# Patient Record
Sex: FEMALE | Race: WHITE | NOT HISPANIC OR LATINO | Employment: OTHER | URBAN - NONMETROPOLITAN AREA
[De-identification: names, ages, dates, MRNs, and addresses within clinical notes are randomized per-mention and may not be internally consistent; named-entity substitution may affect disease eponyms.]

---

## 2017-03-13 ENCOUNTER — TELEPHONE (OUTPATIENT)
Dept: GASTROENTEROLOGY | Facility: CLINIC | Age: 62
End: 2017-03-13

## 2017-03-13 NOTE — TELEPHONE ENCOUNTER
Patient called and said she is set up for colonoscopy with Dr. Cordero April 7 wants to know why she has to go to him to have colonoscopy's instead why can't you do them?

## 2017-03-14 NOTE — TELEPHONE ENCOUNTER
Called patient and told her to do this colonoscopy with Dr. Cordero since last time her polyp was so big if everything is ok this time Dr. Sparks will be glad to do her follow up colon scopes here .

## 2017-05-03 ENCOUNTER — LAB (OUTPATIENT)
Dept: LAB | Facility: HOSPITAL | Age: 62
End: 2017-05-03

## 2017-05-03 ENCOUNTER — OFFICE VISIT (OUTPATIENT)
Dept: GASTROENTEROLOGY | Facility: CLINIC | Age: 62
End: 2017-05-03

## 2017-05-03 VITALS
OXYGEN SATURATION: 98 % | DIASTOLIC BLOOD PRESSURE: 82 MMHG | SYSTOLIC BLOOD PRESSURE: 128 MMHG | HEIGHT: 64 IN | WEIGHT: 170 LBS | BODY MASS INDEX: 29.02 KG/M2 | HEART RATE: 120 BPM

## 2017-05-03 DIAGNOSIS — E66.9 OBESITY, UNSPECIFIED OBESITY SEVERITY, UNSPECIFIED OBESITY TYPE: ICD-10-CM

## 2017-05-03 DIAGNOSIS — R79.89 ABNORMAL LIVER FUNCTION TESTS: ICD-10-CM

## 2017-05-03 DIAGNOSIS — R79.89 ABNORMAL LIVER FUNCTION TESTS: Primary | ICD-10-CM

## 2017-05-03 DIAGNOSIS — F17.200 NEEDS SMOKING CESSATION EDUCATION: ICD-10-CM

## 2017-05-03 LAB
FERRITIN SERPL-MCNC: 47.3 NG/ML (ref 11.1–264)
HAV IGM SERPL QL IA: NEGATIVE
HBV SURFACE AB SER QL: <5
HBV SURFACE AB SER RIA-ACNC: ABNORMAL
HBV SURFACE AG SERPL QL IA: NEGATIVE
HCV AB SER DONR QL: NEGATIVE
HCV S/C RATIO: 0.14 (ref 0–0.99)

## 2017-05-03 PROCEDURE — 86803 HEPATITIS C AB TEST: CPT | Performed by: CLINICAL NURSE SPECIALIST

## 2017-05-03 PROCEDURE — 83516 IMMUNOASSAY NONANTIBODY: CPT | Performed by: CLINICAL NURSE SPECIALIST

## 2017-05-03 PROCEDURE — 82390 ASSAY OF CERULOPLASMIN: CPT | Performed by: CLINICAL NURSE SPECIALIST

## 2017-05-03 PROCEDURE — 86225 DNA ANTIBODY NATIVE: CPT | Performed by: CLINICAL NURSE SPECIALIST

## 2017-05-03 PROCEDURE — 87340 HEPATITIS B SURFACE AG IA: CPT | Performed by: CLINICAL NURSE SPECIALIST

## 2017-05-03 PROCEDURE — 82104 ALPHA-1-ANTITRYPSIN PHENO: CPT | Performed by: CLINICAL NURSE SPECIALIST

## 2017-05-03 PROCEDURE — 86038 ANTINUCLEAR ANTIBODIES: CPT | Performed by: CLINICAL NURSE SPECIALIST

## 2017-05-03 PROCEDURE — 82103 ALPHA-1-ANTITRYPSIN TOTAL: CPT | Performed by: CLINICAL NURSE SPECIALIST

## 2017-05-03 PROCEDURE — 86706 HEP B SURFACE ANTIBODY: CPT | Performed by: CLINICAL NURSE SPECIALIST

## 2017-05-03 PROCEDURE — 99213 OFFICE O/P EST LOW 20 MIN: CPT | Performed by: CLINICAL NURSE SPECIALIST

## 2017-05-03 PROCEDURE — 86709 HEPATITIS A IGM ANTIBODY: CPT | Performed by: CLINICAL NURSE SPECIALIST

## 2017-05-03 PROCEDURE — 36415 COLL VENOUS BLD VENIPUNCTURE: CPT | Performed by: CLINICAL NURSE SPECIALIST

## 2017-05-03 PROCEDURE — 86376 MICROSOMAL ANTIBODY EACH: CPT | Performed by: CLINICAL NURSE SPECIALIST

## 2017-05-03 PROCEDURE — 82728 ASSAY OF FERRITIN: CPT | Performed by: CLINICAL NURSE SPECIALIST

## 2017-05-04 LAB
ACTIN IGG SERPL-ACNC: 62 UNITS (ref 0–19)
ALP LIVER CFR SERPL: <1 UNITS (ref 0–20)
ANA SER QL: POSITIVE
CERULOPLASMIN SERPL-MCNC: 42.8 MG/DL (ref 19–39)
DEPRECATED MITOCHONDRIA M2 IGG SER-ACNC: <20 UNITS (ref 0–20)
DSDNA AB SER-ACNC: 13 IU/ML (ref 0–9)
Lab: ABNORMAL

## 2017-05-05 LAB
A1AT SERPL-MCNC: 233 MG/DL (ref 90–200)
PHENOTYPE: ABNORMAL

## 2017-05-10 ENCOUNTER — HOSPITAL ENCOUNTER (OUTPATIENT)
Dept: ULTRASOUND IMAGING | Facility: HOSPITAL | Age: 62
Discharge: HOME | End: 2017-05-10
Admitting: CLINICAL NURSE SPECIALIST

## 2017-05-10 PROCEDURE — 76705 ECHO EXAM OF ABDOMEN: CPT

## 2017-05-12 ENCOUNTER — TELEPHONE (OUTPATIENT)
Dept: GASTROENTEROLOGY | Facility: CLINIC | Age: 62
End: 2017-05-12

## 2017-05-23 ENCOUNTER — OFFICE VISIT (OUTPATIENT)
Dept: GASTROENTEROLOGY | Facility: CLINIC | Age: 62
End: 2017-05-23

## 2017-05-23 ENCOUNTER — APPOINTMENT (OUTPATIENT)
Dept: LAB | Facility: HOSPITAL | Age: 62
End: 2017-05-23

## 2017-05-23 VITALS
SYSTOLIC BLOOD PRESSURE: 132 MMHG | DIASTOLIC BLOOD PRESSURE: 80 MMHG | OXYGEN SATURATION: 98 % | WEIGHT: 177 LBS | HEART RATE: 98 BPM | BODY MASS INDEX: 30.22 KG/M2 | HEIGHT: 64 IN

## 2017-05-23 DIAGNOSIS — K76.0 FATTY LIVER: Primary | ICD-10-CM

## 2017-05-23 DIAGNOSIS — F17.200 TOBACCO USE DISORDER: ICD-10-CM

## 2017-05-23 DIAGNOSIS — R79.89 ABNORMAL LIVER FUNCTION TESTS: Primary | ICD-10-CM

## 2017-05-23 LAB
ALBUMIN SERPL-MCNC: 3.8 G/DL (ref 3.5–5)
ALBUMIN/GLOB SERPL: 0.8 G/DL (ref 1.1–2.5)
ALP SERPL-CCNC: 170 U/L (ref 24–120)
ALT SERPL W P-5'-P-CCNC: 43 U/L (ref 0–54)
ANION GAP SERPL CALCULATED.3IONS-SCNC: 11 MMOL/L (ref 4–13)
AST SERPL-CCNC: 51 U/L (ref 7–45)
BILIRUB SERPL-MCNC: 0.6 MG/DL (ref 0.1–1)
BUN BLD-MCNC: 18 MG/DL (ref 5–21)
BUN/CREAT SERPL: 26.1 (ref 7–25)
CALCIUM SPEC-SCNC: 9.1 MG/DL (ref 8.4–10.4)
CHLORIDE SERPL-SCNC: 107 MMOL/L (ref 98–110)
CO2 SERPL-SCNC: 24 MMOL/L (ref 24–31)
CREAT BLD-MCNC: 0.69 MG/DL (ref 0.5–1.4)
GFR SERPL CREATININE-BSD FRML MDRD: 87 ML/MIN/1.73
GLOBULIN UR ELPH-MCNC: 4.7 GM/DL
GLUCOSE BLD-MCNC: 141 MG/DL (ref 70–100)
POTASSIUM BLD-SCNC: 5.2 MMOL/L (ref 3.5–5.3)
PROT SERPL-MCNC: 8.5 G/DL (ref 6.3–8.7)
SODIUM BLD-SCNC: 142 MMOL/L (ref 135–145)

## 2017-05-23 PROCEDURE — 99213 OFFICE O/P EST LOW 20 MIN: CPT | Performed by: CLINICAL NURSE SPECIALIST

## 2017-05-23 PROCEDURE — 80053 COMPREHEN METABOLIC PANEL: CPT | Performed by: CLINICAL NURSE SPECIALIST

## 2017-05-23 PROCEDURE — 36415 COLL VENOUS BLD VENIPUNCTURE: CPT | Performed by: CLINICAL NURSE SPECIALIST

## 2017-05-30 DIAGNOSIS — R19.7 DIARRHEA, UNSPECIFIED TYPE: Primary | ICD-10-CM

## 2017-05-30 RX ORDER — MONTELUKAST SODIUM 4 MG/1
1 TABLET, CHEWABLE ORAL 2 TIMES DAILY
Qty: 180 TABLET | Refills: 3 | Status: SHIPPED | OUTPATIENT
Start: 2017-05-30 | End: 2018-08-08 | Stop reason: ALTCHOICE

## 2017-09-15 ENCOUNTER — LAB (OUTPATIENT)
Dept: LAB | Facility: HOSPITAL | Age: 62
End: 2017-09-15

## 2017-09-15 DIAGNOSIS — K76.0 FATTY LIVER: ICD-10-CM

## 2017-09-15 LAB
ALBUMIN SERPL-MCNC: 4.3 G/DL (ref 3.5–5)
ALBUMIN/GLOB SERPL: 1 G/DL (ref 1.1–2.5)
ALP SERPL-CCNC: 114 U/L (ref 24–120)
ALT SERPL W P-5'-P-CCNC: 49 U/L (ref 0–54)
ANION GAP SERPL CALCULATED.3IONS-SCNC: 11 MMOL/L (ref 4–13)
AST SERPL-CCNC: 38 U/L (ref 7–45)
BILIRUB SERPL-MCNC: 0.5 MG/DL (ref 0.1–1)
BUN BLD-MCNC: 14 MG/DL (ref 5–21)
BUN/CREAT SERPL: 20.9 (ref 7–25)
CALCIUM SPEC-SCNC: 9.4 MG/DL (ref 8.4–10.4)
CHLORIDE SERPL-SCNC: 109 MMOL/L (ref 98–110)
CO2 SERPL-SCNC: 22 MMOL/L (ref 24–31)
CREAT BLD-MCNC: 0.67 MG/DL (ref 0.5–1.4)
GFR SERPL CREATININE-BSD FRML MDRD: 89 ML/MIN/1.73
GLOBULIN UR ELPH-MCNC: 4.5 GM/DL
GLUCOSE BLD-MCNC: 100 MG/DL (ref 70–100)
POTASSIUM BLD-SCNC: 4.6 MMOL/L (ref 3.5–5.3)
PROT SERPL-MCNC: 8.8 G/DL (ref 6.3–8.7)
SODIUM BLD-SCNC: 142 MMOL/L (ref 135–145)

## 2017-09-15 PROCEDURE — 80053 COMPREHEN METABOLIC PANEL: CPT | Performed by: CLINICAL NURSE SPECIALIST

## 2017-09-15 PROCEDURE — 36415 COLL VENOUS BLD VENIPUNCTURE: CPT

## 2017-09-21 DIAGNOSIS — R79.89 ELEVATED LIVER FUNCTION TESTS: Primary | ICD-10-CM

## 2018-03-18 ENCOUNTER — APPOINTMENT (OUTPATIENT)
Dept: ULTRASOUND IMAGING | Facility: HOSPITAL | Age: 63
End: 2018-03-18

## 2018-03-18 ENCOUNTER — APPOINTMENT (OUTPATIENT)
Dept: GENERAL RADIOLOGY | Facility: HOSPITAL | Age: 63
End: 2018-03-18

## 2018-03-18 ENCOUNTER — APPOINTMENT (OUTPATIENT)
Dept: CT IMAGING | Facility: HOSPITAL | Age: 63
End: 2018-03-18

## 2018-03-18 ENCOUNTER — HOSPITAL ENCOUNTER (EMERGENCY)
Facility: HOSPITAL | Age: 63
Discharge: HOME | End: 2018-03-18
Attending: EMERGENCY MEDICINE | Admitting: EMERGENCY MEDICINE

## 2018-03-18 VITALS
RESPIRATION RATE: 16 BRPM | HEIGHT: 64 IN | OXYGEN SATURATION: 95 % | DIASTOLIC BLOOD PRESSURE: 89 MMHG | WEIGHT: 200 LBS | BODY MASS INDEX: 34.15 KG/M2 | SYSTOLIC BLOOD PRESSURE: 155 MMHG | HEART RATE: 98 BPM | TEMPERATURE: 97.9 F

## 2018-03-18 DIAGNOSIS — R07.9 CHEST PAIN, UNSPECIFIED TYPE: ICD-10-CM

## 2018-03-18 DIAGNOSIS — J44.1 COPD EXACERBATION (HCC): Primary | ICD-10-CM

## 2018-03-18 LAB
ANION GAP SERPL CALCULATED.3IONS-SCNC: 12 MMOL/L (ref 4–13)
BASOPHILS # BLD AUTO: 0.05 10*3/MM3 (ref 0–0.2)
BASOPHILS NFR BLD AUTO: 0.5 % (ref 0–2)
BUN BLD-MCNC: 15 MG/DL (ref 5–21)
BUN/CREAT SERPL: 21.1 (ref 7–25)
CALCIUM SPEC-SCNC: 9 MG/DL (ref 8.4–10.4)
CHLORIDE SERPL-SCNC: 111 MMOL/L (ref 98–110)
CO2 SERPL-SCNC: 19 MMOL/L (ref 24–31)
CREAT BLD-MCNC: 0.71 MG/DL (ref 0.5–1.4)
D DIMER PPP FEU-MCNC: 2.69 MG/L (FEU) (ref 0–0.5)
DEPRECATED RDW RBC AUTO: 50.2 FL (ref 40–54)
EOSINOPHIL # BLD AUTO: 0.04 10*3/MM3 (ref 0–0.7)
EOSINOPHIL NFR BLD AUTO: 0.4 % (ref 0–4)
ERYTHROCYTE [DISTWIDTH] IN BLOOD BY AUTOMATED COUNT: 17.7 % (ref 12–15)
GFR SERPL CREATININE-BSD FRML MDRD: 84 ML/MIN/1.73
GLUCOSE BLD-MCNC: 104 MG/DL (ref 70–100)
HCT VFR BLD AUTO: 37.5 % (ref 37–47)
HGB BLD-MCNC: 11.7 G/DL (ref 12–16)
IMM GRANULOCYTES # BLD: 0.13 10*3/MM3 (ref 0–0.03)
IMM GRANULOCYTES NFR BLD: 1.3 % (ref 0–5)
LYMPHOCYTES # BLD AUTO: 0.83 10*3/MM3 (ref 0.72–4.86)
LYMPHOCYTES NFR BLD AUTO: 8.6 % (ref 15–45)
MCH RBC QN AUTO: 24.5 PG (ref 28–32)
MCHC RBC AUTO-ENTMCNC: 31.2 G/DL (ref 33–36)
MCV RBC AUTO: 78.5 FL (ref 82–98)
MONOCYTES # BLD AUTO: 0.84 10*3/MM3 (ref 0.19–1.3)
MONOCYTES NFR BLD AUTO: 8.7 % (ref 4–12)
NEUTROPHILS # BLD AUTO: 7.77 10*3/MM3 (ref 1.87–8.4)
NEUTROPHILS NFR BLD AUTO: 80.5 % (ref 39–78)
NRBC BLD MANUAL-RTO: 0 /100 WBC (ref 0–0)
NT-PROBNP SERPL-MCNC: 171 PG/ML (ref 0–900)
PLATELET # BLD AUTO: 259 10*3/MM3 (ref 130–400)
PMV BLD AUTO: 9.4 FL (ref 6–12)
POTASSIUM BLD-SCNC: 4.3 MMOL/L (ref 3.5–5.3)
RBC # BLD AUTO: 4.78 10*6/MM3 (ref 4.2–5.4)
SODIUM BLD-SCNC: 142 MMOL/L (ref 135–145)
TROPONIN I SERPL-MCNC: <0.012 NG/ML (ref 0–0.03)
TROPONIN I SERPL-MCNC: <0.012 NG/ML (ref 0–0.03)
WBC NRBC COR # BLD: 9.66 10*3/MM3 (ref 4.8–10.8)

## 2018-03-18 PROCEDURE — 96374 THER/PROPH/DIAG INJ IV PUSH: CPT

## 2018-03-18 PROCEDURE — 71046 X-RAY EXAM CHEST 2 VIEWS: CPT

## 2018-03-18 PROCEDURE — 71275 CT ANGIOGRAPHY CHEST: CPT

## 2018-03-18 PROCEDURE — 99284 EMERGENCY DEPT VISIT MOD MDM: CPT

## 2018-03-18 PROCEDURE — 0 IOPAMIDOL PER 1 ML: Performed by: EMERGENCY MEDICINE

## 2018-03-18 PROCEDURE — 94799 UNLISTED PULMONARY SVC/PX: CPT

## 2018-03-18 PROCEDURE — 93010 ELECTROCARDIOGRAM REPORT: CPT | Performed by: INTERNAL MEDICINE

## 2018-03-18 PROCEDURE — 25010000002 DEXAMETHASONE PER 1 MG: Performed by: EMERGENCY MEDICINE

## 2018-03-18 PROCEDURE — 80048 BASIC METABOLIC PNL TOTAL CA: CPT | Performed by: EMERGENCY MEDICINE

## 2018-03-18 PROCEDURE — 93971 EXTREMITY STUDY: CPT

## 2018-03-18 PROCEDURE — 93971 EXTREMITY STUDY: CPT | Performed by: SURGERY

## 2018-03-18 PROCEDURE — 83880 ASSAY OF NATRIURETIC PEPTIDE: CPT | Performed by: EMERGENCY MEDICINE

## 2018-03-18 PROCEDURE — 84484 ASSAY OF TROPONIN QUANT: CPT | Performed by: EMERGENCY MEDICINE

## 2018-03-18 PROCEDURE — 93005 ELECTROCARDIOGRAM TRACING: CPT

## 2018-03-18 PROCEDURE — 85379 FIBRIN DEGRADATION QUANT: CPT | Performed by: EMERGENCY MEDICINE

## 2018-03-18 PROCEDURE — 94640 AIRWAY INHALATION TREATMENT: CPT

## 2018-03-18 PROCEDURE — 85025 COMPLETE CBC W/AUTO DIFF WBC: CPT | Performed by: EMERGENCY MEDICINE

## 2018-03-18 PROCEDURE — 93005 ELECTROCARDIOGRAM TRACING: CPT | Performed by: EMERGENCY MEDICINE

## 2018-03-18 RX ORDER — IPRATROPIUM BROMIDE AND ALBUTEROL SULFATE 2.5; .5 MG/3ML; MG/3ML
3 SOLUTION RESPIRATORY (INHALATION) ONCE
Status: COMPLETED | OUTPATIENT
Start: 2018-03-18 | End: 2018-03-18

## 2018-03-18 RX ORDER — DEXAMETHASONE SODIUM PHOSPHATE 10 MG/ML
10 INJECTION INTRAMUSCULAR; INTRAVENOUS ONCE
Status: COMPLETED | OUTPATIENT
Start: 2018-03-18 | End: 2018-03-18

## 2018-03-18 RX ORDER — PREDNISONE 20 MG/1
20 TABLET ORAL 2 TIMES DAILY
Qty: 10 TABLET | Refills: 0 | Status: SHIPPED | OUTPATIENT
Start: 2018-03-18 | End: 2018-08-08

## 2018-03-18 RX ORDER — DEXAMETHASONE 4 MG/1
10 TABLET ORAL ONCE
Status: DISCONTINUED | OUTPATIENT
Start: 2018-03-18 | End: 2018-03-18

## 2018-03-18 RX ORDER — OMEPRAZOLE 20 MG/1
20 CAPSULE, DELAYED RELEASE ORAL DAILY
COMMUNITY
End: 2019-05-21

## 2018-03-18 RX ORDER — METOPROLOL TARTRATE 50 MG/1
100 TABLET, FILM COATED ORAL NIGHTLY
COMMUNITY
End: 2019-08-10 | Stop reason: HOSPADM

## 2018-03-18 RX ORDER — AZITHROMYCIN 250 MG/1
250 TABLET, FILM COATED ORAL DAILY
Qty: 6 TABLET | Refills: 0 | Status: SHIPPED | OUTPATIENT
Start: 2018-03-18 | End: 2018-08-08

## 2018-03-18 RX ADMIN — IOPAMIDOL 120 ML: 755 INJECTION, SOLUTION INTRAVENOUS at 14:32

## 2018-03-18 RX ADMIN — IPRATROPIUM BROMIDE AND ALBUTEROL SULFATE 3 ML: 2.5; .5 SOLUTION RESPIRATORY (INHALATION) at 13:21

## 2018-03-18 RX ADMIN — DEXAMETHASONE SODIUM PHOSPHATE 10 MG: 10 INJECTION, SOLUTION INTRAMUSCULAR; INTRAVENOUS at 13:22

## 2018-03-18 NOTE — DISCHARGE INSTRUCTIONS
You should take ibuprofen every 6 hours for the next 3-5 days.  If your chest pain continues you should follow up with your primary care physician.  If it worsens in any way, you should return to the emergency department for further care.

## 2018-03-18 NOTE — ED PROVIDER NOTES
Subjective   Patient is a 62-year-old female who presents with chest pain.  History of COPD, fibromyalgia, hypercholesterolemia, hypertension.  Patient notes intermittent chest pain for the past 2 weeks.  Recent URI treated with antibiotics 1 month ago and improved.  Last evening, patient noted chest pain returned and has been constant since then.  Location is left upper chest with radiation to her left shoulder.  Denies any previous cardiac history.  Describes her pain as pressure.  Constant since last evening.  Denies any obvious exacerbating or relieving factors.  Denies any associated trauma.  Denies associated diaphoresis, nausea, vomiting.  Chest pain started patient was at rest last evening.  Nonexertional.  She does have chronic shortness of breath secondary to smoking and COPD and does use inhalers.  She notes some worsening shortness of breath and unclear if chronic or not.  She denies any fevers.  Denies any productive cough.  Denies any hemoptysis.        History provided by:  Patient      Review of Systems   Constitutional: Negative for fever.   HENT: Negative for sore throat.    Eyes: Negative for visual disturbance.   Respiratory: Positive for cough, shortness of breath and wheezing. Negative for chest tightness and stridor.    Cardiovascular: Positive for chest pain. Negative for palpitations and leg swelling.   Gastrointestinal: Negative for abdominal pain, constipation, diarrhea, nausea and vomiting.   Genitourinary: Negative for hematuria.   Musculoskeletal: Negative for back pain.   Skin: Negative for rash.   Neurological: Negative for dizziness, syncope, light-headedness and headaches.       Past Medical History:   Diagnosis Date   • Anemia    • Anxiety    • Arthritis    • Cancer of skin     BCC OF NOSE   • COPD (chronic obstructive pulmonary disease)    • Depression    • Fibromyalgia    • GERD (gastroesophageal reflux disease)    • Hx of colonic polyps    • Hypercholesteremia    • Hypertension         Allergies   Allergen Reactions   • Codeine    • Penicillins        Past Surgical History:   Procedure Laterality Date   • BREAST SURGERY     • CHOLECYSTECTOMY     • COLONOSCOPY  04/12/2017    Hemorrhoids and a few diverticula repeat exam in 3 years Dr. Cordero   • COLONOSCOPY W/ POLYPECTOMY  07/28/2016    Tubular adenoma proximal to the anus, 30 MM polyp in the cecum not resected, Diverticulosis ref to Dr. Cordero   • ENDOSCOPY  06/15/2016    Small hh, Negative for celiac disease   • RADICAL HYSTERECTOMY         Family History   Problem Relation Age of Onset   • Diabetes Sister    • Cancer Brother    • Colon cancer Neg Hx    • Colon polyps Neg Hx        Social History     Social History   • Marital status:      Social History Main Topics   • Smoking status: Current Every Day Smoker     Packs/day: 2.00   • Smokeless tobacco: Never Used   • Alcohol use No   • Drug use: No     Other Topics Concern   • Not on file       Lab Results (last 24 hours)     Procedure Component Value Units Date/Time    CBC & Differential [[id number]] Collected:  03/18/18 1320    Specimen:  Blood Updated:  03/18/18 1333    Narrative:       The following orders were created for panel order CBC & Differential.  Procedure                               Abnormality         Status                     ---------                               -----------         ------                     CBC Auto Differential[[id number]]        Abnormal            Final result                 Please view results for these tests on the individual orders.    D-dimer, Quantitative [[id number]]  (Abnormal) Collected:  03/18/18 1320    Specimen:  Blood from Arm, Left Updated:  03/18/18 1343     D-Dimer, Quantitative 2.69 (H) mg/L (FEU)     Narrative:       Reference Range is 0-0.50 mg/L FEU. However, results <0.50 mg/L FEU tends to rule out DVT or PE. Results >0.50 mg/L FEU are not useful in predicting absence or presence of DVT or PE.    CBC Auto Differential  [[id number]]  (Abnormal) Collected:  03/18/18 1320    Specimen:  Blood from Arm, Left Updated:  03/18/18 1333     WBC 9.66 10*3/mm3      RBC 4.78 10*6/mm3      Hemoglobin 11.7 (L) g/dL      Hematocrit 37.5 %      MCV 78.5 (L) fL      MCH 24.5 (L) pg      MCHC 31.2 (L) g/dL      RDW 17.7 (H) %      RDW-SD 50.2 fl      MPV 9.4 fL      Platelets 259 10*3/mm3      Neutrophil % 80.5 (H) %      Lymphocyte % 8.6 (L) %      Monocyte % 8.7 %      Eosinophil % 0.4 %      Basophil % 0.5 %      Immature Grans % 1.3 %      Neutrophils, Absolute 7.77 10*3/mm3      Lymphocytes, Absolute 0.83 10*3/mm3      Monocytes, Absolute 0.84 10*3/mm3      Eosinophils, Absolute 0.04 10*3/mm3      Basophils, Absolute 0.05 10*3/mm3      Immature Grans, Absolute 0.13 (H) 10*3/mm3      nRBC 0.0 /100 WBC     Basic Metabolic Panel [[id number]]  (Abnormal) Collected:  03/18/18 1344    Specimen:  Blood from Arm, Left Updated:  03/18/18 1404     Glucose 104 (H) mg/dL      BUN 15 mg/dL      Comment: Specimen hemolyzed. Results may be affected.        Creatinine 0.71 mg/dL      Sodium 142 mmol/L      Potassium 4.3 mmol/L      Comment: Specimen hemolyzed.  Results may be affected.        Chloride 111 (H) mmol/L      CO2 19.0 (L) mmol/L      Calcium 9.0 mg/dL      eGFR Non African Amer 84 mL/min/1.73      BUN/Creatinine Ratio 21.1     Anion Gap 12.0 mmol/L     Narrative:       GFR Normal >60  Chronic Kidney Disease <60  Kidney Failure <15    Troponin [[id number]]  (Normal) Collected:  03/18/18 1344    Specimen:  Blood from Arm, Left Updated:  03/18/18 1412     Troponin I <0.012 ng/mL     BNP [[id number]]  (Normal) Collected:  03/18/18 1344    Specimen:  Blood from Arm, Left Updated:  03/18/18 1412     proBNP 171.0 pg/mL     Troponin [[id number]]  (Normal) Collected:  03/18/18 1647    Specimen:  Blood from Arm, Right Updated:  03/18/18 7418     Troponin I <0.012 ng/mL           Objective   Physical Exam   Constitutional: She is oriented to person, place, and  time. She appears well-nourished. She is cooperative.  Non-toxic appearance. She does not have a sickly appearance. She does not appear ill. No distress.   HENT:   Head: Atraumatic.   Mouth/Throat: Oropharynx is clear and moist.   Eyes: EOM are normal. Pupils are equal, round, and reactive to light.   Neck: Normal range of motion. Neck supple. No JVD present.   Cardiovascular: Normal rate, regular rhythm and normal heart sounds.  Exam reveals no gallop and no friction rub.    No murmur heard.  Pulmonary/Chest: Effort normal. No stridor. No tachypnea. No respiratory distress. She has wheezes.   Abdominal: Soft. There is no tenderness.   Musculoskeletal: Normal range of motion. She exhibits no edema, tenderness or deformity.        Right knee: Normal.        Left knee: Normal.        Right ankle: Normal.        Left ankle: Normal.        Right lower leg: Normal.        Left lower leg: Normal.   Neurological: She is alert and oriented to person, place, and time. No cranial nerve deficit.   Skin: Skin is warm and dry. She is not diaphoretic.   Psychiatric: She has a normal mood and affect.   Nursing note and vitals reviewed.      ECG 12 Lead    Date/Time: 3/18/2018 1:01 PM  Performed by: MAYA SILVA  Authorized by: MAYA SILVA   Interpreted by physician  Comparison: not compared with previous ECG   Rhythm: sinus rhythm  Rate: normal  BPM: 98  QRS axis: normal  Conduction: conduction normal  ST Segments: ST segments normal  T Waves: T waves normal  Other: no other findings  Clinical impression: normal ECG               CT Angiogram Chest With Contrast   Final Result   1. No CT angiographic evidence of pulmonary embolus or acute aortic   pathology.    2. Pulmonary emphysema and chronic lung changes.   This report was finalized on 03/18/2018 14:45 by Dr. Carl Peguero MD.      XR Chest 2 View   Final Result   1. No acute cardiopulmonary process.           This report was finalized on 03/18/2018 14:18 by   "Carl Peguero MD.      US Venous Doppler Lower Extremity Left (duplex)    (Results Pending)       /82 (BP Location: Left arm, Patient Position: Lying)   Pulse 93   Temp 97.9 °F (36.6 °C) (Temporal Artery )   Resp 16   Ht 162.6 cm (64\")   Wt 90.7 kg (200 lb)   SpO2 94%   BMI 34.33 kg/m²     ED Course    ED Course   Value Comment By Time   D-dimer, Quant: (!) 2.69 (Reviewed) Milan Caballero MD 03/18 1350    This is a 62-year-old female who presents in setting of chest pain with recent URI.  Patient is well-appearing on exam.  Initially hypertensive and tachycardic but resolved.  EKG normal sinus.  Troponin negative.  Patient was complaining of left leg swelling but exam was unremarkable.  Her d-dimer was elevated.  Chest x-ray negative.  CTA negative for PE.  Venous Doppler ultrasound negative for DVT.  BNP normal.  We are pending repeat troponin. Milan Caballero MD 03/18 2131    Repeat troponin negative.  Patient remains well-appearing on reevaluation.  We will discharge home with prednisone and azithromycin.  Follow up with PCP if chest pain continues. Milan Caballero MD 03/18 6658       Medications   ipratropium-albuterol (DUO-NEB) nebulizer solution 3 mL (3 mL Nebulization Given 3/18/18 1321)   dexamethasone (DECADRON) injection 10 mg (10 mg Intravenous Given 3/18/18 1322)   iopamidol (ISOVUE-370) 76 % injection 200 mL (120 mL Intravenous Given 3/18/18 1432)           HEART Score (for prediction of 6-week risk of major adverse cardiac event) reviewed and/or performed as part of the patient evaluation and treatment planning process.  The result associated with this review/performance is: 2    PERC Rule (for pulmonary embolism) reviewed and/or performed as part of the patient evaluation and treatment planning process.  The result associated with this review/performance is: 1      MDM  Number of Diagnoses or Management Options  Chest pain, unspecified type: new and requires workup  COPD " exacerbation: new and requires workup     Amount and/or Complexity of Data Reviewed  Clinical lab tests: reviewed and ordered  Tests in the radiology section of CPT®: ordered and reviewed  Decide to obtain previous medical records or to obtain history from someone other than the patient: yes    Risk of Complications, Morbidity, and/or Mortality  Presenting problems: moderate  Diagnostic procedures: moderate  Management options: low    Patient Progress  Patient progress: improved      Final diagnoses:   COPD exacerbation   Chest pain, unspecified type          Milan Caballero MD  03/18/18 9547

## 2018-03-18 NOTE — ED NOTES
Pt reports off and on again chest pain for a couple of weeks. Pt reports being seen for this pain and was told she was ok. Pt reports having a URI that has been treated. Pt reports coughing due to COPD. Pt states her chest hurts, left shoulder and across upper shoulders posterior.     Jolly Blum  03/18/18 1797

## 2018-04-20 ENCOUNTER — APPOINTMENT (OUTPATIENT)
Dept: GENERAL RADIOLOGY | Facility: HOSPITAL | Age: 63
End: 2018-04-20

## 2018-04-20 ENCOUNTER — APPOINTMENT (OUTPATIENT)
Dept: ULTRASOUND IMAGING | Facility: HOSPITAL | Age: 63
End: 2018-04-20

## 2018-04-20 ENCOUNTER — HOSPITAL ENCOUNTER (EMERGENCY)
Facility: HOSPITAL | Age: 63
Discharge: HOME | End: 2018-04-20
Admitting: EMERGENCY MEDICINE

## 2018-04-20 VITALS
TEMPERATURE: 98.9 F | HEART RATE: 96 BPM | DIASTOLIC BLOOD PRESSURE: 98 MMHG | WEIGHT: 198 LBS | SYSTOLIC BLOOD PRESSURE: 137 MMHG | RESPIRATION RATE: 20 BRPM | HEIGHT: 64 IN | OXYGEN SATURATION: 97 % | BODY MASS INDEX: 33.8 KG/M2

## 2018-04-20 DIAGNOSIS — S82.831A CLOSED FRACTURE OF PROXIMAL END OF RIGHT FIBULA, UNSPECIFIED FRACTURE MORPHOLOGY, INITIAL ENCOUNTER: Primary | ICD-10-CM

## 2018-04-20 PROCEDURE — 93971 EXTREMITY STUDY: CPT | Performed by: SURGERY

## 2018-04-20 PROCEDURE — 73590 X-RAY EXAM OF LOWER LEG: CPT

## 2018-04-20 PROCEDURE — 93971 EXTREMITY STUDY: CPT

## 2018-04-20 PROCEDURE — 99284 EMERGENCY DEPT VISIT MOD MDM: CPT

## 2018-04-20 RX ORDER — OXYCODONE AND ACETAMINOPHEN 7.5; 325 MG/1; MG/1
1 TABLET ORAL ONCE
Status: COMPLETED | OUTPATIENT
Start: 2018-04-20 | End: 2018-04-20

## 2018-04-20 RX ORDER — OXYCODONE AND ACETAMINOPHEN 7.5; 325 MG/1; MG/1
1 TABLET ORAL EVERY 4 HOURS PRN
Qty: 12 TABLET | Refills: 0 | Status: SHIPPED | OUTPATIENT
Start: 2018-04-20 | End: 2018-08-08

## 2018-04-20 RX ADMIN — OXYCODONE AND ACETAMINOPHEN 1 TABLET: 7.5; 325 TABLET ORAL at 11:32

## 2018-04-20 NOTE — DISCHARGE INSTRUCTIONS
Cast or Splint Care, Adult  Casts and splints are supports that are worn to protect broken bones and other injuries. A cast or splint may hold a bone still and in the correct position while it heals. Casts and splints may also help ease pain, swelling, and muscle spasms.  A cast is a hardened support that is usually made of fiberglass or plaster. It is custom-fit to the body and it offers more protection than a splint. It cannot be taken off and put back on. A splint is a type of soft support that is usually made from cloth and elastic. It can be adjusted or taken off as needed.  You may need a cast or a splint if you:  · Have a broken bone.  · Have a soft-tissue injury.  · Need to keep an injured body part from moving (keep it immobile) after surgery.  How is this treated?  If you have a cast:   · Do not stick anything inside the cast to scratch your skin. Sticking something in the cast increases your risk of infection.  · Check the skin around the cast every day. Tell your health care provider about any concerns.  · You may put lotion on dry skin around the edges of the cast. Do not put lotion on the skin underneath the cast.  · Keep the cast clean.  · If the cast is not waterproof:  ¨ Do not let it get wet.  ¨ Cover it with a watertight covering when you take a bath or a shower.  If you have a splint:   · Wear it as told by your health care provider. Remove it only as told by your health care provider.  · Loosen the splint if your fingers or toes tingle, become numb, or turn cold and blue.  · Keep the splint clean.  · If the splint is not waterproof:  ¨ Do not let it get wet.  ¨ Cover it with a watertight covering when you take a bath or a shower.  Bathing   · Do not take baths or swim until your health care provider approves. Ask your health care provider if you can take showers. You may only be allowed to take sponge baths for bathing.  · If your cast or splint is not waterproof, cover it with a watertight  covering when you take a bath or shower.  Managing pain, stiffness, and swelling   · Move your fingers or toes often to avoid stiffness and to lessen swelling.  · Raise (elevate) the injured area above the level of your heart while sitting or lying down.  Safety   · Do not use the injured limb to support your body weight until your health care provider says that it is okay.  · Use crutches or other assistive devices as told by your health care provider.  General instructions   · Do not put pressure on any part of the cast or splint until it is fully hardened. This may take several hours.  · Return to your normal activities as told by your health care provider. Ask your health care provider what activities are safe for you.  · Take over-the-counter and prescription medicines only as told by your health care provider.  · Keep all follow-up visits as told by your health care provider. This is important.  Contact a health care provider if:  · Your cast or splint gets damaged.  · The skin around the cast gets red or raw.  · The skin under the cast is extremely itchy or painful.  · Your cast or splint feels very uncomfortable.  · Your cast or splint is too tight or too loose.  · Your cast becomes wet or it develops a soft spot or area.  · You get an object stuck under your cast.  Get help right away if:  · Your pain is getting worse.  · The injured area tingles, becomes numb, or turns cold and blue.  · The part of your body above or below the cast is swollen and discolored.  · You cannot feel or move your fingers or toes.  · There is fluid leaking through the cast.  · You have severe pain or pressure under the cast.  · You have trouble breathing.  · You have shortness of breath.  · You have chest pain.  This information is not intended to replace advice given to you by your health care provider. Make sure you discuss any questions you have with your health care provider.  Document Released: 12/15/2001 Document Revised:  07/08/2017 Document Reviewed: 06/10/2017  [employer] Interactive Patient Education © 2017 [employer]  Fibular Fracture Rehab  Ask your health care provider which exercises are safe for you. Do exercises exactly as told by your health care provider and adjust them as directed. It is normal to feel mild stretching, pulling, tightness, or discomfort as you do these exercises, but you should stop right away if you feel sudden pain or your pain gets worse. Do not begin these exercises until told by your health care provider.  Stretching and range of motion exercises  These exercises warm up your muscles and joints and improve the movement and flexibility of your lower leg. These exercises also help to relieve pain, numbness, and tingling.  Exercise A: Calf stretch, seated     1. Sit with your left / right leg extended.  2. Hold onto both ends of a belt or towel and loop it around the ball of your left / right foot. The ball of your foot is on the walking surface, right under your toes.  3. Keep your left / right ankle and foot relaxed and keep your knee straight while you use the belt or towel to pull your foot and ankle toward you. You should feel a gentle stretch behind your calf or knee.  4. Hold this position for __________ seconds.  Repeat __________ times. Complete this exercise __________ times a day.  Exercise B: Ankle alphabet     1. Sit with your left / right leg supported at the lower leg.  ¨ Do not rest your foot on anything.  ¨ Make sure your foot has room to move freely.  2. Think of your left / right foot as a paintbrush, and move your foot to trace each letter of the alphabet in the air. Keep your hip and knee still while you trace. Make the letters as large as you can without feeling discomfort.  3. Trace every letter from A to Z.  Repeat __________ times. Complete this exercise __________ times a day.  Exercise C: Passive ankle dorsiflexion     1. Remove your shoes and sit on a chair that is placed on  a non-carpeted surface.  2. Place your left / right foot on the floor, directly under your knee. Extend your other leg for support.  3. Keeping your heel down, slide your left / right foot back toward the chair until you feel a stretch at your ankle or calf. If you do not feel a stretch, slide your buttocks forward to the edge of the chair while still keeping your heel down.  4. Hold this stretch for __________ seconds.  Repeat __________ times. Complete this exercise __________ times a day.  Strengthening exercises  These exercises build strength and endurance in your lower leg. Endurance is the ability to use your muscles for a long time, even after they get tired.  Exercise D: Dorsiflexion     1. Secure a rubber exercise band or tubing to a fixed object, such as a table or pole.  2. Secure the other end of the band around your left / right foot.  3. Sit on the floor, facing the fixed object. The band should be slightly tense when your foot is relaxed.  4. Slowly use your ankle muscles to pull your foot toward you.  5. Hold this position for __________ seconds.  6. Slowly release the tension in the band and return your foot to the starting position.  Repeat __________ times. Complete this exercise __________ times a day.  Exercise E: Plantar flexion     1. Sit with your left / right leg extended.  2. Loop a rubber exercise band or tubing around the ball of your left / right foot. The ball of your foot is on the walking surface, right under your toes.  3. Hold onto both ends of the band or tubing.  4. Slowly push your foot and toes away from you, pointing them downward.  5. Hold this position for __________ seconds.  6. Control the tension in the band as you slowly return to the starting position.  Repeat __________ times. Complete this exercise __________ times a day.  Exercise F: Ankle eversion with band   1. Secure one end of a rubber exercise band or tubing to a fixed object, such as a table leg or a pole, that  will stay still when the band is pulled.  2. Loop the other end of the band around the middle of your left / right foot.  3. Sit on the floor, facing the fixed object. The band should be slightly tense when your foot is relaxed.  4. Make fists with your hands and put them between your knees. This will focus your strengthening at your ankle.  5. Leading with your little toe, slowly push your banded foot outward, away from your body. Make sure the band is positioned to resist the entire motion. Do not move your heel.  6. Hold this position for __________ seconds.  7. Control the tension in the band as you slowly return to the starting position.  Repeat __________ times. Complete this exercise __________ times a day.  Exercise G: Ankle inversion with band   1. Secure one end of a rubber exercise band or tubing to a fixed object, such as a table leg or a pole, that will stay still when the band is pulled.  2. Loop the other end of the band around your left / right foot, near your toes.  3. Sit on the floor, facing the fixed object. The band should be slightly tense when your foot is relaxed.  4. Make fists with your hands and put them between your knees. This will focus your strengthening at your ankle.  5. Leading with your big toe, slowly pull your banded foot inward, toward your other leg. Make sure the band is positioned to resist the entire motion. Do not move your heel.  6. Hold this position for __________ seconds.  7. Control the tension in the band as you slowly return to the starting position.  Repeat __________ times. Complete this exercise __________ times a day.  This information is not intended to replace advice given to you by your health care provider. Make sure you discuss any questions you have with your health care provider.  Document Released: 12/18/2006 Document Revised: 08/24/2017 Document Reviewed: 10/27/2016  [employer] Interactive Patient Education © 2017 [employer]

## 2018-04-20 NOTE — ED PROVIDER NOTES
Subjective   Pt is a  62-year-old white female presents with right lower leg pain for the past 2.5 weeks. She states that she had slipped and twisted her right lower leg at that time. She states that she was seen [facility] in [place], [place] and was advised that she had a sprain. She states that she did not have an xray at that time. She states that she has continued to have pain with ambulation and now has noticed bruising. She denies any injury since this incident.         History provided by:  Patient   used: No        Review of Systems   Constitutional: Negative.    HENT: Negative.    Eyes: Negative.    Respiratory: Negative.    Cardiovascular: Negative.    Gastrointestinal: Negative.    Endocrine: Negative.    Genitourinary: Negative.    Musculoskeletal: Negative.         Pt is a  62-year-old white female presents with right lower leg pain for the past 2.5 weeks. She states that she had slipped and twisted her right lower leg at that time. She states that she was [facility] in [place] and was advised that she had a sprain. She states that she did not have an xray at that time. She states that she has continued to have pain with ambulation and now has noticed bruising. She denies any injury since this incident.      Skin: Negative.    Allergic/Immunologic: Negative.    Neurological: Negative.    Hematological: Negative.    Psychiatric/Behavioral: Negative.    All other systems reviewed and are negative.      Past Medical History:   Diagnosis Date   • Anemia    • Anxiety    • Arthritis    • Cancer of skin     BCC OF NOSE   • COPD (chronic obstructive pulmonary disease)    • Depression    • Fibromyalgia    • GERD (gastroesophageal reflux disease)    • Hx of colonic polyps    • Hypercholesteremia    • Hypertension        Allergies   Allergen Reactions   • Codeine    • Penicillins        Past Surgical History:   Procedure Laterality Date   • BREAST SURGERY     • CHOLECYSTECTOMY     •  COLONOSCOPY  04/12/2017    Hemorrhoids and a few diverticula repeat exam in 3 years Dr. Cordero   • COLONOSCOPY W/ POLYPECTOMY  07/28/2016    Tubular adenoma proximal to the anus, 30 MM polyp in the cecum not resected, Diverticulosis ref to Dr. Cordero   • ENDOSCOPY  06/15/2016    Small hh, Negative for celiac disease   • RADICAL HYSTERECTOMY         Family History   Problem Relation Age of Onset   • Diabetes Sister    • Cancer Brother    • Colon cancer Neg Hx    • Colon polyps Neg Hx        Social History     Social History   • Marital status:      Social History Main Topics   • Smoking status: Current Every Day Smoker     Packs/day: 2.00   • Smokeless tobacco: Never Used   • Alcohol use No   • Drug use: No   • Sexual activity: Defer     Other Topics Concern   • Not on file       Prior to Admission medications    Medication Sig Start Date End Date Taking? Authorizing Provider   ARIPiprazole (ABILIFY) 10 MG tablet  9/19/16   Historical Provider, MD   azithromycin (ZITHROMAX) 250 MG tablet Take 1 tablet by mouth Daily. Take 2 tablets the first day, then 1 tablet daily for 4 days. 3/18/18   Milan Caballero MD   colestipol (COLESTID) 1 G tablet  9/24/16   Historical Provider, MD   colestipol (COLESTID) 1 G tablet Take 1 tablet by mouth 2 (Two) Times a Day. 5/30/17   YOLY Hannah   DULoxetine (CYMBALTA) 60 MG capsule  9/19/16   Historical Provider, MD   metoprolol tartrate (LOPRESSOR) 50 MG tablet Take 50 mg by mouth 2 (Two) Times a Day.    Historical Provider, MD   omeprazole (priLOSEC) 20 MG capsule Take 20 mg by mouth Daily.    Historical Provider, MD   predniSONE (DELTASONE) 20 MG tablet Take 1 tablet by mouth 2 (Two) Times a Day. 3/18/18   Milan Caballero MD   PROAIR  (90 BASE) MCG/ACT inhaler USE TWO PUFFS BY MOUTH EVERY 4 TO 6 HOURS AS NEEDED FOR WHEEZING 9/19/16   Historical Provider, MD   SYMBICORT 160-4.5 MCG/ACT inhaler  8/1/16   Historical Provider, MD       /98 Comment:  "'will take my b/p this evening'  Pulse 96   Temp 98.9 °F (37.2 °C)   Resp 20   Ht 162.6 cm (64\")   Wt 89.8 kg (198 lb)   SpO2 97%   BMI 33.99 kg/m²     Objective   Physical Exam   Constitutional: She is oriented to person, place, and time. She appears well-developed and well-nourished.   HENT:   Head: Normocephalic and atraumatic.   Eyes: Conjunctivae and EOM are normal. Pupils are equal, round, and reactive to light.   Neck: Normal range of motion. Neck supple. No tracheal deviation present. No thyromegaly present.   Cardiovascular: Normal rate, regular rhythm, normal heart sounds and intact distal pulses.    Pulmonary/Chest: Effort normal and breath sounds normal. No respiratory distress. She has no wheezes. She has no rales. She exhibits no tenderness.   Abdominal: Soft. Bowel sounds are normal.   Musculoskeletal:   RLE: moderate tenderness on palpation to anterior and posterior aspect of right tib/fib. She does have calf tenderness. Mild ecchymosis noted to anterior tib/fib area. Pedal pulses palp.    Neurological: She is alert and oriented to person, place, and time. She has normal reflexes. No cranial nerve deficit.   Skin: Skin is warm and dry.   Psychiatric: She has a normal mood and affect. Her behavior is normal. Judgment and thought content normal.   Nursing note and vitals reviewed.      Procedures         Lab Results (last 24 hours)     ** No results found for the last 24 hours. **          US Venous Doppler Lower Extremity Right (duplex)   Final Result   Impression: There is no evidence of deep venous thrombosis or   superficial thrombophlebitis of the right lower extremity.       Comments: Right lower extremity venous duplex exam was performed using   color Doppler flow, Doppler wave form analysis, and grayscale imaging,   with and without compression. There is no evidence of deep venous   thrombosis of the common femoral, superficial femoral, popliteal,   posterior tibial, and peroneal veins. " There is no thrombus identified in   the saphenofemoral junction or the greater saphenous vein. There is no   evidence of clot in the left common femoral vein.       This report was finalized on 04/20/2018 15:50 by Dr. Tim Carrasquillo MD.      XR Tibia Fibula 2 View Right   ED Interpretation   1. Minimally displaced proximal fibular fracture.   This report was finalized on 04/20/2018 11:55 by Dr. Carl Peguero MD.      Final Result   1. Minimally displaced proximal fibular fracture.   This report was finalized on 04/20/2018 11:55 by Dr. Carl Peguero MD.          ED Course  ED Course   Comment By Time   Reviewed pt and pt care plan with dr wu- also in agreement with pt care plan- pt will be placed in knee immobilizer have follow up with ortho. Ivan report completed #[id number]. No signs of suspicious activity noted. Will prescribe small amt of pain medication for acute pain. Reviewed side effects and potential for abuse YOLY Barry 04/20 1243          MDM    Final diagnoses:   Closed fracture of proximal end of right fibula, unspecified fracture morphology, initial encounter     \     YOLY Barry  04/20/18 0768

## 2018-05-14 DIAGNOSIS — K58.0 IRRITABLE BOWEL SYNDROME WITH DIARRHEA: Primary | ICD-10-CM

## 2018-05-14 RX ORDER — MONTELUKAST SODIUM 4 MG/1
TABLET, CHEWABLE ORAL
Qty: 180 TABLET | Refills: 3 | Status: SHIPPED | OUTPATIENT
Start: 2018-05-14 | End: 2018-11-26

## 2018-08-08 ENCOUNTER — OFFICE VISIT (OUTPATIENT)
Dept: GASTROENTEROLOGY | Facility: CLINIC | Age: 63
End: 2018-08-08

## 2018-08-08 ENCOUNTER — APPOINTMENT (OUTPATIENT)
Dept: LAB | Facility: HOSPITAL | Age: 63
End: 2018-08-08

## 2018-08-08 VITALS
SYSTOLIC BLOOD PRESSURE: 120 MMHG | HEART RATE: 90 BPM | OXYGEN SATURATION: 98 % | DIASTOLIC BLOOD PRESSURE: 80 MMHG | HEIGHT: 64 IN | BODY MASS INDEX: 32.95 KG/M2 | WEIGHT: 193 LBS

## 2018-08-08 DIAGNOSIS — R19.7 DIARRHEA IN ADULT PATIENT: Primary | ICD-10-CM

## 2018-08-08 LAB
ADV 40+41 DNA STL QL NAA+NON-PROBE: NOT DETECTED
ASTRO TYP 1-8 RNA STL QL NAA+NON-PROBE: NOT DETECTED
C CAYETANENSIS DNA STL QL NAA+NON-PROBE: NOT DETECTED
C DIFF TOX GENS STL QL NAA+PROBE: NOT DETECTED
CAMPY SP DNA.DIARRHEA STL QL NAA+PROBE: NOT DETECTED
CRYPTOSP STL CULT: NOT DETECTED
E COLI DNA SPEC QL NAA+PROBE: NOT DETECTED
E HISTOLYT AG STL-ACNC: NOT DETECTED
EAEC PAA PLAS AGGR+AATA ST NAA+NON-PRB: NOT DETECTED
EC STX1 + STX2 GENES STL NAA+PROBE: NOT DETECTED
EPEC EAE GENE STL QL NAA+NON-PROBE: NOT DETECTED
ETEC LTA+ST1A+ST1B TOX ST NAA+NON-PROBE: NOT DETECTED
G LAMBLIA DNA SPEC QL NAA+PROBE: NOT DETECTED
NOROVIRUS GI+II RNA STL QL NAA+NON-PROBE: NOT DETECTED
P SHIGELLOIDES DNA STL QL NAA+NON-PROBE: NOT DETECTED
RV RNA STL NAA+PROBE: NOT DETECTED
SALMONELLA DNA SPEC QL NAA+PROBE: NOT DETECTED
SAPO I+II+IV+V RNA STL QL NAA+NON-PROBE: NOT DETECTED
SHIGELLA SP+EIEC IPAH STL QL NAA+PROBE: NOT DETECTED
V CHOLERAE DNA SPEC QL NAA+PROBE: NOT DETECTED
VIBRIO DNA SPEC NAA+PROBE: NOT DETECTED
YERSINIA STL CULT: NOT DETECTED

## 2018-08-08 PROCEDURE — 87507 IADNA-DNA/RNA PROBE TQ 12-25: CPT | Performed by: CLINICAL NURSE SPECIALIST

## 2018-08-08 PROCEDURE — 87205 SMEAR GRAM STAIN: CPT | Performed by: CLINICAL NURSE SPECIALIST

## 2018-08-08 PROCEDURE — 99213 OFFICE O/P EST LOW 20 MIN: CPT | Performed by: CLINICAL NURSE SPECIALIST

## 2018-08-08 RX ORDER — METRONIDAZOLE 500 MG/1
500 TABLET ORAL 3 TIMES DAILY
Qty: 30 TABLET | Refills: 0 | Status: SHIPPED | OUTPATIENT
Start: 2018-08-08 | End: 2018-08-22

## 2018-08-08 NOTE — PROGRESS NOTES
Heather Russo  1955 8/8/2018  Chief Complaint   Patient presents with   • GI Problem     Chronic diarrhea     Subjective   HPI  Heather Russo is a 62 y.o. female who presents with a complaint of diarrhea up to 5-6 times per day that began 10 weeks ago moderate to severe at times. No specific triggers. No alleviating factors. She has associated fatigue and weakness. No fever . No wt loss. She says that she was on antibiotics prior for dental work prior to this starting. She denies any hx of Cdiff in the past. She has has small BRBPR on the tissue when she wipes. She says that she has known hemorrhoids as well that could be the source. She takes Colestid for chronic diarrhea that usually manages well and this is different she says. She has a hx of collagenous colitis years ago.    Past Medical History:   Diagnosis Date   • Anemia    • Anxiety    • Arthritis    • Cancer of skin     BCC OF NOSE   • COPD (chronic obstructive pulmonary disease) (CMS/HCC)    • Depression    • Fibromyalgia    • GERD (gastroesophageal reflux disease)    • Hx of colonic polyps    • Hypercholesteremia    • Hypertension      Past Surgical History:   Procedure Laterality Date   • BREAST SURGERY     • CHOLECYSTECTOMY     • COLONOSCOPY  04/12/2017    Hemorrhoids and a few diverticula repeat exam in 3 years Dr. Cordero   • COLONOSCOPY W/ POLYPECTOMY  07/28/2016    Tubular adenoma proximal to the anus, 30 MM polyp in the cecum not resected, Diverticulosis ref to Dr. Cordero   • ENDOSCOPY  06/15/2016    Small hh, Negative for celiac disease   • RADICAL HYSTERECTOMY       Outpatient Prescriptions Marked as Taking for the 8/8/18 encounter (Office Visit) with Becca Blackwood APRN   Medication Sig Dispense Refill   • ARIPiprazole (ABILIFY) 10 MG tablet      • colestipol (COLESTID) 1 g tablet TAKE 1 TABLET TWICE A  tablet 3   • DULoxetine (CYMBALTA) 60 MG capsule      • metoprolol tartrate (LOPRESSOR) 50 MG tablet Take 50 mg by mouth 2 (Two)  Times a Day.     • omeprazole (priLOSEC) 20 MG capsule Take 20 mg by mouth Daily.     • PROAIR  (90 BASE) MCG/ACT inhaler USE TWO PUFFS BY MOUTH EVERY 4 TO 6 HOURS AS NEEDED FOR WHEEZING  2   • SYMBICORT 160-4.5 MCG/ACT inhaler        Allergies   Allergen Reactions   • Codeine    • Penicillins      Social History     Social History   • Marital status:      Spouse name: N/A   • Number of children: N/A   • Years of education: N/A     Occupational History   • Not on file.     Social History Main Topics   • Smoking status: Current Every Day Smoker     Packs/day: 1.50   • Smokeless tobacco: Never Used   • Alcohol use No   • Drug use: No   • Sexual activity: Defer     Other Topics Concern   • Not on file     Social History Narrative   • No narrative on file     Family History   Problem Relation Age of Onset   • Diabetes Sister    • Cancer Brother    • Colon cancer Neg Hx    • Colon polyps Neg Hx      Health Maintenance   Topic Date Due   • ANNUAL PHYSICAL  08/11/1959   • PNEUMOCOCCAL VACCINE (19-64 MEDIUM RISK) (1 of 1 - PPSV23) 08/11/1975   • TDAP/TD VACCINES (1 - Tdap) 08/11/1975   • ZOSTER VACCINE (1 of 2) 08/11/2006   • MAMMOGRAM  05/02/2017   • PAP SMEAR  05/02/2017   • LIPID PANEL  05/03/2017   • INFLUENZA VACCINE  08/01/2018   • COLONOSCOPY  04/17/2027   • HEPATITIS C SCREENING  Completed     Review of Systems   Constitutional: Positive for fatigue. Negative for activity change, appetite change, chills, diaphoresis, fever and unexpected weight change.   HENT: Negative for ear pain, hearing loss, mouth sores, sore throat, trouble swallowing and voice change.    Eyes: Negative.    Respiratory: Negative for cough, choking, shortness of breath and wheezing.    Cardiovascular: Negative for chest pain and palpitations.   Gastrointestinal: Positive for blood in stool and diarrhea. Negative for abdominal pain, constipation, nausea and vomiting.   Endocrine: Negative for cold intolerance and heat intolerance.  "  Genitourinary: Negative for decreased urine volume, dysuria, frequency, hematuria and urgency.   Musculoskeletal: Negative for back pain, gait problem and myalgias.   Skin: Negative for color change, pallor and rash.   Allergic/Immunologic: Negative for food allergies and immunocompromised state.   Neurological: Positive for weakness. Negative for dizziness, tremors, seizures, syncope, light-headedness, numbness and headaches.   Hematological: Negative for adenopathy. Does not bruise/bleed easily.   Psychiatric/Behavioral: Negative for agitation and confusion. The patient is not nervous/anxious.    All other systems reviewed and are negative.    Objective   Vitals:    08/08/18 0855   BP: 120/80   Pulse: 90   SpO2: 98%   Weight: 87.5 kg (193 lb)   Height: 162.6 cm (64\")     Body mass index is 33.13 kg/m².  Physical Exam   Constitutional: She is oriented to person, place, and time. She appears well-developed and well-nourished.   HENT:   Head: Normocephalic and atraumatic.   Eyes: Pupils are equal, round, and reactive to light.   Neck: Normal range of motion. Neck supple. No tracheal deviation present.   Cardiovascular: Normal rate, regular rhythm and normal heart sounds.  Exam reveals no gallop and no friction rub.    No murmur heard.  Pulmonary/Chest: Effort normal and breath sounds normal. No respiratory distress. She has no wheezes. She has no rales. She exhibits no tenderness.   Abdominal: Soft. Bowel sounds are normal. She exhibits no distension. There is no hepatosplenomegaly. There is no tenderness. There is no rigidity, no rebound and no guarding.   Musculoskeletal: Normal range of motion. She exhibits no edema, tenderness or deformity.   Neurological: She is alert and oriented to person, place, and time. She has normal reflexes.   Skin: Skin is warm and dry. No rash noted. No pallor.   Psychiatric: She has a normal mood and affect. Her behavior is normal. Judgment and thought content normal. "     Assessment/Plan   Heather was seen today for gi problem.    Diagnoses and all orders for this visit:    Diarrhea in adult patient  -     Gastrointestinal Panel, PCR - Stool, Per Rectum  -     Fecal Leukocytes - Stool, Per Rectum  -     metroNIDAZOLE (FLAGYL) 500 MG tablet; Take 1 tablet by mouth 3 (Three) Times a Day.    I want her to start the Flagyl after she turn in her stool cultures.     EMR Dragon/transcription disclaimer: Much of this encounter note is electronic transcription/translation of spoken language to printed text. The electronic translation of spoken language may be erroneous, or at times, nonsensical words or phrases may be inadvertently transcribed. Although I have reviewed the note for such errors, some may still exist.  Body mass index is 33.13 kg/m².  No Follow-up on file.    Patient's Body mass index is 33.13 kg/m². BMI is above normal parameters. Recommendations include: nutrition counseling.      All risks, benefits, alternatives, and indications of colonoscopy and/or Endoscopy procedure have been discussed with the patient. Risks to include perforation of the colon requiring possible surgery or colostomy, risk of bleeding from biopsies or removal of colon tissue, possibility of missing a colon polyp or cancer, or adverse drug reaction.  Benefits to include the diagnosis and management of disease of the colon and rectum. Alternatives to include barium enema, radiographic evaluation, lab testing or no intervention. Pt verbalizes understanding and agrees.     Becca Blackwood, APRN  8/8/2018  9:12 AM      Obesity, Adult  Obesity is the condition of having too much total body fat. Being overweight or obese means that your weight is greater than what is considered healthy for your body size. Obesity is determined by a measurement called BMI. BMI is an estimate of body fat and is calculated from height and weight. For adults, a BMI of 30 or higher is considered obese.  Obesity can eventually  lead to other health concerns and major illnesses, including:  · Stroke.  · Coronary artery disease (CAD).  · Type 2 diabetes.  · Some types of cancer, including cancers of the colon, breast, uterus, and gallbladder.  · Osteoarthritis.  · High blood pressure (hypertension).  · High cholesterol.  · Sleep apnea.  · Gallbladder stones.  · Infertility problems.  What are the causes?  The main cause of obesity is taking in (consuming) more calories than your body uses for energy. Other factors that contribute to this condition may include:  · Being born with genes that make you more likely to become obese.  · Having a medical condition that causes obesity. These conditions include:  ¨ Hypothyroidism.  ¨ Polycystic ovarian syndrome (PCOS).  ¨ Binge-eating disorder.  ¨ Cushing syndrome.  · Taking certain medicines, such as steroids, antidepressants, and seizure medicines.  · Not being physically active (sedentary lifestyle).  · Living where there are limited places to exercise safely or buy healthy foods.  · Not getting enough sleep.  What increases the risk?  The following factors may increase your risk of this condition:  · Having a family history of obesity.  · Being a woman of -American descent.  · Being a man of  descent.  What are the signs or symptoms?  Having excessive body fat is the main symptom of this condition.  How is this diagnosed?  This condition may be diagnosed based on:  · Your symptoms.  · Your medical history.  · A physical exam. Your health care provider may measure:  ¨ Your BMI. If you are an adult with a BMI between 25 and less than 30, you are considered overweight. If you are an adult with a BMI of 30 or higher, you are considered obese.  ¨ The distances around your hips and your waist (circumferences). These may be compared to each other to help diagnose your condition.  ¨ Your skinfold thickness. Your health care provider may gently pinch a fold of your skin and measure it.  How  is this treated?  Treatment for this condition often includes changing your lifestyle. Treatment may include some or all of the following:  · Dietary changes. Work with your health care provider and a dietitian to set a weight-loss goal that is healthy and reasonable for you. Dietary changes may include eating:  ¨ Smaller portions. A portion size is the amount of a particular food that is healthy for you to eat at one time. This varies from person to person.  ¨ Low-calorie or low-fat options.  ¨ More whole grains, fruits, and vegetables.  · Regular physical activity. This may include aerobic activity (cardio) and strength training.  · Medicine to help you lose weight. Your health care provider may prescribe medicine if you are unable to lose 1 pound a week after 6 weeks of eating more healthily and doing more physical activity.  · Surgery. Surgical options may include gastric banding and gastric bypass. Surgery may be done if:  ¨ Other treatments have not helped to improve your condition.  ¨ You have a BMI of 40 or higher.  ¨ You have life-threatening health problems related to obesity.  Follow these instructions at home:     Eating and drinking     · Follow recommendations from your health care provider about what you eat and drink. Your health care provider may advise you to:  ¨ Limit fast foods, sweets, and processed snack foods.  ¨ Choose low-fat options, such as low-fat milk instead of whole milk.  ¨ Eat 5 or more servings of fruits or vegetables every day.  ¨ Eat at home more often. This gives you more control over what you eat.  ¨ Choose healthy foods when you eat out.  ¨ Learn what a healthy portion size is.  ¨ Keep low-fat snacks on hand.  ¨ Avoid sugary drinks, such as soda, fruit juice, iced tea sweetened with sugar, and flavored milk.  ¨ Eat a healthy breakfast.  · Drink enough water to keep your urine clear or pale yellow.  · Do not go without eating for long periods of time (do not fast) or follow a  fad diet. Fasting and fad diets can be unhealthy and even dangerous.  Physical Activity   · Exercise regularly, as told by your health care provider. Ask your health care provider what types of exercise are safe for you and how often you should exercise.  · Warm up and stretch before being active.  · Cool down and stretch after being active.  · Rest between periods of activity.  Lifestyle   · Limit the time that you spend in front of your TV, computer, or video game system.  · Find ways to reward yourself that do not involve food.  · Limit alcohol intake to no more than 1 drink a day for nonpregnant women and 2 drinks a day for men. One drink equals 12 oz of beer, 5 oz of wine, or 1½ oz of hard liquor.  General instructions   · Keep a weight loss journal to keep track of the food you eat and how much you exercise you get.  · Take over-the-counter and prescription medicines only as told by your health care provider.  · Take vitamins and supplements only as told by your health care provider.  · Consider joining a support group. Your health care provider may be able to recommend a support group.  · Keep all follow-up visits as told by your health care provider. This is important.  Contact a health care provider if:  · You are unable to meet your weight loss goal after 6 weeks of dietary and lifestyle changes.  This information is not intended to replace advice given to you by your health care provider. Make sure you discuss any questions you have with your health care provider.  Document Released: 01/25/2006 Document Revised: 05/22/2017 Document Reviewed: 10/05/2016  [employer] Interactive Patient Education © 2017 [employer]      If you smoke or use tobacco, 4 minutes reading provided  Steps to Quit Smoking  Smoking tobacco can be harmful to your health and can affect almost every organ in your body. Smoking puts you, and those around you, at risk for developing many serious chronic diseases. Quitting smoking is  difficult, but it is one of the best things that you can do for your health. It is never too late to quit.  What are the benefits of quitting smoking?  When you quit smoking, you lower your risk of developing serious diseases and conditions, such as:  · Lung cancer or lung disease, such as COPD.  · Heart disease.  · Stroke.  · Heart attack.  · Infertility.  · Osteoporosis and bone fractures.  Additionally, symptoms such as coughing, wheezing, and shortness of breath may get better when you quit. You may also find that you get sick less often because your body is stronger at fighting off colds and infections. If you are pregnant, quitting smoking can help to reduce your chances of having a baby of low birth weight.  How do I get ready to quit?  When you decide to quit smoking, create a plan to make sure that you are successful. Before you quit:  · Pick a date to quit. Set a date within the next two weeks to give you time to prepare.  · Write down the reasons why you are quitting. Keep this list in places where you will see it often, such as on your bathroom mirror or in your car or wallet.  · Identify the people, places, things, and activities that make you want to smoke (triggers) and avoid them. Make sure to take these actions:  ¨ Throw away all cigarettes at home, at work, and in your car.  ¨ Throw away smoking accessories, such as ashtrays and lighters.  ¨ Clean your car and make sure to empty the ashtray.  ¨ Clean your home, including curtains and carpets.  · Tell your family, friends, and coworkers that you are quitting. Support from your loved ones can make quitting easier.  · Talk with your health care provider about your options for quitting smoking.  · Find out what treatment options are covered by your health insurance.  What strategies can I use to quit smoking?  Talk with your healthcare provider about different strategies to quit smoking. Some strategies include:  · Quitting smoking altogether instead  of gradually lessening how much you smoke over a period of time. Research shows that quitting “cold turkey” is more successful than gradually quitting.  · Attending in-person counseling to help you build problem-solving skills. You are more likely to have success in quitting if you attend several counseling sessions. Even short sessions of 10 minutes can be effective.  · Finding resources and support systems that can help you to quit smoking and remain smoke-free after you quit. These resources are most helpful when you use them often. They can include:  ¨ Online chats with a counselor.  ¨ Telephone quitlines.  ¨ Printed self-help materials.  ¨ Support groups or group counseling.  ¨ Text messaging programs.  ¨ Mobile phone applications.  · Taking medicines to help you quit smoking. (If you are pregnant or breastfeeding, talk with your health care provider first.) Some medicines contain nicotine and some do not. Both types of medicines help with cravings, but the medicines that include nicotine help to relieve withdrawal symptoms. Your health care provider may recommend:  ¨ Nicotine patches, gum, or lozenges.  ¨ Nicotine inhalers or sprays.  ¨ Non-nicotine medicine that is taken by mouth.  Talk with your health care provider about combining strategies, such as taking medicines while you are also receiving in-person counseling. Using these two strategies together makes you more likely to succeed in quitting than if you used either strategy on its own.  If you are pregnant or breastfeeding, talk with your health care provider about finding counseling or other support strategies to quit smoking. Do not take medicine to help you quit smoking unless told to do so by your health care provider.  What things can I do to make it easier to quit?  Quitting smoking might feel overwhelming at first, but there is a lot that you can do to make it easier. Take these important actions:  · Reach out to your family and friends and ask  that they support and encourage you during this time. Call telephone quitlines, reach out to support groups, or work with a counselor for support.  · Ask people who smoke to avoid smoking around you.  · Avoid places that trigger you to smoke, such as bars, parties, or smoke-break areas at work.  · Spend time around people who do not smoke.  · Lessen stress in your life, because stress can be a smoking trigger for some people. To lessen stress, try:  ¨ Exercising regularly.  ¨ Deep-breathing exercises.  ¨ Yoga.  ¨ Meditating.  ¨ Performing a body scan. This involves closing your eyes, scanning your body from head to toe, and noticing which parts of your body are particularly tense. Purposefully relax the muscles in those areas.  · Download or purchase mobile phone or tablet apps (applications) that can help you stick to your quit plan by providing reminders, tips, and encouragement. There are many free apps, such as QuitGuide from the CDC (Centers for Disease Control and Prevention). You can find other support for quitting smoking (smoking cessation) through smokefree.gov and other websites.  How will I feel when I quit smoking?  Within the first 24 hours of quitting smoking, you may start to feel some withdrawal symptoms. These symptoms are usually most noticeable 2-3 days after quitting, but they usually do not last beyond 2-3 weeks. Changes or symptoms that you might experience include:  · Mood swings.  · Restlessness, anxiety, or irritation.  · Difficulty concentrating.  · Dizziness.  · Strong cravings for sugary foods in addition to nicotine.  · Mild weight gain.  · Constipation.  · Nausea.  · Coughing or a sore throat.  · Changes in how your medicines work in your body.  · A depressed mood.  · Difficulty sleeping (insomnia).  After the first 2-3 weeks of quitting, you may start to notice more positive results, such as:  · Improved sense of smell and taste.  · Decreased coughing and sore throat.  · Slower heart  rate.  · Lower blood pressure.  · Clearer skin.  · The ability to breathe more easily.  · Fewer sick days.  Quitting smoking is very challenging for most people. Do not get discouraged if you are not successful the first time. Some people need to make many attempts to quit before they achieve long-term success. Do your best to stick to your quit plan, and talk with your health care provider if you have any questions or concerns.  This information is not intended to replace advice given to you by your health care provider. Make sure you discuss any questions you have with your health care provider.  Document Released: 12/12/2002 Document Revised: 08/15/2017 Document Reviewed: 05/03/2016  [employer] Interactive Patient Education © 2017 [employer]

## 2018-08-09 LAB — WBC STL QL MICRO: ABNORMAL

## 2018-08-22 ENCOUNTER — APPOINTMENT (OUTPATIENT)
Dept: LAB | Facility: HOSPITAL | Age: 63
End: 2018-08-22

## 2018-08-22 ENCOUNTER — TELEPHONE (OUTPATIENT)
Dept: GASTROENTEROLOGY | Facility: CLINIC | Age: 63
End: 2018-08-22

## 2018-08-22 ENCOUNTER — OFFICE VISIT (OUTPATIENT)
Dept: GASTROENTEROLOGY | Facility: CLINIC | Age: 63
End: 2018-08-22

## 2018-08-22 VITALS
SYSTOLIC BLOOD PRESSURE: 130 MMHG | BODY MASS INDEX: 31.24 KG/M2 | DIASTOLIC BLOOD PRESSURE: 84 MMHG | OXYGEN SATURATION: 97 % | HEIGHT: 64 IN | WEIGHT: 183 LBS | HEART RATE: 102 BPM

## 2018-08-22 DIAGNOSIS — R11.2 NAUSEA AND VOMITING, INTRACTABILITY OF VOMITING NOT SPECIFIED, UNSPECIFIED VOMITING TYPE: ICD-10-CM

## 2018-08-22 DIAGNOSIS — R10.84 GENERALIZED ABDOMINAL PAIN: Primary | ICD-10-CM

## 2018-08-22 DIAGNOSIS — E66.9 OBESITY, UNSPECIFIED OBESITY SEVERITY, UNSPECIFIED OBESITY TYPE: ICD-10-CM

## 2018-08-22 DIAGNOSIS — Z71.6 TOBACCO ABUSE COUNSELING: ICD-10-CM

## 2018-08-22 LAB
ALBUMIN SERPL-MCNC: 4.8 G/DL (ref 3.5–5)
ALBUMIN/GLOB SERPL: 1.2 G/DL (ref 1.1–2.5)
ALP SERPL-CCNC: 96 U/L (ref 24–120)
ALT SERPL W P-5'-P-CCNC: 15 U/L (ref 0–54)
AMYLASE SERPL-CCNC: 135 U/L (ref 30–110)
ANION GAP SERPL CALCULATED.3IONS-SCNC: 18 MMOL/L (ref 4–13)
ANISOCYTOSIS BLD QL: ABNORMAL
AST SERPL-CCNC: 20 U/L (ref 7–45)
BILIRUB SERPL-MCNC: 0.3 MG/DL (ref 0.1–1)
BUN BLD-MCNC: 11 MG/DL (ref 5–21)
BUN/CREAT SERPL: 12.8 (ref 7–25)
CALCIUM SPEC-SCNC: 9.7 MG/DL (ref 8.4–10.4)
CHLORIDE SERPL-SCNC: 104 MMOL/L (ref 98–110)
CO2 SERPL-SCNC: 19 MMOL/L (ref 24–31)
CREAT BLD-MCNC: 0.86 MG/DL (ref 0.5–1.4)
DEPRECATED RDW RBC AUTO: 46.4 FL (ref 40–54)
ERYTHROCYTE [DISTWIDTH] IN BLOOD BY AUTOMATED COUNT: 18.5 % (ref 12–15)
GFR SERPL CREATININE-BSD FRML MDRD: 67 ML/MIN/1.73
GLOBULIN UR ELPH-MCNC: 4 GM/DL
GLUCOSE BLD-MCNC: 154 MG/DL (ref 70–100)
HCT VFR BLD AUTO: 37.8 % (ref 37–47)
HGB BLD-MCNC: 11.4 G/DL (ref 12–16)
HYPOCHROMIA BLD QL: ABNORMAL
LIPASE SERPL-CCNC: 182 U/L (ref 23–203)
LYMPHOCYTES # BLD MANUAL: 1.98 10*3/MM3 (ref 0.72–4.86)
LYMPHOCYTES NFR BLD MANUAL: 13 % (ref 15–45)
LYMPHOCYTES NFR BLD MANUAL: 14 % (ref 4–12)
MCH RBC QN AUTO: 22.2 PG (ref 28–32)
MCHC RBC AUTO-ENTMCNC: 30.2 G/DL (ref 33–36)
MCV RBC AUTO: 73.5 FL (ref 82–98)
MICROCYTES BLD QL: ABNORMAL
MONOCYTES # BLD AUTO: 2.14 10*3/MM3 (ref 0.19–1.3)
NEUTROPHILS # BLD AUTO: 10.83 10*3/MM3 (ref 1.87–8.4)
NEUTROPHILS NFR BLD MANUAL: 71 % (ref 39–78)
PLAT MORPH BLD: NORMAL
PLATELET # BLD AUTO: 357 10*3/MM3 (ref 130–400)
PMV BLD AUTO: 9.7 FL (ref 6–12)
POLYCHROMASIA BLD QL SMEAR: ABNORMAL
POTASSIUM BLD-SCNC: 2.9 MMOL/L (ref 3.5–5.3)
PROT SERPL-MCNC: 8.8 G/DL (ref 6.3–8.7)
RBC # BLD AUTO: 5.14 10*6/MM3 (ref 4.2–5.4)
SODIUM BLD-SCNC: 141 MMOL/L (ref 135–145)
STOMATOCYTES BLD QL SMEAR: ABNORMAL
VARIANT LYMPHS NFR BLD MANUAL: 2 % (ref 0–5)
WBC MORPH BLD: NORMAL
WBC NRBC COR # BLD: 15.25 10*3/MM3 (ref 4.8–10.8)

## 2018-08-22 PROCEDURE — 83690 ASSAY OF LIPASE: CPT | Performed by: CLINICAL NURSE SPECIALIST

## 2018-08-22 PROCEDURE — 82150 ASSAY OF AMYLASE: CPT | Performed by: CLINICAL NURSE SPECIALIST

## 2018-08-22 PROCEDURE — 80053 COMPREHEN METABOLIC PANEL: CPT | Performed by: CLINICAL NURSE SPECIALIST

## 2018-08-22 PROCEDURE — 85007 BL SMEAR W/DIFF WBC COUNT: CPT | Performed by: CLINICAL NURSE SPECIALIST

## 2018-08-22 PROCEDURE — 99214 OFFICE O/P EST MOD 30 MIN: CPT | Performed by: CLINICAL NURSE SPECIALIST

## 2018-08-22 PROCEDURE — 36415 COLL VENOUS BLD VENIPUNCTURE: CPT | Performed by: CLINICAL NURSE SPECIALIST

## 2018-08-22 PROCEDURE — 85025 COMPLETE CBC W/AUTO DIFF WBC: CPT | Performed by: CLINICAL NURSE SPECIALIST

## 2018-08-22 RX ORDER — POTASSIUM CHLORIDE 20 MEQ/1
40 TABLET, EXTENDED RELEASE ORAL 2 TIMES DAILY
Qty: 8 TABLET | Refills: 0 | Status: SHIPPED | OUTPATIENT
Start: 2018-08-22 | End: 2018-08-24

## 2018-08-22 NOTE — PROGRESS NOTES
Heather Russo  1955 8/22/2018  Chief Complaint   Patient presents with   • GI Problem     Diarrhea and vomiting     Subjective   HPI  Heather Russo is a 63 y.o. female who presents with a complaint of progressive ongoing diarrhea with up to 6 ot 7 stools per day persistent for approx 3 months at this time. I saw her on 8/8 and we started with stool cultures and she had already tried Pepto for her hx of collagenous colitis in the past. This did not help. Cultures were negative other than some WBCs. I treated her with Flagyl she finished this the weekend and now has indigestion and reflux with abdominal pain. The pain is generalized throughout she rates it a 6 out of 10. It radiates throughout her abdomen. It is sharp stabbing and a cramp at times. No alleviating factors. No aggravating factors. No fever. No wt loss. She has associated fatigue and overall not feeling well. She has had an episode of nausea with vomiting on Monday. She has persistent nausea. No BRBPR. No melena.    Past Medical History:   Diagnosis Date   • Anemia    • Anxiety    • Arthritis    • Cancer of skin     BCC OF NOSE   • COPD (chronic obstructive pulmonary disease) (CMS/HCC)    • Depression    • Fibromyalgia    • GERD (gastroesophageal reflux disease)    • Hx of colonic polyps    • Hypercholesteremia    • Hypertension      Past Surgical History:   Procedure Laterality Date   • BREAST SURGERY     • CHOLECYSTECTOMY     • COLONOSCOPY  04/12/2017    Hemorrhoids and a few diverticula repeat exam in 3 years Dr. Cordero   • COLONOSCOPY W/ POLYPECTOMY  07/28/2016    Tubular adenoma proximal to the anus, 30 MM polyp in the cecum not resected, Diverticulosis ref to Dr. Cordero   • ENDOSCOPY  06/15/2016    Small hh, Negative for celiac disease   • RADICAL HYSTERECTOMY       Outpatient Prescriptions Marked as Taking for the 8/22/18 encounter (Office Visit) with Becca Blackwood APRN   Medication Sig Dispense Refill   • ARIPiprazole (ABILIFY) 10 MG  tablet      • colestipol (COLESTID) 1 g tablet TAKE 1 TABLET TWICE A  tablet 3   • DULoxetine (CYMBALTA) 60 MG capsule      • metoprolol tartrate (LOPRESSOR) 50 MG tablet Take 50 mg by mouth 2 (Two) Times a Day.     • omeprazole (priLOSEC) 20 MG capsule Take 20 mg by mouth Daily.     • PROAIR  (90 BASE) MCG/ACT inhaler USE TWO PUFFS BY MOUTH EVERY 4 TO 6 HOURS AS NEEDED FOR WHEEZING  2   • SYMBICORT 160-4.5 MCG/ACT inhaler        Allergies   Allergen Reactions   • Codeine    • Penicillins      Social History     Social History   • Marital status:      Spouse name: N/A   • Number of children: N/A   • Years of education: N/A     Occupational History   • Not on file.     Social History Main Topics   • Smoking status: Current Every Day Smoker     Packs/day: 1.50   • Smokeless tobacco: Never Used   • Alcohol use No   • Drug use: No   • Sexual activity: Defer     Other Topics Concern   • Not on file     Social History Narrative   • No narrative on file     Family History   Problem Relation Age of Onset   • Diabetes Sister    • Cancer Brother    • Colon cancer Neg Hx    • Colon polyps Neg Hx      Health Maintenance   Topic Date Due   • ANNUAL PHYSICAL  08/11/1958   • PNEUMOCOCCAL VACCINE (19-64 MEDIUM RISK) (1 of 1 - PPSV23) 08/11/1975   • TDAP/TD VACCINES (1 - Tdap) 08/11/1975   • ZOSTER VACCINE (1 of 2) 08/11/2006   • MAMMOGRAM  05/02/2017   • PAP SMEAR  05/02/2017   • LIPID PANEL  05/03/2017   • INFLUENZA VACCINE  08/01/2018   • COLONOSCOPY  04/17/2027   • HEPATITIS C SCREENING  Completed     Review of Systems   Constitutional: Positive for fatigue. Negative for activity change, appetite change, chills, diaphoresis, fever and unexpected weight change.   HENT: Negative for ear pain, hearing loss, mouth sores, sore throat, trouble swallowing and voice change.    Eyes: Negative.    Respiratory: Negative for cough, choking, shortness of breath and wheezing.    Cardiovascular: Negative for chest pain and  "palpitations.   Gastrointestinal: Positive for abdominal pain, diarrhea, nausea and vomiting. Negative for blood in stool and constipation.   Endocrine: Negative for cold intolerance and heat intolerance.   Genitourinary: Negative for decreased urine volume, dysuria, frequency, hematuria and urgency.   Musculoskeletal: Negative for back pain, gait problem and myalgias.   Skin: Negative for color change, pallor and rash.   Allergic/Immunologic: Negative for food allergies and immunocompromised state.   Neurological: Positive for weakness. Negative for dizziness, tremors, seizures, syncope, light-headedness, numbness and headaches.   Hematological: Negative for adenopathy. Does not bruise/bleed easily.   Psychiatric/Behavioral: Negative for agitation and confusion. The patient is not nervous/anxious.    All other systems reviewed and are negative.    Objective   Vitals:    08/22/18 1430   BP: 130/84   Pulse: 102   SpO2: 97%   Weight: 83 kg (183 lb)   Height: 162.6 cm (64\")     Body mass index is 31.41 kg/m².  Physical Exam   Constitutional: She is oriented to person, place, and time. She appears well-developed and well-nourished.   HENT:   Head: Normocephalic and atraumatic.   Eyes: Pupils are equal, round, and reactive to light.   Neck: Normal range of motion. Neck supple. No tracheal deviation present.   Cardiovascular: Normal rate, regular rhythm and normal heart sounds.  Exam reveals no gallop and no friction rub.    No murmur heard.  Pulmonary/Chest: Effort normal and breath sounds normal. No respiratory distress. She has no wheezes. She has no rales. She exhibits no tenderness.   Abdominal: Soft. Bowel sounds are normal. She exhibits no distension. There is no hepatosplenomegaly. There is no tenderness. There is no rigidity, no rebound and no guarding.   Musculoskeletal: Normal range of motion. She exhibits no edema, tenderness or deformity.   Neurological: She is alert and oriented to person, place, and time. " She has normal reflexes.   Skin: Skin is warm and dry. No rash noted. No pallor.   Psychiatric: She has a normal mood and affect. Her behavior is normal. Judgment and thought content normal.     Assessment/Plan   Heather was seen today for gi problem.    Diagnoses and all orders for this visit:    Generalized abdominal pain  -     CT Abdomen Pelvis With Contrast  -     CBC & Differential  -     Comprehensive Metabolic Panel  -     Amylase  -     Lipase    Nausea and vomiting, intractability of vomiting not specified, unspecified vomiting type    Obesity, unspecified obesity severity, unspecified obesity type    Tobacco abuse counseling      I have explained that if her symptoms worsen or she develops fever intractable nausea with vomiting she needs to go to the ER.   I have advised her to take prilosec BID and add Gaviscon for her reflux.   I discussed non pharmaceutical treatment of gerd.  This includes gradually losing weight to achieve ideal body wt., elevation of the head of bed by 4-6 inches, nothing to eat or drink 3 hours prior to lying down, avoiding tight clothing, stress reduction, tobacco cessation, reduction of alcohol intake, and dietary restrictions (avoiding caffeine, coffee, fatty foods, mints, chocolate, spicy foods and tomato based sauces as much as possible).        EMR Dragon/transcription disclaimer: Much of this encounter note is electronic transcription/translation of spoken language to printed text. The electronic translation of spoken language may be erroneous, or at times, nonsensical words or phrases may be inadvertently transcribed. Although I have reviewed the note for such errors, some may still exist.  Body mass index is 31.41 kg/m².  No Follow-up on file.    Patient's Body mass index is 31.41 kg/m². BMI is above normal parameters. Recommendations include: nutrition counseling.      All risks, benefits, alternatives, and indications of colonoscopy and/or Endoscopy procedure have been  discussed with the patient. Risks to include perforation of the colon requiring possible surgery or colostomy, risk of bleeding from biopsies or removal of colon tissue, possibility of missing a colon polyp or cancer, or adverse drug reaction.  Benefits to include the diagnosis and management of disease of the colon and rectum. Alternatives to include barium enema, radiographic evaluation, lab testing or no intervention. Pt verbalizes understanding and agrees.     Becca Blackwood, APRN  8/22/2018  3:12 PM      Obesity, Adult  Obesity is the condition of having too much total body fat. Being overweight or obese means that your weight is greater than what is considered healthy for your body size. Obesity is determined by a measurement called BMI. BMI is an estimate of body fat and is calculated from height and weight. For adults, a BMI of 30 or higher is considered obese.  Obesity can eventually lead to other health concerns and major illnesses, including:  · Stroke.  · Coronary artery disease (CAD).  · Type 2 diabetes.  · Some types of cancer, including cancers of the colon, breast, uterus, and gallbladder.  · Osteoarthritis.  · High blood pressure (hypertension).  · High cholesterol.  · Sleep apnea.  · Gallbladder stones.  · Infertility problems.  What are the causes?  The main cause of obesity is taking in (consuming) more calories than your body uses for energy. Other factors that contribute to this condition may include:  · Being born with genes that make you more likely to become obese.  · Having a medical condition that causes obesity. These conditions include:  ¨ Hypothyroidism.  ¨ Polycystic ovarian syndrome (PCOS).  ¨ Binge-eating disorder.  ¨ Cushing syndrome.  · Taking certain medicines, such as steroids, antidepressants, and seizure medicines.  · Not being physically active (sedentary lifestyle).  · Living where there are limited places to exercise safely or buy healthy foods.  · Not getting enough  sleep.  What increases the risk?  The following factors may increase your risk of this condition:  · Having a family history of obesity.  · Being a woman of -American descent.  · Being a man of  descent.  What are the signs or symptoms?  Having excessive body fat is the main symptom of this condition.  How is this diagnosed?  This condition may be diagnosed based on:  · Your symptoms.  · Your medical history.  · A physical exam. Your health care provider may measure:  ¨ Your BMI. If you are an adult with a BMI between 25 and less than 30, you are considered overweight. If you are an adult with a BMI of 30 or higher, you are considered obese.  ¨ The distances around your hips and your waist (circumferences). These may be compared to each other to help diagnose your condition.  ¨ Your skinfold thickness. Your health care provider may gently pinch a fold of your skin and measure it.  How is this treated?  Treatment for this condition often includes changing your lifestyle. Treatment may include some or all of the following:  · Dietary changes. Work with your health care provider and a dietitian to set a weight-loss goal that is healthy and reasonable for you. Dietary changes may include eating:  ¨ Smaller portions. A portion size is the amount of a particular food that is healthy for you to eat at one time. This varies from person to person.  ¨ Low-calorie or low-fat options.  ¨ More whole grains, fruits, and vegetables.  · Regular physical activity. This may include aerobic activity (cardio) and strength training.  · Medicine to help you lose weight. Your health care provider may prescribe medicine if you are unable to lose 1 pound a week after 6 weeks of eating more healthily and doing more physical activity.  · Surgery. Surgical options may include gastric banding and gastric bypass. Surgery may be done if:  ¨ Other treatments have not helped to improve your condition.  ¨ You have a BMI of 40 or  higher.  ¨ You have life-threatening health problems related to obesity.  Follow these instructions at home:     Eating and drinking     · Follow recommendations from your health care provider about what you eat and drink. Your health care provider may advise you to:  ¨ Limit fast foods, sweets, and processed snack foods.  ¨ Choose low-fat options, such as low-fat milk instead of whole milk.  ¨ Eat 5 or more servings of fruits or vegetables every day.  ¨ Eat at home more often. This gives you more control over what you eat.  ¨ Choose healthy foods when you eat out.  ¨ Learn what a healthy portion size is.  ¨ Keep low-fat snacks on hand.  ¨ Avoid sugary drinks, such as soda, fruit juice, iced tea sweetened with sugar, and flavored milk.  ¨ Eat a healthy breakfast.  · Drink enough water to keep your urine clear or pale yellow.  · Do not go without eating for long periods of time (do not fast) or follow a fad diet. Fasting and fad diets can be unhealthy and even dangerous.  Physical Activity   · Exercise regularly, as told by your health care provider. Ask your health care provider what types of exercise are safe for you and how often you should exercise.  · Warm up and stretch before being active.  · Cool down and stretch after being active.  · Rest between periods of activity.  Lifestyle   · Limit the time that you spend in front of your TV, computer, or video game system.  · Find ways to reward yourself that do not involve food.  · Limit alcohol intake to no more than 1 drink a day for nonpregnant women and 2 drinks a day for men. One drink equals 12 oz of beer, 5 oz of wine, or 1½ oz of hard liquor.  General instructions   · Keep a weight loss journal to keep track of the food you eat and how much you exercise you get.  · Take over-the-counter and prescription medicines only as told by your health care provider.  · Take vitamins and supplements only as told by your health care provider.  · Consider joining a  support group. Your health care provider may be able to recommend a support group.  · Keep all follow-up visits as told by your health care provider. This is important.  Contact a health care provider if:  · You are unable to meet your weight loss goal after 6 weeks of dietary and lifestyle changes.  This information is not intended to replace advice given to you by your health care provider. Make sure you discuss any questions you have with your health care provider.  Document Released: 01/25/2006 Document Revised: 05/22/2017 Document Reviewed: 10/05/2016  [employer] Interactive Patient Education © 2017 [employer]      If you smoke or use tobacco, 4 minutes reading provided  Steps to Quit Smoking  Smoking tobacco can be harmful to your health and can affect almost every organ in your body. Smoking puts you, and those around you, at risk for developing many serious chronic diseases. Quitting smoking is difficult, but it is one of the best things that you can do for your health. It is never too late to quit.  What are the benefits of quitting smoking?  When you quit smoking, you lower your risk of developing serious diseases and conditions, such as:  · Lung cancer or lung disease, such as COPD.  · Heart disease.  · Stroke.  · Heart attack.  · Infertility.  · Osteoporosis and bone fractures.  Additionally, symptoms such as coughing, wheezing, and shortness of breath may get better when you quit. You may also find that you get sick less often because your body is stronger at fighting off colds and infections. If you are pregnant, quitting smoking can help to reduce your chances of having a baby of low birth weight.  How do I get ready to quit?  When you decide to quit smoking, create a plan to make sure that you are successful. Before you quit:  · Pick a date to quit. Set a date within the next two weeks to give you time to prepare.  · Write down the reasons why you are quitting. Keep this list in places where you  will see it often, such as on your bathroom mirror or in your car or wallet.  · Identify the people, places, things, and activities that make you want to smoke (triggers) and avoid them. Make sure to take these actions:  ¨ Throw away all cigarettes at home, at work, and in your car.  ¨ Throw away smoking accessories, such as ashtrays and lighters.  ¨ Clean your car and make sure to empty the ashtray.  ¨ Clean your home, including curtains and carpets.  · Tell your family, friends, and coworkers that you are quitting. Support from your loved ones can make quitting easier.  · Talk with your health care provider about your options for quitting smoking.  · Find out what treatment options are covered by your health insurance.  What strategies can I use to quit smoking?  Talk with your healthcare provider about different strategies to quit smoking. Some strategies include:  · Quitting smoking altogether instead of gradually lessening how much you smoke over a period of time. Research shows that quitting “cold turkey” is more successful than gradually quitting.  · Attending in-person counseling to help you build problem-solving skills. You are more likely to have success in quitting if you attend several counseling sessions. Even short sessions of 10 minutes can be effective.  · Finding resources and support systems that can help you to quit smoking and remain smoke-free after you quit. These resources are most helpful when you use them often. They can include:  ¨ Online chats with a counselor.  ¨ Telephone quitlines.  ¨ Printed self-help materials.  ¨ Support groups or group counseling.  ¨ Text messaging programs.  ¨ Mobile phone applications.  · Taking medicines to help you quit smoking. (If you are pregnant or breastfeeding, talk with your health care provider first.) Some medicines contain nicotine and some do not. Both types of medicines help with cravings, but the medicines that include nicotine help to relieve  withdrawal symptoms. Your health care provider may recommend:  ¨ Nicotine patches, gum, or lozenges.  ¨ Nicotine inhalers or sprays.  ¨ Non-nicotine medicine that is taken by mouth.  Talk with your health care provider about combining strategies, such as taking medicines while you are also receiving in-person counseling. Using these two strategies together makes you more likely to succeed in quitting than if you used either strategy on its own.  If you are pregnant or breastfeeding, talk with your health care provider about finding counseling or other support strategies to quit smoking. Do not take medicine to help you quit smoking unless told to do so by your health care provider.  What things can I do to make it easier to quit?  Quitting smoking might feel overwhelming at first, but there is a lot that you can do to make it easier. Take these important actions:  · Reach out to your family and friends and ask that they support and encourage you during this time. Call telephone quitlines, reach out to support groups, or work with a counselor for support.  · Ask people who smoke to avoid smoking around you.  · Avoid places that trigger you to smoke, such as bars, parties, or smoke-break areas at work.  · Spend time around people who do not smoke.  · Lessen stress in your life, because stress can be a smoking trigger for some people. To lessen stress, try:  ¨ Exercising regularly.  ¨ Deep-breathing exercises.  ¨ Yoga.  ¨ Meditating.  ¨ Performing a body scan. This involves closing your eyes, scanning your body from head to toe, and noticing which parts of your body are particularly tense. Purposefully relax the muscles in those areas.  · Download or purchase mobile phone or tablet apps (applications) that can help you stick to your quit plan by providing reminders, tips, and encouragement. There are many free apps, such as QuitGuide from the CDC (Centers for Disease Control and Prevention). You can find other support  for quitting smoking (smoking cessation) through smokefree.gov and other websites.  How will I feel when I quit smoking?  Within the first 24 hours of quitting smoking, you may start to feel some withdrawal symptoms. These symptoms are usually most noticeable 2-3 days after quitting, but they usually do not last beyond 2-3 weeks. Changes or symptoms that you might experience include:  · Mood swings.  · Restlessness, anxiety, or irritation.  · Difficulty concentrating.  · Dizziness.  · Strong cravings for sugary foods in addition to nicotine.  · Mild weight gain.  · Constipation.  · Nausea.  · Coughing or a sore throat.  · Changes in how your medicines work in your body.  · A depressed mood.  · Difficulty sleeping (insomnia).  After the first 2-3 weeks of quitting, you may start to notice more positive results, such as:  · Improved sense of smell and taste.  · Decreased coughing and sore throat.  · Slower heart rate.  · Lower blood pressure.  · Clearer skin.  · The ability to breathe more easily.  · Fewer sick days.  Quitting smoking is very challenging for most people. Do not get discouraged if you are not successful the first time. Some people need to make many attempts to quit before they achieve long-term success. Do your best to stick to your quit plan, and talk with your health care provider if you have any questions or concerns.  This information is not intended to replace advice given to you by your health care provider. Make sure you discuss any questions you have with your health care provider.  Document Released: 12/12/2002 Document Revised: 08/15/2017 Document Reviewed: 05/03/2016  [employer] Interactive Patient Education © 2017 [employer]

## 2018-08-22 NOTE — TELEPHONE ENCOUNTER
I was called by the lab for a critical potassium of 2.9.  She just saw Becca Blackwood today. I do not see that she is on any potassium supplements.  I am going to send a prescription to her pharmacy.  It will be for 2 days.  She will need to follow-up with her primary care provider for ongoing management of her potassium.    Salud De La Rosa MD

## 2018-08-22 NOTE — TELEPHONE ENCOUNTER
I called the patient personally to  her presciption and advised to follow up with PCP.    Salud De La Rosa MD

## 2018-08-24 NOTE — TELEPHONE ENCOUNTER
Spoke to patient, she hasn't called them yet.  I explained to her this is a critical value and to call them asap.  I called Dr. Grzegorz Michel's office and spoke to Mona.  I told her what Dr De La Rosa called in and she said she will call the patient and get her to come in the office.  Faxed labs.

## 2018-08-28 NOTE — PROGRESS NOTES
Please call and check on this. Her Potassium was 2.9 Dr De La Rosa called in potassium but she needs follow up with her PCP. Thanks Becca VIZCAINO

## 2018-08-30 ENCOUNTER — HOSPITAL ENCOUNTER (OUTPATIENT)
Dept: CT IMAGING | Facility: HOSPITAL | Age: 63
Discharge: HOME | End: 2018-08-30
Admitting: CLINICAL NURSE SPECIALIST

## 2018-08-30 PROCEDURE — 82565 ASSAY OF CREATININE: CPT

## 2018-08-30 PROCEDURE — 25010000002 IOPAMIDOL 61 % SOLUTION: Performed by: CLINICAL NURSE SPECIALIST

## 2018-08-30 PROCEDURE — 74177 CT ABD & PELVIS W/CONTRAST: CPT

## 2018-08-30 RX ADMIN — IOPAMIDOL 100 ML: 612 INJECTION, SOLUTION INTRAVENOUS at 09:22

## 2018-08-31 ENCOUNTER — TELEPHONE (OUTPATIENT)
Dept: GASTROENTEROLOGY | Facility: CLINIC | Age: 63
End: 2018-08-31

## 2018-08-31 DIAGNOSIS — R93.5 ABNORMAL CT OF THE ABDOMEN: Primary | ICD-10-CM

## 2018-09-04 NOTE — PROGRESS NOTES
Subjective    Ms. Russo is 63 y.o. female    Chief Complaint: Pyelonephritis     History of Present Illness     Abnormal radiographic finding   This patient presents with a possible abnormal finding of the right kidneyon CT that included abdominal cuts. This is a  new finding. This test was done 1 week(s) ago. Onset is sudden. This was done in context of evaluation for an unrelated illness. Symptoms include No evidence of hematuria, dysuria, or pain.     The following portions of the patient's history were reviewed and updated as appropriate: allergies, current medications, past family history, past medical history, past social history, past surgical history and problem list.    Review of Systems   Constitutional: Negative for appetite change, diaphoresis and fever.   HENT: Negative for facial swelling and sore throat.    Eyes: Negative for discharge and visual disturbance.   Respiratory: Negative for cough and shortness of breath.    Cardiovascular: Negative for chest pain and leg swelling.   Gastrointestinal: Negative for anal bleeding and vomiting.   Endocrine: Negative for cold intolerance and heat intolerance.   Genitourinary: Positive for frequency and urgency. Negative for decreased urine volume, difficulty urinating, dyspareunia, dysuria, enuresis, flank pain, genital sores, menstrual problem, pelvic pain, vaginal bleeding, vaginal discharge and vaginal pain.   Musculoskeletal: Negative for back pain and gait problem.   Skin: Negative for pallor and rash.   Allergic/Immunologic: Negative for food allergies.   Neurological: Negative for seizures and headaches.   Hematological: Negative for adenopathy. Does not bruise/bleed easily.   Psychiatric/Behavioral: Negative for dysphoric mood, self-injury and suicidal ideas.         Current Outpatient Prescriptions:   •  ARIPiprazole (ABILIFY) 10 MG tablet, , Disp: , Rfl:   •  colestipol (COLESTID) 1 g tablet, TAKE 1 TABLET TWICE A DAY, Disp: 180 tablet, Rfl: 3  •   "DULoxetine (CYMBALTA) 60 MG capsule, , Disp: , Rfl:   •  metoprolol tartrate (LOPRESSOR) 50 MG tablet, Take 50 mg by mouth 2 (Two) Times a Day., Disp: , Rfl:   •  omeprazole (priLOSEC) 20 MG capsule, Take 20 mg by mouth Daily., Disp: , Rfl:   •  PROAIR  (90 BASE) MCG/ACT inhaler, USE TWO PUFFS BY MOUTH EVERY 4 TO 6 HOURS AS NEEDED FOR WHEEZING, Disp: , Rfl: 2  •  SYMBICORT 160-4.5 MCG/ACT inhaler, , Disp: , Rfl:     Past Medical History:   Diagnosis Date   • Anemia    • Anxiety    • Arthritis    • Cancer of skin     BCC OF NOSE   • COPD (chronic obstructive pulmonary disease) (CMS/HCC)    • Depression    • Fibromyalgia    • GERD (gastroesophageal reflux disease)    • Hx of colonic polyps    • Hypercholesteremia    • Hypertension        Past Surgical History:   Procedure Laterality Date   • BREAST SURGERY     • CHOLECYSTECTOMY     • COLONOSCOPY  04/12/2017    Hemorrhoids and a few diverticula repeat exam in 3 years Dr. Cordero   • COLONOSCOPY W/ POLYPECTOMY  07/28/2016    Tubular adenoma proximal to the anus, 30 MM polyp in the cecum not resected, Diverticulosis ref to Dr. Cordero   • ENDOSCOPY  06/15/2016    Small hh, Negative for celiac disease   • RADICAL HYSTERECTOMY         Social History     Social History   • Marital status:      Social History Main Topics   • Smoking status: Current Every Day Smoker     Packs/day: 1.50   • Smokeless tobacco: Never Used   • Alcohol use No   • Drug use: No   • Sexual activity: Defer     Other Topics Concern   • Not on file       Family History   Problem Relation Age of Onset   • Diabetes Sister    • Cancer Brother    • Colon cancer Neg Hx    • Colon polyps Neg Hx        Objective    /90   Temp 97.9 °F (36.6 °C)   Ht 162.6 cm (64\")   Wt 81.6 kg (180 lb)   BMI 30.90 kg/m²     Physical Exam   Constitutional: She is oriented to person, place, and time. She appears well-developed and well-nourished. No distress.   HENT:   Head: Normocephalic and atraumatic.   Right " Ear: External ear and ear canal normal.   Left Ear: External ear and ear canal normal.   Nose: No nasal deformity. No epistaxis.   Mouth/Throat: Oropharynx is clear and moist. Mucous membranes are not pale, not dry and not cyanotic. Normal dentition. No oropharyngeal exudate.   Neck: Trachea normal. No tracheal tenderness present. No tracheal deviation present. No thyroid mass and no thyromegaly present.   Pulmonary/Chest: Effort normal. No accessory muscle usage. No respiratory distress. Chest wall is not dull to percussion (No flatness or hyperresonance). She exhibits no mass and no tenderness.   On palpation, no tactile fremitus. All movements are symmetric. No intercostal retraction noted.    Abdominal: Soft. Normal appearance. She exhibits no distension and no mass. There is no hepatosplenomegaly. There is no tenderness. No hernia.   Rectal examination or stool specimen is not indicated.    Musculoskeletal:   Normal gait and station. The spine, ribs, and pelvis are examined. No obvious misalignment or asymmetry. ROM is reasonable for age. No instability. No obvious atrophy, flaccidity or spasticity.    Lymphadenopathy:     She has no cervical adenopathy.        Right: No inguinal adenopathy present.        Left: No inguinal adenopathy present.   Neurological: She is alert and oriented to person, place, and time.   Skin: Skin is warm, dry and intact. No lesion and no rash noted. She is not diaphoretic. No cyanosis. No pallor. Nails show no clubbing.   On palpation, there were no induration, subcutaneous nodules, or tightening   Psychiatric: Her speech is normal and behavior is normal. Judgment and thought content normal. Her mood appears not anxious. Her affect is not labile. She does not exhibit a depressed mood.   Vitals reviewed.    CT independent reivew    The CT scan of the abdomen/pelvis done with and without contrast is available for me to review.  Treatment recommendations require an independent review.   First I scanned the liver, spleen, and bowel pattern.  The retroperitoneum including the major vessels and lymphatic packages are briefly reviewed.  This film as been reviewed by the radiologist to determine any non urologic abnormalities that are present.  The kidneys are closely inspected for size, symmetry, contour, parenchymal thickness, perinephric reaction, presence of calcifications, and intrarenal dilation of the collecting system.  The ureters are inspected for their course, caliber, and any calcifications.  The bladder is inspected for its thickness, size, and presence of any calcifications.  This scan shows:    The right kidney appears abnormal with hazziness in right upper collecting system and right non obstructing stone    The left kidney appears normal on this contrasted CT scan.  The renal parenchymal is normal in thickness.  There are no solid masses or cysts.  There is no hydronephrosis.  There are no stones.      The bladder appears normal on this non-contrasted CT scan.  The bladder appears normal in thickness.  There no masses or stones seen on this exam.         Results for orders placed or performed in visit on 09/05/18   POC Urinalysis Dipstick, Multipro   Result Value Ref Range    Color Yellow Yellow, Straw, Dark Yellow, Charisse    Clarity, UA Clear Clear    Glucose, UA Negative Negative, 1000 mg/dL (3+) mg/dL    Bilirubin Negative Negative    Ketones, UA Negative Negative    Specific Gravity  1.030 1.005 - 1.030    Blood, UA Negative Negative    pH, Urine 5.5 5.0 - 8.0    Protein, POC Negative Negative mg/dL    Urobilinogen, UA Normal Normal    Nitrite, UA Negative Negative    Leukocytes Negative Negative     Assessment and Plan    Diagnoses and all orders for this visit:    Renal mass, right  -     POC Urinalysis Dipstick, Multipro  -     Case Request; Standing  -     sodium chloride 0.9 % infusion; Infuse 100 mL/hr into a venous catheter Continuous.  -     levoFLOXacin (LEVAQUIN) 500 mg in  sodium chloride 0.9 % 150 mL IVPB; Infuse 500 mg into a venous catheter 1 (One) Time.    Urge incontinence of urine    Stress incontinence in female    Encounter for smoking cessation counseling    Other orders  -     Follow Anesthesia Guidelines / Standing Orders; Future  -     Provide instructions to patient on NPO status  -     Obtain informed consent  -     Follow Anesthesia Guidelines / Standing Orders; Standing  -     Verify NPO Status; Standing  -     SCD (sequential compression device)- to be placed on patient in Pre-op; Standing              I reviewed her imaging.  She has some haziness in the right upper collecting system.  She is a right nonobstructing stone in her right lower pole.  She is a history of flank pain or gross hematuria do not think this is consistent with pyelonephritis.  He has a long-standing history of smoking and I am concerned about the possibility of upper tract urothelial cancer.  We will plan for cystoscopy retrograde pyelogram right ureteroscopy and other indicated procedures in the near future.

## 2018-09-05 ENCOUNTER — OFFICE VISIT (OUTPATIENT)
Dept: UROLOGY | Facility: CLINIC | Age: 63
End: 2018-09-05

## 2018-09-05 VITALS
HEIGHT: 64 IN | DIASTOLIC BLOOD PRESSURE: 90 MMHG | WEIGHT: 180 LBS | SYSTOLIC BLOOD PRESSURE: 120 MMHG | BODY MASS INDEX: 30.73 KG/M2 | TEMPERATURE: 97.9 F

## 2018-09-05 DIAGNOSIS — N39.41 URGE INCONTINENCE OF URINE: ICD-10-CM

## 2018-09-05 DIAGNOSIS — Z71.6 ENCOUNTER FOR SMOKING CESSATION COUNSELING: ICD-10-CM

## 2018-09-05 DIAGNOSIS — N39.3 STRESS INCONTINENCE IN FEMALE: ICD-10-CM

## 2018-09-05 DIAGNOSIS — N28.89 RENAL MASS, RIGHT: Primary | ICD-10-CM

## 2018-09-05 LAB
BILIRUB BLD-MCNC: NEGATIVE MG/DL
CLARITY, POC: CLEAR
COLOR UR: YELLOW
GLUCOSE UR STRIP-MCNC: NEGATIVE MG/DL
KETONES UR QL: NEGATIVE
LEUKOCYTE EST, POC: NEGATIVE
NITRITE UR-MCNC: NEGATIVE MG/ML
PH UR: 5.5 [PH] (ref 5–8)
PROT UR STRIP-MCNC: NEGATIVE MG/DL
RBC # UR STRIP: NEGATIVE /UL
SP GR UR: 1.03 (ref 1–1.03)
UROBILINOGEN UR QL: NORMAL

## 2018-09-05 PROCEDURE — 81001 URINALYSIS AUTO W/SCOPE: CPT | Performed by: UROLOGY

## 2018-09-05 PROCEDURE — 99204 OFFICE O/P NEW MOD 45 MIN: CPT | Performed by: UROLOGY

## 2018-09-05 RX ORDER — SODIUM CHLORIDE 9 MG/ML
100 INJECTION, SOLUTION INTRAVENOUS CONTINUOUS
Status: CANCELLED | OUTPATIENT
Start: 2018-09-05

## 2018-09-07 ENCOUNTER — APPOINTMENT (OUTPATIENT)
Dept: PREADMISSION TESTING | Facility: HOSPITAL | Age: 63
End: 2018-09-07

## 2018-09-07 VITALS
SYSTOLIC BLOOD PRESSURE: 140 MMHG | WEIGHT: 182.1 LBS | HEIGHT: 63 IN | BODY MASS INDEX: 32.27 KG/M2 | OXYGEN SATURATION: 98 % | HEART RATE: 125 BPM | RESPIRATION RATE: 20 BRPM | DIASTOLIC BLOOD PRESSURE: 79 MMHG

## 2018-09-07 LAB
ANION GAP SERPL CALCULATED.3IONS-SCNC: 15 MMOL/L (ref 4–13)
BUN BLD-MCNC: 19 MG/DL (ref 5–21)
BUN/CREAT SERPL: 24.4 (ref 7–25)
CALCIUM SPEC-SCNC: 10.3 MG/DL (ref 8.4–10.4)
CHLORIDE SERPL-SCNC: 109 MMOL/L (ref 98–110)
CO2 SERPL-SCNC: 19 MMOL/L (ref 24–31)
CREAT BLD-MCNC: 0.78 MG/DL (ref 0.5–1.4)
DEPRECATED RDW RBC AUTO: 49.2 FL (ref 40–54)
ERYTHROCYTE [DISTWIDTH] IN BLOOD BY AUTOMATED COUNT: 18.7 % (ref 12–15)
GFR SERPL CREATININE-BSD FRML MDRD: 75 ML/MIN/1.73
GLUCOSE BLD-MCNC: 106 MG/DL (ref 70–100)
HCT VFR BLD AUTO: 36.5 % (ref 37–47)
HGB BLD-MCNC: 10.9 G/DL (ref 12–16)
MCH RBC QN AUTO: 22.4 PG (ref 28–32)
MCHC RBC AUTO-ENTMCNC: 29.9 G/DL (ref 33–36)
MCV RBC AUTO: 75.1 FL (ref 82–98)
PLATELET # BLD AUTO: 359 10*3/MM3 (ref 130–400)
PMV BLD AUTO: 9.9 FL (ref 6–12)
POTASSIUM BLD-SCNC: 3.9 MMOL/L (ref 3.5–5.3)
RBC # BLD AUTO: 4.86 10*6/MM3 (ref 4.2–5.4)
SODIUM BLD-SCNC: 143 MMOL/L (ref 135–145)
WBC NRBC COR # BLD: 11.33 10*3/MM3 (ref 4.8–10.8)

## 2018-09-07 PROCEDURE — 93005 ELECTROCARDIOGRAM TRACING: CPT

## 2018-09-07 PROCEDURE — 85027 COMPLETE CBC AUTOMATED: CPT | Performed by: UROLOGY

## 2018-09-07 PROCEDURE — 36415 COLL VENOUS BLD VENIPUNCTURE: CPT

## 2018-09-07 PROCEDURE — 80048 BASIC METABOLIC PNL TOTAL CA: CPT | Performed by: UROLOGY

## 2018-09-07 PROCEDURE — 93010 ELECTROCARDIOGRAM REPORT: CPT | Performed by: INTERNAL MEDICINE

## 2018-09-07 RX ORDER — BISMUTH SUBSALICYLATE 262 MG/1
524 TABLET, CHEWABLE ORAL
COMMUNITY
End: 2019-02-15

## 2018-09-07 RX ORDER — CETIRIZINE HYDROCHLORIDE 10 MG/1
10 TABLET ORAL DAILY
COMMUNITY

## 2018-09-07 NOTE — DISCHARGE INSTRUCTIONS
DAY OF SURGERY INSTRUCTIONS        YOUR SURGEON: DR PENNY MEZA    PROCEDURE: RIGHT FLEXIBLE URETEROSCOPY WITH POSSIBLE BIOPSY OF RIGHT RENAL PELVIS AND RIGHT URETERAL STENT    DATE OF SURGERY: SEPTEMBER 13, 2018    ARRIVAL TIME: AS DIRECTED BY OFFICE    DAY OF SURGERY TAKE ONLY THESE MEDICATIONS UNLESS OTHERWISE INSTRUCTED BY YOUR PHYSICIAN: NONE          MANAGING PAIN AFTER SURGERY    We know you are probably wondering what your pain will be like after surgery.  Following surgery it is unrealistic to expect you will not have pain.   Pain is how our bodies let us know that something is wrong or cautions us to be careful.  That said, our goal is to make your pain tolerable.    Methods we may use to treat your pain include (oral or IV medications, PCAs, epidurals, nerve blocks, etc.)   While some procedures require IV pain medications for a short time after surgery, transitioning to pain medications by mouth allows for better management of pain.   Your nurse will encourage you to take oral pain medications whenever possible.  IV medications work almost immediately, but only last a short while.  Taking medications by mouth allows for a more constant level of medication in your blood stream for a longer period of time.      Once your pain is out of control it is harder to get back under control.  It is important you are aware when your next dose of pain medication is due.  If you are admitted, your nurse may write the time of your next dose on the white board in your room to help you remember.      We are interested in your pain and encourage you to inform us about aggravating factors during your visit.   Many times a simple repositioning every few hours can make a big difference.    If your physician says it is okay, do not let your pain prevent you from getting out of bed. Be sure to call your nurse for assistance prior to getting up so you do not fall.      Before surgery, please decide your tolerable pain  goal.  These faces help describe the pain ratings we use on a 0-10 scale.   Be prepared to tell us your goal and whether or not you take pain or anxiety medications at home.            BEFORE YOU COME TO THE HOSPITAL  (Pre-op instructions)  • Do not eat, drink, smoke or chew gum after midnight the night before surgery.  This also includes no mints.  • Morning of surgery take only the medicines you have been instructed with a sip of water unless otherwise instructed  by your physician.  • Do not shave, wear makeup or dark nail polish.  • Remove all jewelry including rings.  • Leave anything you consider valuable at home.  • Leave your suitcase in the car until after your surgery.  • Bring the following with you if applicable:  o Picture ID and insurance, Medicare or Medicaid cards  o Co-pay/deductible required by insurance (cash, check, credit card)  o Copy of advance directive, living will or power-of- documents if not brought to PAT  o CPAP or BIPAP mask and tubing  o Relaxation aids (MP3 player, book, magazine)  • On the day of surgery check in at registration located at the main entrance of the hospital.       Outpatient Surgery Guidelines, Adult  Outpatient procedures are those for which the person having the procedure is allowed to go home the same day as the procedure. Various procedures are done on an outpatient basis. You should follow some general guidelines if you will be having an outpatient procedure.  LET YOUR HEALTH CARE PROVIDER KNOW ABOUT:  · Any allergies you have.  · All medicines you are taking, including vitamins, herbs, eye drops, creams, and over-the-counter medicines.  · Previous problems you or members of your family have had with the use of anesthetics.  · Any blood disorders you have.  · Previous surgeries you have had.  · Medical conditions you have.  RISKS AND COMPLICATIONS  Your health care provider will discuss possible risks and complications with you before surgery. Common  risks and complications include:    · Problems due to the use of anesthetics.  · Blood loss and replacement (does not apply to minor surgical procedures).  · Temporary increase in pain due to surgery.  · Uncorrected pain or problems that the surgery was meant to correct.  · Infection.  · New damage.  BEFORE THE PROCEDURE  · Ask your health care provider about changing or stopping your regular medicines. You may need to stop taking certain medicines in the days or weeks before the procedure.  · Stop smoking at least 2 weeks before surgery. This lowers your risk for complications during and after surgery. Ask your health care provider for help with this if needed.  · Eat your usual meals and a light supper the day before surgery. Continue fluid intake. Do not drink alcohol.  · Do not eat or drink after midnight the night before your surgery.   · Arrange for someone to take you home and to stay with you for 24 hours after the procedure. Medicine given for your procedure may affect your ability to drive or to care for yourself.  · Call your health care provider's office if you develop an illness or problem that may prevent you from safely having your procedure.  AFTER THE PROCEDURE  After surgery, you will be taken to a recovery area, where your progress will be monitored. If there are no complications, you will be allowed to go home when you are awake, stable, and taking fluids well. You may have numbness around the surgical site. Healing will take some time. You will have tenderness at the surgical site and may have some swelling and bruising. You may also have some nausea.  HOME CARE INSTRUCTIONS  · Do not drive for 24 hours, or as directed by your health care provider. Do not drive while taking prescription pain medicines.  · Do not drink alcohol for 24 hours.  · Do not make important decisions or sign legal documents for 24 hours.  · You may resume a normal diet and activities as directed.  · Do not lift anything  heavier than 10 pounds (4.5 kg) or play contact sports until your health care provider says it is okay.  · Change your bandages (dressings) as directed.  · Only take over-the-counter or prescription medicines as directed by your health care provider.  · Follow up with your health care provider as directed.  SEEK MEDICAL CARE IF:  · You have increased bleeding (more than a small spot) from the surgical site.  · You have redness, swelling, or increasing pain in the wound.  · You see pus coming from the wound.  · You have a fever.  · You notice a bad smell coming from the wound or dressing.  · You feel lightheaded or faint.  · You develop a rash.  · You have trouble breathing.  · You develop allergies.  MAKE SURE YOU:  · Understand these instructions.  · Will watch your condition.  · Will get help right away if you are not doing well or get worse.     This information is not intended to replace advice given to you by your health care provider. Make sure you discuss any questions you have with your health care provider.     Document Released: 09/12/2002 Document Revised: 05/03/2016 Document Reviewed: 05/22/2014  [employer] Interactive Patient Education ©2016 [employer]       Fall Prevention in Hospitals, Adult  As a hospital patient, your condition and the treatments you receive can increase your risk for falls. Some additional risk factors for falls in a hospital include:  · Being in an unfamiliar environment.  · Being on bed rest.  · Your surgery.  · Taking certain medicines.  · Your tubing requirements, such as intravenous (IV) therapy or catheters.  It is important that you learn how to decrease fall risks while at the hospital. Below are important tips that can help prevent falls.  SAFETY TIPS FOR PREVENTING FALLS  Talk about your risk of falling.  · Ask your health care provider why you are at risk for falling. Is it your medicine, illness, tubing placement, or something else?  · Make a plan with your health care  provider to keep you safe from falls.  · Ask your health care provider or pharmacist about side effects of your medicines. Some medicines can make you dizzy or affect your coordination.  Ask for help.  · Ask for help before getting out of bed. You may need to press your call button.  · Ask for assistance in getting safely to the toilet.  · Ask for a walker or cane to be put at your bedside. Ask that most of the side rails on your bed be placed up before your health care provider leaves the room.  · Ask family or friends to sit with you.  · Ask for things that are out of your reach, such as your glasses, hearing aids, telephone, bedside table, or call button.  Follow these tips to avoid falling:  · Stay lying or seated, rather than standing, while waiting for help.  · Wear rubber-soled slippers or shoes whenever you walk in the hospital.  · Avoid quick, sudden movements.  ¨ Change positions slowly.  ¨ Sit on the side of your bed before standing.  ¨ Stand up slowly and wait before you start to walk.  · Let your health care provider know if there is a spill on the floor.  · Pay careful attention to the medical equipment, electrical cords, and tubes around you.  · When you need help, use your call button by your bed or in the bathroom. Wait for one of your health care providers to help you.  · If you feel dizzy or unsure of your footing, return to bed and wait for assistance.  · Avoid being distracted by the TV, telephone, or another person in your room.  · Do not lean or support yourself on rolling objects, such as IV poles or bedside tables.     This information is not intended to replace advice given to you by your health care provider. Make sure you discuss any questions you have with your health care provider.     Document Released: 12/15/2001 Document Revised: 01/08/2016 Document Reviewed: 08/25/2013  [employer] Interactive Patient Education ©2016 [employer]       Surgical Site Infections FAQs  What is a Surgical  Site Infection (SSI)?  A surgical site infection is an infection that occurs after surgery in the part of the body where the surgery took place. Most patients who have surgery do not develop an infection. However, infections develop in about 1 to 3 out of every 100 patients who have surgery.  Some of the common symptoms of a surgical site infection are:  · Redness and pain around the area where you had surgery  · Drainage of cloudy fluid from your surgical wound  · Fever  Can SSIs be treated?  Yes. Most surgical site infections can be treated with antibiotics. The antibiotic given to you depends on the bacteria (germs) causing the infection. Sometimes patients with SSIs also need another surgery to treat the infection.  What are some of the things that hospitals are doing to prevent SSIs?  To prevent SSIs, doctors, nurses, and other healthcare providers:  · Clean their hands and arms up to their elbows with an antiseptic agent just before the surgery.  · Clean their hands with soap and water or an alcohol-based hand rub before and after caring for each patient.  · May remove some of your hair immediately before your surgery using electric clippers if the hair is in the same area where the procedure will occur. They should not shave you with a razor.  · Wear special hair covers, masks, gowns, and gloves during surgery to keep the surgery area clean.  · Give you antibiotics before your surgery starts. In most cases, you should get antibiotics within 60 minutes before the surgery starts and the antibiotics should be stopped within 24 hours after surgery.  · Clean the skin at the site of your surgery with a special soap that kills germs.  What can I do to help prevent SSIs?  Before your surgery:  · Tell your doctor about other medical problems you may have. Health problems such as allergies, diabetes, and obesity could affect your surgery and your treatment.  · Quit smoking. Patients who smoke get more infections. Talk  to your doctor about how you can quit before your surgery.  · Do not shave near where you will have surgery. Shaving with a razor can irritate your skin and make it easier to develop an infection.  At the time of your surgery:  · Speak up if someone tries to shave you with a razor before surgery. Ask why you need to be shaved and talk with your surgeon if you have any concerns.  · Ask if you will get antibiotics before surgery.  After your surgery:  · Make sure that your healthcare providers clean their hands before examining you, either with soap and water or an alcohol-based hand rub.  · If you do not see your providers clean their hands, please ask them to do so.  · Family and friends who visit you should not touch the surgical wound or dressings.  · Family and friends should clean their hands with soap and water or an alcohol-based hand rub before and after visiting you. If you do not see them clean their hands, ask them to clean their hands.  What do I need to do when I go home from the hospital?  · Before you go home, your doctor or nurse should explain everything you need to know about taking care of your wound. Make sure you understand how to care for your wound before you leave the hospital.  · Always clean your hands before and after caring for your wound.  · Before you go home, make sure you know who to contact if you have questions or problems after you get home.  · If you have any symptoms of an infection, such as redness and pain at the surgery site, drainage, or fever, call your doctor immediately.  If you have additional questions, please ask your doctor or nurse.  Developed and co-sponsored by The Society for Healthcare Epidemiology of Hetal (SHEA); Infectious Diseases Society of Hetal (IDSA); American Hospital Association; Association for Professionals in Infection Control and Epidemiology (APIC); Centers for Disease Control and Prevention (CDC); and The Joint Commission.     This information  is not intended to replace advice given to you by your health care provider. Make sure you discuss any questions you have with your health care provider.     Document Released: 12/23/2014 Document Revised: 01/08/2016 Document Reviewed: 03/02/2016  [employer] Interactive Patient Education ©2016 [employer]     PATIENT/FAMILY/RESPONSIBLE PARTY VERBALIZES UNDERSTANDING OF ABOVE EDUCATION.  COPY OF PAIN SCALE GIVEN AND REVIEWED WITH VERBALIZED UNDERSTANDING.

## 2018-09-13 ENCOUNTER — HOSPITAL ENCOUNTER (OUTPATIENT)
Facility: HOSPITAL | Age: 63
Setting detail: HOSPITAL OUTPATIENT SURGERY
Discharge: HOME | End: 2018-09-13
Attending: UROLOGY | Admitting: UROLOGY

## 2018-09-13 ENCOUNTER — ANESTHESIA EVENT (OUTPATIENT)
Dept: PERIOP | Facility: HOSPITAL | Age: 63
End: 2018-09-13

## 2018-09-13 ENCOUNTER — ANESTHESIA (OUTPATIENT)
Dept: PERIOP | Facility: HOSPITAL | Age: 63
End: 2018-09-13

## 2018-09-13 ENCOUNTER — APPOINTMENT (OUTPATIENT)
Dept: GENERAL RADIOLOGY | Facility: HOSPITAL | Age: 63
End: 2018-09-13

## 2018-09-13 VITALS
SYSTOLIC BLOOD PRESSURE: 127 MMHG | HEART RATE: 92 BPM | DIASTOLIC BLOOD PRESSURE: 75 MMHG | OXYGEN SATURATION: 97 % | TEMPERATURE: 98.6 F | RESPIRATION RATE: 16 BRPM

## 2018-09-13 DIAGNOSIS — N20.0 RIGHT NEPHROLITHIASIS: Primary | ICD-10-CM

## 2018-09-13 DIAGNOSIS — N28.89 RENAL MASS, RIGHT: ICD-10-CM

## 2018-09-13 PROCEDURE — 25010000002 ONDANSETRON PER 1 MG: Performed by: UROLOGY

## 2018-09-13 PROCEDURE — 25010000002 LEVOFLOXACIN PER 250 MG: Performed by: UROLOGY

## 2018-09-13 PROCEDURE — 25010000002 SUCCINYLCHOLINE PER 20 MG: Performed by: NURSE ANESTHETIST, CERTIFIED REGISTERED

## 2018-09-13 PROCEDURE — 52356 CYSTO/URETERO W/LITHOTRIPSY: CPT | Performed by: UROLOGY

## 2018-09-13 PROCEDURE — 25010000002 DEXAMETHASONE PER 1 MG: Performed by: NURSE ANESTHETIST, CERTIFIED REGISTERED

## 2018-09-13 PROCEDURE — C2617 STENT, NON-COR, TEM W/O DEL: HCPCS | Performed by: UROLOGY

## 2018-09-13 PROCEDURE — 74420 UROGRAPHY RTRGR +-KUB: CPT | Performed by: UROLOGY

## 2018-09-13 PROCEDURE — C1769 GUIDE WIRE: HCPCS | Performed by: UROLOGY

## 2018-09-13 PROCEDURE — 25010000002 PROPOFOL 10 MG/ML EMULSION: Performed by: NURSE ANESTHETIST, CERTIFIED REGISTERED

## 2018-09-13 PROCEDURE — 52355 CYSTOURETERO W/EXCISE TUMOR: CPT | Performed by: UROLOGY

## 2018-09-13 PROCEDURE — 25010000002 FENTANYL CITRATE (PF) 100 MCG/2ML SOLUTION: Performed by: NURSE ANESTHETIST, CERTIFIED REGISTERED

## 2018-09-13 PROCEDURE — 25010000002 ONDANSETRON PER 1 MG: Performed by: NURSE ANESTHETIST, CERTIFIED REGISTERED

## 2018-09-13 PROCEDURE — 74420 UROGRAPHY RTRGR +-KUB: CPT

## 2018-09-13 PROCEDURE — 88307 TISSUE EXAM BY PATHOLOGIST: CPT | Performed by: UROLOGY

## 2018-09-13 PROCEDURE — C1758 CATHETER, URETERAL: HCPCS | Performed by: UROLOGY

## 2018-09-13 PROCEDURE — 25010000002 IOPAMIDOL 61 % SOLUTION: Performed by: UROLOGY

## 2018-09-13 PROCEDURE — 25010000002 MIDAZOLAM PER 1 MG: Performed by: NURSE ANESTHETIST, CERTIFIED REGISTERED

## 2018-09-13 DEVICE — URETERAL STENT
Type: IMPLANTABLE DEVICE | Site: URETER | Status: FUNCTIONAL
Brand: PERCUFLEX™ PLUS

## 2018-09-13 RX ORDER — DEXAMETHASONE SODIUM PHOSPHATE 4 MG/ML
INJECTION, SOLUTION INTRA-ARTICULAR; INTRALESIONAL; INTRAMUSCULAR; INTRAVENOUS; SOFT TISSUE AS NEEDED
Status: DISCONTINUED | OUTPATIENT
Start: 2018-09-13 | End: 2018-09-13 | Stop reason: SURG

## 2018-09-13 RX ORDER — FLUMAZENIL 0.1 MG/ML
0.2 INJECTION INTRAVENOUS AS NEEDED
Status: DISCONTINUED | OUTPATIENT
Start: 2018-09-13 | End: 2018-09-13 | Stop reason: HOSPADM

## 2018-09-13 RX ORDER — SODIUM CHLORIDE, SODIUM LACTATE, POTASSIUM CHLORIDE, CALCIUM CHLORIDE 600; 310; 30; 20 MG/100ML; MG/100ML; MG/100ML; MG/100ML
1000 INJECTION, SOLUTION INTRAVENOUS CONTINUOUS
Status: DISCONTINUED | OUTPATIENT
Start: 2018-09-13 | End: 2018-09-13 | Stop reason: HOSPADM

## 2018-09-13 RX ORDER — LIDOCAINE HYDROCHLORIDE 40 MG/ML
SOLUTION TOPICAL AS NEEDED
Status: DISCONTINUED | OUTPATIENT
Start: 2018-09-13 | End: 2018-09-13 | Stop reason: SURG

## 2018-09-13 RX ORDER — SODIUM CHLORIDE, SODIUM LACTATE, POTASSIUM CHLORIDE, CALCIUM CHLORIDE 600; 310; 30; 20 MG/100ML; MG/100ML; MG/100ML; MG/100ML
100 INJECTION, SOLUTION INTRAVENOUS CONTINUOUS
Status: DISCONTINUED | OUTPATIENT
Start: 2018-09-13 | End: 2018-09-13 | Stop reason: HOSPADM

## 2018-09-13 RX ORDER — LABETALOL HYDROCHLORIDE 5 MG/ML
5 INJECTION, SOLUTION INTRAVENOUS
Status: DISCONTINUED | OUTPATIENT
Start: 2018-09-13 | End: 2018-09-13 | Stop reason: HOSPADM

## 2018-09-13 RX ORDER — ONDANSETRON 2 MG/ML
4 INJECTION INTRAMUSCULAR; INTRAVENOUS ONCE AS NEEDED
Status: COMPLETED | OUTPATIENT
Start: 2018-09-13 | End: 2018-09-13

## 2018-09-13 RX ORDER — SORBITOL 30 G/1000ML
IRRIGANT IRRIGATION AS NEEDED
Status: DISCONTINUED | OUTPATIENT
Start: 2018-09-13 | End: 2018-09-13 | Stop reason: HOSPADM

## 2018-09-13 RX ORDER — HYDRALAZINE HYDROCHLORIDE 20 MG/ML
5 INJECTION INTRAMUSCULAR; INTRAVENOUS
Status: DISCONTINUED | OUTPATIENT
Start: 2018-09-13 | End: 2018-09-13 | Stop reason: HOSPADM

## 2018-09-13 RX ORDER — TAMSULOSIN HYDROCHLORIDE 0.4 MG/1
1 CAPSULE ORAL NIGHTLY
Qty: 14 CAPSULE | Refills: 0 | Status: ON HOLD | OUTPATIENT
Start: 2018-09-13 | End: 2019-05-02

## 2018-09-13 RX ORDER — FENTANYL CITRATE 50 UG/ML
INJECTION, SOLUTION INTRAMUSCULAR; INTRAVENOUS AS NEEDED
Status: DISCONTINUED | OUTPATIENT
Start: 2018-09-13 | End: 2018-09-13 | Stop reason: SURG

## 2018-09-13 RX ORDER — HYDROCODONE BITARTRATE AND ACETAMINOPHEN 7.5; 325 MG/1; MG/1
1 TABLET ORAL ONCE AS NEEDED
Status: DISCONTINUED | OUTPATIENT
Start: 2018-09-13 | End: 2018-09-13 | Stop reason: HOSPADM

## 2018-09-13 RX ORDER — SUCCINYLCHOLINE CHLORIDE 20 MG/ML
INJECTION INTRAMUSCULAR; INTRAVENOUS AS NEEDED
Status: DISCONTINUED | OUTPATIENT
Start: 2018-09-13 | End: 2018-09-13 | Stop reason: SURG

## 2018-09-13 RX ORDER — METOCLOPRAMIDE HYDROCHLORIDE 5 MG/ML
5 INJECTION INTRAMUSCULAR; INTRAVENOUS
Status: DISCONTINUED | OUTPATIENT
Start: 2018-09-13 | End: 2018-09-13 | Stop reason: HOSPADM

## 2018-09-13 RX ORDER — SODIUM CHLORIDE 9 MG/ML
100 INJECTION, SOLUTION INTRAVENOUS CONTINUOUS
Status: DISCONTINUED | OUTPATIENT
Start: 2018-09-13 | End: 2018-09-13 | Stop reason: HOSPADM

## 2018-09-13 RX ORDER — LEVOFLOXACIN 5 MG/ML
500 INJECTION, SOLUTION INTRAVENOUS ONCE
Status: COMPLETED | OUTPATIENT
Start: 2018-09-13 | End: 2018-09-13

## 2018-09-13 RX ORDER — MIDAZOLAM HYDROCHLORIDE 1 MG/ML
2 INJECTION INTRAMUSCULAR; INTRAVENOUS
Status: DISCONTINUED | OUTPATIENT
Start: 2018-09-13 | End: 2018-09-13 | Stop reason: HOSPADM

## 2018-09-13 RX ORDER — ACETAMINOPHEN 500 MG
1000 TABLET ORAL ONCE
Status: COMPLETED | OUTPATIENT
Start: 2018-09-13 | End: 2018-09-13

## 2018-09-13 RX ORDER — MAGNESIUM HYDROXIDE 1200 MG/15ML
LIQUID ORAL AS NEEDED
Status: DISCONTINUED | OUTPATIENT
Start: 2018-09-13 | End: 2018-09-13 | Stop reason: HOSPADM

## 2018-09-13 RX ORDER — MEPERIDINE HYDROCHLORIDE 25 MG/ML
12.5 INJECTION INTRAMUSCULAR; INTRAVENOUS; SUBCUTANEOUS
Status: DISCONTINUED | OUTPATIENT
Start: 2018-09-13 | End: 2018-09-13 | Stop reason: HOSPADM

## 2018-09-13 RX ORDER — ONDANSETRON 2 MG/ML
INJECTION INTRAMUSCULAR; INTRAVENOUS AS NEEDED
Status: DISCONTINUED | OUTPATIENT
Start: 2018-09-13 | End: 2018-09-13 | Stop reason: SURG

## 2018-09-13 RX ORDER — IPRATROPIUM BROMIDE AND ALBUTEROL SULFATE 2.5; .5 MG/3ML; MG/3ML
3 SOLUTION RESPIRATORY (INHALATION) ONCE AS NEEDED
Status: DISCONTINUED | OUTPATIENT
Start: 2018-09-13 | End: 2018-09-13 | Stop reason: HOSPADM

## 2018-09-13 RX ORDER — HYDROCODONE BITARTRATE AND ACETAMINOPHEN 7.5; 325 MG/1; MG/1
1-2 TABLET ORAL EVERY 4 HOURS PRN
Qty: 12 TABLET | Refills: 0 | Status: SHIPPED | OUTPATIENT
Start: 2018-09-13 | End: 2019-08-30

## 2018-09-13 RX ORDER — FENTANYL CITRATE 50 UG/ML
25 INJECTION, SOLUTION INTRAMUSCULAR; INTRAVENOUS AS NEEDED
Status: DISCONTINUED | OUTPATIENT
Start: 2018-09-13 | End: 2018-09-13 | Stop reason: HOSPADM

## 2018-09-13 RX ORDER — PROPOFOL 10 MG/ML
VIAL (ML) INTRAVENOUS AS NEEDED
Status: DISCONTINUED | OUTPATIENT
Start: 2018-09-13 | End: 2018-09-13 | Stop reason: SURG

## 2018-09-13 RX ORDER — ROCURONIUM BROMIDE 10 MG/ML
INJECTION, SOLUTION INTRAVENOUS AS NEEDED
Status: DISCONTINUED | OUTPATIENT
Start: 2018-09-13 | End: 2018-09-13 | Stop reason: SURG

## 2018-09-13 RX ORDER — NALOXONE HCL 0.4 MG/ML
0.04 VIAL (ML) INJECTION AS NEEDED
Status: DISCONTINUED | OUTPATIENT
Start: 2018-09-13 | End: 2018-09-13 | Stop reason: HOSPADM

## 2018-09-13 RX ORDER — SODIUM CHLORIDE 0.9 % (FLUSH) 0.9 %
3 SYRINGE (ML) INJECTION AS NEEDED
Status: DISCONTINUED | OUTPATIENT
Start: 2018-09-13 | End: 2018-09-13 | Stop reason: HOSPADM

## 2018-09-13 RX ORDER — MIDAZOLAM HYDROCHLORIDE 1 MG/ML
1 INJECTION INTRAMUSCULAR; INTRAVENOUS
Status: DISCONTINUED | OUTPATIENT
Start: 2018-09-13 | End: 2018-09-13 | Stop reason: HOSPADM

## 2018-09-13 RX ORDER — ONDANSETRON 2 MG/ML
4 INJECTION INTRAMUSCULAR; INTRAVENOUS AS NEEDED
Status: DISCONTINUED | OUTPATIENT
Start: 2018-09-13 | End: 2018-09-13 | Stop reason: HOSPADM

## 2018-09-13 RX ORDER — LIDOCAINE HYDROCHLORIDE 20 MG/ML
INJECTION, SOLUTION INFILTRATION; PERINEURAL AS NEEDED
Status: DISCONTINUED | OUTPATIENT
Start: 2018-09-13 | End: 2018-09-13 | Stop reason: SURG

## 2018-09-13 RX ORDER — SODIUM CHLORIDE 0.9 % (FLUSH) 0.9 %
1-10 SYRINGE (ML) INJECTION AS NEEDED
Status: DISCONTINUED | OUTPATIENT
Start: 2018-09-13 | End: 2018-09-13 | Stop reason: HOSPADM

## 2018-09-13 RX ADMIN — FENTANYL CITRATE 100 MCG: 50 INJECTION, SOLUTION INTRAMUSCULAR; INTRAVENOUS at 14:03

## 2018-09-13 RX ADMIN — ONDANSETRON HYDROCHLORIDE 4 MG: 2 INJECTION, SOLUTION INTRAMUSCULAR; INTRAVENOUS at 15:20

## 2018-09-13 RX ADMIN — ACETAMINOPHEN 1000 MG: 500 TABLET, FILM COATED ORAL at 13:41

## 2018-09-13 RX ADMIN — LIDOCAINE HYDROCHLORIDE 40 MG: 20 INJECTION, SOLUTION INFILTRATION; PERINEURAL at 14:03

## 2018-09-13 RX ADMIN — LIDOCAINE HYDROCHLORIDE 1 EACH: 40 SOLUTION TOPICAL at 14:03

## 2018-09-13 RX ADMIN — DEXAMETHASONE SODIUM PHOSPHATE 4 MG: 4 INJECTION, SOLUTION INTRAMUSCULAR; INTRAVENOUS at 14:11

## 2018-09-13 RX ADMIN — PROPOFOL 200 MG: 10 INJECTION, EMULSION INTRAVENOUS at 14:03

## 2018-09-13 RX ADMIN — ONDANSETRON HYDROCHLORIDE 4 MG: 2 SOLUTION INTRAMUSCULAR; INTRAVENOUS at 14:11

## 2018-09-13 RX ADMIN — FENTANYL CITRATE 50 MCG: 50 INJECTION, SOLUTION INTRAMUSCULAR; INTRAVENOUS at 14:39

## 2018-09-13 RX ADMIN — SODIUM CHLORIDE, POTASSIUM CHLORIDE, SODIUM LACTATE AND CALCIUM CHLORIDE 1000 ML: 600; 310; 30; 20 INJECTION, SOLUTION INTRAVENOUS at 12:35

## 2018-09-13 RX ADMIN — SUCCINYLCHOLINE CHLORIDE 100 MG: 20 INJECTION, SOLUTION INTRAMUSCULAR; INTRAVENOUS at 14:03

## 2018-09-13 RX ADMIN — MIDAZOLAM HYDROCHLORIDE 2 MG: 1 INJECTION, SOLUTION INTRAMUSCULAR; INTRAVENOUS at 13:41

## 2018-09-13 RX ADMIN — ROCURONIUM BROMIDE 5 MG: 10 INJECTION INTRAVENOUS at 14:03

## 2018-09-13 RX ADMIN — LIDOCAINE HYDROCHLORIDE 0.5 ML: 10 INJECTION, SOLUTION EPIDURAL; INFILTRATION; INTRACAUDAL; PERINEURAL at 12:35

## 2018-09-13 RX ADMIN — LEVOFLOXACIN 500 MG: 5 INJECTION, SOLUTION INTRAVENOUS at 13:42

## 2018-09-13 RX ADMIN — FENTANYL CITRATE 50 MCG: 50 INJECTION, SOLUTION INTRAMUSCULAR; INTRAVENOUS at 14:25

## 2018-09-13 NOTE — DISCHARGE INSTRUCTIONS

## 2018-09-13 NOTE — OP NOTE
FLEXIBLE URETEROSCOPY  Procedure Note    Heather Russo  9/13/2018    Pre-op Diagnosis:   Renal mass, right [N28.89]    Post-op Diagnosis:     Post-Op Diagnosis Codes:     * Renal mass, right [N28.89]  Right Nephrolithiasis  Procedure/CPT® Codes:      Procedure(s):  CYSTOSCOPY RIGHT RETROGRADE PYELOGRAM RIGHT URETEROSCOPY WITH BIOPSY OF RIGHT RENAL PELVIS LASER LITHOTRIPSY RIGHT NEPHROLITHIASIS AND RIGHT URETERAL STENT INSERTION    Surgeon(s):  Yifan Greco MD    Anesthesia: General    Staff:   Circulator: Coby Rea, RN  Scrub Person: Mo Romero Trystyn L  Orientee: Araceli Granado    Estimated Blood Loss: minimal    Specimens:                ID Type Source Tests Collected by Time   A : RIGHT RENAL PELVIS BIOPSY Tissue Kidney, Right TISSUE PATHOLOGY EXAM Yifan Greco MD 9/13/2018 1421         Drains:      Findings: No papillary lesion  Flat erythema of renal pelvis biopsied  Stone disease in lower pole completely treated    Complications: None    Indications: 63-year-old female who had a CT scan which showed thickening in the renal pelvis it could have been pyelonephritis or possible transitional cell carcinoma.  She was brought the operating room to evaluate this completely.    Description of Procedure: After informed consent was obtained, the patient brought the operating room where she underwent general endotracheal anesthesia in the supine position.  She was converted to the dorsal lithotomy position.  She was prepped and draped in the usual sterile fashion.  Timeout was done to ensure the correct patient, procedure and site.  She received preoperative box one area.    22 Brazilian Storz endoscope inserted urethra in retrograde fashion.  The bladder was drained.  Bladder was inspected there is no lesions noted.  The right ureteral orifice was identified and cannulated 0.38 sensor tip wire.  A 10 Brazilian dual-lumen catheter was placed over this.  Another 0.38 sensor tip wire  was then placed.  I placed the flexible ureteroscope over the wire.  I do not see a papillary lesion there was some flat erythema in the renal pelvis when ahead and used biopsy forceps to remove this.  I felt I got an adequate sample with one biopsy.    I then inspected all the calyces and I did see some stone in her lower pole.  I used a 200 µ fiber on 0.3 J and 8 Hz to dust the stone.  I inspected the other calyces and saw no evidence of stone.  I then inspected the ureter its entirety on way out so no evidence of stone or ureteral injury.  I then placed a 10 [place] dual-lumen back over the wire and instilled 10 mL dilute contrast outlining the collecting system with the findings be dictated below.  At that point I backloaded the cystoscope over the wire placed a 6 Tu[place]uan 24 semi-approach flexible good curl seen in the region of the upper pole calyx and a good curl seen distally in the bladder.  The bladder was drained scope was removed string was secured to the patient the patient was wakened anesthesia and transferred current satisfactory condition.    Right retropyelogram read: 10 Tu[place] dual lumen catheter placed over pre-soup a 0.38 sensor tip wire.  10 mL dilute contrast used outline the collecting system.  Ureter was normal in course and caliber is no filling defects noted there is no hydronephrosis was no dilation of the renal pelvis and blunting of the calyces.    Yifan Greco MD     Date: 9/13/2018  Time: 2:49 PM

## 2018-09-13 NOTE — ANESTHESIA PROCEDURE NOTES
Airway  Urgency: elective    Airway not difficult    General Information and Staff    Patient location during procedure: OR  CRNA: PENNY CONNELLY    Indications and Patient Condition  Indications for airway management: airway protection    Preoxygenated: yes  MILS not maintained throughout  Mask difficulty assessment: 1 - vent by mask    Final Airway Details  Final airway type: endotracheal airway      Successful airway: ETT  Cuffed: yes   Successful intubation technique: direct laryngoscopy  Facilitating devices/methods: intubating stylet  Endotracheal tube insertion site: oral  Blade: Arias  Blade size: 2  ETT size: 7.5 mm  Cormack-Lehane Classification: grade I - full view of glottis  Placement verified by: chest auscultation and capnometry   Measured from: lips  ETT to lips (cm): 20  Number of attempts at approach: 1

## 2018-09-13 NOTE — ANESTHESIA PREPROCEDURE EVALUATION
Anesthesia Evaluation     Patient summary reviewed and Nursing notes reviewed   NPO Solid Status: > 8 hours  NPO Liquid Status: > 8 hours           Airway   Mallampati: I  TM distance: >3 FB  No difficulty expected  Dental    (+) implants    Pulmonary - normal exam   (+) a smoker Current Smoked day of surgery, COPD,   Cardiovascular - normal exam  Exercise tolerance: good (4-7 METS)    Patient on routine beta blocker and Beta blocker given within 24 hours of surgery    (+) hypertension well controlled less than 2 medications, hyperlipidemia,       Neuro/Psych  (+) psychiatric history Anxiety and Depression,     GI/Hepatic/Renal/Endo    (+) obesity,  GERD well controlled,      Musculoskeletal     Abdominal  - normal exam   Substance History      OB/GYN negative ob/gyn ROS         Other   (+) arthritis   history of cancer                    Anesthesia Plan    ASA 3     general     intravenous induction   Anesthetic plan, all risks, benefits, and alternatives have been provided, discussed and informed consent has been obtained with: patient.

## 2018-09-13 NOTE — ANESTHESIA POSTPROCEDURE EVALUATION
Patient: Heather Russo    Procedure Summary     Date:  09/13/18 Room / Location:   [facility]  [facility]    Anesthesia Start:  1401 Anesthesia Stop:  1446    Procedure:  CYSTOSCOPY RIGHT RETROGRADE PYELOGRAM RIGHT URETEROSCOPY WITH BIOPSY OF RIGHT RENAL PELVIS LASER LITHOTRIPSY AND RIGHT URETERAL STENT INSERTION (Right Ureter) Diagnosis:       Renal mass, right      (Renal mass, right [N28.89])    Surgeon:  Yifan Greco MD Provider:  Yifan Rosa CRNA    Anesthesia Type:  general ASA Status:  3          Anesthesia Type: general  Last vitals  BP   109/56 (09/13/18 1547)   Temp   98.6 °F (37 °C) (09/13/18 1515)   Pulse   85 (09/13/18 1547)   Resp   14 (09/13/18 1547)     SpO2   95 % (09/13/18 1547)     Post Anesthesia Care and Evaluation    Patient location during evaluation: PACU  Patient participation: complete - patient participated  Level of consciousness: awake and alert  Pain management: adequate  Airway patency: patent  Anesthetic complications: No anesthetic complications    Cardiovascular status: acceptable  Respiratory status: acceptable  Hydration status: acceptable

## 2018-09-14 LAB
CYTO UR: NORMAL
LAB AP CASE REPORT: NORMAL
LAB AP DIAGNOSIS COMMENT: NORMAL
PATH REPORT.FINAL DX SPEC: NORMAL
PATH REPORT.GROSS SPEC: NORMAL

## 2018-09-17 ENCOUNTER — PROCEDURE VISIT (OUTPATIENT)
Dept: UROLOGY | Facility: CLINIC | Age: 63
End: 2018-09-17

## 2018-09-17 DIAGNOSIS — N20.0 RENAL CALCULUS: ICD-10-CM

## 2018-09-17 PROCEDURE — 99024 POSTOP FOLLOW-UP VISIT: CPT | Performed by: UROLOGY

## 2018-09-17 NOTE — PROGRESS NOTES
Patient of Dr. Greco states she is here today to get her stent removed SP Ureteroscopy Laser Litho with stent placement on 09/13/18. Patient denies any fever, chills, N&V or hematuria. The tape was removed and using the string stent was pulled with no complications. The patient was advised to continue any medications prescribed in the hospital and to call if he has any questions or concerns. The patient verbalized understanding. Follow up with Dr. Greco in 6 weeks with Renal US prior. Dr. Greco was in the office for this procedure.     Reviewed and agree with above

## 2018-10-25 NOTE — PROGRESS NOTES
Subjective    Ms. Russo is 63 y.o. female    Chief Complaint: sp Ureteroscopy    History of Present Illness   Abnormal radiographic finding   This patient presents with a possible abnormal finding of the right kidneyon CT that included abdominal cuts. This is a  new finding. This test was done 2 months(s) ago. Onset is sudden. This was done in context of evaluation for an unrelated illness. Symptoms include No evidence of hematuria, dysuria, or pain.        The following portions of the patient's history were reviewed and updated as appropriate: allergies, current medications, past family history, past medical history, past social history, past surgical history and problem list.    Review of Systems   Constitutional: Negative for appetite change, chills, fatigue, fever and unexpected weight change.   HENT: Negative for congestion, dental problem, ear pain, hearing loss, nosebleeds, sinus pressure and trouble swallowing.    Eyes: Negative for pain, discharge, redness and itching.   Respiratory: Negative for apnea, cough, choking and shortness of breath.    Cardiovascular: Negative for chest pain and palpitations.   Gastrointestinal: Negative for abdominal distention, abdominal pain, blood in stool, constipation, diarrhea, nausea and vomiting.   Endocrine: Negative for cold intolerance and heat intolerance.   Genitourinary: Negative for decreased urine volume, difficulty urinating, dyspareunia, dysuria, enuresis, flank pain, frequency, genital sores, hematuria, menstrual problem, pelvic pain, urgency, vaginal bleeding, vaginal discharge and vaginal pain.   Musculoskeletal: Negative for arthralgias, back pain and gait problem.   Skin: Negative for pallor, rash and wound.   Allergic/Immunologic: Negative for immunocompromised state.   Neurological: Negative for dizziness, tremors, seizures, weakness, numbness and headaches.   Hematological: Negative for adenopathy. Does not bruise/bleed easily.   Psychiatric/Behavioral:  Negative for agitation, behavioral problems, hallucinations, self-injury and suicidal ideas.         Current Outpatient Prescriptions:   •  ARIPiprazole (ABILIFY) 10 MG tablet, , Disp: , Rfl:   •  bismuth subsalicylate (PEPTO BISMOL) 262 MG chewable tablet, Chew 524 mg 4 (Four) Times a Day Before Meals & at Bedtime., Disp: , Rfl:   •  cetirizine (zyrTEC) 10 MG tablet, Take 10 mg by mouth Daily., Disp: , Rfl:   •  colestipol (COLESTID) 1 g tablet, TAKE 1 TABLET TWICE A DAY, Disp: 180 tablet, Rfl: 3  •  DULoxetine (CYMBALTA) 60 MG capsule, , Disp: , Rfl:   •  HYDROcodone-acetaminophen (NORCO) 7.5-325 MG per tablet, Take 1-2 tablets by mouth Every 4 (Four) Hours As Needed for Moderate Pain  (Pain)., Disp: 12 tablet, Rfl: 0  •  metoprolol tartrate (LOPRESSOR) 50 MG tablet, Take 100 mg by mouth Every Night., Disp: , Rfl:   •  omeprazole (priLOSEC) 20 MG capsule, Take 20 mg by mouth Daily., Disp: , Rfl:   •  PROAIR  (90 BASE) MCG/ACT inhaler, USE TWO PUFFS BY MOUTH EVERY 4 TO 6 HOURS AS NEEDED FOR WHEEZING, Disp: , Rfl: 2  •  SYMBICORT 160-4.5 MCG/ACT inhaler, , Disp: , Rfl:   •  tamsulosin (FLOMAX) 0.4 MG capsule 24 hr capsule, Take 1 capsule by mouth Every Night., Disp: 14 capsule, Rfl: 0    Past Medical History:   Diagnosis Date   • Anemia    • Anxiety    • Arthritis    • Cancer of skin     BCC OF NOSE   • COPD (chronic obstructive pulmonary disease) (CMS/HCC)    • Depression    • Fibromyalgia    • GERD (gastroesophageal reflux disease)    • Hx of colonic polyps    • Hypercholesteremia    • Hypertension        Past Surgical History:   Procedure Laterality Date   • BREAST SURGERY     • CHOLECYSTECTOMY     • COLONOSCOPY  04/12/2017    Hemorrhoids and a few diverticula repeat exam in 3 years Dr. Cordero   • COLONOSCOPY W/ POLYPECTOMY  07/28/2016    Tubular adenoma proximal to the anus, 30 MM polyp in the cecum not resected, Diverticulosis ref to Dr. Cordero   • ENDOSCOPY  06/15/2016    Small hh, Negative for celiac  "disease   • RADICAL HYSTERECTOMY     • URETEROSCOPY Right 9/13/2018    Procedure: CYSTOSCOPY RIGHT RETROGRADE PYELOGRAM RIGHT URETEROSCOPY WITH BIOPSY OF RIGHT RENAL PELVIS LASER LITHOTRIPSY AND RIGHT URETERAL STENT INSERTION;  Surgeon: Yifan Greco MD;  Location: [facility];  Service: Urology       Social History     Social History   • Marital status:      Social History Main Topics   • Smoking status: Current Every Day Smoker     Packs/day: 1.00   • Smokeless tobacco: Never Used   • Alcohol use No   • Drug use: No   • Sexual activity: Defer     Other Topics Concern   • Not on file       Family History   Problem Relation Age of Onset   • Diabetes Sister    • Cancer Brother    • Colon cancer Neg Hx    • Colon polyps Neg Hx        Objective    Temp 98.1 °F (36.7 °C)   Ht 162.6 cm (64\")   Wt 77.1 kg (170 lb)   BMI 29.18 kg/m²     Physical Exam   Constitutional: She is oriented to person, place, and time. She appears well-developed and well-nourished. No distress.   Pulmonary/Chest: Effort normal.   Abdominal: Soft. She exhibits no distension and no mass. There is no tenderness. There is no rebound and no guarding. No hernia.   Neurological: She is alert and oriented to person, place, and time.   Skin: Skin is warm and dry. She is not diaphoretic.   Psychiatric: She has a normal mood and affect.   Vitals reviewed.          Results for orders placed or performed in visit on 10/31/18   POC Urinalysis Dipstick, Multipro   Result Value Ref Range    Color Yellow Yellow, Straw, Dark Yellow, Charisse    Clarity, UA Clear Clear    Glucose, UA Negative Negative, 1000 mg/dL (3+) mg/dL    Bilirubin Negative Negative    Ketones, UA Negative Negative    Specific Gravity  1.025 1.005 - 1.030    Blood, UA Large (A) Negative    pH, Urine 5.5 5.0 - 8.0    Protein, POC 30 mg/dL (A) Negative mg/dL    Urobilinogen, UA Normal Normal    Nitrite, UA Negative Negative    Leukocytes Negative Negative     Assessment and " Plan    Diagnoses and all orders for this visit:    Nephrolithiasis  -     POC Urinalysis Dipstick, Multipro              Status post ureteroscopy and biopsy as well as treatment of a lower pole stone.  She did not have her follow-up imaging.  She denies any issues or problems.  I am going to have a renal ultrasound done and we will call her with those results.  I did discuss dietary therapy and she will follow-up with me on a when necessary basis.

## 2018-10-31 ENCOUNTER — OFFICE VISIT (OUTPATIENT)
Dept: UROLOGY | Facility: CLINIC | Age: 63
End: 2018-10-31

## 2018-10-31 VITALS — HEIGHT: 64 IN | WEIGHT: 170 LBS | TEMPERATURE: 98.1 F | BODY MASS INDEX: 29.02 KG/M2

## 2018-10-31 DIAGNOSIS — N20.0 NEPHROLITHIASIS: Primary | ICD-10-CM

## 2018-10-31 LAB
BILIRUB BLD-MCNC: NEGATIVE MG/DL
CLARITY, POC: CLEAR
COLOR UR: YELLOW
GLUCOSE UR STRIP-MCNC: NEGATIVE MG/DL
KETONES UR QL: NEGATIVE
LEUKOCYTE EST, POC: NEGATIVE
NITRITE UR-MCNC: NEGATIVE MG/ML
PH UR: 5.5 [PH] (ref 5–8)
PROT UR STRIP-MCNC: ABNORMAL MG/DL
RBC # UR STRIP: ABNORMAL /UL
SP GR UR: 1.02 (ref 1–1.03)
UROBILINOGEN UR QL: NORMAL

## 2018-10-31 PROCEDURE — 81001 URINALYSIS AUTO W/SCOPE: CPT | Performed by: UROLOGY

## 2018-10-31 PROCEDURE — 99213 OFFICE O/P EST LOW 20 MIN: CPT | Performed by: UROLOGY

## 2018-10-31 NOTE — PATIENT INSTRUCTIONS

## 2018-11-12 ENCOUNTER — HOSPITAL ENCOUNTER (OUTPATIENT)
Dept: ULTRASOUND IMAGING | Facility: HOSPITAL | Age: 63
Discharge: HOME | End: 2018-11-12
Attending: UROLOGY | Admitting: UROLOGY

## 2018-11-12 DIAGNOSIS — N20.0 NEPHROLITHIASIS: ICD-10-CM

## 2018-11-12 PROCEDURE — 76775 US EXAM ABDO BACK WALL LIM: CPT

## 2018-11-16 ENCOUNTER — TELEPHONE (OUTPATIENT)
Dept: UROLOGY | Facility: CLINIC | Age: 63
End: 2018-11-16

## 2018-11-16 NOTE — TELEPHONE ENCOUNTER
Patient called wanting to know about her results from her ultrasound she had done on 11/12/18. Patient said that Angus was suppose to call her with the results.

## 2018-11-26 ENCOUNTER — OFFICE VISIT (OUTPATIENT)
Dept: GASTROENTEROLOGY | Facility: CLINIC | Age: 63
End: 2018-11-26

## 2018-11-26 VITALS
OXYGEN SATURATION: 98 % | SYSTOLIC BLOOD PRESSURE: 130 MMHG | WEIGHT: 166 LBS | BODY MASS INDEX: 29.41 KG/M2 | HEART RATE: 109 BPM | DIASTOLIC BLOOD PRESSURE: 88 MMHG | HEIGHT: 63 IN

## 2018-11-26 DIAGNOSIS — Z71.6 TOBACCO ABUSE COUNSELING: ICD-10-CM

## 2018-11-26 DIAGNOSIS — R19.7 DIARRHEA IN ADULT PATIENT: Primary | ICD-10-CM

## 2018-11-26 PROCEDURE — 99213 OFFICE O/P EST LOW 20 MIN: CPT | Performed by: CLINICAL NURSE SPECIALIST

## 2018-11-26 NOTE — PROGRESS NOTES
Heather Russo  1955 11/26/2018  Chief Complaint   Patient presents with   • GI Problem     Diarrhea         HPI    Heather Russo is a  63 y.o. female here for a follow up visit for diarrhea. She says that this is persistent ongoing up to 5 or 6 times per day some days worse than others. She has had this for a year or more. She says it has improved since I saw her in August. Yesterday she had a good day with minimal diarrhea 1 time. I did workup for this regarding her diarrhea and I did cultures that were negative. Ct scan was performed for abdominal pain generalized at that time and she had some incidental right sided striated nephrogram. She has colestid available however she only does this as needed. She did a round of Flagyl in August and this did not make a difference. She does do artificial sweeteners.     Past Medical History:   Diagnosis Date   • Anemia    • Anxiety    • Arthritis    • Cancer of skin     BCC OF NOSE   • COPD (chronic obstructive pulmonary disease) (CMS/HCC)    • Depression    • Fibromyalgia    • GERD (gastroesophageal reflux disease)    • Hx of colonic polyps    • Hypercholesteremia    • Hypertension      Past Surgical History:   Procedure Laterality Date   • BREAST SURGERY     • CHOLECYSTECTOMY     • COLONOSCOPY  04/12/2017    Hemorrhoids and a few diverticula repeat exam in 3 years Dr. Cordero   • COLONOSCOPY W/ POLYPECTOMY  07/28/2016    Tubular adenoma proximal to the anus, 30 MM polyp in the cecum not resected, Diverticulosis ref to Dr. Cordero   • ENDOSCOPY  06/15/2016    Small hh, Negative for celiac disease   • RADICAL HYSTERECTOMY         Outpatient Medications Marked as Taking for the 11/26/18 encounter (Office Visit) with Becca Blackwood APRN   Medication Sig Dispense Refill   • ARIPiprazole (ABILIFY) 10 MG tablet      • bismuth subsalicylate (PEPTO BISMOL) 262 MG chewable tablet Chew 524 mg 4 (Four) Times a Day Before Meals & at Bedtime.     • cetirizine (zyrTEC) 10 MG  tablet Take 10 mg by mouth Daily.     • DULoxetine (CYMBALTA) 60 MG capsule      • HYDROcodone-acetaminophen (NORCO) 7.5-325 MG per tablet Take 1-2 tablets by mouth Every 4 (Four) Hours As Needed for Moderate Pain  (Pain). 12 tablet 0   • metoprolol tartrate (LOPRESSOR) 50 MG tablet Take 100 mg by mouth Every Night.     • omeprazole (priLOSEC) 20 MG capsule Take 20 mg by mouth Daily.     • PROAIR  (90 BASE) MCG/ACT inhaler USE TWO PUFFS BY MOUTH EVERY 4 TO 6 HOURS AS NEEDED FOR WHEEZING  2   • SYMBICORT 160-4.5 MCG/ACT inhaler      • tamsulosin (FLOMAX) 0.4 MG capsule 24 hr capsule Take 1 capsule by mouth Every Night. 14 capsule 0       Allergies   Allergen Reactions   • Lyrica [Pregabalin] Swelling     LIPS AND FACE   • Morphine Itching   • Codeine Itching   • Penicillins Other (See Comments)     CHILDHOOD ALLERGY         Social History     Socioeconomic History   • Marital status:      Spouse name: Not on file   • Number of children: Not on file   • Years of education: Not on file   • Highest education level: Not on file   Social Needs   • Financial resource strain: Not on file   • Food insecurity - worry: Not on file   • Food insecurity - inability: Not on file   • Transportation needs - medical: Not on file   • Transportation needs - non-medical: Not on file   Occupational History   • Not on file   Tobacco Use   • Smoking status: Current Every Day Smoker     Packs/day: 1.00   • Smokeless tobacco: Never Used   Substance and Sexual Activity   • Alcohol use: No   • Drug use: No   • Sexual activity: Defer   Other Topics Concern   • Not on file   Social History Narrative   • Not on file       Family History   Problem Relation Age of Onset   • Diabetes Sister    • Cancer Brother    • Colon cancer Neg Hx    • Colon polyps Neg Hx        Review of Systems   Constitutional: Negative for activity change, appetite change, chills, diaphoresis, fatigue, fever and unexpected weight change.   HENT: Negative for  "ear pain, hearing loss, mouth sores, sore throat, trouble swallowing and voice change.    Eyes: Negative.    Respiratory: Negative for cough, choking, shortness of breath and wheezing.    Cardiovascular: Negative for chest pain and palpitations.   Gastrointestinal: Negative for abdominal pain, blood in stool, constipation, diarrhea, nausea and vomiting.   Endocrine: Negative for cold intolerance and heat intolerance.   Genitourinary: Negative for decreased urine volume, dysuria, frequency, hematuria and urgency.   Musculoskeletal: Negative for back pain, gait problem and myalgias.   Skin: Negative for color change, pallor and rash.   Allergic/Immunologic: Negative for food allergies and immunocompromised state.   Neurological: Negative for dizziness, tremors, seizures, syncope, weakness, light-headedness, numbness and headaches.   Hematological: Negative for adenopathy. Does not bruise/bleed easily.   Psychiatric/Behavioral: Negative for agitation and confusion. The patient is not nervous/anxious.    All other systems reviewed and are negative.      /88   Pulse 109   Ht 160 cm (63\")   Wt 75.3 kg (166 lb)   SpO2 98%   BMI 29.41 kg/m²   Body mass index is 29.41 kg/m².    Physical Exam   Constitutional: She is oriented to person, place, and time. She appears well-developed and well-nourished.   HENT:   Head: Normocephalic and atraumatic.   Eyes: Pupils are equal, round, and reactive to light.   Neck: Normal range of motion. Neck supple. No tracheal deviation present.   Cardiovascular: Normal rate, regular rhythm and normal heart sounds. Exam reveals no gallop and no friction rub.   No murmur heard.  Pulmonary/Chest: Effort normal and breath sounds normal. No respiratory distress. She has no wheezes. She has no rales. She exhibits no tenderness.   Abdominal: Soft. Bowel sounds are normal. She exhibits no distension. There is no hepatosplenomegaly. There is no tenderness. There is no rigidity, no rebound and " no guarding.   Musculoskeletal: Normal range of motion. She exhibits no edema, tenderness or deformity.   Neurological: She is alert and oriented to person, place, and time. She has normal reflexes.   Skin: Skin is warm and dry. No rash noted. No pallor.   Psychiatric: She has a normal mood and affect. Her behavior is normal. Judgment and thought content normal.       ASSESSMENT AND PLAN    Heather was seen today for gi problem.    Diagnoses and all orders for this visit:    Diarrhea in adult patient  Comments:  negative cultures; suspect IBS    Tobacco abuse counseling      No artificial sweeteners if no results with that get off of her Prilosec and take Zantac daily with Gaviscon as needed. Dietary modifications discussed.     There are no Patient Instructions on file for this visit.  Becca Blackwood, APRN  11:05 AM  11/26/2018    Obesity, Adult  Obesity is the condition of having too much total body fat. Being overweight or obese means that your weight is greater than what is considered healthy for your body size. Obesity is determined by a measurement called BMI. BMI is an estimate of body fat and is calculated from height and weight. For adults, a BMI of 30 or higher is considered obese.  Obesity can eventually lead to other health concerns and major illnesses, including:  · Stroke.  · Coronary artery disease (CAD).  · Type 2 diabetes.  · Some types of cancer, including cancers of the colon, breast, uterus, and gallbladder.  · Osteoarthritis.  · High blood pressure (hypertension).  · High cholesterol.  · Sleep apnea.  · Gallbladder stones.  · Infertility problems.  What are the causes?  The main cause of obesity is taking in (consuming) more calories than your body uses for energy. Other factors that contribute to this condition may include:  · Being born with genes that make you more likely to become obese.  · Having a medical condition that causes obesity. These conditions  include:  ¨ Hypothyroidism.  ¨ Polycystic ovarian syndrome (PCOS).  ¨ Binge-eating disorder.  ¨ Cushing syndrome.  · Taking certain medicines, such as steroids, antidepressants, and seizure medicines.  · Not being physically active (sedentary lifestyle).  · Living where there are limited places to exercise safely or buy healthy foods.  · Not getting enough sleep.  What increases the risk?  The following factors may increase your risk of this condition:  · Having a family history of obesity.  · Being a woman of -American descent.  · Being a man of  descent.  What are the signs or symptoms?  Having excessive body fat is the main symptom of this condition.  How is this diagnosed?  This condition may be diagnosed based on:  · Your symptoms.  · Your medical history.  · A physical exam. Your health care provider may measure:  ¨ Your BMI. If you are an adult with a BMI between 25 and less than 30, you are considered overweight. If you are an adult with a BMI of 30 or higher, you are considered obese.  ¨ The distances around your hips and your waist (circumferences). These may be compared to each other to help diagnose your condition.  ¨ Your skinfold thickness. Your health care provider may gently pinch a fold of your skin and measure it.  How is this treated?  Treatment for this condition often includes changing your lifestyle. Treatment may include some or all of the following:  · Dietary changes. Work with your health care provider and a dietitian to set a weight-loss goal that is healthy and reasonable for you. Dietary changes may include eating:  ¨ Smaller portions. A portion size is the amount of a particular food that is healthy for you to eat at one time. This varies from person to person.  ¨ Low-calorie or low-fat options.  ¨ More whole grains, fruits, and vegetables.  · Regular physical activity. This may include aerobic activity (cardio) and strength training.  · Medicine to help you lose weight.  Your health care provider may prescribe medicine if you are unable to lose 1 pound a week after 6 weeks of eating more healthily and doing more physical activity.  · Surgery. Surgical options may include gastric banding and gastric bypass. Surgery may be done if:  ¨ Other treatments have not helped to improve your condition.  ¨ You have a BMI of 40 or higher.  ¨ You have life-threatening health problems related to obesity.  Follow these instructions at home:     Eating and drinking     · Follow recommendations from your health care provider about what you eat and drink. Your health care provider may advise you to:  ¨ Limit fast foods, sweets, and processed snack foods.  ¨ Choose low-fat options, such as low-fat milk instead of whole milk.  ¨ Eat 5 or more servings of fruits or vegetables every day.  ¨ Eat at home more often. This gives you more control over what you eat.  ¨ Choose healthy foods when you eat out.  ¨ Learn what a healthy portion size is.  ¨ Keep low-fat snacks on hand.  ¨ Avoid sugary drinks, such as soda, fruit juice, iced tea sweetened with sugar, and flavored milk.  ¨ Eat a healthy breakfast.  · Drink enough water to keep your urine clear or pale yellow.  · Do not go without eating for long periods of time (do not fast) or follow a fad diet. Fasting and fad diets can be unhealthy and even dangerous.  Physical Activity   · Exercise regularly, as told by your health care provider. Ask your health care provider what types of exercise are safe for you and how often you should exercise.  · Warm up and stretch before being active.  · Cool down and stretch after being active.  · Rest between periods of activity.  Lifestyle   · Limit the time that you spend in front of your TV, computer, or video game system.  · Find ways to reward yourself that do not involve food.  · Limit alcohol intake to no more than 1 drink a day for nonpregnant women and 2 drinks a day for men. One drink equals 12 oz of beer, 5 oz  of wine, or 1½ oz of hard liquor.  General instructions   · Keep a weight loss journal to keep track of the food you eat and how much you exercise you get.  · Take over-the-counter and prescription medicines only as told by your health care provider.  · Take vitamins and supplements only as told by your health care provider.  · Consider joining a support group. Your health care provider may be able to recommend a support group.  · Keep all follow-up visits as told by your health care provider. This is important.  Contact a health care provider if:  · You are unable to meet your weight loss goal after 6 weeks of dietary and lifestyle changes.  This information is not intended to replace advice given to you by your health care provider. Make sure you discuss any questions you have with your health care provider.  Document Released: 01/25/2006 Document Revised: 05/22/2017 Document Reviewed: 10/05/2016  [employer] Patient Education © 2017 [employer]      IF YOU SMOKE OR USE TOBACCO PLEASE READ THE FOLLOWING:    Why is smoking bad for me?  Smoking increases the risk of heart disease, lung disease, vascular disease, stroke, and cancer.     If you smoke, STOP!    If you would like more information on quitting smoking, please visit the [employer] website: www.[employer]/iJigg.comate/healthier-together/smoke   This link will provide additional resources including the QUIT line and the Beat the Pack support groups.     For more information:    Quit Now [place]  1-800-QUIT-NOW  https://[place].quitlogix.org/en-[place]/

## 2019-02-15 ENCOUNTER — OFFICE VISIT (OUTPATIENT)
Dept: GASTROENTEROLOGY | Facility: CLINIC | Age: 64
End: 2019-02-15

## 2019-02-15 ENCOUNTER — APPOINTMENT (OUTPATIENT)
Dept: LAB | Facility: HOSPITAL | Age: 64
End: 2019-02-15

## 2019-02-15 VITALS
WEIGHT: 184 LBS | BODY MASS INDEX: 32.6 KG/M2 | OXYGEN SATURATION: 98 % | DIASTOLIC BLOOD PRESSURE: 80 MMHG | HEIGHT: 63 IN | HEART RATE: 105 BPM | SYSTOLIC BLOOD PRESSURE: 142 MMHG

## 2019-02-15 DIAGNOSIS — K52.9 COLITIS: ICD-10-CM

## 2019-02-15 DIAGNOSIS — R19.7 DIARRHEA IN ADULT PATIENT: Primary | ICD-10-CM

## 2019-02-15 DIAGNOSIS — K21.9 GASTROESOPHAGEAL REFLUX DISEASE, ESOPHAGITIS PRESENCE NOT SPECIFIED: ICD-10-CM

## 2019-02-15 DIAGNOSIS — Z71.6 TOBACCO ABUSE COUNSELING: ICD-10-CM

## 2019-02-15 PROCEDURE — 84436 ASSAY OF TOTAL THYROXINE: CPT | Performed by: CLINICAL NURSE SPECIALIST

## 2019-02-15 PROCEDURE — 99214 OFFICE O/P EST MOD 30 MIN: CPT | Performed by: CLINICAL NURSE SPECIALIST

## 2019-02-15 PROCEDURE — 36415 COLL VENOUS BLD VENIPUNCTURE: CPT | Performed by: CLINICAL NURSE SPECIALIST

## 2019-02-15 PROCEDURE — 84479 ASSAY OF THYROID (T3 OR T4): CPT | Performed by: CLINICAL NURSE SPECIALIST

## 2019-02-15 PROCEDURE — 84443 ASSAY THYROID STIM HORMONE: CPT | Performed by: CLINICAL NURSE SPECIALIST

## 2019-02-15 RX ORDER — MONTELUKAST SODIUM 4 MG/1
1 TABLET, CHEWABLE ORAL 2 TIMES DAILY
Status: ON HOLD | COMMUNITY
End: 2019-05-02

## 2019-02-15 RX ORDER — SODIUM, POTASSIUM,MAG SULFATES 17.5-3.13G
SOLUTION, RECONSTITUTED, ORAL ORAL
Qty: 2 BOTTLE | Refills: 0 | Status: ON HOLD | OUTPATIENT
Start: 2019-02-15 | End: 2019-05-02

## 2019-02-15 NOTE — PROGRESS NOTES
Heather Russo  1955    2/15/2019  Chief Complaint   Patient presents with   • GI Problem     Diarrhea since last June     Subjective   HPI  Heather Russo is a 63 y.o. female who presents with a complaint of persistent ongoing diarrhea that she has had for a year or more I last saw her November 26, 2018 with this complaint was persistent ongoing 5-6 times per day worse some days more than others. She says that her recurrent symptoms were acute onset with nausea vomiting and diarrhea.  She has hx of chronic diarrhea secondary to lymphocytic colitis in the past, she has had workup to include stool cultures which were negative CT scan unremarkable from a GI standpoint.  When I last saw her she had Colestid available however she had not been taking this regularly.  She had been treated with Flagyl in August and this did not make a difference she was advised to discontinue artificial sweeteners. We discussed lymphocytic colitis. She says that she has trailed off of her Prilosec and this did not make a difference and she is back on this now she says for her GERD. She has not been on antibiotics. No thyroid check recently. Her testing has been negative for celiac. In 2012 she had a bx of lymphocytic colitis I have reviewed this today. Her last colonoscopy was in 2017 reviewed as well. No BRBPR. No melena. No NSAIDS. No fever.    Past Medical History:   Diagnosis Date   • Anemia    • Anxiety    • Arthritis    • Cancer of skin     BCC OF NOSE   • COPD (chronic obstructive pulmonary disease) (CMS/HCC)    • Depression    • Fibromyalgia    • GERD (gastroesophageal reflux disease)    • Hx of colonic polyps    • Hypercholesteremia    • Hypertension      Past Surgical History:   Procedure Laterality Date   • BREAST SURGERY     • CHOLECYSTECTOMY     • COLONOSCOPY  04/12/2017    Hemorrhoids and a few diverticula repeat exam in 3 years Dr. Cordero   • COLONOSCOPY W/ POLYPECTOMY  07/28/2016    Tubular adenoma proximal to the anus, 30 MM  polyp in the cecum not resected, Diverticulosis ref to Dr. Cordero   • ENDOSCOPY  06/15/2016    Small hh, Negative for celiac disease   • RADICAL HYSTERECTOMY     • URETEROSCOPY Right 9/13/2018    Procedure: CYSTOSCOPY RIGHT RETROGRADE PYELOGRAM RIGHT URETEROSCOPY WITH BIOPSY OF RIGHT RENAL PELVIS LASER LITHOTRIPSY AND RIGHT URETERAL STENT INSERTION;  Surgeon: Yifan Greco MD;  Location: [facility];  Service: Urology       Outpatient Medications Marked as Taking for the 2/15/19 encounter (Office Visit) with Becca Blackwood APRN   Medication Sig Dispense Refill   • ARIPiprazole (ABILIFY) 10 MG tablet      • cetirizine (zyrTEC) 10 MG tablet Take 10 mg by mouth Daily.     • colestipol (COLESTID) 1 g tablet Take 1 g by mouth 2 (Two) Times a Day.     • DULoxetine (CYMBALTA) 60 MG capsule      • HYDROcodone-acetaminophen (NORCO) 7.5-325 MG per tablet Take 1-2 tablets by mouth Every 4 (Four) Hours As Needed for Moderate Pain  (Pain). 12 tablet 0   • metoprolol tartrate (LOPRESSOR) 50 MG tablet Take 100 mg by mouth Every Night.     • omeprazole (priLOSEC) 20 MG capsule Take 20 mg by mouth Daily.     • PROAIR  (90 BASE) MCG/ACT inhaler USE TWO PUFFS BY MOUTH EVERY 4 TO 6 HOURS AS NEEDED FOR WHEEZING  2   • SYMBICORT 160-4.5 MCG/ACT inhaler      • tamsulosin (FLOMAX) 0.4 MG capsule 24 hr capsule Take 1 capsule by mouth Every Night. 14 capsule 0     Allergies   Allergen Reactions   • Lyrica [Pregabalin] Swelling     LIPS AND FACE   • Morphine Itching   • Codeine Itching   • Penicillins Other (See Comments)     CHILDHOOD ALLERGY       Social History     Socioeconomic History   • Marital status:      Spouse name: Not on file   • Number of children: Not on file   • Years of education: Not on file   • Highest education level: Not on file   Social Needs   • Financial resource strain: Not on file   • Food insecurity - worry: Not on file   • Food insecurity - inability: Not on file   • Transportation needs -  medical: Not on file   • Transportation needs - non-medical: Not on file   Occupational History   • Not on file   Tobacco Use   • Smoking status: Current Every Day Smoker     Packs/day: 1.00     Types: Cigarettes   • Smokeless tobacco: Never Used   Substance and Sexual Activity   • Alcohol use: No   • Drug use: No   • Sexual activity: Defer   Other Topics Concern   • Not on file   Social History Narrative   • Not on file     Family History   Problem Relation Age of Onset   • Diabetes Sister    • Cancer Brother    • Colon cancer Neg Hx    • Colon polyps Neg Hx      Health Maintenance   Topic Date Due   • ANNUAL PHYSICAL  08/[birth year]/1959   • PNEUMOCOCCAL VACCINE (19-64 MEDIUM RISK) (1 of 1 - PPSV23) 08/11/1975   • TDAP/TD VACCINES (1 - Tdap) 08/11/1975   • ZOSTER VACCINE (1 of 2) 08/11/2006   • MAMMOGRAM  05/02/2017   • PAP SMEAR  05/02/2017   • LIPID PANEL  05/03/2017   • INFLUENZA VACCINE  08/01/2018   • COLONOSCOPY  04/17/2027   • HEPATITIS C SCREENING  Completed     Review of Systems   Constitutional: Negative for activity change, appetite change, chills, diaphoresis, fatigue, fever and unexpected weight change.   HENT: Negative for ear pain, hearing loss, mouth sores, sore throat, trouble swallowing and voice change.    Eyes: Negative.    Respiratory: Negative for cough, choking, shortness of breath and wheezing.    Cardiovascular: Negative for chest pain and palpitations.   Gastrointestinal: Positive for diarrhea. Negative for abdominal pain, blood in stool, constipation, nausea and vomiting.   Endocrine: Negative for cold intolerance and heat intolerance.   Genitourinary: Negative for decreased urine volume, dysuria, frequency, hematuria and urgency.   Musculoskeletal: Negative for back pain, gait problem and myalgias.   Skin: Negative for color change, pallor and rash.   Allergic/Immunologic: Negative for food allergies and immunocompromised state.   Neurological: Negative for dizziness, tremors, seizures, syncope,  "weakness, light-headedness, numbness and headaches.   Hematological: Negative for adenopathy. Does not bruise/bleed easily.   Psychiatric/Behavioral: Negative for agitation and confusion. The patient is not nervous/anxious.    All other systems reviewed and are negative.    Objective   Vitals:    02/15/19 1303   BP: 142/80   Pulse: 105   SpO2: 98%   Weight: 83.5 kg (184 lb)   Height: 160 cm (63\")     Body mass index is 32.59 kg/m².  Physical Exam   Constitutional: She is oriented to person, place, and time. She appears well-developed and well-nourished.   HENT:   Head: Normocephalic and atraumatic.   Eyes: Pupils are equal, round, and reactive to light.   Neck: Normal range of motion. Neck supple. No tracheal deviation present.   Cardiovascular: Normal rate, regular rhythm and normal heart sounds. Exam reveals no gallop and no friction rub.   No murmur heard.  Pulmonary/Chest: Effort normal and breath sounds normal. No respiratory distress. She has no wheezes. She has no rales. She exhibits no tenderness.   Abdominal: Soft. Bowel sounds are normal. She exhibits no distension. There is no hepatosplenomegaly. There is no tenderness. There is no rigidity, no rebound and no guarding.   Musculoskeletal: Normal range of motion. She exhibits no edema, tenderness or deformity.   Neurological: She is alert and oriented to person, place, and time. She has normal reflexes.   Skin: Skin is warm and dry. No rash noted. No pallor.   Psychiatric: She has a normal mood and affect. Her behavior is normal. Judgment and thought content normal.     Assessment/Plan   Heather was seen today for gi problem.    Diagnoses and all orders for this visit:    Diarrhea in adult patient  -     Fungus Culture - Stool, Per Rectum  -     Gastrointestinal Panel, PCR - Stool, Per Rectum  -     Fecal Leukocytes - Stool, Per Rectum  -     Case Request; Standing  -     Follow Anesthesia Guidelines / Standing Orders; Future  -     Implement Anesthesia " Orders Day of Procedure; Standing  -     Obtain Informed Consent; Standing  -     Verify bowel prep was successful; Standing  -     Case Request  -     SUPREP BOWEL PREP KIT 17.5-3.13-1.6 GM/177ML solution oral solution; Take as directed by office instructions provided    Colitis  Comments:  Lymphocytic in 2012     Gastroesophageal reflux disease, esophagitis presence not specified  Comments:  Gaviscon to be added as needed.    Tobacco abuse counseling    this could be recurrent lymphocytic colitis we have discussed this today as well as dietary modifications to try easy to digest foods etc.   Will await stool cultures    COLONOSCOPY WITH ANESTHESIA (N/A)  EMR Dragon/transcription disclaimer: Much of this encounter note is electronic transcription/translation of spoken language to printed text. The electronic translation of spoken language may be erroneous, or at times, nonsensical words or phrases may be inadvertently transcribed. Although I have reviewed the note for such errors, some may still exist.  Body mass index is 32.59 kg/m².  No Follow-up on file.    Patient's Body mass index is 32.59 kg/m². BMI is above normal parameters. Recommendations include: nutrition counseling.      All risks, benefits, alternatives, and indications of colonoscopy and/or Endoscopy procedure have been discussed with the patient. Risks to include perforation of the colon requiring possible surgery or colostomy, risk of bleeding from biopsies or removal of colon tissue, possibility of missing a colon polyp or cancer, or adverse drug reaction.  Benefits to include the diagnosis and management of disease of the colon and rectum. Alternatives to include barium enema, radiographic evaluation, lab testing or no intervention. Pt verbalizes understanding and agrees.     Becca Blackwood, APRN  2/15/2019  1:52 PM      Obesity, Adult  Obesity is the condition of having too much total body fat. Being overweight or obese means that your  weight is greater than what is considered healthy for your body size. Obesity is determined by a measurement called BMI. BMI is an estimate of body fat and is calculated from height and weight. For adults, a BMI of 30 or higher is considered obese.  Obesity can eventually lead to other health concerns and major illnesses, including:  · Stroke.  · Coronary artery disease (CAD).  · Type 2 diabetes.  · Some types of cancer, including cancers of the colon, breast, uterus, and gallbladder.  · Osteoarthritis.  · High blood pressure (hypertension).  · High cholesterol.  · Sleep apnea.  · Gallbladder stones.  · Infertility problems.  What are the causes?  The main cause of obesity is taking in (consuming) more calories than your body uses for energy. Other factors that contribute to this condition may include:  · Being born with genes that make you more likely to become obese.  · Having a medical condition that causes obesity. These conditions include:  ¨ Hypothyroidism.  ¨ Polycystic ovarian syndrome (PCOS).  ¨ Binge-eating disorder.  ¨ Cushing syndrome.  · Taking certain medicines, such as steroids, antidepressants, and seizure medicines.  · Not being physically active (sedentary lifestyle).  · Living where there are limited places to exercise safely or buy healthy foods.  · Not getting enough sleep.  What increases the risk?  The following factors may increase your risk of this condition:  · Having a family history of obesity.  · Being a woman of -American descent.  · Being a man of  descent.  What are the signs or symptoms?  Having excessive body fat is the main symptom of this condition.  How is this diagnosed?  This condition may be diagnosed based on:  · Your symptoms.  · Your medical history.  · A physical exam. Your health care provider may measure:  ¨ Your BMI. If you are an adult with a BMI between 25 and less than 30, you are considered overweight. If you are an adult with a BMI of 30 or higher,  you are considered obese.  ¨ The distances around your hips and your waist (circumferences). These may be compared to each other to help diagnose your condition.  ¨ Your skinfold thickness. Your health care provider may gently pinch a fold of your skin and measure it.  How is this treated?  Treatment for this condition often includes changing your lifestyle. Treatment may include some or all of the following:  · Dietary changes. Work with your health care provider and a dietitian to set a weight-loss goal that is healthy and reasonable for you. Dietary changes may include eating:  ¨ Smaller portions. A portion size is the amount of a particular food that is healthy for you to eat at one time. This varies from person to person.  ¨ Low-calorie or low-fat options.  ¨ More whole grains, fruits, and vegetables.  · Regular physical activity. This may include aerobic activity (cardio) and strength training.  · Medicine to help you lose weight. Your health care provider may prescribe medicine if you are unable to lose 1 pound a week after 6 weeks of eating more healthily and doing more physical activity.  · Surgery. Surgical options may include gastric banding and gastric bypass. Surgery may be done if:  ¨ Other treatments have not helped to improve your condition.  ¨ You have a BMI of 40 or higher.  ¨ You have life-threatening health problems related to obesity.  Follow these instructions at home:     Eating and drinking     · Follow recommendations from your health care provider about what you eat and drink. Your health care provider may advise you to:  ¨ Limit fast foods, sweets, and processed snack foods.  ¨ Choose low-fat options, such as low-fat milk instead of whole milk.  ¨ Eat 5 or more servings of fruits or vegetables every day.  ¨ Eat at home more often. This gives you more control over what you eat.  ¨ Choose healthy foods when you eat out.  ¨ Learn what a healthy portion size is.  ¨ Keep low-fat snacks on  hand.  ¨ Avoid sugary drinks, such as soda, fruit juice, iced tea sweetened with sugar, and flavored milk.  ¨ Eat a healthy breakfast.  · Drink enough water to keep your urine clear or pale yellow.  · Do not go without eating for long periods of time (do not fast) or follow a fad diet. Fasting and fad diets can be unhealthy and even dangerous.  Physical Activity   · Exercise regularly, as told by your health care provider. Ask your health care provider what types of exercise are safe for you and how often you should exercise.  · Warm up and stretch before being active.  · Cool down and stretch after being active.  · Rest between periods of activity.  Lifestyle   · Limit the time that you spend in front of your TV, computer, or video game system.  · Find ways to reward yourself that do not involve food.  · Limit alcohol intake to no more than 1 drink a day for nonpregnant women and 2 drinks a day for men. One drink equals 12 oz of beer, 5 oz of wine, or 1½ oz of hard liquor.  General instructions   · Keep a weight loss journal to keep track of the food you eat and how much you exercise you get.  · Take over-the-counter and prescription medicines only as told by your health care provider.  · Take vitamins and supplements only as told by your health care provider.  · Consider joining a support group. Your health care provider may be able to recommend a support group.  · Keep all follow-up visits as told by your health care provider. This is important.  Contact a health care provider if:  · You are unable to meet your weight loss goal after 6 weeks of dietary and lifestyle changes.  This information is not intended to replace advice given to you by your health care provider. Make sure you discuss any questions you have with your health care provider.  Document Released: 01/25/2006 Document Revised: 05/22/2017 Document Reviewed: 10/05/2016  [employer] Interactive Patient Education © 2017 [employer]      If you smoke or  use tobacco, 4 minutes reading provided  Steps to Quit Smoking  Smoking tobacco can be harmful to your health and can affect almost every organ in your body. Smoking puts you, and those around you, at risk for developing many serious chronic diseases. Quitting smoking is difficult, but it is one of the best things that you can do for your health. It is never too late to quit.  What are the benefits of quitting smoking?  When you quit smoking, you lower your risk of developing serious diseases and conditions, such as:  · Lung cancer or lung disease, such as COPD.  · Heart disease.  · Stroke.  · Heart attack.  · Infertility.  · Osteoporosis and bone fractures.  Additionally, symptoms such as coughing, wheezing, and shortness of breath may get better when you quit. You may also find that you get sick less often because your body is stronger at fighting off colds and infections. If you are pregnant, quitting smoking can help to reduce your chances of having a baby of low birth weight.  How do I get ready to quit?  When you decide to quit smoking, create a plan to make sure that you are successful. Before you quit:  · Pick a date to quit. Set a date within the next two weeks to give you time to prepare.  · Write down the reasons why you are quitting. Keep this list in places where you will see it often, such as on your bathroom mirror or in your car or wallet.  · Identify the people, places, things, and activities that make you want to smoke (triggers) and avoid them. Make sure to take these actions:  ¨ Throw away all cigarettes at home, at work, and in your car.  ¨ Throw away smoking accessories, such as ashtrays and lighters.  ¨ Clean your car and make sure to empty the ashtray.  ¨ Clean your home, including curtains and carpets.  · Tell your family, friends, and coworkers that you are quitting. Support from your loved ones can make quitting easier.  · Talk with your health care provider about your options for quitting  smoking.  · Find out what treatment options are covered by your health insurance.  What strategies can I use to quit smoking?  Talk with your healthcare provider about different strategies to quit smoking. Some strategies include:  · Quitting smoking altogether instead of gradually lessening how much you smoke over a period of time. Research shows that quitting “cold turkey” is more successful than gradually quitting.  · Attending in-person counseling to help you build problem-solving skills. You are more likely to have success in quitting if you attend several counseling sessions. Even short sessions of 10 minutes can be effective.  · Finding resources and support systems that can help you to quit smoking and remain smoke-free after you quit. These resources are most helpful when you use them often. They can include:  ¨ Online chats with a counselor.  ¨ Telephone quitlines.  ¨ Printed self-help materials.  ¨ Support groups or group counseling.  ¨ Text messaging programs.  ¨ Mobile phone applications.  · Taking medicines to help you quit smoking. (If you are pregnant or breastfeeding, talk with your health care provider first.) Some medicines contain nicotine and some do not. Both types of medicines help with cravings, but the medicines that include nicotine help to relieve withdrawal symptoms. Your health care provider may recommend:  ¨ Nicotine patches, gum, or lozenges.  ¨ Nicotine inhalers or sprays.  ¨ Non-nicotine medicine that is taken by mouth.  Talk with your health care provider about combining strategies, such as taking medicines while you are also receiving in-person counseling. Using these two strategies together makes you more likely to succeed in quitting than if you used either strategy on its own.  If you are pregnant or breastfeeding, talk with your health care provider about finding counseling or other support strategies to quit smoking. Do not take medicine to help you quit smoking unless told to  do so by your health care provider.  What things can I do to make it easier to quit?  Quitting smoking might feel overwhelming at first, but there is a lot that you can do to make it easier. Take these important actions:  · Reach out to your family and friends and ask that they support and encourage you during this time. Call telephone quitlines, reach out to support groups, or work with a counselor for support.  · Ask people who smoke to avoid smoking around you.  · Avoid places that trigger you to smoke, such as bars, parties, or smoke-break areas at work.  · Spend time around people who do not smoke.  · Lessen stress in your life, because stress can be a smoking trigger for some people. To lessen stress, try:  ¨ Exercising regularly.  ¨ Deep-breathing exercises.  ¨ Yoga.  ¨ Meditating.  ¨ Performing a body scan. This involves closing your eyes, scanning your body from head to toe, and noticing which parts of your body are particularly tense. Purposefully relax the muscles in those areas.  · Download or purchase mobile phone or tablet apps (applications) that can help you stick to your quit plan by providing reminders, tips, and encouragement. There are many free apps, such as QuitGuide from the CDC (Centers for Disease Control and Prevention). You can find other support for quitting smoking (smoking cessation) through smokefree.gov and other websites.  How will I feel when I quit smoking?  Within the first 24 hours of quitting smoking, you may start to feel some withdrawal symptoms. These symptoms are usually most noticeable 2-3 days after quitting, but they usually do not last beyond 2-3 weeks. Changes or symptoms that you might experience include:  · Mood swings.  · Restlessness, anxiety, or irritation.  · Difficulty concentrating.  · Dizziness.  · Strong cravings for sugary foods in addition to nicotine.  · Mild weight gain.  · Constipation.  · Nausea.  · Coughing or a sore throat.  · Changes in how your  medicines work in your body.  · A depressed mood.  · Difficulty sleeping (insomnia).  After the first 2-3 weeks of quitting, you may start to notice more positive results, such as:  · Improved sense of smell and taste.  · Decreased coughing and sore throat.  · Slower heart rate.  · Lower blood pressure.  · Clearer skin.  · The ability to breathe more easily.  · Fewer sick days.  Quitting smoking is very challenging for most people. Do not get discouraged if you are not successful the first time. Some people need to make many attempts to quit before they achieve long-term success. Do your best to stick to your quit plan, and talk with your health care provider if you have any questions or concerns.  This information is not intended to replace advice given to you by your health care provider. Make sure you discuss any questions you have with your health care provider.  Document Released: 12/12/2002 Document Revised: 08/15/2017 Document Reviewed: 05/03/2016  [employer] Interactive Patient Education © 2017 [employer]

## 2019-02-16 LAB
FT4I SERPL CALC-MCNC: 1.7 (ref 1.2–4.9)
T3RU NFR SERPL: 24 % (ref 24–39)
T4 SERPL-MCNC: 7.1 UG/DL (ref 4.5–12)
TSH SERPL-ACNC: 0.88 UIU/ML (ref 0.45–4.5)

## 2019-02-21 ENCOUNTER — APPOINTMENT (OUTPATIENT)
Dept: LAB | Facility: HOSPITAL | Age: 64
End: 2019-02-21

## 2019-02-21 PROCEDURE — 87102 FUNGUS ISOLATION CULTURE: CPT | Performed by: CLINICAL NURSE SPECIALIST

## 2019-02-21 PROCEDURE — 87205 SMEAR GRAM STAIN: CPT | Performed by: CLINICAL NURSE SPECIALIST

## 2019-02-21 PROCEDURE — 87507 IADNA-DNA/RNA PROBE TQ 12-25: CPT | Performed by: CLINICAL NURSE SPECIALIST

## 2019-02-22 LAB — WBC STL QL MICRO: NORMAL

## 2019-02-26 ENCOUNTER — TELEPHONE (OUTPATIENT)
Dept: GASTROENTEROLOGY | Facility: CLINIC | Age: 64
End: 2019-02-26

## 2019-02-26 DIAGNOSIS — R19.5 YEAST IN STOOL: Primary | ICD-10-CM

## 2019-02-26 RX ORDER — FLUCONAZOLE 100 MG/1
100 TABLET ORAL DAILY
Qty: 5 TABLET | Refills: 0 | Status: SHIPPED | OUTPATIENT
Start: 2019-02-26 | End: 2019-03-03

## 2019-02-26 NOTE — TELEPHONE ENCOUNTER
Sent in diflucan she had a little yeast minimal not sure that this is the source but we need to treat and see. Becca VIZCAINO

## 2019-04-02 LAB — FUNGUS WND CULT: ABNORMAL

## 2019-04-10 ENCOUNTER — TELEPHONE (OUTPATIENT)
Dept: GASTROENTEROLOGY | Facility: CLINIC | Age: 64
End: 2019-04-10

## 2019-04-11 NOTE — TELEPHONE ENCOUNTER
Patient notified before we would scheduled any expensive testing for EPI she needs to have her colonoscopy to check her colitis will try to move colonoscopy up.

## 2019-04-11 NOTE — TELEPHONE ENCOUNTER
She has a hx of colitis microscopic and this as well would cause her symptoms. This needs to be ruled out before insurance will likely support testing for rare occurrence. Thanks Ashley

## 2019-04-16 ENCOUNTER — TELEPHONE (OUTPATIENT)
Dept: GASTROENTEROLOGY | Facility: HOSPITAL | Age: 64
End: 2019-04-16

## 2019-04-19 ENCOUNTER — PREP FOR SURGERY (OUTPATIENT)
Dept: OTHER | Facility: HOSPITAL | Age: 64
End: 2019-04-19

## 2019-04-19 DIAGNOSIS — R19.7 DIARRHEA IN ADULT PATIENT: Primary | ICD-10-CM

## 2019-04-29 DIAGNOSIS — K58.0 IRRITABLE BOWEL SYNDROME WITH DIARRHEA: ICD-10-CM

## 2019-04-29 RX ORDER — MONTELUKAST SODIUM 4 MG/1
TABLET, CHEWABLE ORAL
Qty: 180 TABLET | Refills: 3 | Status: SHIPPED | OUTPATIENT
Start: 2019-04-29 | End: 2020-04-13

## 2019-05-02 ENCOUNTER — ANESTHESIA EVENT (OUTPATIENT)
Dept: GASTROENTEROLOGY | Facility: HOSPITAL | Age: 64
End: 2019-05-02

## 2019-05-02 ENCOUNTER — ANESTHESIA (OUTPATIENT)
Dept: GASTROENTEROLOGY | Facility: HOSPITAL | Age: 64
End: 2019-05-02

## 2019-05-02 ENCOUNTER — HOSPITAL ENCOUNTER (OUTPATIENT)
Facility: HOSPITAL | Age: 64
Setting detail: HOSPITAL OUTPATIENT SURGERY
Discharge: HOME | End: 2019-05-02
Attending: INTERNAL MEDICINE | Admitting: INTERNAL MEDICINE

## 2019-05-02 VITALS
HEART RATE: 82 BPM | OXYGEN SATURATION: 97 % | RESPIRATION RATE: 21 BRPM | BODY MASS INDEX: 25.27 KG/M2 | HEIGHT: 64 IN | TEMPERATURE: 97.4 F | SYSTOLIC BLOOD PRESSURE: 146 MMHG | WEIGHT: 148 LBS | DIASTOLIC BLOOD PRESSURE: 85 MMHG

## 2019-05-02 DIAGNOSIS — R19.7 DIARRHEA IN ADULT PATIENT: ICD-10-CM

## 2019-05-02 PROCEDURE — 25010000002 PROPOFOL 10 MG/ML EMULSION: Performed by: NURSE ANESTHETIST, CERTIFIED REGISTERED

## 2019-05-02 PROCEDURE — 45380 COLONOSCOPY AND BIOPSY: CPT | Performed by: INTERNAL MEDICINE

## 2019-05-02 PROCEDURE — 88305 TISSUE EXAM BY PATHOLOGIST: CPT | Performed by: INTERNAL MEDICINE

## 2019-05-02 RX ORDER — PROPOFOL 10 MG/ML
VIAL (ML) INTRAVENOUS AS NEEDED
Status: DISCONTINUED | OUTPATIENT
Start: 2019-05-02 | End: 2019-05-02 | Stop reason: SURG

## 2019-05-02 RX ORDER — SODIUM CHLORIDE 9 MG/ML
500 INJECTION, SOLUTION INTRAVENOUS CONTINUOUS PRN
Status: DISCONTINUED | OUTPATIENT
Start: 2019-05-02 | End: 2019-05-02 | Stop reason: HOSPADM

## 2019-05-02 RX ORDER — SODIUM CHLORIDE 0.9 % (FLUSH) 0.9 %
3 SYRINGE (ML) INJECTION AS NEEDED
Status: DISCONTINUED | OUTPATIENT
Start: 2019-05-02 | End: 2019-05-02 | Stop reason: HOSPADM

## 2019-05-02 RX ORDER — LIDOCAINE HYDROCHLORIDE 20 MG/ML
INJECTION, SOLUTION INFILTRATION; PERINEURAL AS NEEDED
Status: DISCONTINUED | OUTPATIENT
Start: 2019-05-02 | End: 2019-05-02 | Stop reason: SURG

## 2019-05-02 RX ADMIN — LIDOCAINE HYDROCHLORIDE 100 MG: 20 INJECTION, SOLUTION INFILTRATION; PERINEURAL at 09:46

## 2019-05-02 RX ADMIN — SODIUM CHLORIDE 500 ML: 9 INJECTION, SOLUTION INTRAVENOUS at 08:53

## 2019-05-02 RX ADMIN — PROPOFOL 300 MG: 10 INJECTION, EMULSION INTRAVENOUS at 09:46

## 2019-05-02 NOTE — ANESTHESIA PREPROCEDURE EVALUATION
Anesthesia Evaluation     Patient summary reviewed   no history of anesthetic complications:  NPO Solid Status: > 8 hours             Airway   Mallampati: II  TM distance: >3 FB  Neck ROM: full  Dental    (+) lower dentures and upper dentures    Pulmonary    (+) a smoker, COPD,   Cardiovascular     (+) hypertension,       Neuro/Psych- negative ROS  GI/Hepatic/Renal/Endo    (+)  GERD,      Musculoskeletal     Abdominal    Substance History      OB/GYN          Other                        Anesthesia Plan    ASA 2     MAC     Anesthetic plan, all risks, benefits, and alternatives have been provided, discussed and informed consent has been obtained with: patient.

## 2019-05-02 NOTE — H&P
[facility]  Pre Procedure History & Physical    Chief Complaint:   Diarrhea    Subjective     HPI:   Here for colonoscopy.  Diarrhea.  Please see office note dated 2/15/2019    Past Medical History:   Past Medical History:   Diagnosis Date   • Anemia    • Anxiety    • Arthritis    • Cancer of skin     BCC OF NOSE   • COPD (chronic obstructive pulmonary disease) (CMS/HCC)    • Depression    • Fibromyalgia    • GERD (gastroesophageal reflux disease)    • Hx of colonic polyps    • Hypercholesteremia    • Hypertension    • Seizure (CMS/HCC)        Past Surgical History:  Past Surgical History:   Procedure Laterality Date   • BREAST SURGERY     • CHOLECYSTECTOMY     • COLONOSCOPY  04/12/2017    Hemorrhoids and a few diverticula repeat exam in 3 years Dr. Cordero   • COLONOSCOPY W/ POLYPECTOMY  07/28/2016    Tubular adenoma proximal to the anus, 30 MM polyp in the cecum not resected, Diverticulosis ref to Dr. Cordero   • ENDOSCOPY  06/15/2016    Small hh, Negative for celiac disease   • RADICAL HYSTERECTOMY     • URETEROSCOPY Right 9/13/2018    Procedure: CYSTOSCOPY RIGHT RETROGRADE PYELOGRAM RIGHT URETEROSCOPY WITH BIOPSY OF RIGHT RENAL PELVIS LASER LITHOTRIPSY AND RIGHT URETERAL STENT INSERTION;  Surgeon: Yifan Greco MD;  Location: [facility];  Service: Urology       Family History:  Family History   Problem Relation Age of Onset   • Diabetes Sister    • Cancer Brother    • Colon cancer Neg Hx    • Colon polyps Neg Hx        Social History:   reports that she has been smoking cigarettes.  She has been smoking about 1.00 pack per day. She has never used smokeless tobacco. She reports that she does not drink alcohol or use drugs.    Medications:   Prior to Admission medications    Medication Sig Start Date End Date Taking? Authorizing Provider   ARIPiprazole (ABILIFY) 10 MG tablet  9/19/16  Yes Provider, MD Perla   cetirizine (zyrTEC) 10 MG tablet Take 10 mg by mouth Daily.   Yes Provider,  "MD Perla   colestipol (COLESTID) 1 g tablet TAKE 1 TABLET TWICE A DAY 4/29/19  Yes Becca Blackwood APRN   DULoxetine (CYMBALTA) 60 MG capsule  9/19/16  Yes Perla Bliss MD   metoprolol tartrate (LOPRESSOR) 50 MG tablet Take 100 mg by mouth Every Night.   Yes Perla Bliss MD   omeprazole (priLOSEC) 20 MG capsule Take 20 mg by mouth Daily.   Yes Perla Bliss MD   PROAIR  (90 BASE) MCG/ACT inhaler USE TWO PUFFS BY MOUTH EVERY 4 TO 6 HOURS AS NEEDED FOR WHEEZING 9/19/16  Yes Perla Bliss MD   SYMBICORT 160-4.5 MCG/ACT inhaler  8/1/16  Yes Perla Bliss MD   HYDROcodone-acetaminophen (NORCO) 7.5-325 MG per tablet Take 1-2 tablets by mouth Every 4 (Four) Hours As Needed for Moderate Pain  (Pain). 9/13/18   Yifan Greco MD   colestipol (COLESTID) 1 g tablet Take 1 g by mouth 2 (Two) Times a Day.  5/2/19  Perla Bliss MD   polyethylene glycol (GoLYTELY) 236 g solution Starting at noon on day prior to procedure, drink 8 ounces every 30 minutes until all gone or stools are clear. May add flavor packet. 4/24/19 5/2/19  Becca Blackwood APRN   SUPREP BOWEL PREP KIT 17.5-3.13-1.6 GM/177ML solution oral solution Take as directed by office instructions provided 2/15/19 5/2/19  Becca Blackwood APRN   tamsulosin (FLOMAX) 0.4 MG capsule 24 hr capsule Take 1 capsule by mouth Every Night. 9/13/18 5/2/19  Yifan Greco MD       Allergies:  Lyrica [pregabalin]; Morphine; Codeine; and Penicillins    Objective     Blood pressure 152/90, pulse 82, temperature 97.4 °F (36.3 °C), temperature source Temporal, resp. rate 18, height 162.6 cm (64\"), weight 67.1 kg (148 lb), SpO2 95 %, not currently breastfeeding.    Physical Exam   Constitutional: Pt is oriented to person, place, and in no distress.   HENT: Mouth/Throat: Oropharynx is clear.   Cardiovascular: Normal rate, regular rhythm.    Pulmonary/Chest: Effort normal. No respiratory distress. " No  wheezes.   Abdominal: Soft. Non-distended.  Skin: Skin is warm and dry.   Psychiatric: Mood, memory, affect and judgment appear normal.     Assessment/Plan     Diagnosis:  Diarrhea    Anticipated Surgical Procedure:    Proceed with colonoscopy as scheduled    The following major R/B/A were discussed with the patient, however the list is not all inclusive . Risk:  Bleeding (immediate and delayed), perforation (rupture or tear), reaction to medication, missed lesion/cancer, pain during the procedure, infection, need for surgery, need for ostomy, need for mechanical ventilation (breathing machine), death.  Benefits: removal of polyp/tissue, burn/clip/or inject to stop bleeding, removal of foreign body, dilate any stricture.  Alternatives: Xray or CT, surgery, do nothing with associated risk   The patient was given time to ask question and received explanation, and agrees to proceed as per History and Physical.   No guarantee given or expressed.    EMR Dragon/transcription disclaimer: Much of this encounter note is an electronic transcription/translation of spoken language to printed text.  The electronic translation of spoken language may permit erroneous, or at times, nonsensical words or phrases to be inadvertently transcribed.  Although I have reviewed the note for such errors, some may still exist.    Danis Garcia MD  9:45 AM  5/2/2019

## 2019-05-02 NOTE — ANESTHESIA POSTPROCEDURE EVALUATION
"Patient: Heather Russo    Procedure Summary     Date:  05/02/19 Room / Location:  [facility] ENDOSCOPY 4 / [facility]    Anesthesia Start:  0938 Anesthesia Stop:  1005    Procedure:  COLONOSCOPY WITH ANESTHESIA (N/A ) Diagnosis:       Diarrhea in adult patient      (Diarrhea in adult patient [R19.7])    Surgeon:  Danis Garcia MD Provider:  Yifan Rajan CRNA    Anesthesia Type:  MAC ASA Status:  2          Anesthesia Type: MAC  Last vitals  BP   146/85 (05/02/19 1020)   Temp   97.4 °F (36.3 °C) (05/02/19 0834)   Pulse   82 (05/02/19 1020)   Resp   21 (05/02/19 1020)     SpO2   97 % (05/02/19 1020)     Post Anesthesia Care and Evaluation    Patient location during evaluation: PHASE II  Patient participation: complete - patient participated  Level of consciousness: awake and alert  Pain score: 0  Pain management: adequate  Airway patency: patent  Anesthetic complications: No anesthetic complications  PONV Status: none  Cardiovascular status: acceptable  Respiratory status: acceptable  Hydration status: acceptable    Comments: Blood pressure 146/85, pulse 82, temperature 97.4 °F (36.3 °C), temperature source Temporal, resp. rate 21, height 162.6 cm (64\"), weight 67.1 kg (148 lb), SpO2 97 %, not currently breastfeeding.    Pt discharged from PACU based on jaja score >8      "

## 2019-05-09 ENCOUNTER — TELEPHONE (OUTPATIENT)
Dept: GASTROENTEROLOGY | Facility: CLINIC | Age: 64
End: 2019-05-09

## 2019-05-09 LAB
CYTO UR: NORMAL
LAB AP CASE REPORT: NORMAL
PATH REPORT.FINAL DX SPEC: NORMAL
PATH REPORT.GROSS SPEC: NORMAL

## 2019-05-09 NOTE — TELEPHONE ENCOUNTER
Patient called for results of path from colonoscopy, which noted collagenous colitis. Please let me know if we need to do anything.     Thank you

## 2019-05-10 NOTE — TELEPHONE ENCOUNTER
They just released them yesterday and he hasnt had time to review yet however it does show collangenous colitis she needs to start pepto 2 tbsp 4 times per day and follow up with us in 3 weeks.   Becca Blackwood

## 2019-05-21 ENCOUNTER — OFFICE VISIT (OUTPATIENT)
Dept: GASTROENTEROLOGY | Facility: CLINIC | Age: 64
End: 2019-05-21

## 2019-05-21 VITALS
BODY MASS INDEX: 26.4 KG/M2 | DIASTOLIC BLOOD PRESSURE: 70 MMHG | OXYGEN SATURATION: 96 % | WEIGHT: 149 LBS | HEIGHT: 63 IN | SYSTOLIC BLOOD PRESSURE: 122 MMHG | HEART RATE: 99 BPM

## 2019-05-21 DIAGNOSIS — R19.7 DIARRHEA IN ADULT PATIENT: Primary | ICD-10-CM

## 2019-05-21 PROCEDURE — 99213 OFFICE O/P EST LOW 20 MIN: CPT | Performed by: INTERNAL MEDICINE

## 2019-05-21 RX ORDER — FAMOTIDINE 20 MG/1
40 TABLET, FILM COATED ORAL 2 TIMES DAILY
Qty: 360 TABLET | Refills: 3 | Status: SHIPPED | OUTPATIENT
Start: 2019-05-21 | End: 2019-08-10 | Stop reason: HOSPADM

## 2019-05-21 NOTE — PROGRESS NOTES
[facility] - Office note  5/21/2019    Heather Russo 1955     Chief Complaint   Patient presents with   • GI Problem     Collagenous colitis       HISTORY OF PRESENT ILLNESS    Heather Russo is a 63 y.o. female who presents for follow-up from recent colonoscopy.  Diarrhea.  Biopsies were consistent with collagenous colitis.  She does take Excedrin P.M.  Occasional Advil.  She is on omeprazole.  She is also on Cymbalta.    Past Medical History:   Diagnosis Date   • Anemia    • Anxiety    • Arthritis    • Cancer of skin     BCC OF NOSE   • COPD (chronic obstructive pulmonary disease) (CMS/HCC)    • Depression    • Fibromyalgia    • GERD (gastroesophageal reflux disease)    • Hx of colonic polyps    • Hypercholesteremia    • Hypertension    • Seizure (CMS/HCC)        Past Surgical History:   Procedure Laterality Date   • BREAST SURGERY     • CHOLECYSTECTOMY     • COLONOSCOPY  04/12/2017    Hemorrhoids and a few diverticula repeat exam in 3 years Dr. Cordero   • COLONOSCOPY N/A 5/2/2019    Procedure: COLONOSCOPY WITH ANESTHESIA;  Surgeon: Danis Garcia MD;  Location: [facility] ENDOSCOPY;  Service: Gastroenterology   • COLONOSCOPY W/ POLYPECTOMY  07/28/2016    Tubular adenoma proximal to the anus, 30 MM polyp in the cecum not resected, Diverticulosis ref to Dr. Cordero   • ENDOSCOPY  06/15/2016    Small hh, Negative for celiac disease   • RADICAL HYSTERECTOMY     • URETEROSCOPY Right 9/13/2018    Procedure: CYSTOSCOPY RIGHT RETROGRADE PYELOGRAM RIGHT URETEROSCOPY WITH BIOPSY OF RIGHT RENAL PELVIS LASER LITHOTRIPSY AND RIGHT URETERAL STENT INSERTION;  Surgeon: Yifan Greco MD;  Location: [facility] OR;  Service: Urology         Current Outpatient Medications:   •  ARIPiprazole (ABILIFY) 10 MG tablet, , Disp: , Rfl:   •  Bismuth Subsalicylate (PEPTO-BISMOL) 262 MG tablet, Take  by mouth. 2 po qid, Disp: , Rfl:   •  cetirizine (zyrTEC) 10 MG tablet, Take 10 mg by mouth Daily., Disp: , Rfl:   •   "colestipol (COLESTID) 1 g tablet, TAKE 1 TABLET TWICE A DAY, Disp: 180 tablet, Rfl: 3  •  DULoxetine (CYMBALTA) 60 MG capsule, , Disp: , Rfl:   •  HYDROcodone-acetaminophen (NORCO) 7.5-325 MG per tablet, Take 1-2 tablets by mouth Every 4 (Four) Hours As Needed for Moderate Pain  (Pain)., Disp: 12 tablet, Rfl: 0  •  metoprolol tartrate (LOPRESSOR) 50 MG tablet, Take 100 mg by mouth Every Night., Disp: , Rfl:   •  PROAIR  (90 BASE) MCG/ACT inhaler, USE TWO PUFFS BY MOUTH EVERY 4 TO 6 HOURS AS NEEDED FOR WHEEZING, Disp: , Rfl: 2  •  SYMBICORT 160-4.5 MCG/ACT inhaler, , Disp: , Rfl:     Allergies   Allergen Reactions   • Lyrica [Pregabalin] Swelling     LIPS AND FACE   • Morphine Itching   • Codeine Itching   • Penicillins Other (See Comments)     CHILDHOOD ALLERGY         Social History     Socioeconomic History   • Marital status:      Spouse name: Not on file   • Number of children: Not on file   • Years of education: Not on file   • Highest education level: Not on file   Tobacco Use   • Smoking status: Current Every Day Smoker     Packs/day: 1.00     Types: Cigarettes   • Smokeless tobacco: Never Used   Substance and Sexual Activity   • Alcohol use: No   • Drug use: No   • Sexual activity: Defer       Family History   Problem Relation Age of Onset   • Diabetes Sister    • Cancer Brother    • Colon cancer Neg Hx    • Colon polyps Neg Hx        Review of Systems   Gastrointestinal: Positive for diarrhea and nausea.       Vitals:    05/21/19 1320   BP: 122/70   Pulse: 99   SpO2: 96%   Weight: 67.6 kg (149 lb)   Height: 160 cm (63\")    Body mass index is 26.39 kg/m².    Physical Exam    Lab Results - Last 18 Months   Lab Units 09/07/18  1422 09/07/18  1421  08/22/18  1546   WBC 10*3/mm3 11.33*  --   --  15.25*   HEMOGLOBIN g/dL 10.9*  --   --  11.4*   HEMATOCRIT % 36.5*  --   --  37.8   MCV fL 75.1*  --   --  73.5*   PLATELETS 10*3/mm3 359  --   --  357   GLUCOSE mg/dL  --  106*  --  154*   BUN mg/dL  --  " 19  --  11   CREATININE mg/dL  --  0.78   < > 0.86   SODIUM mmol/L  --  143  --  141   POTASSIUM mmol/L  --  3.9  --  2.9*   CHLORIDE mmol/L  --  109  --  104   CO2 mmol/L  --  19.0*  --  19.0*   TOTAL PROTEIN g/dL  --   --   --  8.8*   ALBUMIN g/dL  --   --   --  4.80   ALT (SGPT) U/L  --   --   --  15   AST (SGOT) U/L  --   --   --  20   ALK PHOS U/L  --   --   --  96   BILIRUBIN mg/dL  --   --   --  0.3   GLOBULIN gm/dL  --   --   --  4.0    < > = values in this interval not displayed.         ASSESSMENT AND PLAN      1. Diarrhea in adult patient  Biopsies consistent with collagenous colitis.  Will discontinue the omeprazole and change to Pepcid 40 mg twice daily.  I will have her avoid nonsteroidals as much as possible.  Continue the Pepto-Bismol for now.  If the nausea persists and she can perhaps discontinue.  Continue the Colestid.  I will see her back in 3 months       EMR Dragon/transcription disclaimer: Much of this encounter note is an electronic transcription/translation of spoken language to printed text.  The electronic translation of spoken language may permit erroneous, or at times, nonsensical words or phrases to be inadvertently transcribed.  Although I have reviewed the note for such errors, some may still exist.    Danis Garcia MD  5/21/2019  1:42 PM

## 2019-06-11 ENCOUNTER — OFFICE VISIT (OUTPATIENT)
Dept: GASTROENTEROLOGY | Facility: CLINIC | Age: 64
End: 2019-06-11

## 2019-06-11 VITALS
HEART RATE: 95 BPM | DIASTOLIC BLOOD PRESSURE: 72 MMHG | HEIGHT: 63 IN | BODY MASS INDEX: 26.05 KG/M2 | OXYGEN SATURATION: 98 % | SYSTOLIC BLOOD PRESSURE: 122 MMHG | WEIGHT: 147 LBS

## 2019-06-11 DIAGNOSIS — R10.13 ABDOMINAL PAIN, EPIGASTRIC: Primary | ICD-10-CM

## 2019-06-11 DIAGNOSIS — Z71.6 TOBACCO ABUSE COUNSELING: ICD-10-CM

## 2019-06-11 DIAGNOSIS — I10 HTN (HYPERTENSION), BENIGN: ICD-10-CM

## 2019-06-11 DIAGNOSIS — R11.0 NAUSEA: ICD-10-CM

## 2019-06-11 DIAGNOSIS — Z79.1 NSAID LONG-TERM USE: ICD-10-CM

## 2019-06-11 PROCEDURE — 99214 OFFICE O/P EST MOD 30 MIN: CPT | Performed by: CLINICAL NURSE SPECIALIST

## 2019-06-11 NOTE — PROGRESS NOTES
Heather Russo  1955 6/11/2019  Chief Complaint   Patient presents with   • GI Problem     Abdominal pain and nausea     Subjective   HPI  Heather Russo is a 63 y.o. female who presents with a complaint of nausea and epigastric abdominal pain. The pain began when she stopped the Omeprazole and had to go to Pepcid 40 mg twice daily due to collagenous colitis.  She says that the pain is daily and worse with food or when she cant have a BM. She says it is a burn at times. She rates it a 8 out of 10. No melena. No BRBPR. No NSAIDS. No wt loss. She does continue to smoke tobacco and she also drinks diet coke. Excedrin for headaches daily.   Past Medical History:   Diagnosis Date   • Anemia    • Anxiety    • Arthritis    • Cancer of skin     BCC OF NOSE   • COPD (chronic obstructive pulmonary disease) (CMS/HCC)    • Depression    • Fibromyalgia    • GERD (gastroesophageal reflux disease)    • Hx of colonic polyps    • Hypercholesteremia    • Hypertension    • Seizure (CMS/HCC)      Past Surgical History:   Procedure Laterality Date   • BREAST SURGERY     • CHOLECYSTECTOMY     • COLONOSCOPY  04/12/2017    Hemorrhoids and a few diverticula repeat exam in 3 years Dr. Cordero   • COLONOSCOPY N/A 5/2/2019    Procedure: COLONOSCOPY WITH ANESTHESIA;  Surgeon: Danis Garcia MD;  Location: [facility] ENDOSCOPY;  Service: Gastroenterology   • COLONOSCOPY W/ POLYPECTOMY  07/28/2016    Tubular adenoma proximal to the anus, 30 MM polyp in the cecum not resected, Diverticulosis ref to Dr. Cordero   • ENDOSCOPY  06/15/2016    Small hh, Negative for celiac disease   • RADICAL HYSTERECTOMY     • URETEROSCOPY Right 9/13/2018    Procedure: CYSTOSCOPY RIGHT RETROGRADE PYELOGRAM RIGHT URETEROSCOPY WITH BIOPSY OF RIGHT RENAL PELVIS LASER LITHOTRIPSY AND RIGHT URETERAL STENT INSERTION;  Surgeon: Yifan Greco MD;  Location: [facility] OR;  Service: Urology       Outpatient Medications Marked as Taking for the 6/11/19 encounter (Office Visit)  with Becca Blackwood APRN   Medication Sig Dispense Refill   • ARIPiprazole (ABILIFY) 10 MG tablet      • cetirizine (zyrTEC) 10 MG tablet Take 10 mg by mouth Daily.     • colestipol (COLESTID) 1 g tablet TAKE 1 TABLET TWICE A  tablet 3   • DULoxetine (CYMBALTA) 60 MG capsule      • famotidine (PEPCID) 20 MG tablet Take 2 tablets by mouth 2 (Two) Times a Day for 90 days. 360 tablet 3   • metoprolol tartrate (LOPRESSOR) 50 MG tablet Take 100 mg by mouth Every Night.     • PROAIR  (90 BASE) MCG/ACT inhaler USE TWO PUFFS BY MOUTH EVERY 4 TO 6 HOURS AS NEEDED FOR WHEEZING  2   • SYMBICORT 160-4.5 MCG/ACT inhaler        Allergies   Allergen Reactions   • Lyrica [Pregabalin] Swelling     LIPS AND FACE   • Morphine Itching   • Codeine Itching   • Penicillins Other (See Comments)     CHILDHOOD ALLERGY       Social History     Socioeconomic History   • Marital status:      Spouse name: Not on file   • Number of children: Not on file   • Years of education: Not on file   • Highest education level: Not on file   Tobacco Use   • Smoking status: Current Every Day Smoker     Packs/day: 1.00     Types: Cigarettes   • Smokeless tobacco: Never Used   Substance and Sexual Activity   • Alcohol use: No   • Drug use: No   • Sexual activity: Defer     Family History   Problem Relation Age of Onset   • Diabetes Sister    • Cancer Brother    • Colon cancer Neg Hx    • Colon polyps Neg Hx      Health Maintenance   Topic Date Due   • ANNUAL PHYSICAL  08/[birth year]/1959   • PNEUMOCOCCAL VACCINE (19-64 MEDIUM RISK) (1 of 1 - PPSV23) 08/11/1975   • TDAP/TD VACCINES (1 - Tdap) 08/11/1975   • ZOSTER VACCINE (1 of 2) 08/11/2006   • MAMMOGRAM  05/02/2017   • PAP SMEAR  05/02/2017   • LIPID PANEL  05/03/2017   • INFLUENZA VACCINE  08/01/2019   • COLONOSCOPY  05/02/2024   • HEPATITIS C SCREENING  Completed     Review of Systems   Constitutional: Negative for activity change, appetite change, chills, diaphoresis, fatigue, fever and  "unexpected weight change.   HENT: Negative for ear pain, hearing loss, mouth sores, sore throat, trouble swallowing and voice change.    Eyes: Negative.    Respiratory: Negative for cough, choking, shortness of breath and wheezing.    Cardiovascular: Negative for chest pain and palpitations.   Gastrointestinal: Positive for abdominal pain and nausea. Negative for blood in stool, constipation, diarrhea and vomiting.   Endocrine: Negative for cold intolerance and heat intolerance.   Genitourinary: Negative for decreased urine volume, dysuria, frequency, hematuria and urgency.   Musculoskeletal: Negative for back pain, gait problem and myalgias.   Skin: Negative for color change, pallor and rash.   Allergic/Immunologic: Negative for food allergies and immunocompromised state.   Neurological: Negative for dizziness, tremors, seizures, syncope, weakness, light-headedness, numbness and headaches.   Hematological: Negative for adenopathy. Does not bruise/bleed easily.   Psychiatric/Behavioral: Negative for agitation and confusion. The patient is not nervous/anxious.    All other systems reviewed and are negative.    Objective   Vitals:    06/11/19 1259   BP: 122/72   Pulse: 95   SpO2: 98%   Weight: 66.7 kg (147 lb)   Height: 160 cm (63\")     Body mass index is 26.04 kg/m².  Physical Exam   Constitutional: She is oriented to person, place, and time. She appears well-developed and well-nourished.   HENT:   Head: Normocephalic and atraumatic.   Eyes: Pupils are equal, round, and reactive to light.   Neck: Normal range of motion. Neck supple. No tracheal deviation present.   Cardiovascular: Normal rate, regular rhythm and normal heart sounds. Exam reveals no gallop and no friction rub.   No murmur heard.  Pulmonary/Chest: Effort normal and breath sounds normal. No respiratory distress. She has no wheezes. She has no rales. She exhibits no tenderness.   Abdominal: Soft. Bowel sounds are normal. She exhibits no distension. There " is no hepatosplenomegaly. There is tenderness. There is no rigidity, no rebound and no guarding.   Musculoskeletal: Normal range of motion. She exhibits no edema, tenderness or deformity.   Neurological: She is alert and oriented to person, place, and time. She has normal reflexes.   Skin: Skin is warm and dry. No rash noted. No pallor.   Psychiatric: She has a normal mood and affect. Her behavior is normal. Judgment and thought content normal.     Assessment/Plan   Heather was seen today for gi problem.    Diagnoses and all orders for this visit:    Abdominal pain, epigastric  -     Case Request; Standing  -     Follow Anesthesia Guidelines / Standing Orders; Future  -     Obtain Informed Consent; Future  -     Implement Anesthesia Orders Day of Procedure; Standing  -     Obtain Informed Consent; Standing  -     Case Request    Nausea    Tobacco abuse counseling    NSAID long-term use    HTN (hypertension), benign  Comments:  cont BP medication the day of procedure    I have had a long discussion that I cannot put her on a PPI due to the collangenous colitis and that she is going to have to make some lifestyle modifications to assist in the managment of her GERD symptoms. No caffeine to include diet coke.   No NSAIDS to include Excedrin Migraine  To continue to the colestid for diarrhea    ESOPHAGOGASTRODUODENOSCOPY WITH ANESTHESIA (N/A)  EMR Dragon/transcription disclaimer: Much of this encounter note is electronic transcription/translation of spoken language to printed text. The electronic translation of spoken language may be erroneous, or at times, nonsensical words or phrases may be inadvertently transcribed. Although I have reviewed the note for such errors, some may still exist.  Body mass index is 26.04 kg/m².  Return if symptoms worsen or fail to improve.    Patient's Body mass index is 26.04 kg/m². BMI is above normal parameters. Recommendations include: nutrition counseling.      All risks, benefits,  alternatives, and indications of colonoscopy and/or Endoscopy procedure have been discussed with the patient. Risks to include perforation of the colon requiring possible surgery or colostomy, risk of bleeding from biopsies or removal of colon tissue, possibility of missing a colon polyp or cancer, or adverse drug reaction.  Benefits to include the diagnosis and management of disease of the colon and rectum. Alternatives to include barium enema, radiographic evaluation, lab testing or no intervention. Pt verbalizes understanding and agrees.     Becca Blackwood, APRN  6/11/2019  1:31 PM      Obesity, Adult  Obesity is the condition of having too much total body fat. Being overweight or obese means that your weight is greater than what is considered healthy for your body size. Obesity is determined by a measurement called BMI. BMI is an estimate of body fat and is calculated from height and weight. For adults, a BMI of 30 or higher is considered obese.  Obesity can eventually lead to other health concerns and major illnesses, including:  · Stroke.  · Coronary artery disease (CAD).  · Type 2 diabetes.  · Some types of cancer, including cancers of the colon, breast, uterus, and gallbladder.  · Osteoarthritis.  · High blood pressure (hypertension).  · High cholesterol.  · Sleep apnea.  · Gallbladder stones.  · Infertility problems.  What are the causes?  The main cause of obesity is taking in (consuming) more calories than your body uses for energy. Other factors that contribute to this condition may include:  · Being born with genes that make you more likely to become obese.  · Having a medical condition that causes obesity. These conditions include:  ¨ Hypothyroidism.  ¨ Polycystic ovarian syndrome (PCOS).  ¨ Binge-eating disorder.  ¨ Cushing syndrome.  · Taking certain medicines, such as steroids, antidepressants, and seizure medicines.  · Not being physically active (sedentary lifestyle).  · Living where there  are limited places to exercise safely or buy healthy foods.  · Not getting enough sleep.  What increases the risk?  The following factors may increase your risk of this condition:  · Having a family history of obesity.  · Being a woman of -American descent.  · Being a man of  descent.  What are the signs or symptoms?  Having excessive body fat is the main symptom of this condition.  How is this diagnosed?  This condition may be diagnosed based on:  · Your symptoms.  · Your medical history.  · A physical exam. Your health care provider may measure:  ¨ Your BMI. If you are an adult with a BMI between 25 and less than 30, you are considered overweight. If you are an adult with a BMI of 30 or higher, you are considered obese.  ¨ The distances around your hips and your waist (circumferences). These may be compared to each other to help diagnose your condition.  ¨ Your skinfold thickness. Your health care provider may gently pinch a fold of your skin and measure it.  How is this treated?  Treatment for this condition often includes changing your lifestyle. Treatment may include some or all of the following:  · Dietary changes. Work with your health care provider and a dietitian to set a weight-loss goal that is healthy and reasonable for you. Dietary changes may include eating:  ¨ Smaller portions. A portion size is the amount of a particular food that is healthy for you to eat at one time. This varies from person to person.  ¨ Low-calorie or low-fat options.  ¨ More whole grains, fruits, and vegetables.  · Regular physical activity. This may include aerobic activity (cardio) and strength training.  · Medicine to help you lose weight. Your health care provider may prescribe medicine if you are unable to lose 1 pound a week after 6 weeks of eating more healthily and doing more physical activity.  · Surgery. Surgical options may include gastric banding and gastric bypass. Surgery may be done if:  ¨ Other  treatments have not helped to improve your condition.  ¨ You have a BMI of 40 or higher.  ¨ You have life-threatening health problems related to obesity.  Follow these instructions at home:     Eating and drinking     · Follow recommendations from your health care provider about what you eat and drink. Your health care provider may advise you to:  ¨ Limit fast foods, sweets, and processed snack foods.  ¨ Choose low-fat options, such as low-fat milk instead of whole milk.  ¨ Eat 5 or more servings of fruits or vegetables every day.  ¨ Eat at home more often. This gives you more control over what you eat.  ¨ Choose healthy foods when you eat out.  ¨ Learn what a healthy portion size is.  ¨ Keep low-fat snacks on hand.  ¨ Avoid sugary drinks, such as soda, fruit juice, iced tea sweetened with sugar, and flavored milk.  ¨ Eat a healthy breakfast.  · Drink enough water to keep your urine clear or pale yellow.  · Do not go without eating for long periods of time (do not fast) or follow a fad diet. Fasting and fad diets can be unhealthy and even dangerous.  Physical Activity   · Exercise regularly, as told by your health care provider. Ask your health care provider what types of exercise are safe for you and how often you should exercise.  · Warm up and stretch before being active.  · Cool down and stretch after being active.  · Rest between periods of activity.  Lifestyle   · Limit the time that you spend in front of your TV, computer, or video game system.  · Find ways to reward yourself that do not involve food.  · Limit alcohol intake to no more than 1 drink a day for nonpregnant women and 2 drinks a day for men. One drink equals 12 oz of beer, 5 oz of wine, or 1½ oz of hard liquor.  General instructions   · Keep a weight loss journal to keep track of the food you eat and how much you exercise you get.  · Take over-the-counter and prescription medicines only as told by your health care provider.  · Take vitamins and  supplements only as told by your health care provider.  · Consider joining a support group. Your health care provider may be able to recommend a support group.  · Keep all follow-up visits as told by your health care provider. This is important.  Contact a health care provider if:  · You are unable to meet your weight loss goal after 6 weeks of dietary and lifestyle changes.  This information is not intended to replace advice given to you by your health care provider. Make sure you discuss any questions you have with your health care provider.  Document Released: 01/25/2006 Document Revised: 05/22/2017 Document Reviewed: 10/05/2016  [employer] Interactive Patient Education © 2017 [employer]      If you smoke or use tobacco, 4 minutes reading provided  Steps to Quit Smoking  Smoking tobacco can be harmful to your health and can affect almost every organ in your body. Smoking puts you, and those around you, at risk for developing many serious chronic diseases. Quitting smoking is difficult, but it is one of the best things that you can do for your health. It is never too late to quit.  What are the benefits of quitting smoking?  When you quit smoking, you lower your risk of developing serious diseases and conditions, such as:  · Lung cancer or lung disease, such as COPD.  · Heart disease.  · Stroke.  · Heart attack.  · Infertility.  · Osteoporosis and bone fractures.  Additionally, symptoms such as coughing, wheezing, and shortness of breath may get better when you quit. You may also find that you get sick less often because your body is stronger at fighting off colds and infections. If you are pregnant, quitting smoking can help to reduce your chances of having a baby of low birth weight.  How do I get ready to quit?  When you decide to quit smoking, create a plan to make sure that you are successful. Before you quit:  · Pick a date to quit. Set a date within the next two weeks to give you time to prepare.  · Write  down the reasons why you are quitting. Keep this list in places where you will see it often, such as on your bathroom mirror or in your car or wallet.  · Identify the people, places, things, and activities that make you want to smoke (triggers) and avoid them. Make sure to take these actions:  ¨ Throw away all cigarettes at home, at work, and in your car.  ¨ Throw away smoking accessories, such as ashtrays and lighters.  ¨ Clean your car and make sure to empty the ashtray.  ¨ Clean your home, including curtains and carpets.  · Tell your family, friends, and coworkers that you are quitting. Support from your loved ones can make quitting easier.  · Talk with your health care provider about your options for quitting smoking.  · Find out what treatment options are covered by your health insurance.  What strategies can I use to quit smoking?  Talk with your healthcare provider about different strategies to quit smoking. Some strategies include:  · Quitting smoking altogether instead of gradually lessening how much you smoke over a period of time. Research shows that quitting “cold turkey” is more successful than gradually quitting.  · Attending in-person counseling to help you build problem-solving skills. You are more likely to have success in quitting if you attend several counseling sessions. Even short sessions of 10 minutes can be effective.  · Finding resources and support systems that can help you to quit smoking and remain smoke-free after you quit. These resources are most helpful when you use them often. They can include:  ¨ Online chats with a counselor.  ¨ Telephone quitlines.  ¨ Printed self-help materials.  ¨ Support groups or group counseling.  ¨ Text messaging programs.  ¨ Mobile phone applications.  · Taking medicines to help you quit smoking. (If you are pregnant or breastfeeding, talk with your health care provider first.) Some medicines contain nicotine and some do not. Both types of medicines help  with cravings, but the medicines that include nicotine help to relieve withdrawal symptoms. Your health care provider may recommend:  ¨ Nicotine patches, gum, or lozenges.  ¨ Nicotine inhalers or sprays.  ¨ Non-nicotine medicine that is taken by mouth.  Talk with your health care provider about combining strategies, such as taking medicines while you are also receiving in-person counseling. Using these two strategies together makes you more likely to succeed in quitting than if you used either strategy on its own.  If you are pregnant or breastfeeding, talk with your health care provider about finding counseling or other support strategies to quit smoking. Do not take medicine to help you quit smoking unless told to do so by your health care provider.  What things can I do to make it easier to quit?  Quitting smoking might feel overwhelming at first, but there is a lot that you can do to make it easier. Take these important actions:  · Reach out to your family and friends and ask that they support and encourage you during this time. Call telephone quitlines, reach out to support groups, or work with a counselor for support.  · Ask people who smoke to avoid smoking around you.  · Avoid places that trigger you to smoke, such as bars, parties, or smoke-break areas at work.  · Spend time around people who do not smoke.  · Lessen stress in your life, because stress can be a smoking trigger for some people. To lessen stress, try:  ¨ Exercising regularly.  ¨ Deep-breathing exercises.  ¨ Yoga.  ¨ Meditating.  ¨ Performing a body scan. This involves closing your eyes, scanning your body from head to toe, and noticing which parts of your body are particularly tense. Purposefully relax the muscles in those areas.  · Download or purchase mobile phone or tablet apps (applications) that can help you stick to your quit plan by providing reminders, tips, and encouragement. There are many free apps, such as QuitGuide from the [employer]  (Centers for Disease Control and Prevention). You can find other support for quitting smoking (smoking cessation) through smokefree.gov and other websites.  How will I feel when I quit smoking?  Within the first 24 hours of quitting smoking, you may start to feel some withdrawal symptoms. These symptoms are usually most noticeable 2-3 days after quitting, but they usually do not last beyond 2-3 weeks. Changes or symptoms that you might experience include:  · Mood swings.  · Restlessness, anxiety, or irritation.  · Difficulty concentrating.  · Dizziness.  · Strong cravings for sugary foods in addition to nicotine.  · Mild weight gain.  · Constipation.  · Nausea.  · Coughing or a sore throat.  · Changes in how your medicines work in your body.  · A depressed mood.  · Difficulty sleeping (insomnia).  After the first 2-3 weeks of quitting, you may start to notice more positive results, such as:  · Improved sense of smell and taste.  · Decreased coughing and sore throat.  · Slower heart rate.  · Lower blood pressure.  · Clearer skin.  · The ability to breathe more easily.  · Fewer sick days.  Quitting smoking is very challenging for most people. Do not get discouraged if you are not successful the first time. Some people need to make many attempts to quit before they achieve long-term success. Do your best to stick to your quit plan, and talk with your health care provider if you have any questions or concerns.  This information is not intended to replace advice given to you by your health care provider. Make sure you discuss any questions you have with your health care provider.  Document Released: 12/12/2002 Document Revised: 08/15/2017 Document Reviewed: 05/03/2016  [employer] Interactive Patient Education © 2017 [employer]

## 2019-06-11 NOTE — H&P (VIEW-ONLY)
Heather Russo  1955 6/11/2019  Chief Complaint   Patient presents with   • GI Problem     Abdominal pain and nausea     Subjective   HPI  Heather Russo is a 63 y.o. female who presents with a complaint of nausea and epigastric abdominal pain. The pain began when she stopped the Omeprazole and had to go to Pepcid 40 mg twice daily due to collagenous colitis.  She says that the pain is daily and worse with food or when she cant have a BM. She says it is a burn at times. She rates it a 8 out of 10. No melena. No BRBPR. No NSAIDS. No wt loss. She does continue to smoke tobacco and she also drinks diet coke. Excedrin for headaches daily.   Past Medical History:   Diagnosis Date   • Anemia    • Anxiety    • Arthritis    • Cancer of skin     BCC OF NOSE   • COPD (chronic obstructive pulmonary disease) (CMS/HCC)    • Depression    • Fibromyalgia    • GERD (gastroesophageal reflux disease)    • Hx of colonic polyps    • Hypercholesteremia    • Hypertension    • Seizure (CMS/HCC)      Past Surgical History:   Procedure Laterality Date   • BREAST SURGERY     • CHOLECYSTECTOMY     • COLONOSCOPY  04/12/2017    Hemorrhoids and a few diverticula repeat exam in 3 years Dr. Cordero   • COLONOSCOPY N/A 5/2/2019    Procedure: COLONOSCOPY WITH ANESTHESIA;  Surgeon: Danis Garcia MD;  Location: [facility] ENDOSCOPY;  Service: Gastroenterology   • COLONOSCOPY W/ POLYPECTOMY  07/28/2016    Tubular adenoma proximal to the anus, 30 MM polyp in the cecum not resected, Diverticulosis ref to Dr. Cordero   • ENDOSCOPY  06/15/2016    Small hh, Negative for celiac disease   • RADICAL HYSTERECTOMY     • URETEROSCOPY Right 9/13/2018    Procedure: CYSTOSCOPY RIGHT RETROGRADE PYELOGRAM RIGHT URETEROSCOPY WITH BIOPSY OF RIGHT RENAL PELVIS LASER LITHOTRIPSY AND RIGHT URETERAL STENT INSERTION;  Surgeon: Yifan Greco MD;  Location: [facility] OR;  Service: Urology       Outpatient Medications Marked as Taking for the 6/11/19 encounter (Office Visit)  with Becca Blackwood APRN   Medication Sig Dispense Refill   • ARIPiprazole (ABILIFY) 10 MG tablet      • cetirizine (zyrTEC) 10 MG tablet Take 10 mg by mouth Daily.     • colestipol (COLESTID) 1 g tablet TAKE 1 TABLET TWICE A  tablet 3   • DULoxetine (CYMBALTA) 60 MG capsule      • famotidine (PEPCID) 20 MG tablet Take 2 tablets by mouth 2 (Two) Times a Day for 90 days. 360 tablet 3   • metoprolol tartrate (LOPRESSOR) 50 MG tablet Take 100 mg by mouth Every Night.     • PROAIR  (90 BASE) MCG/ACT inhaler USE TWO PUFFS BY MOUTH EVERY 4 TO 6 HOURS AS NEEDED FOR WHEEZING  2   • SYMBICORT 160-4.5 MCG/ACT inhaler        Allergies   Allergen Reactions   • Lyrica [Pregabalin] Swelling     LIPS AND FACE   • Morphine Itching   • Codeine Itching   • Penicillins Other (See Comments)     CHILDHOOD ALLERGY       Social History     Socioeconomic History   • Marital status:      Spouse name: Not on file   • Number of children: Not on file   • Years of education: Not on file   • Highest education level: Not on file   Tobacco Use   • Smoking status: Current Every Day Smoker     Packs/day: 1.00     Types: Cigarettes   • Smokeless tobacco: Never Used   Substance and Sexual Activity   • Alcohol use: No   • Drug use: No   • Sexual activity: Defer     Family History   Problem Relation Age of Onset   • Diabetes Sister    • Cancer Brother    • Colon cancer Neg Hx    • Colon polyps Neg Hx      Health Maintenance   Topic Date Due   • ANNUAL PHYSICAL  08/[birth year]/1959   • PNEUMOCOCCAL VACCINE (19-64 MEDIUM RISK) (1 of 1 - PPSV23) 08/11/1975   • TDAP/TD VACCINES (1 - Tdap) 08/11/1975   • ZOSTER VACCINE (1 of 2) 08/11/2006   • MAMMOGRAM  05/02/2017   • PAP SMEAR  05/02/2017   • LIPID PANEL  05/03/2017   • INFLUENZA VACCINE  08/01/2019   • COLONOSCOPY  05/02/2024   • HEPATITIS C SCREENING  Completed     Review of Systems   Constitutional: Negative for activity change, appetite change, chills, diaphoresis, fatigue, fever and  "unexpected weight change.   HENT: Negative for ear pain, hearing loss, mouth sores, sore throat, trouble swallowing and voice change.    Eyes: Negative.    Respiratory: Negative for cough, choking, shortness of breath and wheezing.    Cardiovascular: Negative for chest pain and palpitations.   Gastrointestinal: Positive for abdominal pain and nausea. Negative for blood in stool, constipation, diarrhea and vomiting.   Endocrine: Negative for cold intolerance and heat intolerance.   Genitourinary: Negative for decreased urine volume, dysuria, frequency, hematuria and urgency.   Musculoskeletal: Negative for back pain, gait problem and myalgias.   Skin: Negative for color change, pallor and rash.   Allergic/Immunologic: Negative for food allergies and immunocompromised state.   Neurological: Negative for dizziness, tremors, seizures, syncope, weakness, light-headedness, numbness and headaches.   Hematological: Negative for adenopathy. Does not bruise/bleed easily.   Psychiatric/Behavioral: Negative for agitation and confusion. The patient is not nervous/anxious.    All other systems reviewed and are negative.    Objective   Vitals:    06/11/19 1259   BP: 122/72   Pulse: 95   SpO2: 98%   Weight: 66.7 kg (147 lb)   Height: 160 cm (63\")     Body mass index is 26.04 kg/m².  Physical Exam   Constitutional: She is oriented to person, place, and time. She appears well-developed and well-nourished.   HENT:   Head: Normocephalic and atraumatic.   Eyes: Pupils are equal, round, and reactive to light.   Neck: Normal range of motion. Neck supple. No tracheal deviation present.   Cardiovascular: Normal rate, regular rhythm and normal heart sounds. Exam reveals no gallop and no friction rub.   No murmur heard.  Pulmonary/Chest: Effort normal and breath sounds normal. No respiratory distress. She has no wheezes. She has no rales. She exhibits no tenderness.   Abdominal: Soft. Bowel sounds are normal. She exhibits no distension. There " is no hepatosplenomegaly. There is tenderness. There is no rigidity, no rebound and no guarding.   Musculoskeletal: Normal range of motion. She exhibits no edema, tenderness or deformity.   Neurological: She is alert and oriented to person, place, and time. She has normal reflexes.   Skin: Skin is warm and dry. No rash noted. No pallor.   Psychiatric: She has a normal mood and affect. Her behavior is normal. Judgment and thought content normal.     Assessment/Plan   Heather was seen today for gi problem.    Diagnoses and all orders for this visit:    Abdominal pain, epigastric  -     Case Request; Standing  -     Follow Anesthesia Guidelines / Standing Orders; Future  -     Obtain Informed Consent; Future  -     Implement Anesthesia Orders Day of Procedure; Standing  -     Obtain Informed Consent; Standing  -     Case Request    Nausea    Tobacco abuse counseling    NSAID long-term use    HTN (hypertension), benign  Comments:  cont BP medication the day of procedure    I have had a long discussion that I cannot put her on a PPI due to the collangenous colitis and that she is going to have to make some lifestyle modifications to assist in the managment of her GERD symptoms. No caffeine to include diet coke.   No NSAIDS to include Excedrin Migraine  To continue to the colestid for diarrhea    ESOPHAGOGASTRODUODENOSCOPY WITH ANESTHESIA (N/A)  EMR Dragon/transcription disclaimer: Much of this encounter note is electronic transcription/translation of spoken language to printed text. The electronic translation of spoken language may be erroneous, or at times, nonsensical words or phrases may be inadvertently transcribed. Although I have reviewed the note for such errors, some may still exist.  Body mass index is 26.04 kg/m².  Return if symptoms worsen or fail to improve.    Patient's Body mass index is 26.04 kg/m². BMI is above normal parameters. Recommendations include: nutrition counseling.      All risks, benefits,  alternatives, and indications of colonoscopy and/or Endoscopy procedure have been discussed with the patient. Risks to include perforation of the colon requiring possible surgery or colostomy, risk of bleeding from biopsies or removal of colon tissue, possibility of missing a colon polyp or cancer, or adverse drug reaction.  Benefits to include the diagnosis and management of disease of the colon and rectum. Alternatives to include barium enema, radiographic evaluation, lab testing or no intervention. Pt verbalizes understanding and agrees.     Becca Blackwood, APRN  6/11/2019  1:31 PM      Obesity, Adult  Obesity is the condition of having too much total body fat. Being overweight or obese means that your weight is greater than what is considered healthy for your body size. Obesity is determined by a measurement called BMI. BMI is an estimate of body fat and is calculated from height and weight. For adults, a BMI of 30 or higher is considered obese.  Obesity can eventually lead to other health concerns and major illnesses, including:  · Stroke.  · Coronary artery disease (CAD).  · Type 2 diabetes.  · Some types of cancer, including cancers of the colon, breast, uterus, and gallbladder.  · Osteoarthritis.  · High blood pressure (hypertension).  · High cholesterol.  · Sleep apnea.  · Gallbladder stones.  · Infertility problems.  What are the causes?  The main cause of obesity is taking in (consuming) more calories than your body uses for energy. Other factors that contribute to this condition may include:  · Being born with genes that make you more likely to become obese.  · Having a medical condition that causes obesity. These conditions include:  ¨ Hypothyroidism.  ¨ Polycystic ovarian syndrome (PCOS).  ¨ Binge-eating disorder.  ¨ Cushing syndrome.  · Taking certain medicines, such as steroids, antidepressants, and seizure medicines.  · Not being physically active (sedentary lifestyle).  · Living where there  are limited places to exercise safely or buy healthy foods.  · Not getting enough sleep.  What increases the risk?  The following factors may increase your risk of this condition:  · Having a family history of obesity.  · Being a woman of -American descent.  · Being a man of  descent.  What are the signs or symptoms?  Having excessive body fat is the main symptom of this condition.  How is this diagnosed?  This condition may be diagnosed based on:  · Your symptoms.  · Your medical history.  · A physical exam. Your health care provider may measure:  ¨ Your BMI. If you are an adult with a BMI between 25 and less than 30, you are considered overweight. If you are an adult with a BMI of 30 or higher, you are considered obese.  ¨ The distances around your hips and your waist (circumferences). These may be compared to each other to help diagnose your condition.  ¨ Your skinfold thickness. Your health care provider may gently pinch a fold of your skin and measure it.  How is this treated?  Treatment for this condition often includes changing your lifestyle. Treatment may include some or all of the following:  · Dietary changes. Work with your health care provider and a dietitian to set a weight-loss goal that is healthy and reasonable for you. Dietary changes may include eating:  ¨ Smaller portions. A portion size is the amount of a particular food that is healthy for you to eat at one time. This varies from person to person.  ¨ Low-calorie or low-fat options.  ¨ More whole grains, fruits, and vegetables.  · Regular physical activity. This may include aerobic activity (cardio) and strength training.  · Medicine to help you lose weight. Your health care provider may prescribe medicine if you are unable to lose 1 pound a week after 6 weeks of eating more healthily and doing more physical activity.  · Surgery. Surgical options may include gastric banding and gastric bypass. Surgery may be done if:  ¨ Other  treatments have not helped to improve your condition.  ¨ You have a BMI of 40 or higher.  ¨ You have life-threatening health problems related to obesity.  Follow these instructions at home:     Eating and drinking     · Follow recommendations from your health care provider about what you eat and drink. Your health care provider may advise you to:  ¨ Limit fast foods, sweets, and processed snack foods.  ¨ Choose low-fat options, such as low-fat milk instead of whole milk.  ¨ Eat 5 or more servings of fruits or vegetables every day.  ¨ Eat at home more often. This gives you more control over what you eat.  ¨ Choose healthy foods when you eat out.  ¨ Learn what a healthy portion size is.  ¨ Keep low-fat snacks on hand.  ¨ Avoid sugary drinks, such as soda, fruit juice, iced tea sweetened with sugar, and flavored milk.  ¨ Eat a healthy breakfast.  · Drink enough water to keep your urine clear or pale yellow.  · Do not go without eating for long periods of time (do not fast) or follow a fad diet. Fasting and fad diets can be unhealthy and even dangerous.  Physical Activity   · Exercise regularly, as told by your health care provider. Ask your health care provider what types of exercise are safe for you and how often you should exercise.  · Warm up and stretch before being active.  · Cool down and stretch after being active.  · Rest between periods of activity.  Lifestyle   · Limit the time that you spend in front of your TV, computer, or video game system.  · Find ways to reward yourself that do not involve food.  · Limit alcohol intake to no more than 1 drink a day for nonpregnant women and 2 drinks a day for men. One drink equals 12 oz of beer, 5 oz of wine, or 1½ oz of hard liquor.  General instructions   · Keep a weight loss journal to keep track of the food you eat and how much you exercise you get.  · Take over-the-counter and prescription medicines only as told by your health care provider.  · Take vitamins and  supplements only as told by your health care provider.  · Consider joining a support group. Your health care provider may be able to recommend a support group.  · Keep all follow-up visits as told by your health care provider. This is important.  Contact a health care provider if:  · You are unable to meet your weight loss goal after 6 weeks of dietary and lifestyle changes.  This information is not intended to replace advice given to you by your health care provider. Make sure you discuss any questions you have with your health care provider.  Document Released: 01/25/2006 Document Revised: 05/22/2017 Document Reviewed: 10/05/2016  [employer] Interactive Patient Education © 2017 [employer]      If you smoke or use tobacco, 4 minutes reading provided  Steps to Quit Smoking  Smoking tobacco can be harmful to your health and can affect almost every organ in your body. Smoking puts you, and those around you, at risk for developing many serious chronic diseases. Quitting smoking is difficult, but it is one of the best things that you can do for your health. It is never too late to quit.  What are the benefits of quitting smoking?  When you quit smoking, you lower your risk of developing serious diseases and conditions, such as:  · Lung cancer or lung disease, such as COPD.  · Heart disease.  · Stroke.  · Heart attack.  · Infertility.  · Osteoporosis and bone fractures.  Additionally, symptoms such as coughing, wheezing, and shortness of breath may get better when you quit. You may also find that you get sick less often because your body is stronger at fighting off colds and infections. If you are pregnant, quitting smoking can help to reduce your chances of having a baby of low birth weight.  How do I get ready to quit?  When you decide to quit smoking, create a plan to make sure that you are successful. Before you quit:  · Pick a date to quit. Set a date within the next two weeks to give you time to prepare.  · Write  down the reasons why you are quitting. Keep this list in places where you will see it often, such as on your bathroom mirror or in your car or wallet.  · Identify the people, places, things, and activities that make you want to smoke (triggers) and avoid them. Make sure to take these actions:  ¨ Throw away all cigarettes at home, at work, and in your car.  ¨ Throw away smoking accessories, such as ashtrays and lighters.  ¨ Clean your car and make sure to empty the ashtray.  ¨ Clean your home, including curtains and carpets.  · Tell your family, friends, and coworkers that you are quitting. Support from your loved ones can make quitting easier.  · Talk with your health care provider about your options for quitting smoking.  · Find out what treatment options are covered by your health insurance.  What strategies can I use to quit smoking?  Talk with your healthcare provider about different strategies to quit smoking. Some strategies include:  · Quitting smoking altogether instead of gradually lessening how much you smoke over a period of time. Research shows that quitting “cold turkey” is more successful than gradually quitting.  · Attending in-person counseling to help you build problem-solving skills. You are more likely to have success in quitting if you attend several counseling sessions. Even short sessions of 10 minutes can be effective.  · Finding resources and support systems that can help you to quit smoking and remain smoke-free after you quit. These resources are most helpful when you use them often. They can include:  ¨ Online chats with a counselor.  ¨ Telephone quitlines.  ¨ Printed self-help materials.  ¨ Support groups or group counseling.  ¨ Text messaging programs.  ¨ Mobile phone applications.  · Taking medicines to help you quit smoking. (If you are pregnant or breastfeeding, talk with your health care provider first.) Some medicines contain nicotine and some do not. Both types of medicines help  with cravings, but the medicines that include nicotine help to relieve withdrawal symptoms. Your health care provider may recommend:  ¨ Nicotine patches, gum, or lozenges.  ¨ Nicotine inhalers or sprays.  ¨ Non-nicotine medicine that is taken by mouth.  Talk with your health care provider about combining strategies, such as taking medicines while you are also receiving in-person counseling. Using these two strategies together makes you more likely to succeed in quitting than if you used either strategy on its own.  If you are pregnant or breastfeeding, talk with your health care provider about finding counseling or other support strategies to quit smoking. Do not take medicine to help you quit smoking unless told to do so by your health care provider.  What things can I do to make it easier to quit?  Quitting smoking might feel overwhelming at first, but there is a lot that you can do to make it easier. Take these important actions:  · Reach out to your family and friends and ask that they support and encourage you during this time. Call telephone quitlines, reach out to support groups, or work with a counselor for support.  · Ask people who smoke to avoid smoking around you.  · Avoid places that trigger you to smoke, such as bars, parties, or smoke-break areas at work.  · Spend time around people who do not smoke.  · Lessen stress in your life, because stress can be a smoking trigger for some people. To lessen stress, try:  ¨ Exercising regularly.  ¨ Deep-breathing exercises.  ¨ Yoga.  ¨ Meditating.  ¨ Performing a body scan. This involves closing your eyes, scanning your body from head to toe, and noticing which parts of your body are particularly tense. Purposefully relax the muscles in those areas.  · Download or purchase mobile phone or tablet apps (applications) that can help you stick to your quit plan by providing reminders, tips, and encouragement. There are many free apps, such as QuitGuide from the [employer]  (Centers for Disease Control and Prevention). You can find other support for quitting smoking (smoking cessation) through smokefree.gov and other websites.  How will I feel when I quit smoking?  Within the first 24 hours of quitting smoking, you may start to feel some withdrawal symptoms. These symptoms are usually most noticeable 2–3 days after quitting, but they usually do not last beyond 2–3 weeks. Changes or symptoms that you might experience include:  · Mood swings.  · Restlessness, anxiety, or irritation.  · Difficulty concentrating.  · Dizziness.  · Strong cravings for sugary foods in addition to nicotine.  · Mild weight gain.  · Constipation.  · Nausea.  · Coughing or a sore throat.  · Changes in how your medicines work in your body.  · A depressed mood.  · Difficulty sleeping (insomnia).  After the first 2–3 weeks of quitting, you may start to notice more positive results, such as:  · Improved sense of smell and taste.  · Decreased coughing and sore throat.  · Slower heart rate.  · Lower blood pressure.  · Clearer skin.  · The ability to breathe more easily.  · Fewer sick days.  Quitting smoking is very challenging for most people. Do not get discouraged if you are not successful the first time. Some people need to make many attempts to quit before they achieve long-term success. Do your best to stick to your quit plan, and talk with your health care provider if you have any questions or concerns.  This information is not intended to replace advice given to you by your health care provider. Make sure you discuss any questions you have with your health care provider.  Document Released: 12/12/2002 Document Revised: 08/15/2017 Document Reviewed: 05/03/2016  [employer] Interactive Patient Education © 2017 [employer]

## 2019-06-13 ENCOUNTER — ANESTHESIA (OUTPATIENT)
Dept: GASTROENTEROLOGY | Facility: HOSPITAL | Age: 64
End: 2019-06-13

## 2019-06-13 ENCOUNTER — ANESTHESIA EVENT (OUTPATIENT)
Dept: GASTROENTEROLOGY | Facility: HOSPITAL | Age: 64
End: 2019-06-13

## 2019-06-13 ENCOUNTER — HOSPITAL ENCOUNTER (OUTPATIENT)
Facility: HOSPITAL | Age: 64
Setting detail: HOSPITAL OUTPATIENT SURGERY
Discharge: HOME | End: 2019-06-13
Attending: INTERNAL MEDICINE | Admitting: INTERNAL MEDICINE

## 2019-06-13 VITALS
HEART RATE: 79 BPM | SYSTOLIC BLOOD PRESSURE: 132 MMHG | TEMPERATURE: 97.6 F | OXYGEN SATURATION: 99 % | BODY MASS INDEX: 25.27 KG/M2 | WEIGHT: 148 LBS | RESPIRATION RATE: 20 BRPM | DIASTOLIC BLOOD PRESSURE: 70 MMHG | HEIGHT: 64 IN

## 2019-06-13 DIAGNOSIS — R10.13 ABDOMINAL PAIN, EPIGASTRIC: ICD-10-CM

## 2019-06-13 PROCEDURE — 25010000002 PROPOFOL 10 MG/ML EMULSION: Performed by: NURSE ANESTHETIST, CERTIFIED REGISTERED

## 2019-06-13 PROCEDURE — 43239 EGD BIOPSY SINGLE/MULTIPLE: CPT | Performed by: INTERNAL MEDICINE

## 2019-06-13 PROCEDURE — 87081 CULTURE SCREEN ONLY: CPT | Performed by: INTERNAL MEDICINE

## 2019-06-13 PROCEDURE — 88305 TISSUE EXAM BY PATHOLOGIST: CPT | Performed by: INTERNAL MEDICINE

## 2019-06-13 RX ORDER — SODIUM CHLORIDE 9 MG/ML
500 INJECTION, SOLUTION INTRAVENOUS CONTINUOUS PRN
Status: DISCONTINUED | OUTPATIENT
Start: 2019-06-13 | End: 2019-06-13 | Stop reason: HOSPADM

## 2019-06-13 RX ORDER — PROPOFOL 10 MG/ML
VIAL (ML) INTRAVENOUS AS NEEDED
Status: DISCONTINUED | OUTPATIENT
Start: 2019-06-13 | End: 2019-06-13 | Stop reason: SURG

## 2019-06-13 RX ORDER — LIDOCAINE HYDROCHLORIDE 20 MG/ML
INJECTION, SOLUTION INFILTRATION; PERINEURAL AS NEEDED
Status: DISCONTINUED | OUTPATIENT
Start: 2019-06-13 | End: 2019-06-13 | Stop reason: SURG

## 2019-06-13 RX ORDER — SODIUM CHLORIDE 0.9 % (FLUSH) 0.9 %
3 SYRINGE (ML) INJECTION AS NEEDED
Status: DISCONTINUED | OUTPATIENT
Start: 2019-06-13 | End: 2019-06-13 | Stop reason: HOSPADM

## 2019-06-13 RX ADMIN — PROPOFOL 70 MG: 10 INJECTION, EMULSION INTRAVENOUS at 11:00

## 2019-06-13 RX ADMIN — LIDOCAINE HYDROCHLORIDE 50 MG: 20 INJECTION, SOLUTION INFILTRATION; PERINEURAL at 11:00

## 2019-06-13 RX ADMIN — SODIUM CHLORIDE 500 ML: 9 INJECTION, SOLUTION INTRAVENOUS at 09:41

## 2019-06-13 RX ADMIN — PROPOFOL 80 MG: 10 INJECTION, EMULSION INTRAVENOUS at 11:05

## 2019-06-13 NOTE — ANESTHESIA POSTPROCEDURE EVALUATION
Patient: Heather Russo    Procedure Summary     Date:  06/13/19 Room / Location:  [facility] ENDOSCOPY 4 / [facility]    Anesthesia Start:  1054 Anesthesia Stop:  1112    Procedure:  ESOPHAGOGASTRODUODENOSCOPY WITH ANESTHESIA (N/A ) Diagnosis:       Abdominal pain, epigastric      (Abdominal pain, epigastric [R10.13])    Surgeon:  Danis Garcia MD Provider:  Jorge Ram CRNA    Anesthesia Type:  MAC ASA Status:  2          Anesthesia Type: MAC  Last vitals  BP   (!) 144/110(coughing) (06/13/19 1112)   Temp   97.6 °F (36.4 °C) (06/13/19 0920)   Pulse   96 (06/13/19 1112)   Resp   25 (06/13/19 1112)     SpO2   94 % (06/13/19 1112)     Post Anesthesia Care and Evaluation    Patient location during evaluation: PHASE II  Patient participation: complete - patient participated  Level of consciousness: awake  Pain score: 0  Pain management: adequate  Airway patency: patent  Anesthetic complications: No anesthetic complications  PONV Status: none  Cardiovascular status: acceptable  Respiratory status: acceptable  Hydration status: acceptable

## 2019-06-14 LAB — UREASE TISS QL: NEGATIVE

## 2019-08-08 ENCOUNTER — APPOINTMENT (OUTPATIENT)
Dept: CT IMAGING | Facility: HOSPITAL | Age: 64
End: 2019-08-08

## 2019-08-08 ENCOUNTER — HOSPITAL ENCOUNTER (INPATIENT)
Facility: HOSPITAL | Age: 64
LOS: 2 days | Discharge: HOME | End: 2019-08-10
Attending: FAMILY MEDICINE | Admitting: UROLOGY

## 2019-08-08 ENCOUNTER — TELEPHONE (OUTPATIENT)
Dept: GASTROENTEROLOGY | Facility: CLINIC | Age: 64
End: 2019-08-08

## 2019-08-08 DIAGNOSIS — K92.0 HEMATEMESIS, PRESENCE OF NAUSEA NOT SPECIFIED: Primary | ICD-10-CM

## 2019-08-08 DIAGNOSIS — N28.89 KIDNEY MASS: ICD-10-CM

## 2019-08-08 DIAGNOSIS — N28.89 RENAL MASS: ICD-10-CM

## 2019-08-08 DIAGNOSIS — R74.01 TRANSAMINITIS: ICD-10-CM

## 2019-08-08 PROBLEM — R74.8 ELEVATED LIVER ENZYMES: Status: ACTIVE | Noted: 2019-08-08

## 2019-08-08 LAB
ALBUMIN SERPL-MCNC: 4.1 G/DL (ref 3.5–5)
ALBUMIN/GLOB SERPL: 1.1 G/DL (ref 1.1–2.5)
ALP SERPL-CCNC: 193 U/L (ref 24–120)
ALT SERPL W P-5'-P-CCNC: 436 U/L (ref 0–54)
AMYLASE SERPL-CCNC: 87 U/L (ref 30–110)
ANION GAP SERPL CALCULATED.3IONS-SCNC: 8 MMOL/L (ref 4–13)
APTT PPP: 41.4 SECONDS (ref 24.1–35)
AST SERPL-CCNC: 253 U/L (ref 7–45)
BACTERIA UR QL AUTO: ABNORMAL /HPF
BASOPHILS # BLD AUTO: 0.05 10*3/MM3 (ref 0–0.2)
BASOPHILS NFR BLD AUTO: 0.6 % (ref 0–1.5)
BILIRUB SERPL-MCNC: 0.4 MG/DL (ref 0.1–1)
BILIRUB UR QL STRIP: NEGATIVE
BUN BLD-MCNC: 15 MG/DL (ref 5–21)
BUN/CREAT SERPL: 26.8 (ref 7–25)
CALCIUM SPEC-SCNC: 9.8 MG/DL (ref 8.4–10.4)
CHLORIDE SERPL-SCNC: 112 MMOL/L (ref 98–110)
CLARITY UR: CLEAR
CO2 SERPL-SCNC: 19 MMOL/L (ref 24–31)
COLOR UR: YELLOW
CREAT BLD-MCNC: 0.56 MG/DL (ref 0.5–1.4)
D-LACTATE SERPL-SCNC: 0.9 MMOL/L (ref 0.5–2)
DEPRECATED RDW RBC AUTO: 57.6 FL (ref 37–54)
DEVELOPER EXPIRATION DATE: NORMAL
DEVELOPER EXPIRATION DATE: NORMAL
DEVELOPER LOT NUMBER: 147
DEVELOPER LOT NUMBER: 147
EOSINOPHIL # BLD AUTO: 0.06 10*3/MM3 (ref 0–0.4)
EOSINOPHIL NFR BLD AUTO: 0.7 % (ref 0.3–6.2)
ERYTHROCYTE [DISTWIDTH] IN BLOOD BY AUTOMATED COUNT: 18.3 % (ref 12.3–15.4)
EXPIRATION DATE: NORMAL
EXPIRATION DATE: NORMAL
FECAL OCCULT BLOOD SCREEN, POC: NEGATIVE
FECAL OCCULT BLOOD SCREEN, POC: NEGATIVE
GFR SERPL CREATININE-BSD FRML MDRD: 110 ML/MIN/1.73
GLOBULIN UR ELPH-MCNC: 3.6 GM/DL
GLUCOSE BLD-MCNC: 117 MG/DL (ref 70–100)
GLUCOSE UR STRIP-MCNC: NEGATIVE MG/DL
HAV IGM SERPL QL IA: NEGATIVE
HBV CORE IGM SERPL QL IA: NEGATIVE
HBV SURFACE AG SERPL QL IA: NEGATIVE
HCT VFR BLD AUTO: 38.8 % (ref 34–46.6)
HCV AB SER DONR QL: NEGATIVE
HCV S/C RATIO: 0.04 (ref 0–0.99)
HGB BLD-MCNC: 12.5 G/DL (ref 12–15.9)
HGB UR QL STRIP.AUTO: NEGATIVE
HOLD SPECIMEN: NORMAL
HYALINE CASTS UR QL AUTO: ABNORMAL /LPF
IMM GRANULOCYTES # BLD AUTO: 0.13 10*3/MM3 (ref 0–0.05)
IMM GRANULOCYTES NFR BLD AUTO: 1.5 % (ref 0–0.5)
INR PPP: 0.99 (ref 0.91–1.09)
KETONES UR QL STRIP: NEGATIVE
LEUKOCYTE ESTERASE UR QL STRIP.AUTO: ABNORMAL
LIPASE SERPL-CCNC: 42 U/L (ref 23–203)
LYMPHOCYTES # BLD AUTO: 0.87 10*3/MM3 (ref 0.7–3.1)
LYMPHOCYTES NFR BLD AUTO: 10 % (ref 19.6–45.3)
Lab: 147
Lab: 147
MCH RBC QN AUTO: 27.9 PG (ref 26.6–33)
MCHC RBC AUTO-ENTMCNC: 32.2 G/DL (ref 31.5–35.7)
MCV RBC AUTO: 86.6 FL (ref 79–97)
MONOCYTES # BLD AUTO: 0.98 10*3/MM3 (ref 0.1–0.9)
MONOCYTES NFR BLD AUTO: 11.3 % (ref 5–12)
NEGATIVE CONTROL: NEGATIVE
NEGATIVE CONTROL: NEGATIVE
NEUTROPHILS # BLD AUTO: 6.57 10*3/MM3 (ref 1.7–7)
NEUTROPHILS NFR BLD AUTO: 75.9 % (ref 42.7–76)
NITRITE UR QL STRIP: NEGATIVE
NRBC BLD AUTO-RTO: 0 /100 WBC (ref 0–0.2)
PH UR STRIP.AUTO: <=5 [PH] (ref 5–8)
PLATELET # BLD AUTO: 214 10*3/MM3 (ref 140–450)
PMV BLD AUTO: 10.4 FL (ref 6–12)
POSITIVE CONTROL: POSITIVE
POSITIVE CONTROL: POSITIVE
POTASSIUM BLD-SCNC: 4.2 MMOL/L (ref 3.5–5.3)
PROT SERPL-MCNC: 7.7 G/DL (ref 6.3–8.7)
PROT UR QL STRIP: NEGATIVE
PROTHROMBIN TIME: 13.4 SECONDS (ref 11.9–14.6)
RBC # BLD AUTO: 4.48 10*6/MM3 (ref 3.77–5.28)
RBC # UR: ABNORMAL /HPF
REF LAB TEST METHOD: ABNORMAL
SODIUM BLD-SCNC: 139 MMOL/L (ref 135–145)
SP GR UR STRIP: 1.02 (ref 1–1.03)
SQUAMOUS #/AREA URNS HPF: ABNORMAL /HPF
UROBILINOGEN UR QL STRIP: ABNORMAL
WBC NRBC COR # BLD: 8.66 10*3/MM3 (ref 3.4–10.8)
WBC UR QL AUTO: ABNORMAL /HPF
WHOLE BLOOD HOLD SPECIMEN: NORMAL

## 2019-08-08 PROCEDURE — 85730 THROMBOPLASTIN TIME PARTIAL: CPT | Performed by: NURSE PRACTITIONER

## 2019-08-08 PROCEDURE — G0378 HOSPITAL OBSERVATION PER HR: HCPCS

## 2019-08-08 PROCEDURE — 94640 AIRWAY INHALATION TREATMENT: CPT

## 2019-08-08 PROCEDURE — 93005 ELECTROCARDIOGRAM TRACING: CPT | Performed by: NURSE PRACTITIONER

## 2019-08-08 PROCEDURE — 25010000002 IOPAMIDOL 61 % SOLUTION: Performed by: NURSE PRACTITIONER

## 2019-08-08 PROCEDURE — 85610 PROTHROMBIN TIME: CPT | Performed by: NURSE PRACTITIONER

## 2019-08-08 PROCEDURE — 94799 UNLISTED PULMONARY SVC/PX: CPT

## 2019-08-08 PROCEDURE — 99284 EMERGENCY DEPT VISIT MOD MDM: CPT

## 2019-08-08 PROCEDURE — 83690 ASSAY OF LIPASE: CPT | Performed by: NURSE PRACTITIONER

## 2019-08-08 PROCEDURE — 82270 OCCULT BLOOD FECES: CPT | Performed by: PHYSICIAN ASSISTANT

## 2019-08-08 PROCEDURE — 80074 ACUTE HEPATITIS PANEL: CPT | Performed by: FAMILY MEDICINE

## 2019-08-08 PROCEDURE — 81001 URINALYSIS AUTO W/SCOPE: CPT | Performed by: NURSE PRACTITIONER

## 2019-08-08 PROCEDURE — 74177 CT ABD & PELVIS W/CONTRAST: CPT

## 2019-08-08 PROCEDURE — 80053 COMPREHEN METABOLIC PANEL: CPT | Performed by: NURSE PRACTITIONER

## 2019-08-08 PROCEDURE — 83605 ASSAY OF LACTIC ACID: CPT | Performed by: NURSE PRACTITIONER

## 2019-08-08 PROCEDURE — 85025 COMPLETE CBC W/AUTO DIFF WBC: CPT | Performed by: NURSE PRACTITIONER

## 2019-08-08 PROCEDURE — 94760 N-INVAS EAR/PLS OXIMETRY 1: CPT

## 2019-08-08 PROCEDURE — 82270 OCCULT BLOOD FECES: CPT | Performed by: FAMILY MEDICINE

## 2019-08-08 PROCEDURE — 93010 ELECTROCARDIOGRAM REPORT: CPT | Performed by: INTERNAL MEDICINE

## 2019-08-08 PROCEDURE — 82150 ASSAY OF AMYLASE: CPT | Performed by: NURSE PRACTITIONER

## 2019-08-08 RX ORDER — SODIUM CHLORIDE 0.9 % (FLUSH) 0.9 %
10 SYRINGE (ML) INJECTION AS NEEDED
Status: DISCONTINUED | OUTPATIENT
Start: 2019-08-08 | End: 2019-08-10 | Stop reason: HOSPADM

## 2019-08-08 RX ORDER — TRAMADOL HYDROCHLORIDE 50 MG/1
50 TABLET ORAL EVERY 6 HOURS PRN
Status: DISCONTINUED | OUTPATIENT
Start: 2019-08-08 | End: 2019-08-10 | Stop reason: HOSPADM

## 2019-08-08 RX ORDER — ONDANSETRON 2 MG/ML
4 INJECTION INTRAMUSCULAR; INTRAVENOUS EVERY 6 HOURS PRN
Status: DISCONTINUED | OUTPATIENT
Start: 2019-08-08 | End: 2019-08-10 | Stop reason: HOSPADM

## 2019-08-08 RX ORDER — SODIUM CHLORIDE 0.9 % (FLUSH) 0.9 %
3-10 SYRINGE (ML) INJECTION AS NEEDED
Status: DISCONTINUED | OUTPATIENT
Start: 2019-08-08 | End: 2019-08-10 | Stop reason: HOSPADM

## 2019-08-08 RX ORDER — BUDESONIDE AND FORMOTEROL FUMARATE DIHYDRATE 160; 4.5 UG/1; UG/1
2 AEROSOL RESPIRATORY (INHALATION)
Status: DISCONTINUED | OUTPATIENT
Start: 2019-08-08 | End: 2019-08-10 | Stop reason: HOSPADM

## 2019-08-08 RX ORDER — PANTOPRAZOLE SODIUM 40 MG/10ML
80 INJECTION, POWDER, LYOPHILIZED, FOR SOLUTION INTRAVENOUS ONCE
Status: COMPLETED | OUTPATIENT
Start: 2019-08-08 | End: 2019-08-08

## 2019-08-08 RX ORDER — ZOLPIDEM TARTRATE 5 MG/1
5 TABLET ORAL ONCE
Status: COMPLETED | OUTPATIENT
Start: 2019-08-08 | End: 2019-08-08

## 2019-08-08 RX ORDER — ALBUTEROL SULFATE 2.5 MG/3ML
2.5 SOLUTION RESPIRATORY (INHALATION) EVERY 4 HOURS PRN
Status: DISCONTINUED | OUTPATIENT
Start: 2019-08-08 | End: 2019-08-10 | Stop reason: HOSPADM

## 2019-08-08 RX ORDER — PANTOPRAZOLE SODIUM 40 MG/10ML
40 INJECTION, POWDER, LYOPHILIZED, FOR SOLUTION INTRAVENOUS EVERY 12 HOURS SCHEDULED
Status: DISCONTINUED | OUTPATIENT
Start: 2019-08-08 | End: 2019-08-10 | Stop reason: HOSPADM

## 2019-08-08 RX ORDER — DULOXETIN HYDROCHLORIDE 30 MG/1
60 CAPSULE, DELAYED RELEASE ORAL DAILY
Status: DISCONTINUED | OUTPATIENT
Start: 2019-08-09 | End: 2019-08-10 | Stop reason: HOSPADM

## 2019-08-08 RX ORDER — NICOTINE 21 MG/24HR
1 PATCH, TRANSDERMAL 24 HOURS TRANSDERMAL EVERY 24 HOURS
Status: DISCONTINUED | OUTPATIENT
Start: 2019-08-08 | End: 2019-08-10 | Stop reason: HOSPADM

## 2019-08-08 RX ORDER — ARIPIPRAZOLE 10 MG/1
10 TABLET ORAL DAILY
Status: DISCONTINUED | OUTPATIENT
Start: 2019-08-09 | End: 2019-08-10 | Stop reason: HOSPADM

## 2019-08-08 RX ORDER — SODIUM CHLORIDE 9 MG/ML
100 INJECTION, SOLUTION INTRAVENOUS CONTINUOUS
Status: DISCONTINUED | OUTPATIENT
Start: 2019-08-08 | End: 2019-08-10

## 2019-08-08 RX ORDER — SODIUM CHLORIDE 0.9 % (FLUSH) 0.9 %
3 SYRINGE (ML) INJECTION EVERY 12 HOURS SCHEDULED
Status: DISCONTINUED | OUTPATIENT
Start: 2019-08-08 | End: 2019-08-10 | Stop reason: HOSPADM

## 2019-08-08 RX ORDER — PANTOPRAZOLE SODIUM 40 MG/10ML
80 INJECTION, POWDER, LYOPHILIZED, FOR SOLUTION INTRAVENOUS ONCE
Status: DISCONTINUED | OUTPATIENT
Start: 2019-08-08 | End: 2019-08-08 | Stop reason: DRUGHIGH

## 2019-08-08 RX ORDER — OMEPRAZOLE 40 MG/1
40 CAPSULE, DELAYED RELEASE ORAL 2 TIMES DAILY
COMMUNITY
End: 2019-08-10 | Stop reason: HOSPADM

## 2019-08-08 RX ORDER — METOPROLOL TARTRATE 100 MG/1
100 TABLET ORAL NIGHTLY
Status: DISCONTINUED | OUTPATIENT
Start: 2019-08-08 | End: 2019-08-08 | Stop reason: SDDI

## 2019-08-08 RX ADMIN — PANTOPRAZOLE SODIUM 40 MG: 40 INJECTION, POWDER, LYOPHILIZED, FOR SOLUTION INTRAVENOUS at 21:49

## 2019-08-08 RX ADMIN — SODIUM CHLORIDE, PRESERVATIVE FREE 3 ML: 5 INJECTION INTRAVENOUS at 21:49

## 2019-08-08 RX ADMIN — SODIUM CHLORIDE 100 ML/HR: 9 INJECTION, SOLUTION INTRAVENOUS at 13:28

## 2019-08-08 RX ADMIN — BUDESONIDE AND FORMOTEROL FUMARATE DIHYDRATE 2 PUFF: 160; 4.5 AEROSOL RESPIRATORY (INHALATION) at 22:25

## 2019-08-08 RX ADMIN — ZOLPIDEM TARTRATE 5 MG: 5 TABLET ORAL at 21:49

## 2019-08-08 RX ADMIN — IOPAMIDOL 100 ML: 612 INJECTION, SOLUTION INTRAVENOUS at 14:28

## 2019-08-08 RX ADMIN — PANTOPRAZOLE SODIUM 80 MG: 40 INJECTION, POWDER, LYOPHILIZED, FOR SOLUTION INTRAVENOUS at 13:28

## 2019-08-08 RX ADMIN — NICOTINE 1 PATCH: 21 PATCH, EXTENDED RELEASE TRANSDERMAL at 21:48

## 2019-08-08 NOTE — ED NOTES
Justyna FREDERICK and Lefty MCDANIEL at bedside to assess pt     Bernabe Farfan RN  08/08/19 8006

## 2019-08-08 NOTE — H&P
[facility]  HISTORY AND PHYSICAL    Date of Admission: 8/8/2019  Primary Care Physician: Grzegorz Michel Jr., MD    Subjective     Chief Complaint: Hematemesis/fatty liver/kidney mass.    History of Present Illness   Pt is a 63-year-old presented ER with complaint of hematemesis.  Patient has been vomiting blood started today.  One episode of hemoptysis.  Patient asking for food.  History of colitis seen Dr. Garcia outpatient.   CT scan abdomen pelvis shows renal mass.  Patient denies any fever night sweats chills.  Patient denies any abdomen pain.  Patient denies any black tarry stool or bright red blood.  Patient is asking to eat.    Patient has a past medical history anemia, anxiety, arthritis, skin cancer, colitis, COPD, depression, fibromyalgia, reflux, colon polyp, hypercholesterolemia, hypertension, seizure.    Review of Systems   Constitutional: Positive for activity change, appetite change and fatigue. Negative for chills and fever.   HENT: Negative for hearing loss, nosebleeds, tinnitus and trouble swallowing.    Eyes: Negative for visual disturbance.   Respiratory: Negative for cough, chest tightness, shortness of breath and wheezing.    Cardiovascular: Negative for chest pain, palpitations and leg swelling.   Gastrointestinal: Positive for nausea and vomiting. Negative for abdominal distention, abdominal pain, blood in stool, constipation and diarrhea.   Endocrine: Negative for cold intolerance, heat intolerance, polydipsia, polyphagia and polyuria.   Genitourinary: Negative for decreased urine volume, difficulty urinating, dysuria, flank pain, frequency and hematuria.   Musculoskeletal: Negative for arthralgias, joint swelling and myalgias.   Skin: Negative for rash.   Allergic/Immunologic: Negative for immunocompromised state.   Neurological: Positive for weakness. Negative for dizziness, syncope, light-headedness and headaches.   Hematological:  Negative for adenopathy. Does not bruise/bleed easily.   Psychiatric/Behavioral: Negative for confusion and sleep disturbance. The patient is not nervous/anxious.         Otherwise complete ROS reviewed and negative except as mentioned in the HPI.      Past Medical History:   Past Medical History:   Diagnosis Date   • Anemia    • Anxiety    • Arthritis    • Cancer of skin     BCC OF NOSE   • Colitis    • COPD (chronic obstructive pulmonary disease) (CMS/HCC)    • Depression    • Fibromyalgia    • GERD (gastroesophageal reflux disease)    • Hx of colonic polyps    • Hypercholesteremia    • Hypertension    • Seizure (CMS/HCC)        Past Surgical History:  Past Surgical History:   Procedure Laterality Date   • BREAST SURGERY     • CHOLECYSTECTOMY     • COLONOSCOPY  04/12/2017    Hemorrhoids and a few diverticula repeat exam in 3 years Dr. Cordero   • COLONOSCOPY N/A 5/2/2019    Procedure: COLONOSCOPY WITH ANESTHESIA;  Surgeon: Danis Garcia MD;  Location: [facility] ENDOSCOPY;  Service: Gastroenterology   • COLONOSCOPY W/ POLYPECTOMY  07/28/2016    Tubular adenoma proximal to the anus, 30 MM polyp in the cecum not resected, Diverticulosis ref to Dr. Cordero   • ENDOSCOPY  06/15/2016    Small hh, Negative for celiac disease   • ENDOSCOPY N/A 6/13/2019    Procedure: ESOPHAGOGASTRODUODENOSCOPY WITH ANESTHESIA;  Surgeon: Danis Garcia MD;  Location: [facility] ENDOSCOPY;  Service: Gastroenterology   • RADICAL HYSTERECTOMY     • URETEROSCOPY Right 9/13/2018    Procedure: CYSTOSCOPY RIGHT RETROGRADE PYELOGRAM RIGHT URETEROSCOPY WITH BIOPSY OF RIGHT RENAL PELVIS LASER LITHOTRIPSY AND RIGHT URETERAL STENT INSERTION;  Surgeon: Yifan Greco MD;  Location: [facility] OR;  Service: Urology       Family History: family history includes Cancer in her brother; Diabetes in her sister.    Social History:  reports that she has been smoking cigarettes.  She has been smoking about 1.00 pack per day. She has never used smokeless tobacco.  "She reports that she does not drink alcohol or use drugs.    Medications:  Prior to Admission medications    Medication Sig Start Date End Date Taking? Authorizing Provider   ARIPiprazole (ABILIFY) 10 MG tablet  9/19/16  Yes Perla Bliss MD   Bismuth Subsalicylate (PEPTO-BISMOL) 262 MG tablet Take  by mouth. 2 po qid   Yes Perla Bliss MD   cetirizine (zyrTEC) 10 MG tablet Take 10 mg by mouth Daily.   Yes Perla Bliss MD   colestipol (COLESTID) 1 g tablet TAKE 1 TABLET TWICE A DAY 4/29/19  Yes Becca Blackwood APRN   DULoxetine (CYMBALTA) 60 MG capsule  9/19/16  Yes Perla Bliss MD   omeprazole (priLOSEC) 40 MG capsule Take 40 mg by mouth 2 (Two) Times a Day.   Yes Perla Bliss MD   PROAIR  (90 BASE) MCG/ACT inhaler USE TWO PUFFS BY MOUTH EVERY 4 TO 6 HOURS AS NEEDED FOR WHEEZING 9/19/16  Yes Perla Bliss MD   SYMBICORT 160-4.5 MCG/ACT inhaler  8/1/16  Yes Perla Bliss MD   famotidine (PEPCID) 20 MG tablet Take 2 tablets by mouth 2 (Two) Times a Day for 90 days. 5/21/19 8/19/19  Danis Garcia MD   HYDROcodone-acetaminophen (NORCO) 7.5-325 MG per tablet Take 1-2 tablets by mouth Every 4 (Four) Hours As Needed for Moderate Pain  (Pain). 9/13/18   Yifan Greco MD   metoprolol tartrate (LOPRESSOR) 50 MG tablet Take 100 mg by mouth Every Night.    Provider, MD Perla     Allergies:  Allergies   Allergen Reactions   • Lyrica [Pregabalin] Swelling     LIPS AND FACE   • Morphine Itching   • Codeine Itching   • Penicillins Other (See Comments)     CHILDHOOD ALLERGY         Objective     Vital Signs: /84   Pulse 103   Temp 97.9 °F (36.6 °C) (Oral)   Resp 16   Ht 160 cm (63\")   Wt 68 kg (150 lb)   SpO2 95%   BMI 26.57 kg/m²   Physical Exam   Constitutional: She is oriented to person, place, and time. She appears well-developed and well-nourished.   HENT:   Head: Normocephalic and atraumatic.   Eyes: Conjunctivae and EOM " are normal. Pupils are equal, round, and reactive to light.   Neck: Neck supple. No JVD present. No thyromegaly present.   Cardiovascular: Normal rate, regular rhythm, normal heart sounds and intact distal pulses. Exam reveals no gallop and no friction rub.   No murmur heard.  Pulmonary/Chest: Effort normal and breath sounds normal. No respiratory distress. She has no wheezes. She has no rales. She exhibits no tenderness.   Diminished breath sound bilateral, clear.   Abdominal: Soft. Bowel sounds are normal. She exhibits no distension. There is no tenderness. There is no rebound and no guarding.   Musculoskeletal: Normal range of motion. She exhibits no edema, tenderness or deformity.   Lymphadenopathy:     She has no cervical adenopathy.   Neurological: She is alert and oriented to person, place, and time. She displays normal reflexes. No cranial nerve deficit. She exhibits normal muscle tone.   Skin: Skin is warm and dry. Capillary refill takes 2 to 3 seconds. No rash noted.   Psychiatric: She has a normal mood and affect. Her behavior is normal. Judgment and thought content normal.   Nursing note and vitals reviewed.          Results Reviewed:    Lab Results (last 24 hours)     Procedure Component Value Units Date/Time    POC Occult Blood Stool [[id number]]  (Normal) Collected:  08/08/19 1655    Specimen:  Stool Updated:  08/08/19 1657     Fecal Occult Blood Negative     Lot Number 147     Expiration Date 12/31/2019     DEVELOPER LOT NUMBER 147     DEVELOPER EXPIRATION DATE 12/31/2019     Positive Control Positive     Negative Control Negative    POC Occult Blood Stool [[id number]]  (Normal) Collected:  08/08/19 1649    Specimen:  Stool Updated:  08/08/19 1650     Fecal Occult Blood Negative     Lot Number 147     Expiration Date 12,312,019     DEVELOPER LOT NUMBER 147     DEVELOPER EXPIRATION DATE 12,312,019     Positive Control Positive     Negative Control Negative    [place] Draw [[id number]] Collected:   08/08/19 1327    Specimen:  Blood Updated:  08/08/19 1431    Narrative:       The following orders were created for panel order [place] Draw.  Procedure                               Abnormality         Status                     ---------                               -----------         ------                     Light Blue Top[[id number]]                                   Final result               Green Top (Gel)[[id number]]                                  Final result               Lavender Top[[id number]]                                     Final result               Red Top[[id number]]                                          Final result                 Please view results for these tests on the individual orders.    Light Blue Top [[id number]] Collected:  08/08/19 1328    Specimen:  Blood Updated:  08/08/19 1431     Extra Tube hold for add-on     Comment: Auto resulted       Green Top (Gel) [[id number]] Collected:  08/08/19 1327    Specimen:  Blood Updated:  08/08/19 1431     Extra Tube Hold for add-ons.     Comment: Auto resulted.       Lavender Top [[id number]] Collected:  08/08/19 1327    Specimen:  Blood Updated:  08/08/19 1431     Extra Tube hold for add-on     Comment: Auto resulted       Red Top [[id number]] Collected:  08/08/19 1327    Specimen:  Blood Updated:  08/08/19 1431     Extra Tube Hold for add-ons.     Comment: Auto resulted.       [place] Draw [[id number]] Collected:  08/08/19 1327    Specimen:  Blood Updated:  08/08/19 1431    Narrative:       The following orders were created for panel order [place] Draw.  Procedure                               Abnormality         Status                     ---------                               -----------         ------                     Light Blue Top[[id number]]                                   Final result               Green Top (Gel)[[id number]]                                  Final result               Lavender Top[[id number]]                                      Final result               Red Top[[id number]]                                          Final result                 Please view results for these tests on the individual orders.    Light Blue Top [[id number]] Collected:  08/08/19 1328    Specimen:  Blood Updated:  08/08/19 1431     Extra Tube hold for add-on     Comment: Auto resulted       Green Top (Gel) [[id number]] Collected:  08/08/19 1327    Specimen:  Blood Updated:  08/08/19 1431     Extra Tube Hold for add-ons.     Comment: Auto resulted.       Lavender Top [[id number]] Collected:  08/08/19 1327    Specimen:  Blood Updated:  08/08/19 1431     Extra Tube hold for add-on     Comment: Auto resulted       Red Top [[id number]] Collected:  08/08/19 1327    Specimen:  Blood Updated:  08/08/19 1431     Extra Tube Hold for add-ons.     Comment: Auto resulted.       Lipase [[id number]]  (Normal) Collected:  08/08/19 1327    Specimen:  Blood Updated:  08/08/19 1359     Lipase 42 U/L     Amylase [[id number]]  (Normal) Collected:  08/08/19 1327    Specimen:  Blood Updated:  08/08/19 1359     Amylase 87 U/L     Comprehensive Metabolic Panel [[id number]]  (Abnormal) Collected:  08/08/19 1327    Specimen:  Blood Updated:  08/08/19 1358     Glucose 117 mg/dL      BUN 15 mg/dL      Creatinine 0.56 mg/dL      Sodium 139 mmol/L      Potassium 4.2 mmol/L      Chloride 112 mmol/L      CO2 19.0 mmol/L      Calcium 9.8 mg/dL      Total Protein 7.7 g/dL      Albumin 4.10 g/dL      ALT (SGPT) 436 U/L      AST (SGOT) 253 U/L      Alkaline Phosphatase 193 U/L      Total Bilirubin 0.4 mg/dL      eGFR Non African Amer 110 mL/min/1.73      Globulin 3.6 gm/dL      A/G Ratio 1.1 g/dL      BUN/Creatinine Ratio 26.8     Anion Gap 8.0 mmol/L     Narrative:       GFR Normal >60  Chronic Kidney Disease <60  Kidney Failure <15    Protime-INR [[id number]]  (Normal) Collected:  08/08/19 1328    Specimen:  Blood Updated:  08/08/19 1357     Protime 13.4 Seconds      INR 0.99    aPTT [[id number]]   (Abnormal) Collected:  08/08/19 1328    Specimen:  Blood Updated:  08/08/19 1357     PTT 41.4 seconds     Lactic Acid, Plasma [[id number]]  (Normal) Collected:  08/08/19 1327    Specimen:  Blood Updated:  08/08/19 1355     Lactate 0.9 mmol/L     Urinalysis With Culture If Indicated - Urine, Clean Catch [[id number]]  (Abnormal) Collected:  08/08/19 1254    Specimen:  Urine, Clean Catch Updated:  08/08/19 1352     Color, UA Yellow     Appearance, UA Clear     pH, UA <=5.0     Specific Gravity, UA 1.022     Glucose, UA Negative     Ketones, UA Negative     Bilirubin, UA Negative     Blood, UA Negative     Protein, UA Negative     Leuk Esterase, UA Trace     Nitrite, UA Negative     Urobilinogen, UA 0.2 E.U./dL    Urinalysis, Microscopic Only - Urine, Clean Catch [[id number]]  (Abnormal) Collected:  08/08/19 1254    Specimen:  Urine, Clean Catch Updated:  08/08/19 1352     RBC, UA 3-5 /HPF      WBC, UA 3-5 /HPF      Bacteria, UA None Seen /HPF      Squamous Epithelial Cells, UA 0-2 /HPF      Hyaline Casts, UA 3-6 /LPF      Methodology Automated Microscopy    CBC & Differential [[id number]] Collected:  08/08/19 1327    Specimen:  Blood Updated:  08/08/19 1350    Narrative:       The following orders were created for panel order CBC & Differential.  Procedure                               Abnormality         Status                     ---------                               -----------         ------                     CBC Auto Differential[[id number]]        Abnormal            Final result                 Please view results for these tests on the individual orders.    CBC Auto Differential [[id number]]  (Abnormal) Collected:  08/08/19 1327    Specimen:  Blood Updated:  08/08/19 1350     WBC 8.66 10*3/mm3      RBC 4.48 10*6/mm3      Hemoglobin 12.5 g/dL      Hematocrit 38.8 %      MCV 86.6 fL      MCH 27.9 pg      MCHC 32.2 g/dL      RDW 18.3 %      RDW-SD 57.6 fl      MPV 10.4 fL      Platelets 214 10*3/mm3      Neutrophil  % 75.9 %      Lymphocyte % 10.0 %      Monocyte % 11.3 %      Eosinophil % 0.7 %      Basophil % 0.6 %      Immature Grans % 1.5 %      Neutrophils, Absolute 6.57 10*3/mm3      Lymphocytes, Absolute 0.87 10*3/mm3      Monocytes, Absolute 0.98 10*3/mm3      Eosinophils, Absolute 0.06 10*3/mm3      Basophils, Absolute 0.05 10*3/mm3      Immature Grans, Absolute 0.13 10*3/mm3      nRBC 0.0 /100 WBC            Radiology Data:    Imaging Results (last 24 hours)     Procedure Component Value Units Date/Time    CT Abdomen Pelvis With Contrast [[id number]] Collected:  08/08/19 1447     Updated:  08/08/19 1513    Narrative:       CT ABDOMEN AND PELVIS WITH CONTRAST 8/8/2019 2:20 PM CDT     HISTORY: Epigastric pain and vomiting     COMPARISON: CT scan dated 08/30/2018.      DLP: 3 and 17 mGy cm     TECHNIQUE: Following the intravenous administration of contrast, helical  CT tomographic images of the abdomen and pelvis were acquired. Coronal  reformatted images were also provided for review.      FINDINGS:   The lung bases and base of the heart are unremarkable.      LIVER: No focal liver lesion. The hepatic vasculature is patent.      BILIARY SYSTEM: The gallbladder has been removed. There is no  intrahepatic or extrahepatic ductal dilation.      PANCREAS: No focal pancreatic lesion.      SPLEEN: Unremarkable.      KIDNEYS AND ADRENALS: Adrenal glands are unremarkable. There is a 5 mm  nonobstructing left upper pole renal stone with additional 6 mm left  lower pole stone. There is an infiltrative process which appears to be  involving the right renal sinus as well as a portion of the right  inferior renal pole. This has increased from the earlier comparison CT,  with near complete replacement of the sinus fat at the right inferior  pole. There appears to be heterogeneously enhancing/hypoenhancing  portions of the right inferior renal pole. The overall extent of this  process measures up to 4.8 cm (series 3-image 35). There are  several  small nonobstructing calculi within right upper and right lower pole  calyces. The right renal pelvis is nondilated. The ureters are  nondilated.     RETROPERITONEUM: No enlarged aortocaval lymph node measuring 1.3 cm  short axis (series 2 image image 31). Slightly prominent aortocaval node  just inferior to this measuring up to 0.9 cm short axis. There are 2  prominent retrocaval lymph nodes as well with one measuring 1.3 cm short  axis and a second measuring 0.9 cm short axis.     GI TRACT: Moderate hiatal hernia. The stomach is nondistended. Small  bowel is nondistended.  There appears to be a segment of circumferential  wall thickening along a solitary loop of small bowel in the low mid  abdomen (series 2-image 56 and series 3-image 25). Scattered colonic  diverticula. Submucosal fat deposition along the ascending colon. No  obvious wall thickening along the colon. Appendix is not well  visualized.     OTHER: No mesenteric adenopathy. No intraperitoneal free fluid or free  air. Dense atheromatous calcification along the abdominal aorta without  aneurysm formation. The osseous structures and soft tissues demonstrate  no worrisome lesions. Spinal scoliotic curvature.      PELVIS: No mass lesion, fluid collection or significant lymphadenopathy  is seen in the pelvis. The urinary bladder is normal in appearance.       Impression:       1. Enlarging infiltrative process involving the right renal sinus and  right inferior renal pole. This appears most likely to be an enhancing  and infiltrative soft tissue mass, either a transitional cell carcinoma  or perhaps infiltrative renal cell carcinoma. 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 This seems unlikely to reflect an acute  process such as pyelonephritis with adjacent reactive adenopathy.  2. Questionable circumferential wall thickening along the segment of  small bowel in the lower  abdomen, perhaps a mild enteritis.  3. Colonic diverticulosis without acute diverticulitis. No inflammatory  wall thickening along the colon at this time.        This report was finalized on 08/08/2019 15:10 by Dr Marco Griggs, .          I have personally reviewed and interpreted the radiology studies and ECG obtained at time of admission.     Assessment / Plan      Assessment & Plan  Active Hospital Problems    Diagnosis   • Hematemesis   • Elevated liver enzymes   • Renal mass     Added automatically from request for surgery [id number]       Plans    Hematemesis.  History of varices.  Consult GI.  Dr. Garcia.  Hemoccult negative.  Protonix drip.  Zofran as needed.    Fatty liver.  Elevated liver enzymes.  Acute hepatitis panel.  Ultrasound the liver.  Scan abdomen pelvic- enlarging infiltrative process involving the right renal sinus and right inferior renal pole-likely to be enhancing and infiltrating soft tissue mass- transitional cell carcinoma versus infiltrate renal cell carcinoma, new adjacent retroperitoneal adenopathy-concerning metastasis, circumferential wall thickening along the small bowel of the lower abdomen-mild enteritis, colonic diverticulosis.    Kidney mass.  Consult Dr. Greco.    Chronic smoker.  Discussed patient cutting back and stopping.  Nicotine patch for now.    COPD.  Continue albuterol inhaler as needed.  Continue Symbicort.    History of colitis.  Follow with Dr. Meneses outpatient    Chronic pain.  Continue Norco PRN.    Hypertension/hyperlipidemia.  Continue Lopressor.  Continue Colestid.     Anxiety/depression.  Continue Abilify.  Cont Cymbalta.    Nutrition.  Clear liquid diet for now.  N.p.o. after midnight.    Code Status: full code     I discussed the patient's findings and my recommendations with: patient    Estimated length of stay: 2-4 days.     Federico Nicole MD   08/08/19   5:48 PM

## 2019-08-08 NOTE — ED PROVIDER NOTES
Subjective   63 yof c/o epigastric pain and vomiting. Onset today.  She states she has vomited x 2 today - the first time it was 'blood' and the second time there was no blood present.  She denies fever or dysuria.  She has chronic diarrhea.             Review of Systems   Constitutional: Negative for activity change, appetite change, fatigue and fever.   HENT: Negative for congestion, ear pain, facial swelling and sore throat.    Eyes: Negative for discharge and visual disturbance.   Respiratory: Negative for apnea, chest tightness, shortness of breath, wheezing and stridor.    Cardiovascular: Negative for chest pain and palpitations.   Gastrointestinal: Negative for abdominal distention, abdominal pain, diarrhea, nausea and vomiting.   Genitourinary: Negative for difficulty urinating and dysuria.   Musculoskeletal: Negative for arthralgias and myalgias.   Skin: Negative for rash and wound.   Neurological: Negative for dizziness and seizures.   Psychiatric/Behavioral: Negative for agitation and confusion.       Past Medical History:   Diagnosis Date   • Anemia    • Anxiety    • Arthritis    • Cancer of skin     BCC OF NOSE   • Colitis    • COPD (chronic obstructive pulmonary disease) (CMS/HCC)    • Depression    • Fibromyalgia    • GERD (gastroesophageal reflux disease)    • Hx of colonic polyps    • Hypercholesteremia    • Hypertension    • Seizure (CMS/HCC)        Allergies   Allergen Reactions   • Lyrica [Pregabalin] Swelling     LIPS AND FACE   • Morphine Itching   • Codeine Itching   • Penicillins Other (See Comments)     CHILDHOOD ALLERGY         Past Surgical History:   Procedure Laterality Date   • BREAST SURGERY     • CHOLECYSTECTOMY     • COLONOSCOPY  04/12/2017    Hemorrhoids and a few diverticula repeat exam in 3 years Dr. Cordero   • COLONOSCOPY N/A 5/2/2019    Collagenous colitis, Diverticulosis repeat exam in 5 years   • COLONOSCOPY W/ POLYPECTOMY  07/28/2016    Tubular adenoma proximal to the anus, 30  MM polyp in the cecum not resected, Diverticulosis ref to Dr. Cordero   • ENDOSCOPY  06/15/2016    Small hh, Negative for celiac disease   • ENDOSCOPY N/A 6/13/2019    HH otherwise normal exam   • RADICAL HYSTERECTOMY     • URETEROSCOPY Right 9/13/2018    Procedure: CYSTOSCOPY RIGHT RETROGRADE PYELOGRAM RIGHT URETEROSCOPY WITH BIOPSY OF RIGHT RENAL PELVIS LASER LITHOTRIPSY AND RIGHT URETERAL STENT INSERTION;  Surgeon: Yifan Greco MD;  Location:  [facility] OR;  Service: Urology   • URETEROSCOPY Right 8/9/2019    Procedure: RIGHT FLEXIBLE URETEROSCOPY WITH RENAL PELVIC BIOPSY AND RIGHT URETERAL STENT;  Surgeon: Yifan Greco MD;  Location:  [facility] OR;  Service: Urology       Family History   Problem Relation Age of Onset   • Diabetes Sister    • Cancer Brother    • Colon cancer Neg Hx    • Colon polyps Neg Hx        Social History     Socioeconomic History   • Marital status:      Spouse name: Not on file   • Number of children: Not on file   • Years of education: Not on file   • Highest education level: Not on file   Tobacco Use   • Smoking status: Current Every Day Smoker     Packs/day: 1.00     Types: Cigarettes   • Smokeless tobacco: Never Used   Substance and Sexual Activity   • Alcohol use: No   • Drug use: No   • Sexual activity: Defer           Objective   Physical Exam   Constitutional: She is oriented to person, place, and time. She appears well-developed.   HENT:   Head: Normocephalic.   Eyes: EOM are normal. Pupils are equal, round, and reactive to light.   Neck: Normal range of motion. Neck supple.   Cardiovascular: Normal rate and regular rhythm.   No murmur heard.  Pulmonary/Chest: Effort normal and breath sounds normal.   Abdominal: Soft. Bowel sounds are normal.   Musculoskeletal: Normal range of motion.   Neurological: She is alert and oriented to person, place, and time.   Skin: Skin is warm and dry.   Psychiatric: She has a normal mood and affect.   Nursing note and vitals  reviewed.      Procedures           ED Course  ED Course as of Sep 13 1048   u Aug 08, 2019   1503 Transfer of care to Dagoberto MARTINEZ  [KS]      ED Course User Index  [KS] Justyna Post, YOLY                  MDM  Number of Diagnoses or Management Options  Hematemesis, presence of nausea not specified:   Kidney mass:   Transaminitis:         Final diagnoses:   Hematemesis, presence of nausea not specified   Kidney mass   Transaminitis            Justyna Post APRN  09/13/19 1048

## 2019-08-08 NOTE — TELEPHONE ENCOUNTER
Patient called stating she is having some epigastric pain and felt nauseated this morning she went in the bathroom a few minutes ago and she vomited what she is saying a whole lot of bright red blood feeling weak wants to know what to do ?

## 2019-08-08 NOTE — ED PROVIDER NOTES
Subjective   History of Present Illness    Review of Systems    Past Medical History:   Diagnosis Date   • Anemia    • Anxiety    • Arthritis    • Cancer of skin     BCC OF NOSE   • Colitis    • COPD (chronic obstructive pulmonary disease) (CMS/HCC)    • Depression    • Fibromyalgia    • GERD (gastroesophageal reflux disease)    • Hx of colonic polyps    • Hypercholesteremia    • Hypertension    • Seizure (CMS/HCC)        Allergies   Allergen Reactions   • Lyrica [Pregabalin] Swelling     LIPS AND FACE   • Morphine Itching   • Codeine Itching   • Penicillins Other (See Comments)     CHILDHOOD ALLERGY         Past Surgical History:   Procedure Laterality Date   • BREAST SURGERY     • CHOLECYSTECTOMY     • COLONOSCOPY  04/12/2017    Hemorrhoids and a few diverticula repeat exam in 3 years Dr. Cordero   • COLONOSCOPY N/A 5/2/2019    Procedure: COLONOSCOPY WITH ANESTHESIA;  Surgeon: Danis Garcia MD;  Location: [facility] ENDOSCOPY;  Service: Gastroenterology   • COLONOSCOPY W/ POLYPECTOMY  07/28/2016    Tubular adenoma proximal to the anus, 30 MM polyp in the cecum not resected, Diverticulosis ref to Dr. Cordero   • ENDOSCOPY  06/15/2016    Small hh, Negative for celiac disease   • ENDOSCOPY N/A 6/13/2019    Procedure: ESOPHAGOGASTRODUODENOSCOPY WITH ANESTHESIA;  Surgeon: Danis Garcia MD;  Location: [facility] ENDOSCOPY;  Service: Gastroenterology   • RADICAL HYSTERECTOMY     • URETEROSCOPY Right 9/13/2018    Procedure: CYSTOSCOPY RIGHT RETROGRADE PYELOGRAM RIGHT URETEROSCOPY WITH BIOPSY OF RIGHT RENAL PELVIS LASER LITHOTRIPSY AND RIGHT URETERAL STENT INSERTION;  Surgeon: Yifan Greco MD;  Location: [facility] OR;  Service: Urology       Family History   Problem Relation Age of Onset   • Diabetes Sister    • Cancer Brother    • Colon cancer Neg Hx    • Colon polyps Neg Hx        Social History     Socioeconomic History   • Marital status:      Spouse name: Not on file   • Number of children: Not on file   • Years  of education: Not on file   • Highest education level: Not on file   Tobacco Use   • Smoking status: Current Every Day Smoker     Packs/day: 1.00     Types: Cigarettes   • Smokeless tobacco: Never Used   Substance and Sexual Activity   • Alcohol use: No   • Drug use: No   • Sexual activity: Defer           Objective   Physical Exam    Procedures           ED Course  ED Course as of Aug 08 1715   Thu Aug 08, 2019   1503 Transfer of care to Dagoberto MARTINEZ  [KS]      ED Course User Index  [KS] Justyna Post APRN        Labs Reviewed   COMPREHENSIVE METABOLIC PANEL - Abnormal; Notable for the following components:       Result Value    Glucose 117 (*)     Chloride 112 (*)     CO2 19.0 (*)     ALT (SGPT) 436 (*)     AST (SGOT) 253 (*)     Alkaline Phosphatase 193 (*)     BUN/Creatinine Ratio 26.8 (*)     All other components within normal limits    Narrative:     GFR Normal >60  Chronic Kidney Disease <60  Kidney Failure <15   APTT - Abnormal; Notable for the following components:    PTT 41.4 (*)     All other components within normal limits   URINALYSIS W/ CULTURE IF INDICATED - Abnormal; Notable for the following components:    Leuk Esterase, UA Trace (*)     All other components within normal limits   CBC WITH AUTO DIFFERENTIAL - Abnormal; Notable for the following components:    RDW 18.3 (*)     RDW-SD 57.6 (*)     Lymphocyte % 10.0 (*)     Immature Grans % 1.5 (*)     Monocytes, Absolute 0.98 (*)     Immature Grans, Absolute 0.13 (*)     All other components within normal limits   URINALYSIS, MICROSCOPIC ONLY - Abnormal; Notable for the following components:    RBC, UA 3-5 (*)     WBC, UA 3-5 (*)     All other components within normal limits   PROTIME-INR - Normal   LIPASE - Normal   AMYLASE - Normal   LACTIC ACID, PLASMA - Normal   POCT OCCULT BLOOD STOOL - Normal   POCT OCCULT BLOOD STOOL - Normal   RAINBOW DRAW    Narrative:     The following orders were created for panel order [place] Draw.  Procedure                                Abnormality         Status                     ---------                               -----------         ------                     Light Blue Top[[id number]]                                   Final result               Green Top (Gel)[[id number]]                                  Final result               Lavender Top[[id number]]                                     Final result               Red Top[[id number]]                                          Final result                 Please view results for these tests on the individual orders.   RAINBOW DRAW    Narrative:     The following orders were created for panel order [place] Draw.  Procedure                               Abnormality         Status                     ---------                               -----------         ------                     Light Blue Top[[id number]]                                   Final result               Green Top (Gel)[[id number]]                                  Final result               Lavender Top[[id number]]                                     Final result               Red Top[[id number]]                                          Final result                 Please view results for these tests on the individual orders.   CBC AND DIFFERENTIAL    Narrative:     The following orders were created for panel order CBC & Differential.  Procedure                               Abnormality         Status                     ---------                               -----------         ------                     CBC Auto Differential[[id number]]        Abnormal            Final result                 Please view results for these tests on the individual orders.   LIGHT BLUE TOP   GREEN TOP   LAVENDER TOP   RED TOP   LIGHT BLUE TOP   GREEN TOP   LAVENDER TOP   RED TOP     CT Abdomen Pelvis With Contrast   Final Result   1. Enlarging infiltrative process involving the right renal sinus and   right inferior renal  pole. This appears most likely to be an enhancing   and infiltrative soft tissue mass, either a transitional cell carcinoma   or perhaps infiltrative renal cell carcinoma. New adjacent   retroperitoneal (retrocaval and aortocaval) adenopathy which is   concerning for sophia metastases. This seems unlikely to reflect an acute   process such as pyelonephritis with adjacent reactive adenopathy.   2. Questionable circumferential wall thickening along the segment of   small bowel in the lower abdomen, perhaps a mild enteritis.   3. Colonic diverticulosis without acute diverticulitis. No inflammatory   wall thickening along the colon at this time.           This report was finalized on 08/08/2019 15:10 by Dr Marco Griggs, .                  MDM  Number of Diagnoses or Management Options  Diagnosis management comments: Infiltrative kidney mass, hematemesis, elevated LFTs        Amount and/or Complexity of Data Reviewed  Clinical lab tests: reviewed and ordered  Tests in the radiology section of CPT®: reviewed and ordered  Tests in the medicine section of CPT®: ordered and reviewed  Decide to obtain previous medical records or to obtain history from someone other than the patient: yes    Risk of Complications, Morbidity, and/or Mortality  Presenting problems: moderate  Diagnostic procedures: moderate  Management options: moderate    Patient Progress  Patient progress: stable        Final diagnoses:   Hematemesis, presence of nausea not specified   Kidney mass   Transaminitis            Dagoberto Ghosh PA-C  08/08/19 5349

## 2019-08-08 NOTE — ED NOTES
Pt updated results are final and Dagoberto BERRIOS to discuss.  No other concerns at this time     Bernabe Farfan RN  08/08/19 1397

## 2019-08-09 ENCOUNTER — APPOINTMENT (OUTPATIENT)
Dept: ULTRASOUND IMAGING | Facility: HOSPITAL | Age: 64
End: 2019-08-09

## 2019-08-09 ENCOUNTER — ANESTHESIA (OUTPATIENT)
Dept: PERIOP | Facility: HOSPITAL | Age: 64
End: 2019-08-09

## 2019-08-09 ENCOUNTER — APPOINTMENT (OUTPATIENT)
Dept: GENERAL RADIOLOGY | Facility: HOSPITAL | Age: 64
End: 2019-08-09

## 2019-08-09 ENCOUNTER — ANESTHESIA EVENT (OUTPATIENT)
Dept: PERIOP | Facility: HOSPITAL | Age: 64
End: 2019-08-09

## 2019-08-09 PROBLEM — E78.5 HYPERLIPIDEMIA: Status: ACTIVE | Noted: 2019-08-09

## 2019-08-09 LAB
ALBUMIN SERPL-MCNC: 3.7 G/DL (ref 3.5–5)
ALBUMIN/GLOB SERPL: 1 G/DL (ref 1.1–2.5)
ALP SERPL-CCNC: 171 U/L (ref 24–120)
ALT SERPL W P-5'-P-CCNC: 497 U/L (ref 0–54)
ANION GAP SERPL CALCULATED.3IONS-SCNC: 6 MMOL/L (ref 4–13)
ARTICHOKE IGE QN: 85 MG/DL (ref 0–99)
AST SERPL-CCNC: 319 U/L (ref 7–45)
BASOPHILS # BLD AUTO: 0.06 10*3/MM3 (ref 0–0.2)
BASOPHILS NFR BLD AUTO: 1 % (ref 0–1.5)
BILIRUB SERPL-MCNC: 0.5 MG/DL (ref 0.1–1)
BUN BLD-MCNC: 12 MG/DL (ref 5–21)
BUN/CREAT SERPL: 22.2 (ref 7–25)
CALCIUM SPEC-SCNC: 9.5 MG/DL (ref 8.4–10.4)
CHLORIDE SERPL-SCNC: 112 MMOL/L (ref 98–110)
CHOLEST SERPL-MCNC: 154 MG/DL (ref 130–200)
CO2 SERPL-SCNC: 22 MMOL/L (ref 24–31)
CREAT BLD-MCNC: 0.54 MG/DL (ref 0.5–1.4)
DEPRECATED RDW RBC AUTO: 58 FL (ref 37–54)
EOSINOPHIL # BLD AUTO: 0.12 10*3/MM3 (ref 0–0.4)
EOSINOPHIL NFR BLD AUTO: 1.9 % (ref 0.3–6.2)
ERYTHROCYTE [DISTWIDTH] IN BLOOD BY AUTOMATED COUNT: 18.2 % (ref 12.3–15.4)
GFR SERPL CREATININE-BSD FRML MDRD: 114 ML/MIN/1.73
GLOBULIN UR ELPH-MCNC: 3.6 GM/DL
GLUCOSE BLD-MCNC: 94 MG/DL (ref 70–100)
HBA1C MFR BLD: 6.5 % (ref 4.8–5.9)
HCT VFR BLD AUTO: 37.9 % (ref 34–46.6)
HDLC SERPL-MCNC: 38 MG/DL
HGB BLD-MCNC: 12.5 G/DL (ref 12–15.9)
IMM GRANULOCYTES # BLD AUTO: 0.1 10*3/MM3 (ref 0–0.05)
IMM GRANULOCYTES NFR BLD AUTO: 1.6 % (ref 0–0.5)
LDLC/HDLC SERPL: 2.18 {RATIO}
LYMPHOCYTES # BLD AUTO: 0.98 10*3/MM3 (ref 0.7–3.1)
LYMPHOCYTES NFR BLD AUTO: 15.9 % (ref 19.6–45.3)
MCH RBC QN AUTO: 28.9 PG (ref 26.6–33)
MCHC RBC AUTO-ENTMCNC: 33 G/DL (ref 31.5–35.7)
MCV RBC AUTO: 87.5 FL (ref 79–97)
MONOCYTES # BLD AUTO: 1.08 10*3/MM3 (ref 0.1–0.9)
MONOCYTES NFR BLD AUTO: 17.5 % (ref 5–12)
NEUTROPHILS # BLD AUTO: 3.82 10*3/MM3 (ref 1.7–7)
NEUTROPHILS NFR BLD AUTO: 62.1 % (ref 42.7–76)
NRBC BLD AUTO-RTO: 0 /100 WBC (ref 0–0.2)
PLATELET # BLD AUTO: 217 10*3/MM3 (ref 140–450)
PMV BLD AUTO: 10.5 FL (ref 6–12)
POTASSIUM BLD-SCNC: 4.5 MMOL/L (ref 3.5–5.3)
PROT SERPL-MCNC: 7.3 G/DL (ref 6.3–8.7)
RBC # BLD AUTO: 4.33 10*6/MM3 (ref 3.77–5.28)
SODIUM BLD-SCNC: 140 MMOL/L (ref 135–145)
TRIGL SERPL-MCNC: 166 MG/DL (ref 0–149)
TSH SERPL DL<=0.05 MIU/L-ACNC: 0.53 MIU/ML (ref 0.47–4.68)
WBC NRBC COR # BLD: 6.16 10*3/MM3 (ref 3.4–10.8)

## 2019-08-09 PROCEDURE — 83036 HEMOGLOBIN GLYCOSYLATED A1C: CPT | Performed by: FAMILY MEDICINE

## 2019-08-09 PROCEDURE — 0TB38ZX EXCISION OF RIGHT KIDNEY PELVIS, VIA NATURAL OR ARTIFICIAL OPENING ENDOSCOPIC, DIAGNOSTIC: ICD-10-PCS | Performed by: UROLOGY

## 2019-08-09 PROCEDURE — 94760 N-INVAS EAR/PLS OXIMETRY 1: CPT

## 2019-08-09 PROCEDURE — 71046 X-RAY EXAM CHEST 2 VIEWS: CPT

## 2019-08-09 PROCEDURE — C1894 INTRO/SHEATH, NON-LASER: HCPCS | Performed by: UROLOGY

## 2019-08-09 PROCEDURE — C1769 GUIDE WIRE: HCPCS | Performed by: UROLOGY

## 2019-08-09 PROCEDURE — 76705 ECHO EXAM OF ABDOMEN: CPT

## 2019-08-09 PROCEDURE — 25010000002 FENTANYL CITRATE (PF) 100 MCG/2ML SOLUTION: Performed by: NURSE ANESTHETIST, CERTIFIED REGISTERED

## 2019-08-09 PROCEDURE — 25010000002 ONDANSETRON PER 1 MG: Performed by: FAMILY MEDICINE

## 2019-08-09 PROCEDURE — 25010000002 ONDANSETRON PER 1 MG: Performed by: NURSE ANESTHETIST, CERTIFIED REGISTERED

## 2019-08-09 PROCEDURE — 25010000002 PROPOFOL 10 MG/ML EMULSION: Performed by: NURSE ANESTHETIST, CERTIFIED REGISTERED

## 2019-08-09 PROCEDURE — 85025 COMPLETE CBC W/AUTO DIFF WBC: CPT | Performed by: FAMILY MEDICINE

## 2019-08-09 PROCEDURE — 25010000002 DEXAMETHASONE PER 1 MG: Performed by: NURSE ANESTHETIST, CERTIFIED REGISTERED

## 2019-08-09 PROCEDURE — 80053 COMPREHEN METABOLIC PANEL: CPT | Performed by: FAMILY MEDICINE

## 2019-08-09 PROCEDURE — 52354 CYSTOURETERO W/BIOPSY: CPT | Performed by: UROLOGY

## 2019-08-09 PROCEDURE — 74420 UROGRAPHY RTRGR +-KUB: CPT | Performed by: UROLOGY

## 2019-08-09 PROCEDURE — 88307 TISSUE EXAM BY PATHOLOGIST: CPT | Performed by: UROLOGY

## 2019-08-09 PROCEDURE — C2617 STENT, NON-COR, TEM W/O DEL: HCPCS | Performed by: UROLOGY

## 2019-08-09 PROCEDURE — BT1D1ZZ FLUOROSCOPY OF RIGHT KIDNEY, URETER AND BLADDER USING LOW OSMOLAR CONTRAST: ICD-10-PCS | Performed by: UROLOGY

## 2019-08-09 PROCEDURE — 25010000002 IOPAMIDOL 61 % SOLUTION: Performed by: UROLOGY

## 2019-08-09 PROCEDURE — 99244 OFF/OP CNSLTJ NEW/EST MOD 40: CPT | Performed by: UROLOGY

## 2019-08-09 PROCEDURE — 94799 UNLISTED PULMONARY SVC/PX: CPT

## 2019-08-09 PROCEDURE — C1758 CATHETER, URETERAL: HCPCS | Performed by: UROLOGY

## 2019-08-09 PROCEDURE — 80061 LIPID PANEL: CPT | Performed by: FAMILY MEDICINE

## 2019-08-09 PROCEDURE — 25010000002 SUCCINYLCHOLINE PER 20 MG: Performed by: NURSE ANESTHETIST, CERTIFIED REGISTERED

## 2019-08-09 PROCEDURE — 25010000002 LEVOFLOXACIN PER 250 MG: Performed by: UROLOGY

## 2019-08-09 PROCEDURE — 99232 SBSQ HOSP IP/OBS MODERATE 35: CPT | Performed by: INTERNAL MEDICINE

## 2019-08-09 PROCEDURE — 88112 CYTOPATH CELL ENHANCE TECH: CPT | Performed by: UROLOGY

## 2019-08-09 PROCEDURE — 74420 UROGRAPHY RTRGR +-KUB: CPT

## 2019-08-09 PROCEDURE — 0T768DZ DILATION OF RIGHT URETER WITH INTRALUMINAL DEVICE, VIA NATURAL OR ARTIFICIAL OPENING ENDOSCOPIC: ICD-10-PCS | Performed by: UROLOGY

## 2019-08-09 PROCEDURE — 84443 ASSAY THYROID STIM HORMONE: CPT | Performed by: FAMILY MEDICINE

## 2019-08-09 PROCEDURE — 25010000002 LEVOFLOXACIN PER 250 MG: Performed by: NURSE ANESTHETIST, CERTIFIED REGISTERED

## 2019-08-09 DEVICE — URETERAL STENT
Type: IMPLANTABLE DEVICE | Site: URETER | Status: FUNCTIONAL
Brand: PERCUFLEX™ PLUS

## 2019-08-09 RX ORDER — HYDRALAZINE HYDROCHLORIDE 20 MG/ML
5 INJECTION INTRAMUSCULAR; INTRAVENOUS
Status: DISCONTINUED | OUTPATIENT
Start: 2019-08-09 | End: 2019-08-09 | Stop reason: HOSPADM

## 2019-08-09 RX ORDER — BUTALBITAL, ACETAMINOPHEN AND CAFFEINE 50; 325; 40 MG/1; MG/1; MG/1
1 TABLET ORAL EVERY 4 HOURS PRN
Status: DISCONTINUED | OUTPATIENT
Start: 2019-08-09 | End: 2019-08-10 | Stop reason: HOSPADM

## 2019-08-09 RX ORDER — SODIUM CHLORIDE 9 MG/ML
100 INJECTION, SOLUTION INTRAVENOUS CONTINUOUS
Status: DISCONTINUED | OUTPATIENT
Start: 2019-08-09 | End: 2019-08-10

## 2019-08-09 RX ORDER — LEVOFLOXACIN 5 MG/ML
INJECTION, SOLUTION INTRAVENOUS AS NEEDED
Status: DISCONTINUED | OUTPATIENT
Start: 2019-08-09 | End: 2019-08-09 | Stop reason: SURG

## 2019-08-09 RX ORDER — TAMSULOSIN HYDROCHLORIDE 0.4 MG/1
0.4 CAPSULE ORAL DAILY
Status: DISCONTINUED | OUTPATIENT
Start: 2019-08-09 | End: 2019-08-10 | Stop reason: HOSPADM

## 2019-08-09 RX ORDER — FENTANYL CITRATE 50 UG/ML
25 INJECTION, SOLUTION INTRAMUSCULAR; INTRAVENOUS AS NEEDED
Status: DISCONTINUED | OUTPATIENT
Start: 2019-08-09 | End: 2019-08-09 | Stop reason: HOSPADM

## 2019-08-09 RX ORDER — OXYBUTYNIN CHLORIDE 5 MG/1
5 TABLET ORAL 3 TIMES DAILY
Status: DISCONTINUED | OUTPATIENT
Start: 2019-08-09 | End: 2019-08-10 | Stop reason: HOSPADM

## 2019-08-09 RX ORDER — MAGNESIUM HYDROXIDE 1200 MG/15ML
LIQUID ORAL AS NEEDED
Status: DISCONTINUED | OUTPATIENT
Start: 2019-08-09 | End: 2019-08-09 | Stop reason: HOSPADM

## 2019-08-09 RX ORDER — PHENYLEPHRINE HCL IN 0.9% NACL 0.8MG/10ML
SYRINGE (ML) INTRAVENOUS AS NEEDED
Status: DISCONTINUED | OUTPATIENT
Start: 2019-08-09 | End: 2019-08-09 | Stop reason: SURG

## 2019-08-09 RX ORDER — LEVOFLOXACIN 5 MG/ML
500 INJECTION, SOLUTION INTRAVENOUS ONCE
Status: COMPLETED | OUTPATIENT
Start: 2019-08-09 | End: 2019-08-09

## 2019-08-09 RX ORDER — SUCCINYLCHOLINE CHLORIDE 20 MG/ML
INJECTION INTRAMUSCULAR; INTRAVENOUS AS NEEDED
Status: DISCONTINUED | OUTPATIENT
Start: 2019-08-09 | End: 2019-08-09 | Stop reason: SURG

## 2019-08-09 RX ORDER — FENTANYL CITRATE 50 UG/ML
INJECTION, SOLUTION INTRAMUSCULAR; INTRAVENOUS AS NEEDED
Status: DISCONTINUED | OUTPATIENT
Start: 2019-08-09 | End: 2019-08-09 | Stop reason: SURG

## 2019-08-09 RX ORDER — ZOLPIDEM TARTRATE 5 MG/1
5 TABLET ORAL NIGHTLY PRN
Status: DISCONTINUED | OUTPATIENT
Start: 2019-08-09 | End: 2019-08-10 | Stop reason: HOSPADM

## 2019-08-09 RX ORDER — LIDOCAINE HYDROCHLORIDE 20 MG/ML
INJECTION, SOLUTION INFILTRATION; PERINEURAL AS NEEDED
Status: DISCONTINUED | OUTPATIENT
Start: 2019-08-09 | End: 2019-08-09 | Stop reason: SURG

## 2019-08-09 RX ORDER — METOCLOPRAMIDE HYDROCHLORIDE 5 MG/ML
5 INJECTION INTRAMUSCULAR; INTRAVENOUS
Status: DISCONTINUED | OUTPATIENT
Start: 2019-08-09 | End: 2019-08-09 | Stop reason: HOSPADM

## 2019-08-09 RX ORDER — SODIUM CHLORIDE 0.9 % (FLUSH) 0.9 %
3 SYRINGE (ML) INJECTION EVERY 12 HOURS SCHEDULED
Status: DISCONTINUED | OUTPATIENT
Start: 2019-08-09 | End: 2019-08-09 | Stop reason: HOSPADM

## 2019-08-09 RX ORDER — LIDOCAINE HYDROCHLORIDE 40 MG/ML
SOLUTION TOPICAL AS NEEDED
Status: DISCONTINUED | OUTPATIENT
Start: 2019-08-09 | End: 2019-08-09 | Stop reason: SURG

## 2019-08-09 RX ORDER — LABETALOL HYDROCHLORIDE 5 MG/ML
5 INJECTION, SOLUTION INTRAVENOUS
Status: DISCONTINUED | OUTPATIENT
Start: 2019-08-09 | End: 2019-08-09 | Stop reason: HOSPADM

## 2019-08-09 RX ORDER — DEXAMETHASONE SODIUM PHOSPHATE 4 MG/ML
INJECTION, SOLUTION INTRA-ARTICULAR; INTRALESIONAL; INTRAMUSCULAR; INTRAVENOUS; SOFT TISSUE AS NEEDED
Status: DISCONTINUED | OUTPATIENT
Start: 2019-08-09 | End: 2019-08-09 | Stop reason: SURG

## 2019-08-09 RX ORDER — ACETAMINOPHEN 500 MG
1000 TABLET ORAL ONCE
Status: COMPLETED | OUTPATIENT
Start: 2019-08-09 | End: 2019-08-09

## 2019-08-09 RX ORDER — ONDANSETRON 2 MG/ML
INJECTION INTRAMUSCULAR; INTRAVENOUS AS NEEDED
Status: DISCONTINUED | OUTPATIENT
Start: 2019-08-09 | End: 2019-08-09 | Stop reason: SURG

## 2019-08-09 RX ORDER — PROPOFOL 10 MG/ML
VIAL (ML) INTRAVENOUS AS NEEDED
Status: DISCONTINUED | OUTPATIENT
Start: 2019-08-09 | End: 2019-08-09 | Stop reason: SURG

## 2019-08-09 RX ORDER — ONDANSETRON 2 MG/ML
4 INJECTION INTRAMUSCULAR; INTRAVENOUS AS NEEDED
Status: DISCONTINUED | OUTPATIENT
Start: 2019-08-09 | End: 2019-08-09 | Stop reason: HOSPADM

## 2019-08-09 RX ORDER — ROCURONIUM BROMIDE 10 MG/ML
INJECTION, SOLUTION INTRAVENOUS AS NEEDED
Status: DISCONTINUED | OUTPATIENT
Start: 2019-08-09 | End: 2019-08-09 | Stop reason: SURG

## 2019-08-09 RX ORDER — NALOXONE HCL 0.4 MG/ML
0.04 VIAL (ML) INJECTION AS NEEDED
Status: DISCONTINUED | OUTPATIENT
Start: 2019-08-09 | End: 2019-08-09 | Stop reason: HOSPADM

## 2019-08-09 RX ORDER — FLUMAZENIL 0.1 MG/ML
0.2 INJECTION INTRAVENOUS AS NEEDED
Status: DISCONTINUED | OUTPATIENT
Start: 2019-08-09 | End: 2019-08-09 | Stop reason: HOSPADM

## 2019-08-09 RX ORDER — SODIUM CHLORIDE 0.9 % (FLUSH) 0.9 %
1-10 SYRINGE (ML) INJECTION AS NEEDED
Status: DISCONTINUED | OUTPATIENT
Start: 2019-08-09 | End: 2019-08-09 | Stop reason: HOSPADM

## 2019-08-09 RX ORDER — SODIUM CHLORIDE, SODIUM LACTATE, POTASSIUM CHLORIDE, CALCIUM CHLORIDE 600; 310; 30; 20 MG/100ML; MG/100ML; MG/100ML; MG/100ML
100 INJECTION, SOLUTION INTRAVENOUS CONTINUOUS
Status: DISCONTINUED | OUTPATIENT
Start: 2019-08-09 | End: 2019-08-10 | Stop reason: HOSPADM

## 2019-08-09 RX ORDER — IPRATROPIUM BROMIDE AND ALBUTEROL SULFATE 2.5; .5 MG/3ML; MG/3ML
3 SOLUTION RESPIRATORY (INHALATION) ONCE AS NEEDED
Status: DISCONTINUED | OUTPATIENT
Start: 2019-08-09 | End: 2019-08-09 | Stop reason: HOSPADM

## 2019-08-09 RX ORDER — EPHEDRINE SULFATE 50 MG/ML
INJECTION INTRAVENOUS AS NEEDED
Status: DISCONTINUED | OUTPATIENT
Start: 2019-08-09 | End: 2019-08-09 | Stop reason: SURG

## 2019-08-09 RX ADMIN — SODIUM CHLORIDE, POTASSIUM CHLORIDE, SODIUM LACTATE AND CALCIUM CHLORIDE 100 ML/HR: 600; 310; 30; 20 INJECTION, SOLUTION INTRAVENOUS at 19:45

## 2019-08-09 RX ADMIN — OXYBUTYNIN CHLORIDE 5 MG: 5 TABLET ORAL at 20:09

## 2019-08-09 RX ADMIN — ONDANSETRON HYDROCHLORIDE 4 MG: 2 SOLUTION INTRAMUSCULAR; INTRAVENOUS at 15:54

## 2019-08-09 RX ADMIN — LIDOCAINE HYDROCHLORIDE 60 MG: 20 INJECTION, SOLUTION INFILTRATION; PERINEURAL at 15:33

## 2019-08-09 RX ADMIN — Medication 80 MCG: at 15:43

## 2019-08-09 RX ADMIN — TRAMADOL HYDROCHLORIDE 50 MG: 50 TABLET, FILM COATED ORAL at 19:03

## 2019-08-09 RX ADMIN — ONDANSETRON HYDROCHLORIDE 4 MG: 2 SOLUTION INTRAMUSCULAR; INTRAVENOUS at 09:22

## 2019-08-09 RX ADMIN — FENTANYL CITRATE 50 MCG: 50 INJECTION, SOLUTION INTRAMUSCULAR; INTRAVENOUS at 15:33

## 2019-08-09 RX ADMIN — NICOTINE 1 PATCH: 21 PATCH, EXTENDED RELEASE TRANSDERMAL at 19:00

## 2019-08-09 RX ADMIN — LEVOFLOXACIN 500 MG: 500 INJECTION, SOLUTION INTRAVENOUS at 15:33

## 2019-08-09 RX ADMIN — DEXAMETHASONE SODIUM PHOSPHATE 4 MG: 4 INJECTION, SOLUTION INTRAMUSCULAR; INTRAVENOUS at 15:54

## 2019-08-09 RX ADMIN — TAMSULOSIN HYDROCHLORIDE 0.4 MG: 0.4 CAPSULE ORAL at 20:09

## 2019-08-09 RX ADMIN — SODIUM CHLORIDE, PRESERVATIVE FREE 3 ML: 5 INJECTION INTRAVENOUS at 20:09

## 2019-08-09 RX ADMIN — Medication 160 MCG: at 15:48

## 2019-08-09 RX ADMIN — EPHEDRINE SULFATE 15 MG: 50 INJECTION INTRAVENOUS at 15:54

## 2019-08-09 RX ADMIN — EPHEDRINE SULFATE 10 MG: 50 INJECTION INTRAVENOUS at 16:02

## 2019-08-09 RX ADMIN — BUDESONIDE AND FORMOTEROL FUMARATE DIHYDRATE 2 PUFF: 160; 4.5 AEROSOL RESPIRATORY (INHALATION) at 11:11

## 2019-08-09 RX ADMIN — SODIUM CHLORIDE, PRESERVATIVE FREE 3 ML: 5 INJECTION INTRAVENOUS at 11:21

## 2019-08-09 RX ADMIN — PANTOPRAZOLE SODIUM 40 MG: 40 INJECTION, POWDER, LYOPHILIZED, FOR SOLUTION INTRAVENOUS at 09:22

## 2019-08-09 RX ADMIN — PROPOFOL 140 MG: 10 INJECTION, EMULSION INTRAVENOUS at 15:33

## 2019-08-09 RX ADMIN — LIDOCAINE HYDROCHLORIDE 1 EACH: 40 SOLUTION TOPICAL at 15:33

## 2019-08-09 RX ADMIN — DULOXETINE HYDROCHLORIDE 60 MG: 30 CAPSULE, DELAYED RELEASE ORAL at 19:00

## 2019-08-09 RX ADMIN — SODIUM CHLORIDE 100 ML/HR: 9 INJECTION, SOLUTION INTRAVENOUS at 11:20

## 2019-08-09 RX ADMIN — ACETAMINOPHEN 1000 MG: 500 TABLET, FILM COATED ORAL at 14:45

## 2019-08-09 RX ADMIN — SUCCINYLCHOLINE CHLORIDE 120 MG: 20 INJECTION, SOLUTION INTRAMUSCULAR; INTRAVENOUS at 15:33

## 2019-08-09 RX ADMIN — ZOLPIDEM TARTRATE 5 MG: 5 TABLET ORAL at 23:38

## 2019-08-09 RX ADMIN — LEVOFLOXACIN 500 MG: 5 INJECTION, SOLUTION INTRAVENOUS at 14:19

## 2019-08-09 RX ADMIN — ROCURONIUM BROMIDE 5 MG: 10 INJECTION INTRAVENOUS at 15:33

## 2019-08-09 RX ADMIN — SODIUM CHLORIDE, POTASSIUM CHLORIDE, SODIUM LACTATE AND CALCIUM CHLORIDE 100 ML/HR: 600; 310; 30; 20 INJECTION, SOLUTION INTRAVENOUS at 14:43

## 2019-08-09 RX ADMIN — ARIPIPRAZOLE 10 MG: 10 TABLET ORAL at 19:00

## 2019-08-09 RX ADMIN — EPHEDRINE SULFATE 10 MG: 50 INJECTION INTRAVENOUS at 15:44

## 2019-08-09 RX ADMIN — FENTANYL CITRATE 50 MCG: 50 INJECTION, SOLUTION INTRAMUSCULAR; INTRAVENOUS at 15:37

## 2019-08-09 RX ADMIN — TRAMADOL HYDROCHLORIDE 50 MG: 50 TABLET, FILM COATED ORAL at 06:25

## 2019-08-09 RX ADMIN — PANTOPRAZOLE SODIUM 40 MG: 40 INJECTION, POWDER, LYOPHILIZED, FOR SOLUTION INTRAVENOUS at 20:10

## 2019-08-09 NOTE — CONSULTS
Consults         Referring Provider: Corey  Reason for Consultation: Right Renal Mass    Chief complaint Hematemesis    Subjective .     History of present illness:  Renal Mass  Patient here for evaluation of renal mass.  The renal mass was found on CT.  The location of the mass is the right kidney.  The mass has been present for1 day.  The mass is described as infiltrative.  The size of the mass is not measured.  It would be classifed as a Bosniak NA.  The mass was found on evaluation of hematemesis.  Associated symptoms include no  related.    Review of Systems  The following systems were reviewed and negative;  eyes, ENT, respiratory, cardiovascular, integument, breast, hematologic / lymphatic, musculoskeletal, neurological, behavioral/psych, endocrine and allergies / immunologic     History  Past Medical History:   Diagnosis Date   • Anemia    • Anxiety    • Arthritis    • Cancer of skin     BCC OF NOSE   • Colitis    • COPD (chronic obstructive pulmonary disease) (CMS/HCC)    • Depression    • Fibromyalgia    • GERD (gastroesophageal reflux disease)    • Hx of colonic polyps    • Hypercholesteremia    • Hypertension    • Seizure (CMS/HCC)        Past Surgical History:   Procedure Laterality Date   • BREAST SURGERY     • CHOLECYSTECTOMY     • COLONOSCOPY  04/12/2017    Hemorrhoids and a few diverticula repeat exam in 3 years Dr. Cordero   • COLONOSCOPY N/A 5/2/2019    Procedure: COLONOSCOPY WITH ANESTHESIA;  Surgeon: Danis Garcia MD;  Location: [facility] ENDOSCOPY;  Service: Gastroenterology   • COLONOSCOPY W/ POLYPECTOMY  07/28/2016    Tubular adenoma proximal to the anus, 30 MM polyp in the cecum not resected, Diverticulosis ref to Dr. Cordero   • ENDOSCOPY  06/15/2016    Small hh, Negative for celiac disease   • ENDOSCOPY N/A 6/13/2019    Procedure: ESOPHAGOGASTRODUODENOSCOPY WITH ANESTHESIA;  Surgeon: Danis Garcia MD;  Location: [facility] ENDOSCOPY;  Service: Gastroenterology   • RADICAL HYSTERECTOMY     •  URETEROSCOPY Right 9/13/2018    Procedure: CYSTOSCOPY RIGHT RETROGRADE PYELOGRAM RIGHT URETEROSCOPY WITH BIOPSY OF RIGHT RENAL PELVIS LASER LITHOTRIPSY AND RIGHT URETERAL STENT INSERTION;  Surgeon: Yifan Greco MD;  Location: [facility];  Service: Urology       Family History   Problem Relation Age of Onset   • Diabetes Sister    • Cancer Brother    • Colon cancer Neg Hx    • Colon polyps Neg Hx        Social History     Socioeconomic History   • Marital status:      Spouse name: Not on file   • Number of children: Not on file   • Years of education: Not on file   • Highest education level: Not on file   Tobacco Use   • Smoking status: Current Every Day Smoker     Packs/day: 1.00     Types: Cigarettes   • Smokeless tobacco: Never Used   Substance and Sexual Activity   • Alcohol use: No   • Drug use: No   • Sexual activity: Defer       Current Facility-Administered Medications   Medication Dose Route Frequency Provider Last Rate Last Dose   • albuterol (PROVENTIL) nebulizer solution 0.083% 2.5 mg/3mL  2.5 mg Nebulization Q4H PRN Federico Nicole MD       • ARIPiprazole (ABILIFY) tablet 10 mg  10 mg Oral Daily Federico Nicole MD       • budesonide-formoterol (SYMBICORT) 160-4.5 MCG/ACT inhaler 2 puff  2 puff Inhalation BID - RT Federico Nicole MD   2 puff at 08/08/19 2225   • DULoxetine (CYMBALTA) DR capsule 60 mg  60 mg Oral Daily Federico Nicole MD       • nicotine (NICODERM CQ) 21 MG/24HR patch 1 patch  1 patch Transdermal Q24H Federico Nicole MD   1 patch at 08/08/19 2148   • ondansetron (ZOFRAN) injection 4 mg  4 mg Intravenous Q6H PRN Federico Nicole MD       • pantoprazole (PROTONIX) injection 40 mg  40 mg Intravenous Q12H Federico Nicole MD   40 mg at 08/08/19 2149   • sodium chloride 0.9 % flush 10 mL  10 mL Intravenous PRN Justyna Post APRN       • sodium chloride 0.9 % flush 3 mL  3 mL Intravenous Q12H Federico Nicole MD   3 mL at 08/08/19 2149   • sodium chloride 0.9 % flush  3-10 mL  3-10 mL Intravenous PRN Federico Nicole MD       • sodium chloride 0.9 % infusion  100 mL/hr Intravenous Continuous Shoulders, YOLY lFores   Stopped at 08/08/19 1806   • traMADol (ULTRAM) tablet 50 mg  50 mg Oral Q6H PRN Federico Nicole MD   50 mg at 08/09/19 0625        Allergies   Allergen Reactions   • Lyrica [Pregabalin] Swelling     LIPS AND FACE   • Morphine Itching   • Codeine Itching   • Penicillins Other (See Comments)     CHILDHOOD ALLERGY         Objective     Vital Signs   Temp:  [97.6 °F (36.4 °C)-98.3 °F (36.8 °C)] 98.1 °F (36.7 °C)  Heart Rate:  [] 91  Resp:  [16-18] 16  BP: (115-137)/(70-89) 137/89    Intake/Output Summary (Last 24 hours) at 8/9/2019 0644  Last data filed at 8/8/2019 1806  Gross per 24 hour   Intake 200 ml   Output --   Net 200 ml       Physical Exam:     General Appearance:    Alert, cooperative, in no acute distress   Head:    Normocephalic, without obvious abnormality, atraumatic   Eyes:            Lids and lashes normal, conjunctivae and sclerae normal, no   icterus, no pallor, corneas clear, PERRLA   Ears:    Ears appear intact with no abnormalities noted   Throat:   No oral lesions, no thrush, oral mucosa moist   Neck:   No adenopathy, supple, trachea midline, no thyromegaly, no   carotid bruit, no JVD   Back:     No kyphosis present, no scoliosis present, no skin lesions,      erythema or scars, no tenderness to percussion or                   palpation,   range of motion normal   Lungs:     Respirations unlabored    Heart:  Regular rate   Chest Wall:    No abnormalities observed   Abdomen:     No masses, no organomegaly, soft        non-tender, non-distended, no guarding, no rebound                Tenderness     Genitorurinary:   Deferred   Extremities:   Moves all extremities well, no edema, no cyanosis, no             redness   Pulses:   Pulses palpable and equal bilaterally   Skin:   No bleeding, bruising or rash   Lymph nodes:   No palpable  adenopathy   Neurologic:   Cranial nerves 2 - 12 grossly intact       Results Review:   I reviewed the patient's new clinical results.              Imaging Results (last 24 hours)     Procedure Component Value Units Date/Time    CT Abdomen Pelvis With Contrast [[id number]] Collected:  08/08/19 1447     Updated:  08/08/19 1513    Narrative:       CT ABDOMEN AND PELVIS WITH CONTRAST 8/8/2019 2:20 PM CDT     HISTORY: Epigastric pain and vomiting     COMPARISON: CT scan dated 08/30/2018.      DLP: 3 and 17 mGy cm     TECHNIQUE: Following the intravenous administration of contrast, helical  CT tomographic images of the abdomen and pelvis were acquired. Coronal  reformatted images were also provided for review.      FINDINGS:   The lung bases and base of the heart are unremarkable.      LIVER: No focal liver lesion. The hepatic vasculature is patent.      BILIARY SYSTEM: The gallbladder has been removed. There is no  intrahepatic or extrahepatic ductal dilation.      PANCREAS: No focal pancreatic lesion.      SPLEEN: Unremarkable.      KIDNEYS AND ADRENALS: Adrenal glands are unremarkable. There is a 5 mm  nonobstructing left upper pole renal stone with additional 6 mm left  lower pole stone. There is an infiltrative process which appears to be  involving the right renal sinus as well as a portion of the right  inferior renal pole. This has increased from the earlier comparison CT,  with near complete replacement of the sinus fat at the right inferior  pole. There appears to be heterogeneously enhancing/hypoenhancing  portions of the right inferior renal pole. The overall extent of this  process measures up to 4.8 cm (series 3-image 35). There are several  small nonobstructing calculi within right upper and right lower pole  calyces. The right renal pelvis is nondilated. The ureters are  nondilated.     RETROPERITONEUM: No enlarged aortocaval lymph node measuring 1.3 cm  short axis (series 2 image image 31). Slightly  prominent aortocaval node  just inferior to this measuring up to 0.9 cm short axis. There are 2  prominent retrocaval lymph nodes as well with one measuring 1.3 cm short  axis and a second measuring 0.9 cm short axis.     GI TRACT: Moderate hiatal hernia. The stomach is nondistended. Small  bowel is nondistended.  There appears to be a segment of circumferential  wall thickening along a solitary loop of small bowel in the low mid  abdomen (series 2-image 56 and series 3-image 25). Scattered colonic  diverticula. Submucosal fat deposition along the ascending colon. No  obvious wall thickening along the colon. Appendix is not well  visualized.     OTHER: No mesenteric adenopathy. No intraperitoneal free fluid or free  air. Dense atheromatous calcification along the abdominal aorta without  aneurysm formation. The osseous structures and soft tissues demonstrate  no worrisome lesions. Spinal scoliotic curvature.      PELVIS: No mass lesion, fluid collection or significant lymphadenopathy  is seen in the pelvis. The urinary bladder is normal in appearance.       Impression:       1. Enlarging infiltrative process involving the right renal sinus and  right inferior renal pole. This appears most likely to be an enhancing  and infiltrative soft tissue mass, either a transitional cell carcinoma  or perhaps infiltrative renal cell carcinoma. 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 This seems unlikely to reflect an acute  process such as pyelonephritis with adjacent reactive adenopathy.  2. Questionable circumferential wall thickening along the segment of  small bowel in the lower abdomen, perhaps a mild enteritis.  3. Colonic diverticulosis without acute diverticulitis. No inflammatory  wall thickening along the colon at this time.        This report was finalized on 08/08/2019 15:10 by Dr Marco Griggs, .        CT independent reivew    The CT scan of  the abdomen/pelvis done with and without contrast is available for me to review.  Treatment recommendations require an independent review.  First I scanned the liver, spleen, and bowel pattern.  The retroperitoneum including the major vessels and lymphatic packages are briefly reviewed.  This film as been reviewed by the radiologist to determine any non urologic abnormalities that are present.  The kidneys are closely inspected for size, symmetry, contour, parenchymal thickness, perinephric reaction, presence of calcifications, and intrarenal dilation of the collecting system.  The ureters are inspected for their course, caliber, and any calcifications.  The bladder is inspected for its thickness, size, and presence of any calcifications.  This scan shows:    The right kidney appears infiltrative mass with RP adenopathy    The left kidney appears normal on this contrasted CT scan.  The renal parenchymal is normal in thickness.  There are no solid masses or cysts.  There is no hydronephrosis.  There are no stones.      The bladder appears normal on this non-contrasted CT scan.  The bladder appears normal in thickness.  There no masses or stones seen on this exam.       Assessment/Plan       Renal mass    Hematemesis    Elevated liver enzymes      Right Renal Mass    Patient underwent right ureteroscopy laser litho-biopsy of renal pelvis September 2018.  The biopsy of her renal pelvis came back as inflammatory.  She presented with hematemesis recently and was shown to have a infiltrative mass concerning for infiltrative RCC versus transitional cell carcinoma.    GI work-up is pending.  I discussed options with her I think the best option is to go evaluate her collecting system with the ureteroscope today if there is anything we can biopsy we will biopsy.  Certainly I am concerned about the possibility of a renal cell carcinoma versus a transitional cell carcinoma.  I discussed that we need to find out whether this is  transitional cell carcinoma because that can change my surgical management.  If it is transitional cell carcinoma she would need a nephro ureterectomy.  If it is renal cell carcinoma then she needs a radical nephrectomy.  She voiced understanding of this and wishes to proceed with the plan as outlined above.    I discussed the patients findings and my recommendations with patient and nursing staff    Yifan Greco MD  08/09/19  6:44 AM

## 2019-08-09 NOTE — PAYOR COMM NOTE
"Heather Russo (63 y.o. Female)  Attn [id number]   [id number]     [facility] phone     Fax        Date of Birth Social Security Number Address Home Phone MRN    [id number]  [id number] [address] [address]  [place] [address] [phone] [id number]    Yarsanism Marital Status          [facility]        Admission Date Admission Type Admitting Provider Attending Provider Department, Room/Bed    8/8/19 Emergency Federico Nicole MD Truong, Khai C, MD [facility] 3C, 365/1    Discharge Date Discharge Disposition Discharge Destination                       Attending Provider:  Federico Nicole MD    Allergies:  Lyrica [Pregabalin], Morphine, Codeine, Penicillins    Isolation:  None   Infection:  None   Code Status:  CPR    Ht:  160 cm (63\")   Wt:  68 kg (150 lb)    Admission Cmt:  None   Principal Problem:  None                Active Insurance as of 8/8/2019     Primary Coverage     Payor Plan Insurance Group Employer/Plan Group    [employer] PPO [id number]     Payor Plan Address Payor Plan Phone Number Payor Plan Fax Number Effective Dates    [address] [phone]  1/1/2017 - None Entered    [place] [address]       Subscriber Name Subscriber Birth Date Member ID       FABBY RUSSO 7/6/1952 [id number]                 Emergency Contacts      (Rel.) Home Phone Work Phone Mobile Phone    KaranFabby (Spouse) [phone] -- [phone]    Jayla Manning (Daughter) -- -- [phone]               History & Physical      Federico Nicole MD at 8/8/2019  5:20 PM              [facility]  HISTORY AND PHYSICAL    Date of Admission: 8/8/2019  Primary Care Physician: Grzegorz Michel Jr., MD    Subjective     Chief Complaint: Hematemesis/fatty liver/kidney mass.    History of Present Illness   Pt is a 63-year-old presented ER with complaint of hematemesis.  " Patient has been vomiting blood started today.  One episode of hemoptysis.  Patient asking for food.  History of colitis seen Dr. Garcia outpatient.   CT scan abdomen pelvis shows renal mass.  Patient denies any fever night sweats chills.  Patient denies any abdomen pain.  Patient denies any black tarry stool or bright red blood.  Patient is asking to eat.    Patient has a past medical history anemia, anxiety, arthritis, skin cancer, colitis, COPD, depression, fibromyalgia, reflux, colon polyp, hypercholesterolemia, hypertension, seizure.    Review of Systems   Constitutional: Positive for activity change, appetite change and fatigue. Negative for chills and fever.   HENT: Negative for hearing loss, nosebleeds, tinnitus and trouble swallowing.    Eyes: Negative for visual disturbance.   Respiratory: Negative for cough, chest tightness, shortness of breath and wheezing.    Cardiovascular: Negative for chest pain, palpitations and leg swelling.   Gastrointestinal: Positive for nausea and vomiting. Negative for abdominal distention, abdominal pain, blood in stool, constipation and diarrhea.   Endocrine: Negative for cold intolerance, heat intolerance, polydipsia, polyphagia and polyuria.   Genitourinary: Negative for decreased urine volume, difficulty urinating, dysuria, flank pain, frequency and hematuria.   Musculoskeletal: Negative for arthralgias, joint swelling and myalgias.   Skin: Negative for rash.   Allergic/Immunologic: Negative for immunocompromised state.   Neurological: Positive for weakness. Negative for dizziness, syncope, light-headedness and headaches.   Hematological: Negative for adenopathy. Does not bruise/bleed easily.   Psychiatric/Behavioral: Negative for confusion and sleep disturbance. The patient is not nervous/anxious.         Otherwise complete ROS reviewed and negative except as mentioned in the HPI.      Past Medical History:   Past Medical History:   Diagnosis Date   • Anemia    •  Anxiety    • Arthritis    • Cancer of skin     BCC OF NOSE   • Colitis    • COPD (chronic obstructive pulmonary disease) (CMS/HCC)    • Depression    • Fibromyalgia    • GERD (gastroesophageal reflux disease)    • Hx of colonic polyps    • Hypercholesteremia    • Hypertension    • Seizure (CMS/HCC)        Past Surgical History:  Past Surgical History:   Procedure Laterality Date   • BREAST SURGERY     • CHOLECYSTECTOMY     • COLONOSCOPY  04/12/2017    Hemorrhoids and a few diverticula repeat exam in 3 years Dr. Cordero   • COLONOSCOPY N/A 5/2/2019    Procedure: COLONOSCOPY WITH ANESTHESIA;  Surgeon: Danis Garcia MD;  Location: [facility] ENDOSCOPY;  Service: Gastroenterology   • COLONOSCOPY W/ POLYPECTOMY  07/28/2016    Tubular adenoma proximal to the anus, 30 MM polyp in the cecum not resected, Diverticulosis ref to Dr. Cordero   • ENDOSCOPY  06/15/2016    Small hh, Negative for celiac disease   • ENDOSCOPY N/A 6/13/2019    Procedure: ESOPHAGOGASTRODUODENOSCOPY WITH ANESTHESIA;  Surgeon: Danis Garcia MD;  Location: [facility] ENDOSCOPY;  Service: Gastroenterology   • RADICAL HYSTERECTOMY     • URETEROSCOPY Right 9/13/2018    Procedure: CYSTOSCOPY RIGHT RETROGRADE PYELOGRAM RIGHT URETEROSCOPY WITH BIOPSY OF RIGHT RENAL PELVIS LASER LITHOTRIPSY AND RIGHT URETERAL STENT INSERTION;  Surgeon: Yifan Greco MD;  Location: [facility] OR;  Service: Urology       Family History: family history includes Cancer in her brother; Diabetes in her sister.    Social History:  reports that she has been smoking cigarettes.  She has been smoking about 1.00 pack per day. She has never used smokeless tobacco. She reports that she does not drink alcohol or use drugs.    Medications:  Prior to Admission medications    Medication Sig Start Date End Date Taking? Authorizing Provider   ARIPiprazole (ABILIFY) 10 MG tablet  9/19/16  Yes Provider, MD Perla   Bismuth Subsalicylate (PEPTO-BISMOL) 262 MG tablet Take  by mouth. 2 po qid   Yes  "ProviderPerla MD   cetirizine (zyrTEC) 10 MG tablet Take 10 mg by mouth Daily.   Yes Perla Bliss MD   colestipol (COLESTID) 1 g tablet TAKE 1 TABLET TWICE A DAY 4/29/19  Yes Becca Blackwood APRN   DULoxetine (CYMBALTA) 60 MG capsule  9/19/16  Yes Perla Bliss MD   omeprazole (priLOSEC) 40 MG capsule Take 40 mg by mouth 2 (Two) Times a Day.   Yes Perla Bliss MD   PROAIR  (90 BASE) MCG/ACT inhaler USE TWO PUFFS BY MOUTH EVERY 4 TO 6 HOURS AS NEEDED FOR WHEEZING 9/19/16  Yes Perla Bliss MD   SYMBICORT 160-4.5 MCG/ACT inhaler  8/1/16  Yes Perla Bliss MD   famotidine (PEPCID) 20 MG tablet Take 2 tablets by mouth 2 (Two) Times a Day for 90 days. 5/21/19 8/19/19  Danis Garcia MD   HYDROcodone-acetaminophen (NORCO) 7.5-325 MG per tablet Take 1-2 tablets by mouth Every 4 (Four) Hours As Needed for Moderate Pain  (Pain). 9/13/18   Yifan Greco MD   metoprolol tartrate (LOPRESSOR) 50 MG tablet Take 100 mg by mouth Every Night.    ProviderPerla MD     Allergies:  Allergies   Allergen Reactions   • Lyrica [Pregabalin] Swelling     LIPS AND FACE   • Morphine Itching   • Codeine Itching   • Penicillins Other (See Comments)     CHILDHOOD ALLERGY         Objective     Vital Signs: /84   Pulse 103   Temp 97.9 °F (36.6 °C) (Oral)   Resp 16   Ht 160 cm (63\")   Wt 68 kg (150 lb)   SpO2 95%   BMI 26.57 kg/m²    Physical Exam   Constitutional: She is oriented to person, place, and time. She appears well-developed and well-nourished.   HENT:   Head: Normocephalic and atraumatic.   Eyes: Conjunctivae and EOM are normal. Pupils are equal, round, and reactive to light.   Neck: Neck supple. No JVD present. No thyromegaly present.   Cardiovascular: Normal rate, regular rhythm, normal heart sounds and intact distal pulses. Exam reveals no gallop and no friction rub.   No murmur heard.  Pulmonary/Chest: Effort normal and breath sounds normal. " No respiratory distress. She has no wheezes. She has no rales. She exhibits no tenderness.   Diminished breath sound bilateral, clear.   Abdominal: Soft. Bowel sounds are normal. She exhibits no distension. There is no tenderness. There is no rebound and no guarding.   Musculoskeletal: Normal range of motion. She exhibits no edema, tenderness or deformity.   Lymphadenopathy:     She has no cervical adenopathy.   Neurological: She is alert and oriented to person, place, and time. She displays normal reflexes. No cranial nerve deficit. She exhibits normal muscle tone.   Skin: Skin is warm and dry. Capillary refill takes 2 to 3 seconds. No rash noted.   Psychiatric: She has a normal mood and affect. Her behavior is normal. Judgment and thought content normal.   Nursing note and vitals reviewed.          Results Reviewed:    Lab Results (last 24 hours)     Procedure Component Value Units Date/Time    POC Occult Blood Stool [[id number]]  (Normal) Collected:  08/08/19 1655    Specimen:  Stool Updated:  08/08/19 1657     Fecal Occult Blood Negative     Lot Number 147     Expiration Date 12/31/2019     DEVELOPER LOT NUMBER 147     DEVELOPER EXPIRATION DATE 12/31/2019     Positive Control Positive     Negative Control Negative    POC Occult Blood Stool [[id number]]  (Normal) Collected:  08/08/19 1649    Specimen:  Stool Updated:  08/08/19 1650     Fecal Occult Blood Negative     Lot Number 147     Expiration Date 12,312,019     DEVELOPER LOT NUMBER 147     DEVELOPER EXPIRATION DATE 12,312,019     Positive Control Positive     Negative Control Negative    [place] Draw [[id number]] Collected:  08/08/19 1327    Specimen:  Blood Updated:  08/08/19 1431    Narrative:       The following orders were created for panel order [place] Draw.  Procedure                               Abnormality         Status                     ---------                               -----------         ------                     Light Blue Top[[id number]]                                    Final result               Green Top (Gel)[[id number]]                                  Final result               Lavender Top[[id number]]                                     Final result               Red Top[[id number]]                                          Final result                 Please view results for these tests on the individual orders.    Light Blue Top [[id number]] Collected:  08/08/19 1328    Specimen:  Blood Updated:  08/08/19 1431     Extra Tube hold for add-on     Comment: Auto resulted       Green Top (Gel) [[id number]] Collected:  08/08/19 1327    Specimen:  Blood Updated:  08/08/19 1431     Extra Tube Hold for add-ons.     Comment: Auto resulted.       Lavender Top [[id number]] Collected:  08/08/19 1327    Specimen:  Blood Updated:  08/08/19 1431     Extra Tube hold for add-on     Comment: Auto resulted       Red Top [[id number]] Collected:  08/08/19 1327    Specimen:  Blood Updated:  08/08/19 1431     Extra Tube Hold for add-ons.     Comment: Auto resulted.       [place] Draw [[id number]] Collected:  08/08/19 1327    Specimen:  Blood Updated:  08/08/19 1431    Narrative:       The following orders were created for panel order [place] Draw.  Procedure                               Abnormality         Status                     ---------                               -----------         ------                     Light Blue Top[[id number]]                                   Final result               Green Top (Gel)[[id number]]                                  Final result               Lavender Top[[id number]]                                     Final result               Red Top[[id number]]                                          Final result                 Please view results for these tests on the individual orders.    Light Blue Top [[id number]] Collected:  08/08/19 1328    Specimen:  Blood Updated:  08/08/19 1431     Extra Tube hold for add-on     Comment: Auto  resulted       Green Top (Gel) [[id number]] Collected:  08/08/19 1327    Specimen:  Blood Updated:  08/08/19 1431     Extra Tube Hold for add-ons.     Comment: Auto resulted.       Lavender Top [[id number]] Collected:  08/08/19 1327    Specimen:  Blood Updated:  08/08/19 1431     Extra Tube hold for add-on     Comment: Auto resulted       Red Top [[id number]] Collected:  08/08/19 1327    Specimen:  Blood Updated:  08/08/19 1431     Extra Tube Hold for add-ons.     Comment: Auto resulted.       Lipase [[id number]]  (Normal) Collected:  08/08/19 1327    Specimen:  Blood Updated:  08/08/19 1359     Lipase 42 U/L     Amylase [[id number]]  (Normal) Collected:  08/08/19 1327    Specimen:  Blood Updated:  08/08/19 1359     Amylase 87 U/L     Comprehensive Metabolic Panel [[id number]]  (Abnormal) Collected:  08/08/19 1327    Specimen:  Blood Updated:  08/08/19 1358     Glucose 117 mg/dL      BUN 15 mg/dL      Creatinine 0.56 mg/dL      Sodium 139 mmol/L      Potassium 4.2 mmol/L      Chloride 112 mmol/L      CO2 19.0 mmol/L      Calcium 9.8 mg/dL      Total Protein 7.7 g/dL      Albumin 4.10 g/dL      ALT (SGPT) 436 U/L      AST (SGOT) 253 U/L      Alkaline Phosphatase 193 U/L      Total Bilirubin 0.4 mg/dL      eGFR Non African Amer 110 mL/min/1.73      Globulin 3.6 gm/dL      A/G Ratio 1.1 g/dL      BUN/Creatinine Ratio 26.8     Anion Gap 8.0 mmol/L     Narrative:       GFR Normal >60  Chronic Kidney Disease <60  Kidney Failure <15    Protime-INR [[id number]]  (Normal) Collected:  08/08/19 1328    Specimen:  Blood Updated:  08/08/19 1357     Protime 13.4 Seconds      INR 0.99    aPTT [[id number]]  (Abnormal) Collected:  08/08/19 1328    Specimen:  Blood Updated:  08/08/19 1357     PTT 41.4 seconds     Lactic Acid, Plasma [[id number]]  (Normal) Collected:  08/08/19 1327    Specimen:  Blood Updated:  08/08/19 1355     Lactate 0.9 mmol/L     Urinalysis With Culture If Indicated - Urine, Clean Catch [[id number]]  (Abnormal) Collected:   08/08/19 1254    Specimen:  Urine, Clean Catch Updated:  08/08/19 1352     Color, UA Yellow     Appearance, UA Clear     pH, UA <=5.0     Specific Gravity, UA 1.022     Glucose, UA Negative     Ketones, UA Negative     Bilirubin, UA Negative     Blood, UA Negative     Protein, UA Negative     Leuk Esterase, UA Trace     Nitrite, UA Negative     Urobilinogen, UA 0.2 E.U./dL    Urinalysis, Microscopic Only - Urine, Clean Catch [[id number]]  (Abnormal) Collected:  08/08/19 1254    Specimen:  Urine, Clean Catch Updated:  08/08/19 1352     RBC, UA 3-5 /HPF      WBC, UA 3-5 /HPF      Bacteria, UA None Seen /HPF      Squamous Epithelial Cells, UA 0-2 /HPF      Hyaline Casts, UA 3-6 /LPF      Methodology Automated Microscopy    CBC & Differential [[id number]] Collected:  08/08/19 1327    Specimen:  Blood Updated:  08/08/19 1350    Narrative:       The following orders were created for panel order CBC & Differential.  Procedure                               Abnormality         Status                     ---------                               -----------         ------                     CBC Auto Differential[[id number]]        Abnormal            Final result                 Please view results for these tests on the individual orders.    CBC Auto Differential [[id number]]  (Abnormal) Collected:  08/08/19 1327    Specimen:  Blood Updated:  08/08/19 1350     WBC 8.66 10*3/mm3      RBC 4.48 10*6/mm3      Hemoglobin 12.5 g/dL      Hematocrit 38.8 %      MCV 86.6 fL      MCH 27.9 pg      MCHC 32.2 g/dL      RDW 18.3 %      RDW-SD 57.6 fl      MPV 10.4 fL      Platelets 214 10*3/mm3      Neutrophil % 75.9 %      Lymphocyte % 10.0 %      Monocyte % 11.3 %      Eosinophil % 0.7 %      Basophil % 0.6 %      Immature Grans % 1.5 %      Neutrophils, Absolute 6.57 10*3/mm3      Lymphocytes, Absolute 0.87 10*3/mm3      Monocytes, Absolute 0.98 10*3/mm3      Eosinophils, Absolute 0.06 10*3/mm3      Basophils, Absolute 0.05 10*3/mm3       Immature Grans, Absolute 0.13 10*3/mm3      nRBC 0.0 /100 WBC            Radiology Data:    Imaging Results (last 24 hours)     Procedure Component Value Units Date/Time    CT Abdomen Pelvis With Contrast [[id number]] Collected:  08/08/19 1447     Updated:  08/08/19 1513    Narrative:       CT ABDOMEN AND PELVIS WITH CONTRAST 8/8/2019 2:20 PM CDT     HISTORY: Epigastric pain and vomiting     COMPARISON: CT scan dated 08/30/2018.      DLP: 3 and 17 mGy cm     TECHNIQUE: Following the intravenous administration of contrast, helical  CT tomographic images of the abdomen and pelvis were acquired. Coronal  reformatted images were also provided for review.      FINDINGS:   The lung bases and base of the heart are unremarkable.      LIVER: No focal liver lesion. The hepatic vasculature is patent.      BILIARY SYSTEM: The gallbladder has been removed. There is no  intrahepatic or extrahepatic ductal dilation.      PANCREAS: No focal pancreatic lesion.      SPLEEN: Unremarkable.      KIDNEYS AND ADRENALS: Adrenal glands are unremarkable. There is a 5 mm  nonobstructing left upper pole renal stone with additional 6 mm left  lower pole stone. There is an infiltrative process which appears to be  involving the right renal sinus as well as a portion of the right  inferior renal pole. This has increased from the earlier comparison CT,  with near complete replacement of the sinus fat at the right inferior  pole. There appears to be heterogeneously enhancing/hypoenhancing  portions of the right inferior renal pole. The overall extent of this  process measures up to 4.8 cm (series 3-image 35). There are several  small nonobstructing calculi within right upper and right lower pole  calyces. The right renal pelvis is nondilated. The ureters are  nondilated.     RETROPERITONEUM: No enlarged aortocaval lymph node measuring 1.3 cm  short axis (series 2 image image 31). Slightly prominent aortocaval node  just inferior to this measuring up  to 0.9 cm short axis. There are 2  prominent retrocaval lymph nodes as well with one measuring 1.3 cm short  axis and a second measuring 0.9 cm short axis.     GI TRACT: Moderate hiatal hernia. The stomach is nondistended. Small  bowel is nondistended.  There appears to be a segment of circumferential  wall thickening along a solitary loop of small bowel in the low mid  abdomen (series 2-image 56 and series 3-image 25). Scattered colonic  diverticula. Submucosal fat deposition along the ascending colon. No  obvious wall thickening along the colon. Appendix is not well  visualized.     OTHER: No mesenteric adenopathy. No intraperitoneal free fluid or free  air. Dense atheromatous calcification along the abdominal aorta without  aneurysm formation. The osseous structures and soft tissues demonstrate  no worrisome lesions. Spinal scoliotic curvature.      PELVIS: No mass lesion, fluid collection or significant lymphadenopathy  is seen in the pelvis. The urinary bladder is normal in appearance.       Impression:       1. Enlarging infiltrative process involving the right renal sinus and  right inferior renal pole. This appears most likely to be an enhancing  and infiltrative soft tissue mass, either a transitional cell carcinoma  or perhaps infiltrative renal cell carcinoma. 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 This seems unlikely to reflect an acute  process such as pyelonephritis with adjacent reactive adenopathy.  2. Questionable circumferential wall thickening along the segment of  small bowel in the lower abdomen, perhaps a mild enteritis.  3. Colonic diverticulosis without acute diverticulitis. No inflammatory  wall thickening along the colon at this time.        This report was finalized on 08/08/2019 15:10 by Dr Marco Griggs, .          I have personally reviewed and interpreted the radiology studies and ECG obtained at time of  admission.     Assessment / Plan      Assessment & Plan  Active Hospital Problems    Diagnosis   • Hematemesis   • Elevated liver enzymes   • Renal mass     Added automatically from request for surgery [id number]       Plans    Hematemesis.  History of varices.  Consult GI.  Dr. Garcia.  Hemoccult negative.  Protonix drip.  Zofran as needed.    Fatty liver.  Elevated liver enzymes.  Acute hepatitis panel.  Ultrasound the liver.  Scan abdomen pelvic- enlarging infiltrative process involving the right renal sinus and right inferior renal pole-likely to be enhancing and infiltrating soft tissue mass- transitional cell carcinoma versus infiltrate renal cell carcinoma, new adjacent retroperitoneal adenopathy-concerning metastasis, circumferential wall thickening along the small bowel of the lower abdomen-mild enteritis, colonic diverticulosis.    Kidney mass.  Consult Dr. Greco.    Chronic smoker.  Discussed patient cutting back and stopping.  Nicotine patch for now.    COPD.  Continue albuterol inhaler as needed.  Continue Symbicort.    History of colitis.  Follow with Dr. Meneses outpatient    Chronic pain.  Continue Norco PRN.    Hypertension/hyperlipidemia.  Continue Lopressor.  Continue Colestid.     Anxiety/depression.  Continue Abilify.  Cont Cymbalta.    Nutrition.  Clear liquid diet for now.  N.p.o. after midnight.    Code Status: full code     I discussed the patient's findings and my recommendations with: patient    Estimated length of stay: 2-4 days.     Federico Nicole MD   08/08/19   5:48 PM              Electronically signed by Federico Nicole MD at 8/8/2019  5:49 PM          Emergency Department Notes      Bernabe Farfan, RN at 8/8/2019 12:21 PM        Justyna FREDERICK and Lefty RN at bedside to assess pt     Bernabe Farfan RN  08/08/19 1221      Electronically signed by Bernabe Farfan RN at 8/8/2019 12:21 PM     Bernabe Farfan RN at 8/8/2019  1:07 PM        Attempt x 2 to start IV  unsuccessful per Bernabe Kwon RN, RN  08/08/19 1309      Electronically signed by Bernabe Farfan RN at 8/8/2019  1:09 PM     Bernabe Farfan RN at 8/8/2019  4:22 PM        Pt updated results are final and Dagoberto BERRIOS to discuss.  No other concerns at this time     Bernabe Farfan RN  08/08/19 1633      Electronically signed by Bernabe Farfan RN at 8/8/2019  4:33 PM     Dagoberto Ghosh PA-C at 8/8/2019  5:14 PM     Attestation signed by Jolly Dalton MD at 8/8/2019  5:20 PM          For this patient encounter, I reviewed the NP or PA documentation, treatment plan, and medical decision making. Jolly Dalton MD 8/8/2019 5:20 PM                  Subjective   History of Present Illness    Review of Systems    Past Medical History:   Diagnosis Date   • Anemia    • Anxiety    • Arthritis    • Cancer of skin     BCC OF NOSE   • Colitis    • COPD (chronic obstructive pulmonary disease) (CMS/HCC)    • Depression    • Fibromyalgia    • GERD (gastroesophageal reflux disease)    • Hx of colonic polyps    • Hypercholesteremia    • Hypertension    • Seizure (CMS/HCC)        Allergies   Allergen Reactions   • Lyrica [Pregabalin] Swelling     LIPS AND FACE   • Morphine Itching   • Codeine Itching   • Penicillins Other (See Comments)     CHILDHOOD ALLERGY         Past Surgical History:   Procedure Laterality Date   • BREAST SURGERY     • CHOLECYSTECTOMY     • COLONOSCOPY  04/12/2017    Hemorrhoids and a few diverticula repeat exam in 3 years Dr. Cordero   • COLONOSCOPY N/A 5/2/2019    Procedure: COLONOSCOPY WITH ANESTHESIA;  Surgeon: Danis Garcia MD;  Location: [facility] ENDOSCOPY;  Service: Gastroenterology   • COLONOSCOPY W/ POLYPECTOMY  07/28/2016    Tubular adenoma proximal to the anus, 30 MM polyp in the cecum not resected, Diverticulosis ref to Dr. Cordero   • ENDOSCOPY  06/15/2016    Small hh, Negative for celiac disease   • ENDOSCOPY N/A 6/13/2019    Procedure:  ESOPHAGOGASTRODUODENOSCOPY WITH ANESTHESIA;  Surgeon: Danis Garcia MD;  Location: [facility] ENDOSCOPY;  Service: Gastroenterology   • RADICAL HYSTERECTOMY     • URETEROSCOPY Right 9/13/2018    Procedure: CYSTOSCOPY RIGHT RETROGRADE PYELOGRAM RIGHT URETEROSCOPY WITH BIOPSY OF RIGHT RENAL PELVIS LASER LITHOTRIPSY AND RIGHT URETERAL STENT INSERTION;  Surgeon: Yifan Greco MD;  Location: [facility] OR;  Service: Urology       Family History   Problem Relation Age of Onset   • Diabetes Sister    • Cancer Brother    • Colon cancer Neg Hx    • Colon polyps Neg Hx        Social History     Socioeconomic History   • Marital status:      Spouse name: Not on file   • Number of children: Not on file   • Years of education: Not on file   • Highest education level: Not on file   Tobacco Use   • Smoking status: Current Every Day Smoker     Packs/day: 1.00     Types: Cigarettes   • Smokeless tobacco: Never Used   Substance and Sexual Activity   • Alcohol use: No   • Drug use: No   • Sexual activity: Defer           Objective   Physical Exam    Procedures          ED Course  ED Course as of Aug 08 1715   Thu Aug 08, 2019   1503 Transfer of care to Dagoberto MARTINEZ  [KS]      ED Course User Index  [KS] Justyna Post APRN        Labs Reviewed   COMPREHENSIVE METABOLIC PANEL - Abnormal; Notable for the following components:       Result Value    Glucose 117 (*)     Chloride 112 (*)     CO2 19.0 (*)     ALT (SGPT) 436 (*)     AST (SGOT) 253 (*)     Alkaline Phosphatase 193 (*)     BUN/Creatinine Ratio 26.8 (*)     All other components within normal limits    Narrative:     GFR Normal >60  Chronic Kidney Disease <60  Kidney Failure <15   APTT - Abnormal; Notable for the following components:    PTT 41.4 (*)     All other components within normal limits   URINALYSIS W/ CULTURE IF INDICATED - Abnormal; Notable for the following components:    Leuk Esterase, UA Trace (*)     All other components within normal limits    CBC WITH AUTO DIFFERENTIAL - Abnormal; Notable for the following components:    RDW 18.3 (*)     RDW-SD 57.6 (*)     Lymphocyte % 10.0 (*)     Immature Grans % 1.5 (*)     Monocytes, Absolute 0.98 (*)     Immature Grans, Absolute 0.13 (*)     All other components within normal limits   URINALYSIS, MICROSCOPIC ONLY - Abnormal; Notable for the following components:    RBC, UA 3-5 (*)     WBC, UA 3-5 (*)     All other components within normal limits   PROTIME-INR - Normal   LIPASE - Normal   AMYLASE - Normal   LACTIC ACID, PLASMA - Normal   POCT OCCULT BLOOD STOOL - Normal   POCT OCCULT BLOOD STOOL - Normal   RAINBOW DRAW    Narrative:     The following orders were created for panel order [facility] Draw.  Procedure                               Abnormality         Status                     ---------                               -----------         ------                     Light Blue Top[[id number]]                                   Final result               Green Top (Gel)[[id number]]                                  Final result               Lavender Top[[id number]]                                     Final result               Red Top[[id number]]                                          Final result                 Please view results for these tests on the individual orders.   RAINBOW DRAW    Narrative:     The following orders were created for panel order [facility].  Procedure                               Abnormality         Status                     ---------                               -----------         ------                     Light Blue Top[[id number]]                                   Final result               Green Top (Gel)[[id number]]                                  Final result               Lavender Top[[id number]]                                     Final result               Red Top[[id number]]                                          Final result                 Please view results for these  tests on the individual orders.   CBC AND DIFFERENTIAL    Narrative:     The following orders were created for panel order CBC & Differential.  Procedure                               Abnormality         Status                     ---------                               -----------         ------                     CBC Auto Differential[[id number]]        Abnormal            Final result                 Please view results for these tests on the individual orders.   LIGHT BLUE TOP   GREEN TOP   LAVENDER TOP   RED TOP   LIGHT BLUE TOP   GREEN TOP   LAVENDER TOP   RED TOP     CT Abdomen Pelvis With Contrast   Final Result   1. Enlarging infiltrative process involving the right renal sinus and   right inferior renal pole. This appears most likely to be an enhancing   and infiltrative soft tissue mass, either a transitional cell carcinoma   or perhaps infiltrative renal cell carcinoma. New adjacent   retroperitoneal (retrocaval and aortocaval) adenopathy which is   concerning for sophia metastases. This seems unlikely to reflect an acute   process such as pyelonephritis with adjacent reactive adenopathy.   2. Questionable circumferential wall thickening along the segment of   small bowel in the lower abdomen, perhaps a mild enteritis.   3. Colonic diverticulosis without acute diverticulitis. No inflammatory   wall thickening along the colon at this time.           This report was finalized on 08/08/2019 15:10 by Dr Marco Griggs, .                  MDM  Number of Diagnoses or Management Options  Diagnosis management comments: Infiltrative kidney mass, hematemesis, elevated LFTs        Amount and/or Complexity of Data Reviewed  Clinical lab tests: reviewed and ordered  Tests in the radiology section of CPT®:  reviewed and ordered  Tests in the medicine section of CPT®:  ordered and reviewed  Decide to obtain previous medical records or to obtain history from someone other than the patient: yes    Risk of Complications,  Morbidity, and/or Mortality  Presenting problems: moderate  Diagnostic procedures: moderate  Management options: moderate    Patient Progress  Patient progress: stable        Final diagnoses:   Hematemesis, presence of nausea not specified   Kidney mass   Transaminitis            Dagoberto Ghosh PA-C  08/08/19 171      Electronically signed by Jolly Dalton MD at 8/8/2019  5:20 PM     Bernabe Farfan, RN at 8/8/2019  5:52 PM        Ok for sandwich and sprite per Dagoberto BERRIOS.     Bernabe Farfan RN  08/08/19 1754      Electronically signed by Bernabe Farfan RN at 8/8/2019  5:52 PM       Hospital Medications (active)       Dose Frequency Start End    albuterol (PROVENTIL) nebulizer solution 0.083% 2.5 mg/3mL 2.5 mg Every 4 Hours PRN 8/8/2019     Sig - Route: Take 2.5 mg by nebulization Every 4 (Four) Hours As Needed for Wheezing. - Nebulization    ARIPiprazole (ABILIFY) tablet 10 mg 10 mg Daily 8/9/2019     Sig - Route: Take 1 tablet by mouth Daily. - Oral    budesonide-formoterol (SYMBICORT) 160-4.5 MCG/ACT inhaler 2 puff 2 puff 2 Times Daily - RT 8/8/2019     Sig - Route: Inhale 2 puffs 2 (Two) Times a Day. - Inhalation    butalbital-acetaminophen-caffeine (FIORICET, ESGIC) -40 MG per tablet 1 tablet 1 tablet Every 4 Hours PRN 8/9/2019     Sig - Route: Take 1 tablet by mouth Every 4 (Four) Hours As Needed for Headache. - Oral    DULoxetine (CYMBALTA) DR capsule 60 mg 60 mg Daily 8/9/2019     Sig - Route: Take 2 capsules by mouth Daily. - Oral    iopamidol (ISOVUE-300) 61 % injection 100 mL 100 mL Once in Imaging 8/8/2019 8/8/2019    Sig - Route: Infuse 100 mL into a venous catheter Once. - Intravenous    levoFLOXacin (LEVAQUIN) 500 mg/100 mL D5W (premix) (LEVAQUIN) 500 mg 500 mg Once 8/9/2019     Sig - Route: Infuse 100 mL into a venous catheter 1 (One) Time. - Intravenous    nicotine (NICODERM CQ) 21 MG/24HR patch 1 patch 1 patch Every 24 Hours 8/8/2019     Sig - Route: Place 1  "patch on the skin as directed by provider Daily. - Transdermal    ondansetron (ZOFRAN) injection 4 mg 4 mg Every 6 Hours PRN 8/8/2019     Sig - Route: Infuse 2 mL into a venous catheter Every 6 (Six) Hours As Needed for Nausea or Vomiting. - Intravenous    pantoprazole (PROTONIX) injection 40 mg 40 mg Every 12 Hours Scheduled 8/8/2019     Sig - Route: Infuse 10 mL into a venous catheter Every 12 (Twelve) Hours. - Intravenous    pantoprazole (PROTONIX) injection 80 mg 80 mg Once 8/8/2019 8/8/2019    Sig - Route: Infuse 20 mL into a venous catheter 1 (One) Time. - Intravenous    Cosign for Ordering: Accepted by Jolly Dalton MD on 8/8/2019  5:19 PM    sodium chloride 0.9 % flush 10 mL 10 mL As Needed 8/8/2019     Sig - Route: Infuse 10 mL into a venous catheter As Needed for Line Care. - Intravenous    Cosign for Ordering: Accepted by Jolly Dalton MD on 8/8/2019  5:19 PM    Linked Group 1:  \"And\" Linked Group Details        sodium chloride 0.9 % flush 3 mL 3 mL Every 12 Hours Scheduled 8/8/2019     Sig - Route: Infuse 3 mL into a venous catheter Every 12 (Twelve) Hours. - Intravenous    sodium chloride 0.9 % flush 3-10 mL 3-10 mL As Needed 8/8/2019     Sig - Route: Infuse 3-10 mL into a venous catheter As Needed for Line Care. - Intravenous    sodium chloride 0.9 % infusion 100 mL/hr Continuous 8/8/2019     Sig - Route: Infuse 100 mL/hr into a venous catheter Continuous. - Intravenous    Cosign for Ordering: Accepted by Jolly Dalton MD on 8/8/2019  5:19 PM    sodium chloride 0.9 % infusion 100 mL/hr Continuous 8/9/2019     Sig - Route: Infuse 100 mL/hr into a venous catheter Continuous. - Intravenous    traMADol (ULTRAM) tablet 50 mg 50 mg Every 6 Hours PRN 8/8/2019 8/18/2019    Sig - Route: Take 1 tablet by mouth Every 6 (Six) Hours As Needed for Moderate Pain . - Oral    zolpidem (AMBIEN) tablet 5 mg 5 mg Once 8/8/2019 8/8/2019    Sig - Route: Take 1 tablet by mouth 1 (One) Time. - Oral    metoprolol " tartrate (LOPRESSOR) tablet 100 mg (Discontinued) 100 mg Nightly 8/8/2019 8/8/2019    Sig - Route: Take 1 tablet by mouth Every Night. - Oral    Reason for Discontinue: Non-compliance    pantoprazole (PROTONIX) 40 mg/100 mL (0.4 mg/mL) in 0.9% NS IVPB (Discontinued) 8 mg/hr Continuous 8/8/2019 8/8/2019    Sig - Route: Infuse 8 mg/hr into a venous catheter Continuous. - Intravenous    Reason for Discontinue: Alternate therapy    pantoprazole (PROTONIX) injection 80 mg (Discontinued) 80 mg Once 8/8/2019 8/8/2019    Sig - Route: Infuse 20 mL into a venous catheter 1 (One) Time. - Intravenous    Reason for Discontinue: Dose adjustment             Consult Notes (last 24 hours) (Notes from 8/8/2019  9:35 AM through 8/9/2019  9:35 AM)      Yifan Greco MD at 8/9/2019  6:44 AM          Consults         Referring Provider: Nicole  Reason for Consultation: Right Renal Mass    Chief complaint Hematemesis    Subjective .     History of present illness:  Renal Mass  Patient here for evaluation of renal mass.  The renal mass was found on CT.  The location of the mass is the right kidney.  The mass has been present for1 day.  The mass is described as infiltrative.  The size of the mass is not measured.  It would be classifed as a Bosniak NA.  The mass was found on evaluation of hematemesis.  Associated symptoms include no  related.    Review of Systems  The following systems were reviewed and negative;  eyes, ENT, respiratory, cardiovascular, integument, breast, hematologic / lymphatic, musculoskeletal, neurological, behavioral/psych, endocrine and allergies / immunologic     History  Past Medical History:   Diagnosis Date   • Anemia    • Anxiety    • Arthritis    • Cancer of skin     BCC OF NOSE   • Colitis    • COPD (chronic obstructive pulmonary disease) (CMS/HCC)    • Depression    • Fibromyalgia    • GERD (gastroesophageal reflux disease)    • Hx of colonic polyps    • Hypercholesteremia    • Hypertension    •  Seizure (CMS/HCC)        Past Surgical History:   Procedure Laterality Date   • BREAST SURGERY     • CHOLECYSTECTOMY     • COLONOSCOPY  04/12/2017    Hemorrhoids and a few diverticula repeat exam in 3 years Dr. Cordero   • COLONOSCOPY N/A 5/2/2019    Procedure: COLONOSCOPY WITH ANESTHESIA;  Surgeon: Danis Garcia MD;  Location: [facility] ENDOSCOPY;  Service: Gastroenterology   • COLONOSCOPY W/ POLYPECTOMY  07/28/2016    Tubular adenoma proximal to the anus, 30 MM polyp in the cecum not resected, Diverticulosis ref to Dr. Cordero   • ENDOSCOPY  06/15/2016    Small hh, Negative for celiac disease   • ENDOSCOPY N/A 6/13/2019    Procedure: ESOPHAGOGASTRODUODENOSCOPY WITH ANESTHESIA;  Surgeon: Danis Garcia MD;  Location: [facility] ENDOSCOPY;  Service: Gastroenterology   • RADICAL HYSTERECTOMY     • URETEROSCOPY Right 9/13/2018    Procedure: CYSTOSCOPY RIGHT RETROGRADE PYELOGRAM RIGHT URETEROSCOPY WITH BIOPSY OF RIGHT RENAL PELVIS LASER LITHOTRIPSY AND RIGHT URETERAL STENT INSERTION;  Surgeon: Yifan Greco MD;  Location: [facility] OR;  Service: Urology       Family History   Problem Relation Age of Onset   • Diabetes Sister    • Cancer Brother    • Colon cancer Neg Hx    • Colon polyps Neg Hx        Social History     Socioeconomic History   • Marital status:      Spouse name: Not on file   • Number of children: Not on file   • Years of education: Not on file   • Highest education level: Not on file   Tobacco Use   • Smoking status: Current Every Day Smoker     Packs/day: 1.00     Types: Cigarettes   • Smokeless tobacco: Never Used   Substance and Sexual Activity   • Alcohol use: No   • Drug use: No   • Sexual activity: Defer       Current Facility-Administered Medications   Medication Dose Route Frequency Provider Last Rate Last Dose   • albuterol (PROVENTIL) nebulizer solution 0.083% 2.5 mg/3mL  2.5 mg Nebulization Q4H PRN Federico Nicole MD       • ARIPiprazole (ABILIFY) tablet 10 mg  10 mg Oral Daily Corey  Federico SANDERS MD       • budesonide-formoterol (SYMBICORT) 160-4.5 MCG/ACT inhaler 2 puff  2 puff Inhalation BID - RT Federico Nicole MD   2 puff at 08/08/19 2225   • DULoxetine (CYMBALTA) DR capsule 60 mg  60 mg Oral Daily Federico Nicole MD       • nicotine (NICODERM CQ) 21 MG/24HR patch 1 patch  1 patch Transdermal Q24H Federico Nicole MD   1 patch at 08/08/19 2148   • ondansetron (ZOFRAN) injection 4 mg  4 mg Intravenous Q6H PRN Federico Nicole MD       • pantoprazole (PROTONIX) injection 40 mg  40 mg Intravenous Q12H Federico Nicole MD   40 mg at 08/08/19 2149   • sodium chloride 0.9 % flush 10 mL  10 mL Intravenous PRN Shoulders, Mashaee Marry, APRN       • sodium chloride 0.9 % flush 3 mL  3 mL Intravenous Q12H Federico Nicole MD   3 mL at 08/08/19 2149   • sodium chloride 0.9 % flush 3-10 mL  3-10 mL Intravenous PRN Federico Nicole MD       • sodium chloride 0.9 % infusion  100 mL/hr Intravenous Continuous Shoulders, Kristee Croft, APRN   Stopped at 08/08/19 1806   • traMADol (ULTRAM) tablet 50 mg  50 mg Oral Q6H PRN Federico Nicole MD   50 mg at 08/09/19 0625        Allergies   Allergen Reactions   • Lyrica [Pregabalin] Swelling     LIPS AND FACE   • Morphine Itching   • Codeine Itching   • Penicillins Other (See Comments)     CHILDHOOD ALLERGY         Objective     Vital Signs   Temp:  [97.6 °F (36.4 °C)-98.3 °F (36.8 °C)] 98.1 °F (36.7 °C)  Heart Rate:  [] 91  Resp:  [16-18] 16  BP: (115-137)/(70-89) 137/89    Intake/Output Summary (Last 24 hours) at 8/9/2019 0644  Last data filed at 8/8/2019 1806  Gross per 24 hour   Intake 200 ml   Output --   Net 200 ml       Physical Exam:     General Appearance:    Alert, cooperative, in no acute distress   Head:    Normocephalic, without obvious abnormality, atraumatic   Eyes:            Lids and lashes normal, conjunctivae and sclerae normal, no   icterus, no pallor, corneas clear, PERRLA   Ears:    Ears appear intact with no abnormalities noted   Throat:   No oral  lesions, no thrush, oral mucosa moist   Neck:   No adenopathy, supple, trachea midline, no thyromegaly, no   carotid bruit, no JVD   Back:     No kyphosis present, no scoliosis present, no skin lesions,      erythema or scars, no tenderness to percussion or                   palpation,   range of motion normal   Lungs:     Respirations unlabored    Heart:  Regular rate   Chest Wall:    No abnormalities observed   Abdomen:     No masses, no organomegaly, soft        non-tender, non-distended, no guarding, no rebound                Tenderness     Genitorurinary:   Deferred   Extremities:   Moves all extremities well, no edema, no cyanosis, no             redness   Pulses:   Pulses palpable and equal bilaterally   Skin:   No bleeding, bruising or rash   Lymph nodes:   No palpable adenopathy   Neurologic:   Cranial nerves 2 - 12 grossly intact       Results Review:   I reviewed the patient's new clinical results.              Imaging Results (last 24 hours)     Procedure Component Value Units Date/Time    CT Abdomen Pelvis With Contrast [[id number]] Collected:  08/08/19 1447     Updated:  08/08/19 1513    Narrative:       CT ABDOMEN AND PELVIS WITH CONTRAST 8/8/2019 2:20 PM CDT     HISTORY: Epigastric pain and vomiting     COMPARISON: CT scan dated 08/30/2018.      DLP: 3 and 17 mGy cm     TECHNIQUE: Following the intravenous administration of contrast, helical  CT tomographic images of the abdomen and pelvis were acquired. Coronal  reformatted images were also provided for review.      FINDINGS:   The lung bases and base of the heart are unremarkable.      LIVER: No focal liver lesion. The hepatic vasculature is patent.      BILIARY SYSTEM: The gallbladder has been removed. There is no  intrahepatic or extrahepatic ductal dilation.      PANCREAS: No focal pancreatic lesion.      SPLEEN: Unremarkable.      KIDNEYS AND ADRENALS: Adrenal glands are unremarkable. There is a 5 mm  nonobstructing left upper pole renal stone  with additional 6 mm left  lower pole stone. There is an infiltrative process which appears to be  involving the right renal sinus as well as a portion of the right  inferior renal pole. This has increased from the earlier comparison CT,  with near complete replacement of the sinus fat at the right inferior  pole. There appears to be heterogeneously enhancing/hypoenhancing  portions of the right inferior renal pole. The overall extent of this  process measures up to 4.8 cm (series 3-image 35). There are several  small nonobstructing calculi within right upper and right lower pole  calyces. The right renal pelvis is nondilated. The ureters are  nondilated.     RETROPERITONEUM: No enlarged aortocaval lymph node measuring 1.3 cm  short axis (series 2 image image 31). Slightly prominent aortocaval node  just inferior to this measuring up to 0.9 cm short axis. There are 2  prominent retrocaval lymph nodes as well with one measuring 1.3 cm short  axis and a second measuring 0.9 cm short axis.     GI TRACT: Moderate hiatal hernia. The stomach is nondistended. Small  bowel is nondistended.  There appears to be a segment of circumferential  wall thickening along a solitary loop of small bowel in the low mid  abdomen (series 2-image 56 and series 3-image 25). Scattered colonic  diverticula. Submucosal fat deposition along the ascending colon. No  obvious wall thickening along the colon. Appendix is not well  visualized.     OTHER: No mesenteric adenopathy. No intraperitoneal free fluid or free  air. Dense atheromatous calcification along the abdominal aorta without  aneurysm formation. The osseous structures and soft tissues demonstrate  no worrisome lesions. Spinal scoliotic curvature.      PELVIS: No mass lesion, fluid collection or significant lymphadenopathy  is seen in the pelvis. The urinary bladder is normal in appearance.       Impression:       1. Enlarging infiltrative process involving the right renal sinus  and  right inferior renal pole. This appears most likely to be an enhancing  and infiltrative soft tissue mass, either a transitional cell carcinoma  or perhaps infiltrative renal cell carcinoma. 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 This seems unlikely to reflect an acute  process such as pyelonephritis with adjacent reactive adenopathy.  2. Questionable circumferential wall thickening along the segment of  small bowel in the lower abdomen, perhaps a mild enteritis.  3. Colonic diverticulosis without acute diverticulitis. No inflammatory  wall thickening along the colon at this time.        This report was finalized on 08/08/2019 15:10 by Dr Marco Griggs, .        CT independent reivew    The CT scan of the abdomen/pelvis done with and without contrast is available for me to review.  Treatment recommendations require an independent review.  First I scanned the liver, spleen, and bowel pattern.  The retroperitoneum including the major vessels and lymphatic packages are briefly reviewed.  This film as been reviewed by the radiologist to determine any non urologic abnormalities that are present.  The kidneys are closely inspected for size, symmetry, contour, parenchymal thickness, perinephric reaction, presence of calcifications, and intrarenal dilation of the collecting system.  The ureters are inspected for their course, caliber, and any calcifications.  The bladder is inspected for its thickness, size, and presence of any calcifications.  This scan shows:    The right kidney appears infiltrative mass with RP adenopathy    The left kidney appears normal on this contrasted CT scan.  The renal parenchymal is normal in thickness.  There are no solid masses or cysts.  There is no hydronephrosis.  There are no stones.      The bladder appears normal on this non-contrasted CT scan.  The bladder appears normal in thickness.  There no masses or  stones seen on this exam.       Assessment/Plan       Renal mass    Hematemesis    Elevated liver enzymes      Right Renal Mass    Patient underwent right ureteroscopy laser litho-biopsy of renal pelvis September 2018.  The biopsy of her renal pelvis came back as inflammatory.  She presented with hematemesis recently and was shown to have a infiltrative mass concerning for infiltrative RCC versus transitional cell carcinoma.    GI work-up is pending.  I discussed options with her I think the best option is to go evaluate her collecting system with the ureteroscope today if there is anything we can biopsy we will biopsy.  Certainly I am concerned about the possibility of a renal cell carcinoma versus a transitional cell carcinoma.  I discussed that we need to find out whether this is transitional cell carcinoma because that can change my surgical management.  If it is transitional cell carcinoma she would need a nephro ureterectomy.  If it is renal cell carcinoma then she needs a radical nephrectomy.  She voiced understanding of this and wishes to proceed with the plan as outlined above.    I discussed the patients findings and my recommendations with patient and nursing staff    Yifan Greco MD  08/09/19  6:44 AM            Electronically signed by Yifan Greco MD at 8/9/2019  7:16 AM         8/9 nurse note:  ; IVF initiated; NPO since MN for Liver US; no signs or symptoms of bleeding    zofran iv x1

## 2019-08-09 NOTE — CONSULTS
[facility] Gastroenterology  Inpatient Consult Note      Referring Provider: Federico Nicole MD  Primary Physician: Grzegorz Michel Jr., MD     Date of Admission: 8/8/2019  Date of Service:  08/09/19    Chief Complaint   Patient presents with   • Vomiting Blood       Subjective     Heather Russo is a 63 y.o. female we are asked to see for hematemesis.  Here in the long-term patient of ours that we have worked up for anemia.  She called the office yesterday stating that she had vomited up some blood and we asked her to go to the emergency room.  At that time her hemoglobin was stable and she was hemodynamically stable.  CT scan showed an abnormality of the left kidney and she is undergoing biopsy today.  She has not noted any further hematemesis.  There is been no melena.  She had some intermittent nausea.  Her hemoglobin is stable    Past Medical History:   Diagnosis Date   • Anemia    • Anxiety    • Arthritis    • Cancer of skin     BCC OF NOSE   • Colitis    • COPD (chronic obstructive pulmonary disease) (CMS/HCC)    • Depression    • Fibromyalgia    • GERD (gastroesophageal reflux disease)    • Hx of colonic polyps    • Hypercholesteremia    • Hypertension    • Seizure (CMS/HCC)        Past Surgical History:   Procedure Laterality Date   • BREAST SURGERY     • CHOLECYSTECTOMY     • COLONOSCOPY  04/12/2017    Hemorrhoids and a few diverticula repeat exam in 3 years Dr. Cordero   • COLONOSCOPY N/A 5/2/2019    Procedure: COLONOSCOPY WITH ANESTHESIA;  Surgeon: Danis Garcia MD;  Location: [facility] ENDOSCOPY;  Service: Gastroenterology   • COLONOSCOPY W/ POLYPECTOMY  07/28/2016    Tubular adenoma proximal to the anus, 30 MM polyp in the cecum not resected, Diverticulosis ref to Dr. Cordero   • ENDOSCOPY  06/15/2016    Small hh, Negative for celiac disease   • ENDOSCOPY N/A 6/13/2019    Procedure: ESOPHAGOGASTRODUODENOSCOPY WITH ANESTHESIA;  Surgeon: Danis Garcia MD;  Location: [facility] ENDOSCOPY;  Service:  Gastroenterology   • RADICAL HYSTERECTOMY     • URETEROSCOPY Right 9/13/2018    Procedure: CYSTOSCOPY RIGHT RETROGRADE PYELOGRAM RIGHT URETEROSCOPY WITH BIOPSY OF RIGHT RENAL PELVIS LASER LITHOTRIPSY AND RIGHT URETERAL STENT INSERTION;  Surgeon: Yifan Greco MD;  Location: [facility];  Service: Urology          Current Facility-Administered Medications:   •  albuterol (PROVENTIL) nebulizer solution 0.083% 2.5 mg/3mL, 2.5 mg, Nebulization, Q4H PRN, Federico Nicole MD  •  ARIPiprazole (ABILIFY) tablet 10 mg, 10 mg, Oral, Daily, Federico Nicole MD  •  budesonide-formoterol (SYMBICORT) 160-4.5 MCG/ACT inhaler 2 puff, 2 puff, Inhalation, BID - RT, Federico Nicole MD, 2 puff at 08/09/19 1111  •  butalbital-acetaminophen-caffeine (FIORICET, ESGIC) -40 MG per tablet 1 tablet, 1 tablet, Oral, Q4H PRN, Arun Martin APRN  •  DULoxetine (CYMBALTA) DR capsule 60 mg, 60 mg, Oral, Daily, Federico Nicole MD  •  levoFLOXacin (LEVAQUIN) 500 mg/100 mL D5W (premix) (LEVAQUIN) 500 mg, 500 mg, Intravenous, Once, Yifan Greco MD  •  nicotine (NICODERM CQ) 21 MG/24HR patch 1 patch, 1 patch, Transdermal, Q24H, Federico Nicole MD, 1 patch at 08/08/19 2148  •  ondansetron (ZOFRAN) injection 4 mg, 4 mg, Intravenous, Q6H PRN, Federico Nicole MD, 4 mg at 08/09/19 0922  •  pantoprazole (PROTONIX) injection 40 mg, 40 mg, Intravenous, Q12H, Federico Nicole MD, 40 mg at 08/09/19 0922  •  [COMPLETED] Insert peripheral IV, , , Once **AND** sodium chloride 0.9 % flush 10 mL, 10 mL, Intravenous, PRN, Shoulders, Kristee Croft, APRN  •  sodium chloride 0.9 % flush 3 mL, 3 mL, Intravenous, Q12H, Federico Nicole MD, 3 mL at 08/09/19 1121  •  sodium chloride 0.9 % flush 3-10 mL, 3-10 mL, Intravenous, PRN, Federico Nicole MD  •  sodium chloride 0.9 % infusion, 100 mL/hr, Intravenous, Continuous, Justyna Post, YOLY, Stopped at 08/08/19 1806  •  sodium chloride 0.9 % infusion, 100 mL/hr, Intravenous, Continuous, Greco,  "Yifan Almeida MD, Last Rate: 100 mL/hr at 08/09/19 1120, 100 mL/hr at 08/09/19 1120  •  traMADol (ULTRAM) tablet 50 mg, 50 mg, Oral, Q6H PRN, Federico Nicole MD, 50 mg at 08/09/19 0625    Allergies   Allergen Reactions   • Lyrica [Pregabalin] Swelling     LIPS AND FACE   • Morphine Itching   • Codeine Itching   • Penicillins Other (See Comments)     CHILDHOOD ALLERGY         Social History     Socioeconomic History   • Marital status:      Spouse name: Not on file   • Number of children: Not on file   • Years of education: Not on file   • Highest education level: Not on file   Tobacco Use   • Smoking status: Current Every Day Smoker     Packs/day: 1.00     Types: Cigarettes   • Smokeless tobacco: Never Used   Substance and Sexual Activity   • Alcohol use: No   • Drug use: No   • Sexual activity: Defer       Family History   Problem Relation Age of Onset   • Diabetes Sister    • Cancer Brother    • Colon cancer Neg Hx    • Colon polyps Neg Hx        Review of Systems   Respiratory: Negative.    Cardiovascular: Positive for chest pain.   Gastrointestinal: Positive for abdominal pain, nausea and vomiting.   Genitourinary: Negative.        Objective     /88 (BP Location: Right arm, Patient Position: Lying)   Pulse 94   Temp 98.6 °F (37 °C) (Oral)   Resp 16   Ht 160 cm (63\")   Wt 68 kg (150 lb)   SpO2 94%   BMI 26.57 kg/m²     Physical Exam   Constitutional: She is oriented to person, place, and time.   Cardiovascular: Normal rate.   Pulmonary/Chest: Effort normal.   Abdominal: Soft. Bowel sounds are normal. She exhibits no distension.   Neurological: She is alert and oriented to person, place, and time.   Skin: Skin is warm and dry.       Lab Results   Component Value Date    WBC 6.16 08/09/2019    HGB 12.5 08/09/2019    HCT 37.9 08/09/2019     08/09/2019        Lab Results   Component Value Date     08/09/2019    K 4.5 08/09/2019     (H) 08/09/2019    CO2 22.0 (L) 08/09/2019    " CO2 19.0 (L) 08/08/2019    CO2 19.0 (L) 09/07/2018    BUN 12 08/09/2019    CREATININE 0.54 08/09/2019    BILITOT 0.5 08/09/2019    ALKPHOS 171 (H) 08/09/2019     (H) 08/09/2019     (H) 08/09/2019    GLUCOSE 94 08/09/2019       Lab Results   Component Value Date    INR 0.99 08/08/2019       IMPRESSION/PLAN:     Patient Active Problem List   Diagnosis   • Laryngopharyngeal reflux   • Hoarseness   • Tobacco use disorder   • Abnormal liver function tests   • Generalized abdominal pain   • Nausea   • Obesity, unspecified obesity severity, unspecified obesity type   • Tobacco abuse counseling   • Renal mass   • Diarrhea in adult patient   • Colitis   • Gastroesophageal reflux disease   • Abdominal pain, epigastric   • NSAID long-term use   • HTN (hypertension), benign   • Hematemesis   • Elevated liver enzymes   • Hyperlipidemia         1.  Hematemesis.  Hemoglobin remains stable without evidence for ongoing bleeding.  I think the best thing to do is allow her to complete the urology evaluation today.  She has had a recent endoscopy and she is having some reflux symptoms we will continue her current medications for reflux and if there is any additional bleeding we can certainly proceed with endoscopy.    EMR Dragon/transcription disclaimer: Much of this encounter note is an electronic transcription/translation of spoken language to printed text.  The electronic translation of spoken language may permit erroneous, or at times, nonsensical words or phrases to be inadvertently transcribed.  Although I have reviewed the note for such errors, some may still exist.      Danis Garcia MD  08/09/19  1:23 PM

## 2019-08-09 NOTE — ANESTHESIA PREPROCEDURE EVALUATION
Anesthesia Evaluation     Patient summary reviewed and Nursing notes reviewed   no history of anesthetic complications:  NPO Solid Status: > 8 hours  NPO Liquid Status: > 8 hours           Airway   Mallampati: I  TM distance: >3 FB  Neck ROM: full  Dental    (+) edentulous    Pulmonary    (+) a smoker Current Abstained day of surgery, COPD,   Cardiovascular     ECG reviewed    (+) hypertension, hyperlipidemia,       Neuro/Psych  (+) seizures (remote hx of pt seizure, no seizures in years, no longer needing medication to control ) well controlled, psychiatric history Anxiety,     GI/Hepatic/Renal/Endo    (+)  GERD poorly controlled,      Musculoskeletal     Abdominal    Substance History      OB/GYN          Other   (+) arthritis   history of cancer active                    Anesthesia Plan    ASA 2     general     intravenous induction   Anesthetic plan, all risks, benefits, and alternatives have been provided, discussed and informed consent has been obtained with: patient.

## 2019-08-09 NOTE — PROGRESS NOTES
[facility] Medicine Services  INPATIENT PROGRESS NOTE    Patient Name: Heather Russo  Date of Admission: 8/8/2019  Today's Date: 08/09/19  Length of Stay: 0  Primary Care Physician: Grzegorz Michel Jr., MD    Subjective   Chief Complaint: headache    HPI   Currently laying in bed.  She currently denies any flank pain, hematemesis or nausea.  She states that she has a headache and does get them from time to time.  She takes Excedrin migraine at home that seems to help.  She denies any vision changes.  Dr. Greco taking her to the OR today for uteroscope for biopsy.      Review of Systems   Constitutional: Positive for activity change and fatigue. Negative for appetite change, chills and fever.   HENT: Negative for hearing loss, nosebleeds, tinnitus and trouble swallowing.    Eyes: Negative for visual disturbance.   Respiratory: Negative for cough, chest tightness, shortness of breath and wheezing.    Cardiovascular: Negative for chest pain, palpitations and leg swelling.   Gastrointestinal: Positive for vomiting. Negative for abdominal distention, abdominal pain, blood in stool, constipation, diarrhea and nausea.   Endocrine: Negative for cold intolerance, heat intolerance, polydipsia, polyphagia and polyuria.   Genitourinary: Negative for decreased urine volume, difficulty urinating, dysuria, flank pain, frequency and hematuria.   Musculoskeletal: Negative for arthralgias, joint swelling and myalgias.   Skin: Negative for rash.   Allergic/Immunologic: Negative for immunocompromised state.   Neurological: Positive for weakness and headaches. Negative for dizziness, syncope and light-headedness.   Hematological: Negative for adenopathy. Does not bruise/bleed easily.   Psychiatric/Behavioral: Negative for confusion and sleep disturbance. The patient is not nervous/anxious.       All pertinent negatives and positives are as above. All other systems have been reviewed and are negative  unless otherwise stated.     Objective    Temp:  [97.6 °F (36.4 °C)-98.4 °F (36.9 °C)] 98.4 °F (36.9 °C)  Heart Rate:  [] 89  Resp:  [16-18] 16  BP: (115-137)/(65-89) 116/65  Physical Exam   Constitutional: She is oriented to person, place, and time. She appears well-developed and well-nourished.   Laying in bed.  NAD.   HENT:   Head: Normocephalic and atraumatic.   Eyes: Conjunctivae and EOM are normal. Pupils are equal, round, and reactive to light.   Neck: Neck supple. No JVD present. No thyromegaly present.   Cardiovascular: Normal rate, regular rhythm, normal heart sounds and intact distal pulses. Exam reveals no gallop and no friction rub.   No murmur heard.  Pulmonary/Chest: Effort normal and breath sounds normal. No respiratory distress. She has no wheezes. She has no rales. She exhibits no tenderness.   Abdominal: Soft. Bowel sounds are normal. She exhibits no distension. There is no tenderness. There is no rebound and no guarding.   Musculoskeletal: Normal range of motion. She exhibits no edema, tenderness or deformity.   Lymphadenopathy:     She has no cervical adenopathy.   Neurological: She is alert and oriented to person, place, and time.   Skin: Skin is warm and dry. No rash noted.   Psychiatric: She has a normal mood and affect. Her behavior is normal. Judgment and thought content normal.   Nursing note and vitals reviewed.    Results Review:  I have reviewed the labs, radiology results, and diagnostic studies.    Laboratory Data:   Results from last 7 days   Lab Units 08/09/19  0454 08/08/19  1327   WBC 10*3/mm3 6.16 8.66   HEMOGLOBIN g/dL 12.5 12.5   HEMATOCRIT % 37.9 38.8   PLATELETS 10*3/mm3 217 214      Results from last 7 days   Lab Units 08/09/19  0454 08/08/19  1327   SODIUM mmol/L 140 139   POTASSIUM mmol/L 4.5 4.2   CHLORIDE mmol/L 112* 112*   CO2 mmol/L 22.0* 19.0*   BUN mg/dL 12 15   CREATININE mg/dL 0.54 0.56   CALCIUM mg/dL 9.5 9.8   BILIRUBIN mg/dL 0.5 0.4   ALK PHOS U/L 171*  193*   ALT (SGPT) U/L 497* 436*   AST (SGOT) U/L 319* 253*   GLUCOSE mg/dL 94 117*     Culture Data:        Radiology Data:   Imaging Results (last 24 hours)     Procedure Component Value Units Date/Time    US Liver [[id number]] Collected:  08/09/19 0830     Updated:  08/09/19 0840    Narrative:       EXAMINATION: US LIVER-     8/9/2019 7:00 AM CDT     HISTORY: elevated liver enzymes     Grayscale and color flow ultrasound evaluation of the liver and right  kidney.     Absent gallbladder.  No focal liver abnormality.  No biliary dilation. CBD = 8 mm.     Right kidney = 127 x 45 x 55 mm.  Incidental 9 x 8 mm right renal cyst.  Small nonobstructing right renal stone noted.     There is mild dilation of the upper pole collecting system. The  appearance of an infiltrative mass within the central and lower right  kidney is much better appreciated on the CT exam.     Summary:  1. Normal liver.  2. Right renal mass better appreciated on the CT exam performed  yesterday.  This report was finalized on 08/09/2019 08:37 by Dr. Chase Rios MD.    XR Chest 2 View [[id number]] Collected:  08/09/19 0755     Updated:  08/09/19 0759    Narrative:       Exam: XR CHEST 2 VW-     Indication: Preoperative evaluation     Comparison: None available.     Findings:     The cardiac silhouette is normal in size.  Calcified granuloma in the right midlung zone.  No consolidation, pleural effusion, or pneumothorax.  No suspicious osseous finding.       Impression:          No acute findings.  This report was finalized on 08/09/2019 07:56 by Dr. Conner Helton MD.    CT Abdomen Pelvis With Contrast [[id number]] Collected:  08/08/19 1447     Updated:  08/08/19 1513    Narrative:       CT ABDOMEN AND PELVIS WITH CONTRAST 8/8/2019 2:20 PM CDT     HISTORY: Epigastric pain and vomiting     COMPARISON: CT scan dated 08/30/2018.      DLP: 3 and 17 mGy cm     TECHNIQUE: Following the intravenous administration of contrast, helical  CT tomographic images  of the abdomen and pelvis were acquired. Coronal  reformatted images were also provided for review.      FINDINGS:   The lung bases and base of the heart are unremarkable.      LIVER: No focal liver lesion. The hepatic vasculature is patent.      BILIARY SYSTEM: The gallbladder has been removed. There is no  intrahepatic or extrahepatic ductal dilation.      PANCREAS: No focal pancreatic lesion.      SPLEEN: Unremarkable.      KIDNEYS AND ADRENALS: Adrenal glands are unremarkable. There is a 5 mm  nonobstructing left upper pole renal stone with additional 6 mm left  lower pole stone. There is an infiltrative process which appears to be  involving the right renal sinus as well as a portion of the right  inferior renal pole. This has increased from the earlier comparison CT,  with near complete replacement of the sinus fat at the right inferior  pole. There appears to be heterogeneously enhancing/hypoenhancing  portions of the right inferior renal pole. The overall extent of this  process measures up to 4.8 cm (series 3-image 35). There are several  small nonobstructing calculi within right upper and right lower pole  calyces. The right renal pelvis is nondilated. The ureters are  nondilated.     RETROPERITONEUM: No enlarged aortocaval lymph node measuring 1.3 cm  short axis (series 2 image image 31). Slightly prominent aortocaval node  just inferior to this measuring up to 0.9 cm short axis. There are 2  prominent retrocaval lymph nodes as well with one measuring 1.3 cm short  axis and a second measuring 0.9 cm short axis.     GI TRACT: Moderate hiatal hernia. The stomach is nondistended. Small  bowel is nondistended.  There appears to be a segment of circumferential  wall thickening along a solitary loop of small bowel in the low mid  abdomen (series 2-image 56 and series 3-image 25). Scattered colonic  diverticula. Submucosal fat deposition along the ascending colon. No  obvious wall thickening along the colon.  Appendix is not well  visualized.     OTHER: No mesenteric adenopathy. No intraperitoneal free fluid or free  air. Dense atheromatous calcification along the abdominal aorta without  aneurysm formation. The osseous structures and soft tissues demonstrate  no worrisome lesions. Spinal scoliotic curvature.      PELVIS: No mass lesion, fluid collection or significant lymphadenopathy  is seen in the pelvis. The urinary bladder is normal in appearance.       Impression:       1. Enlarging infiltrative process involving the right renal sinus and  right inferior renal pole. This appears most likely to be an enhancing  and infiltrative soft tissue mass, either a transitional cell carcinoma  or perhaps infiltrative renal cell carcinoma. 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 This seems unlikely to reflect an acute  process such as pyelonephritis with adjacent reactive adenopathy.  2. Questionable circumferential wall thickening along the segment of  small bowel in the lower abdomen, perhaps a mild enteritis.  3. Colonic diverticulosis without acute diverticulitis. No inflammatory  wall thickening along the colon at this time.        This report was finalized on 08/08/2019 15:10 by Dr Marco Griggs, .        I have reviewed the patient's current medications.     Assessment/Plan     Active Hospital Problems    Diagnosis   • Hematemesis   • Elevated liver enzymes   • Renal mass     Added automatically from request for surgery [id number]     • NSAID long-term use   • Nausea   • Hyperlipidemia   • HTN (hypertension), benign   • Gastroesophageal reflux disease     Gaviscon to be added as needed.       Plan:  1.  Urology consulted for renal mass, going to the OR today for uteroscope and possible biopsy  2.  CBC and BMP in AM  3.  Fioricet for headache  4.  Continue home medications as ordered  5.  FOBT negative   6.  Monitor hemoglobin  7.  Monitor for further  vomiting  8.  Avoid NSAIDs  9.  Supportive care  10.  Work up ongoing     Discharge Planning: I expect the patient to be discharged to home in ? days.    YOLY Loving   08/09/19   10:23 AM

## 2019-08-09 NOTE — PLAN OF CARE
Problem: Patient Care Overview  Goal: Plan of Care Review  Outcome: Ongoing (interventions implemented as appropriate)   08/09/19 0240   Coping/Psychosocial   Plan of Care Reviewed With patient;family   Plan of Care Review   Progress no change   OTHER   Outcome Summary Pt. denies patient so far this shift; up ad ramos; voiding; IVF initiated; NPO since MN for Liver US; no signs or symptoms of bleeding; will monitor.     Goal: Individualization and Mutuality  Outcome: Ongoing (interventions implemented as appropriate)    Goal: Discharge Needs Assessment  Outcome: Ongoing (interventions implemented as appropriate)    Goal: Interprofessional Rounds/Family Conf  Outcome: Ongoing (interventions implemented as appropriate)      Problem: Gastrointestinal Bleeding (Adult)  Goal: Signs and Symptoms of Listed Potential Problems Will be Absent, Minimized or Managed (Gastrointestinal Bleeding)  Outcome: Ongoing (interventions implemented as appropriate)

## 2019-08-09 NOTE — ANESTHESIA PROCEDURE NOTES
Airway  Urgency: elective    Date/Time: 8/9/2019 3:35 PM  Airway not difficult    General Information and Staff    Patient location during procedure: OR  CRNA: Jason Jasso CRNA    Indications and Patient Condition  Indications for airway management: airway protection    Preoxygenated: yes  Mask difficulty assessment: 1 - vent by mask    Final Airway Details  Final airway type: endotracheal airway      Successful airway: ETT  Cuffed: yes   Successful intubation technique: direct laryngoscopy  Endotracheal tube insertion site: oral  Blade: Erika  Blade size: 3  ETT size (mm): 7.0  Cormack-Lehane Classification: grade I - full view of glottis  Placement verified by: chest auscultation and capnometry   Measured from: lips  ETT to lips (cm): 20  Number of attempts at approach: 1

## 2019-08-09 NOTE — ANESTHESIA POSTPROCEDURE EVALUATION
"Patient: Heather Russo    Procedure Summary     Date:  08/09/19 Room / Location:   [facility] OR  /  [facility] OR    Anesthesia Start:  1527 Anesthesia Stop:  1621    Procedure:  RIGHT FLEXIBLE URETEROSCOPY WITH RENAL PELVIC BIOPSY AND RIGHT URETERAL STENT (Right Ureter) Diagnosis:       Renal mass      (Renal mass [N28.89])    Surgeon:  Yifan Greco MD Provider:  Jason Jasso CRNA    Anesthesia Type:  general ASA Status:  2          Anesthesia Type: general  Last vitals  BP   (P) 117/81 (08/09/19 1735)   Temp   (P) 97.7 °F (36.5 °C) (08/09/19 1735)   Pulse   (P) 100 (08/09/19 1735)   Resp   (P) 18 (08/09/19 1735)     SpO2   (P) 95 % (08/09/19 1735)     Post Anesthesia Care and Evaluation    Patient location during evaluation: PACU  Patient participation: complete - patient participated  Level of consciousness: awake and alert  Pain management: adequate  Airway patency: patent  Anesthetic complications: No anesthetic complications  PONV Status: none  Cardiovascular status: acceptable and hemodynamically stable  Respiratory status: acceptable  Hydration status: acceptable    Comments: Blood pressure 117/81, pulse 100, temperature 97.7 °F (36.5 °C), temperature source Oral, resp. rate 18, height 160 cm (63\"), weight 68 kg (150 lb), SpO2 95 %, not currently breastfeeding.    Patient discharged from PACU based upon Macey score. Please see RN notes for further details      "

## 2019-08-10 VITALS
BODY MASS INDEX: 26.58 KG/M2 | RESPIRATION RATE: 16 BRPM | HEIGHT: 63 IN | WEIGHT: 150 LBS | DIASTOLIC BLOOD PRESSURE: 60 MMHG | HEART RATE: 96 BPM | OXYGEN SATURATION: 96 % | SYSTOLIC BLOOD PRESSURE: 105 MMHG | TEMPERATURE: 97.9 F

## 2019-08-10 LAB
ALBUMIN SERPL-MCNC: 3.9 G/DL (ref 3.5–5)
ALBUMIN/GLOB SERPL: 1.1 G/DL (ref 1.1–2.5)
ALP SERPL-CCNC: 185 U/L (ref 24–120)
ALT SERPL W P-5'-P-CCNC: 449 U/L (ref 0–54)
ANION GAP SERPL CALCULATED.3IONS-SCNC: 6 MMOL/L (ref 4–13)
AST SERPL-CCNC: 191 U/L (ref 7–45)
BASOPHILS # BLD AUTO: 0.03 10*3/MM3 (ref 0–0.2)
BASOPHILS NFR BLD AUTO: 0.3 % (ref 0–1.5)
BILIRUB SERPL-MCNC: 0.4 MG/DL (ref 0.1–1)
BUN BLD-MCNC: 11 MG/DL (ref 5–21)
BUN/CREAT SERPL: 17.7 (ref 7–25)
CALCIUM SPEC-SCNC: 9.5 MG/DL (ref 8.4–10.4)
CHLORIDE SERPL-SCNC: 108 MMOL/L (ref 98–110)
CO2 SERPL-SCNC: 27 MMOL/L (ref 24–31)
CREAT BLD-MCNC: 0.62 MG/DL (ref 0.5–1.4)
DEPRECATED RDW RBC AUTO: 58.1 FL (ref 37–54)
EOSINOPHIL # BLD AUTO: 0.06 10*3/MM3 (ref 0–0.4)
EOSINOPHIL NFR BLD AUTO: 0.6 % (ref 0.3–6.2)
ERYTHROCYTE [DISTWIDTH] IN BLOOD BY AUTOMATED COUNT: 17.7 % (ref 12.3–15.4)
GFR SERPL CREATININE-BSD FRML MDRD: 98 ML/MIN/1.73
GLOBULIN UR ELPH-MCNC: 3.5 GM/DL
GLUCOSE BLD-MCNC: 129 MG/DL (ref 70–100)
HCT VFR BLD AUTO: 39.4 % (ref 34–46.6)
HGB BLD-MCNC: 12.4 G/DL (ref 12–15.9)
IMM GRANULOCYTES # BLD AUTO: 0.09 10*3/MM3 (ref 0–0.05)
IMM GRANULOCYTES NFR BLD AUTO: 0.9 % (ref 0–0.5)
LYMPHOCYTES # BLD AUTO: 1.34 10*3/MM3 (ref 0.7–3.1)
LYMPHOCYTES NFR BLD AUTO: 12.9 % (ref 19.6–45.3)
MCH RBC QN AUTO: 28.2 PG (ref 26.6–33)
MCHC RBC AUTO-ENTMCNC: 31.5 G/DL (ref 31.5–35.7)
MCV RBC AUTO: 89.5 FL (ref 79–97)
MONOCYTES # BLD AUTO: 1.29 10*3/MM3 (ref 0.1–0.9)
MONOCYTES NFR BLD AUTO: 12.4 % (ref 5–12)
NEUTROPHILS # BLD AUTO: 7.58 10*3/MM3 (ref 1.7–7)
NEUTROPHILS NFR BLD AUTO: 72.9 % (ref 42.7–76)
NRBC BLD AUTO-RTO: 0 /100 WBC (ref 0–0.2)
PLATELET # BLD AUTO: 201 10*3/MM3 (ref 140–450)
PMV BLD AUTO: 10 FL (ref 6–12)
POTASSIUM BLD-SCNC: 4.8 MMOL/L (ref 3.5–5.3)
PROT SERPL-MCNC: 7.4 G/DL (ref 6.3–8.7)
RBC # BLD AUTO: 4.4 10*6/MM3 (ref 3.77–5.28)
SODIUM BLD-SCNC: 141 MMOL/L (ref 135–145)
WBC NRBC COR # BLD: 10.39 10*3/MM3 (ref 3.4–10.8)

## 2019-08-10 PROCEDURE — 85025 COMPLETE CBC W/AUTO DIFF WBC: CPT | Performed by: NURSE PRACTITIONER

## 2019-08-10 PROCEDURE — 99232 SBSQ HOSP IP/OBS MODERATE 35: CPT | Performed by: INTERNAL MEDICINE

## 2019-08-10 PROCEDURE — 99224 PR SBSQ OBSERVATION CARE/DAY 15 MINUTES: CPT | Performed by: UROLOGY

## 2019-08-10 PROCEDURE — 80053 COMPREHEN METABOLIC PANEL: CPT | Performed by: NURSE PRACTITIONER

## 2019-08-10 RX ORDER — PANTOPRAZOLE SODIUM 40 MG/1
40 TABLET, DELAYED RELEASE ORAL DAILY
Qty: 30 TABLET | Refills: 1 | Status: SHIPPED | OUTPATIENT
Start: 2019-08-10 | End: 2019-08-26

## 2019-08-10 RX ORDER — OXYBUTYNIN CHLORIDE 5 MG/1
5 TABLET ORAL 3 TIMES DAILY
Qty: 90 TABLET | Refills: 0 | Status: SHIPPED | OUTPATIENT
Start: 2019-08-10 | End: 2019-09-09

## 2019-08-10 RX ORDER — TAMSULOSIN HYDROCHLORIDE 0.4 MG/1
0.4 CAPSULE ORAL DAILY
Qty: 30 CAPSULE | Refills: 0 | Status: SHIPPED | OUTPATIENT
Start: 2019-08-11 | End: 2020-10-19

## 2019-08-10 RX ADMIN — TAMSULOSIN HYDROCHLORIDE 0.4 MG: 0.4 CAPSULE ORAL at 08:28

## 2019-08-10 RX ADMIN — TRAMADOL HYDROCHLORIDE 50 MG: 50 TABLET, FILM COATED ORAL at 07:43

## 2019-08-10 RX ADMIN — SODIUM CHLORIDE, PRESERVATIVE FREE 3 ML: 5 INJECTION INTRAVENOUS at 08:28

## 2019-08-10 RX ADMIN — SODIUM CHLORIDE, POTASSIUM CHLORIDE, SODIUM LACTATE AND CALCIUM CHLORIDE 100 ML/HR: 600; 310; 30; 20 INJECTION, SOLUTION INTRAVENOUS at 04:02

## 2019-08-10 RX ADMIN — DULOXETINE HYDROCHLORIDE 60 MG: 30 CAPSULE, DELAYED RELEASE ORAL at 08:27

## 2019-08-10 RX ADMIN — PANTOPRAZOLE SODIUM 40 MG: 40 INJECTION, POWDER, LYOPHILIZED, FOR SOLUTION INTRAVENOUS at 08:28

## 2019-08-10 RX ADMIN — ARIPIPRAZOLE 10 MG: 10 TABLET ORAL at 08:27

## 2019-08-10 RX ADMIN — OXYBUTYNIN CHLORIDE 5 MG: 5 TABLET ORAL at 08:28

## 2019-08-10 NOTE — BRIEF OP NOTE
FLEXIBLE URETEROSCOPY  Progress Note    Heather Russo  8/9/2019    Pre-op Diagnosis:   Renal mass [N28.89]       Post-Op Diagnosis Codes:     * Renal mass [N28.89]    Procedure/CPT® Codes:      Procedure(s):  RIGHT RETROGRADE PYELOGRAM    Surgeon(s):  Yifan Greco MD    Anesthesia: General    Staff:   Circulator: Margaret Hogue RN  Scrub Person: Garry Bishop; Jessica Rondon    Estimated Blood Loss: none    Urine Voided: * No values recorded between 8/9/2019  3:27 PM and 8/9/2019  4:17 PM *    Specimens:                Specimens     ID Source Type Tests Collected By Collected At Frozen?      A Kidney, Right Body Fluid · NON-GYNECOLOGIC CYTOLOGY   Yifan Greco MD 8/9/19 3      Description: RIGHT RENAL PELVIC WASHING    B Kidney, Right Tissue · TISSUE PATHOLOGY EXAM  · SURGICAL PATHOLOGY EXAM   Yifan Greco MD 8/9/19 8034      Description: RIGHT RENAL PELVIS BIOPSY                Drains:      Findings: 10 [place] dual-lumen catheter was placed over previously placed 0.38 sensor tip wire and 10 cc dilute contrast used on the collecting system.  The ureters normal course and caliber there was no filling defects is no hydronephrosis or extravasation of contrast.          Yifan Greco MD     Date: 8/10/2019  Time: 12:07 PM

## 2019-08-10 NOTE — PROGRESS NOTES
Subjective    Ms. Russo is 63 y.o. female    Chief Complaint: Renal Mass    History of Present Illness     Sp biopsy of renal pelvis, no flank pain, denies difficulty urinating, no fevers    The following portions of the patient's history were reviewed and updated as appropriate: allergies, current medications, past family history, past medical history, past social history, past surgical history and problem list.    Review of Systems   Constitutional: Negative for chills and fever.   Gastrointestinal: Negative for abdominal pain, anal bleeding and blood in stool.   Genitourinary: Negative for dysuria and hematuria.         Current Facility-Administered Medications:   •  albuterol (PROVENTIL) nebulizer solution 0.083% 2.5 mg/3mL, 2.5 mg, Nebulization, Q4H PRN, Federico Nicole MD  •  ARIPiprazole (ABILIFY) tablet 10 mg, 10 mg, Oral, Daily, Federico Nicole MD, 10 mg at 08/10/19 0827  •  budesonide-formoterol (SYMBICORT) 160-4.5 MCG/ACT inhaler 2 puff, 2 puff, Inhalation, BID - RT, Federico Nicole MD, 2 puff at 08/09/19 1111  •  butalbital-acetaminophen-caffeine (FIORICET, ESGIC) -40 MG per tablet 1 tablet, 1 tablet, Oral, Q4H PRN, Arun Martin APRN  •  DULoxetine (CYMBALTA) DR capsule 60 mg, 60 mg, Oral, Daily, Federico Nicole MD, 60 mg at 08/10/19 0827  •  lactated ringers infusion, 100 mL/hr, Intravenous, Continuous, Eye, YONNY Woods, Stopped at 08/10/19 1207  •  nicotine (NICODERM CQ) 21 MG/24HR patch 1 patch, 1 patch, Transdermal, Q24H, Federico Nicole MD, 1 patch at 08/09/19 1900  •  ondansetron (ZOFRAN) injection 4 mg, 4 mg, Intravenous, Q6H PRN, Federico Nicole MD, 4 mg at 08/09/19 0922  •  oxybutynin (DITROPAN) tablet 5 mg, 5 mg, Oral, TID, Yifan Greco MD, 5 mg at 08/10/19 0828  •  pantoprazole (PROTONIX) injection 40 mg, 40 mg, Intravenous, Q12H, Federico Nicole MD, 40 mg at 08/10/19 0828  •  [COMPLETED] Insert peripheral IV, , , Once **AND** sodium chloride 0.9 % flush 10 mL, 10 mL,  Intravenous, PRN, Shoulders, Kristee Croft, APRN  •  sodium chloride 0.9 % flush 3 mL, 3 mL, Intravenous, Q12H, Federico Nicole MD, 3 mL at 08/10/19 0828  •  sodium chloride 0.9 % flush 3-10 mL, 3-10 mL, Intravenous, PRN, Federico Nicole MD  •  tamsulosin (FLOMAX) 24 hr capsule 0.4 mg, 0.4 mg, Oral, Daily, Yifan Greco MD, 0.4 mg at 08/10/19 0828  •  traMADol (ULTRAM) tablet 50 mg, 50 mg, Oral, Q6H PRN, Federico Nicole MD, 50 mg at 08/10/19 0743  •  zolpidem (AMBIEN) tablet 5 mg, 5 mg, Oral, Nightly PRN, Nael Hoffman DO, 5 mg at 08/09/19 2338    Past Medical History:   Diagnosis Date   • Anemia    • Anxiety    • Arthritis    • Cancer of skin     BCC OF NOSE   • Colitis    • COPD (chronic obstructive pulmonary disease) (CMS/HCC)    • Depression    • Fibromyalgia    • GERD (gastroesophageal reflux disease)    • Hx of colonic polyps    • Hypercholesteremia    • Hypertension    • Seizure (CMS/HCC)        Past Surgical History:   Procedure Laterality Date   • BREAST SURGERY     • CHOLECYSTECTOMY     • COLONOSCOPY  04/12/2017    Hemorrhoids and a few diverticula repeat exam in 3 years Dr. Cordero   • COLONOSCOPY N/A 5/2/2019    Procedure: COLONOSCOPY WITH ANESTHESIA;  Surgeon: Danis Garcia MD;  Location: [facility] ENDOSCOPY;  Service: Gastroenterology   • COLONOSCOPY W/ POLYPECTOMY  07/28/2016    Tubular adenoma proximal to the anus, 30 MM polyp in the cecum not resected, Diverticulosis ref to Dr. Cordero   • ENDOSCOPY  06/15/2016    Small hh, Negative for celiac disease   • ENDOSCOPY N/A 6/13/2019    Procedure: ESOPHAGOGASTRODUODENOSCOPY WITH ANESTHESIA;  Surgeon: Danis Garcia MD;  Location: [facility] ENDOSCOPY;  Service: Gastroenterology   • RADICAL HYSTERECTOMY     • URETEROSCOPY Right 9/13/2018    Procedure: CYSTOSCOPY RIGHT RETROGRADE PYELOGRAM RIGHT URETEROSCOPY WITH BIOPSY OF RIGHT RENAL PELVIS LASER LITHOTRIPSY AND RIGHT URETERAL STENT INSERTION;  Surgeon: Yifan Greco MD;  Location: [facility]  "OR;  Service: Urology       Social History     Socioeconomic History   • Marital status:      Spouse name: Not on file   • Number of children: Not on file   • Years of education: Not on file   • Highest education level: Not on file   Tobacco Use   • Smoking status: Current Every Day Smoker     Packs/day: 1.00     Types: Cigarettes   • Smokeless tobacco: Never Used   Substance and Sexual Activity   • Alcohol use: No   • Drug use: No   • Sexual activity: Defer       Family History   Problem Relation Age of Onset   • Diabetes Sister    • Cancer Brother    • Colon cancer Neg Hx    • Colon polyps Neg Hx        Objective    /60 (BP Location: Left arm, Patient Position: Lying)   Pulse 96   Temp 97.9 °F (36.6 °C) (Oral)   Resp 16   Ht 160 cm (63\")   Wt 68 kg (150 lb)   SpO2 96%   BMI 26.57 kg/m²     Intake/Output Summary (Last 24 hours) at 8/10/2019 1208  Last data filed at 8/10/2019 1207  Gross per 24 hour   Intake 1984 ml   Output 550 ml   Net 1434 ml       Physical Exam   Constitutional: She is oriented to person, place, and time. She appears well-developed and well-nourished. No distress.   Pulmonary/Chest: Effort normal.   Abdominal: Soft. She exhibits no distension and no mass. There is no tenderness. There is no rebound and no guarding. No hernia.   Neurological: She is alert and oriented to person, place, and time.   Skin: Skin is warm and dry. She is not diaphoretic.   Psychiatric: She has a normal mood and affect.   Vitals reviewed.      Results Review:   I reviewed the patient's new clinical results.              Imaging Results (last 24 hours)     Procedure Component Value Units Date/Time    XR Pyelogram Retrograde [[id number]] Collected:  08/09/19 1648     Updated:  08/09/19 1653    Narrative:       EXAMINATION: Right retrograde pyelogram 08/09/2019     Fluoroscopy time: 1.2 minutes.     DOSE: 3.90 mGy. 4 images are submitted for interpretation.     FINDINGS: C-arm was used intraoperatively " during performance of a  right-sided retrograde pyelogram. Initially there was a questionable  filling defect at the UPJ. However, subsequent images demonstrate no  definite filling defect within the renal pelvis although there is some  blunting of the calyces. Subsequently a double-J ureteral stent is left  in place and is well-positioned. The films are available to the OR for  review.  This report was finalized on 08/09/2019 16:50 by Dr. Jadon Duron MD.            Results for orders placed or performed during the hospital encounter of 08/08/19   Comprehensive Metabolic Panel   Result Value Ref Range    Glucose 117 (H) 70 - 100 mg/dL    BUN 15 5 - 21 mg/dL    Creatinine 0.56 0.50 - 1.40 mg/dL    Sodium 139 135 - 145 mmol/L    Potassium 4.2 3.5 - 5.3 mmol/L    Chloride 112 (H) 98 - 110 mmol/L    CO2 19.0 (L) 24.0 - 31.0 mmol/L    Calcium 9.8 8.4 - 10.4 mg/dL    Total Protein 7.7 6.3 - 8.7 g/dL    Albumin 4.10 3.50 - 5.00 g/dL    ALT (SGPT) 436 (H) 0 - 54 U/L    AST (SGOT) 253 (H) 7 - 45 U/L    Alkaline Phosphatase 193 (H) 24 - 120 U/L    Total Bilirubin 0.4 0.1 - 1.0 mg/dL    eGFR Non African Amer 110 >60 mL/min/1.73    Globulin 3.6 gm/dL    A/G Ratio 1.1 1.1 - 2.5 g/dL    BUN/Creatinine Ratio 26.8 (H) 7.0 - 25.0    Anion Gap 8.0 4.0 - 13.0 mmol/L   Protime-INR   Result Value Ref Range    Protime 13.4 11.9 - 14.6 Seconds    INR 0.99 0.91 - 1.09   aPTT   Result Value Ref Range    PTT 41.4 (H) 24.1 - 35.0 seconds   Lipase   Result Value Ref Range    Lipase 42 23 - 203 U/L   Amylase   Result Value Ref Range    Amylase 87 30 - 110 U/L   Urinalysis With Culture If Indicated - Urine, Clean Catch   Result Value Ref Range    Color, UA Yellow Yellow, Straw    Appearance, UA Clear Clear    pH, UA <=5.0 5.0 - 8.0    Specific Gravity, UA 1.022 1.005 - 1.030    Glucose, UA Negative Negative    Ketones, UA Negative Negative    Bilirubin, UA Negative Negative    Blood, UA Negative Negative    Protein, UA Negative Negative     Leuk Esterase, UA Trace (A) Negative    Nitrite, UA Negative Negative    Urobilinogen, UA 0.2 E.U./dL 0.2 - 1.0 E.U./dL   Lactic Acid, Plasma   Result Value Ref Range    Lactate 0.9 0.5 - 2.0 mmol/L   CBC Auto Differential   Result Value Ref Range    WBC 8.66 3.40 - 10.80 10*3/mm3    RBC 4.48 3.77 - 5.28 10*6/mm3    Hemoglobin 12.5 12.0 - 15.9 g/dL    Hematocrit 38.8 34.0 - 46.6 %    MCV 86.6 79.0 - 97.0 fL    MCH 27.9 26.6 - 33.0 pg    MCHC 32.2 31.5 - 35.7 g/dL    RDW 18.3 (H) 12.3 - 15.4 %    RDW-SD 57.6 (H) 37.0 - 54.0 fl    MPV 10.4 6.0 - 12.0 fL    Platelets 214 140 - 450 10*3/mm3    Neutrophil % 75.9 42.7 - 76.0 %    Lymphocyte % 10.0 (L) 19.6 - 45.3 %    Monocyte % 11.3 5.0 - 12.0 %    Eosinophil % 0.7 0.3 - 6.2 %    Basophil % 0.6 0.0 - 1.5 %    Immature Grans % 1.5 (H) 0.0 - 0.5 %    Neutrophils, Absolute 6.57 1.70 - 7.00 10*3/mm3    Lymphocytes, Absolute 0.87 0.70 - 3.10 10*3/mm3    Monocytes, Absolute 0.98 (H) 0.10 - 0.90 10*3/mm3    Eosinophils, Absolute 0.06 0.00 - 0.40 10*3/mm3    Basophils, Absolute 0.05 0.00 - 0.20 10*3/mm3    Immature Grans, Absolute 0.13 (H) 0.00 - 0.05 10*3/mm3    nRBC 0.0 0.0 - 0.2 /100 WBC   Urinalysis, Microscopic Only - Urine, Clean Catch   Result Value Ref Range    RBC, UA 3-5 (A) None Seen /HPF    WBC, UA 3-5 (A) None Seen /HPF    Bacteria, UA None Seen None Seen /HPF    Squamous Epithelial Cells, UA 0-2 None Seen, 0-2 /HPF    Hyaline Casts, UA 3-6 None Seen /LPF    Methodology Automated Microscopy    Hepatitis Panel, Acute   Result Value Ref Range    HCV S/C Ratio 0.04 0.00 - 0.99    Hepatitis C Ab Negative Negative    Hep A IgM Negative Negative    Hep B C IgM Negative Negative    Hepatitis B Surface Ag Negative Negative   CBC Auto Differential   Result Value Ref Range    WBC 6.16 3.40 - 10.80 10*3/mm3    RBC 4.33 3.77 - 5.28 10*6/mm3    Hemoglobin 12.5 12.0 - 15.9 g/dL    Hematocrit 37.9 34.0 - 46.6 %    MCV 87.5 79.0 - 97.0 fL    MCH 28.9 26.6 - 33.0 pg    MCHC 33.0  31.5 - 35.7 g/dL    RDW 18.2 (H) 12.3 - 15.4 %    RDW-SD 58.0 (H) 37.0 - 54.0 fl    MPV 10.5 6.0 - 12.0 fL    Platelets 217 140 - 450 10*3/mm3    Neutrophil % 62.1 42.7 - 76.0 %    Lymphocyte % 15.9 (L) 19.6 - 45.3 %    Monocyte % 17.5 (H) 5.0 - 12.0 %    Eosinophil % 1.9 0.3 - 6.2 %    Basophil % 1.0 0.0 - 1.5 %    Immature Grans % 1.6 (H) 0.0 - 0.5 %    Neutrophils, Absolute 3.82 1.70 - 7.00 10*3/mm3    Lymphocytes, Absolute 0.98 0.70 - 3.10 10*3/mm3    Monocytes, Absolute 1.08 (H) 0.10 - 0.90 10*3/mm3    Eosinophils, Absolute 0.12 0.00 - 0.40 10*3/mm3    Basophils, Absolute 0.06 0.00 - 0.20 10*3/mm3    Immature Grans, Absolute 0.10 (H) 0.00 - 0.05 10*3/mm3    nRBC 0.0 0.0 - 0.2 /100 WBC   Comprehensive Metabolic Panel   Result Value Ref Range    Glucose 94 70 - 100 mg/dL    BUN 12 5 - 21 mg/dL    Creatinine 0.54 0.50 - 1.40 mg/dL    Sodium 140 135 - 145 mmol/L    Potassium 4.5 3.5 - 5.3 mmol/L    Chloride 112 (H) 98 - 110 mmol/L    CO2 22.0 (L) 24.0 - 31.0 mmol/L    Calcium 9.5 8.4 - 10.4 mg/dL    Total Protein 7.3 6.3 - 8.7 g/dL    Albumin 3.70 3.50 - 5.00 g/dL    ALT (SGPT) 497 (H) 0 - 54 U/L    AST (SGOT) 319 (H) 7 - 45 U/L    Alkaline Phosphatase 171 (H) 24 - 120 U/L    Total Bilirubin 0.5 0.1 - 1.0 mg/dL    eGFR Non African Amer 114 >60 mL/min/1.73    Globulin 3.6 gm/dL    A/G Ratio 1.0 (L) 1.1 - 2.5 g/dL    BUN/Creatinine Ratio 22.2 7.0 - 25.0    Anion Gap 6.0 4.0 - 13.0 mmol/L   TSH   Result Value Ref Range    TSH 0.528 0.470 - 4.680 mIU/mL   Lipid Panel   Result Value Ref Range    Total Cholesterol 154 130 - 200 mg/dL    Triglycerides 166 (H) 0 - 149 mg/dL    HDL Cholesterol 38 (L) >=50 mg/dL    LDL Cholesterol  85 0 - 99 mg/dL    LDL/HDL Ratio 2.18    Hemoglobin A1c   Result Value Ref Range    Hemoglobin A1C 6.5 (H) 4.8 - 5.9 %   Comprehensive Metabolic Panel   Result Value Ref Range    Glucose 129 (H) 70 - 100 mg/dL    BUN 11 5 - 21 mg/dL    Creatinine 0.62 0.50 - 1.40 mg/dL    Sodium 141 135 - 145  mmol/L    Potassium 4.8 3.5 - 5.3 mmol/L    Chloride 108 98 - 110 mmol/L    CO2 27.0 24.0 - 31.0 mmol/L    Calcium 9.5 8.4 - 10.4 mg/dL    Total Protein 7.4 6.3 - 8.7 g/dL    Albumin 3.90 3.50 - 5.00 g/dL    ALT (SGPT) 449 (H) 0 - 54 U/L    AST (SGOT) 191 (H) 7 - 45 U/L    Alkaline Phosphatase 185 (H) 24 - 120 U/L    Total Bilirubin 0.4 0.1 - 1.0 mg/dL    eGFR Non African Amer 98 >60 mL/min/1.73    Globulin 3.5 gm/dL    A/G Ratio 1.1 1.1 - 2.5 g/dL    BUN/Creatinine Ratio 17.7 7.0 - 25.0    Anion Gap 6.0 4.0 - 13.0 mmol/L   CBC Auto Differential   Result Value Ref Range    WBC 10.39 3.40 - 10.80 10*3/mm3    RBC 4.40 3.77 - 5.28 10*6/mm3    Hemoglobin 12.4 12.0 - 15.9 g/dL    Hematocrit 39.4 34.0 - 46.6 %    MCV 89.5 79.0 - 97.0 fL    MCH 28.2 26.6 - 33.0 pg    MCHC 31.5 31.5 - 35.7 g/dL    RDW 17.7 (H) 12.3 - 15.4 %    RDW-SD 58.1 (H) 37.0 - 54.0 fl    MPV 10.0 6.0 - 12.0 fL    Platelets 201 140 - 450 10*3/mm3    Neutrophil % 72.9 42.7 - 76.0 %    Lymphocyte % 12.9 (L) 19.6 - 45.3 %    Monocyte % 12.4 (H) 5.0 - 12.0 %    Eosinophil % 0.6 0.3 - 6.2 %    Basophil % 0.3 0.0 - 1.5 %    Immature Grans % 0.9 (H) 0.0 - 0.5 %    Neutrophils, Absolute 7.58 (H) 1.70 - 7.00 10*3/mm3    Lymphocytes, Absolute 1.34 0.70 - 3.10 10*3/mm3    Monocytes, Absolute 1.29 (H) 0.10 - 0.90 10*3/mm3    Eosinophils, Absolute 0.06 0.00 - 0.40 10*3/mm3    Basophils, Absolute 0.03 0.00 - 0.20 10*3/mm3    Immature Grans, Absolute 0.09 (H) 0.00 - 0.05 10*3/mm3    nRBC 0.0 0.0 - 0.2 /100 WBC   POC Occult Blood Stool   Result Value Ref Range    Fecal Occult Blood Negative Negative    Lot Number 147     Expiration Date 12,312,019     DEVELOPER LOT NUMBER 147     DEVELOPER EXPIRATION DATE 12,312,019     Positive Control Positive Positive    Negative Control Negative Negative   POC Occult Blood Stool   Result Value Ref Range    Fecal Occult Blood Negative Negative    Lot Number 147     Expiration Date 12/31/2019     DEVELOPER LOT NUMBER 147      DEVELOPER EXPIRATION DATE 12/31/2019     Positive Control Positive Positive    Negative Control Negative Negative   Light Blue Top   Result Value Ref Range    Extra Tube hold for add-on    Green Top (Gel)   Result Value Ref Range    Extra Tube Hold for add-ons.    Lavender Top   Result Value Ref Range    Extra Tube hold for add-on    Red Top   Result Value Ref Range    Extra Tube Hold for add-ons.    Light Blue Top   Result Value Ref Range    Extra Tube hold for add-on    Green Top (Gel)   Result Value Ref Range    Extra Tube Hold for add-ons.    Lavender Top   Result Value Ref Range    Extra Tube hold for add-on    Red Top   Result Value Ref Range    Extra Tube Hold for add-ons.      Assessment and Plan    Renal Mass    Status post right ureteroscopy biopsy.  Okay to DC home from  standpoint.  Follow-up Friday for cystoscopy and stent removal.  Will call with pathology results and further plan.

## 2019-08-10 NOTE — OP NOTE
FLEXIBLE URETEROSCOPY  Procedure Note    Heather Russo  8/9/2019    Pre-op Diagnosis:   Renal mass [N28.89]    Post-op Diagnosis:     Post-Op Diagnosis Codes:     * Renal mass [N28.89]    Procedure/CPT® Codes:      Procedure(s):  RIGHT FLEXIBLE URETEROSCOPY WITH RENAL PELVIC BIOPSY AND RIGHT URETERAL STENT    Surgeon(s):  Yifan Greco MD    Anesthesia: General    Staff:   Circulator: Margaret Hogue RN  Scrub Person: Garry Bishop; Jessica Rondon    Estimated Blood Loss: minimal    Specimens:                Specimens     ID Source Type Tests Collected By Collected At Frozen?      A Kidney, Right Body Fluid · NON-GYNECOLOGIC CYTOLOGY   Yifan Greco MD 8/9/19 [id number]      Description: RIGHT RENAL PELVIC WASHING    B Kidney, Right Tissue · TISSUE PATHOLOGY EXAM  · SURGICAL PATHOLOGY EXAM   Yifan Greco MD 8/9/19 0029      Description: RIGHT RENAL PELVIS BIOPSY            Drains:      Findings: No evidence of classic TCC  Flat erythematous areas in renal pelvis biopsied X 3  Small non obstructing stones not treated    Complications: None    Indications: 63-year-old female with an infiltrative renal mass.  There is concern for TCC versus RCC.  She is brought the operating room for evaluation of possible transitional cell carcinoma.    Description of Procedure: After informed consent is obtained the patient brought the operating room where she underwent general endotracheal anesthesia in the supine position.  She was converted to the dorsolithotomy position.  She was prepped and draped in the usual sterile fashion.  Timeout was done to ensure the correct patient procedure and site.  She did receive preoperative antibiotics in the holding area.    22 Frisian Storz endoscope certain urethra in retrograde fashion.  The bladder was inspected there is no lesions noted.  The right ureteral orifice was identified and cannulated with a 0.38 sensor tip wire.  I then placed a 10 Frisian dual-lumen  catheter over the wire into the upper ureter.  I took renal pelvic washings with 10 cc of normal saline these were sent off for cytologic analysis.  I then took the 10 Panamanian lumen catheter placed into the distal ureter and instilled 10 cc dilute contrast outlining the collecting system.  Findings be dictated in a separate note.  In brief, there did not appear to be anything consistent with transitional cell carcinoma.  I then placed another 0.38 sensor tip wire into the collecting system to the dual-lumen catheter.  I then placed 11, 13 Panamanian 20 to your access sheath.  I then placed an 8.5 Panamanian Stortz flexible digital ureteroscope over the wire.  I used biopsy forceps to biopsy some flat erythematous areas in the renal pelvis x3.  Also inspected all the calyces and saw no evidence of transitional cell carcinoma.  I then removed the access sheath as well as the ureteroscope and did not see any evidence of ureteral tumor or ureteral injury.  I then backloaded the cystoscope of the wire placed a 6 Panamanian 24 cm Percuflex stent with good curl seen in renal pelvis and a good curl seen distally in the bladder.  The bladder was drained scope was removed the patient awakened anesthesia and transferred recovery in satisfactory condition.    Yifan Greco MD     Date: 8/10/2019  Time: 12:03 PM

## 2019-08-10 NOTE — PAYOR COMM NOTE
"DC HOME 8-10-19  [id number]    KaranHeather richey (63 y.o. Female)     Date of Birth Social Security Number Address Home Phone MRN    1955  [id number] [address] [phone] [id number]    [employer]ah[employer]ism Marital Status          [facility]        Admission Date Admission Type Admitting Provider Attending Provider Department, Room/Bed    8/8/19 Emergency Federico Nicole MD  [facility] 3C, 365/1    Discharge Date Discharge Disposition Discharge Destination        8/10/2019 Home or Self Care              Attending Provider:  (none)   Allergies:  Lyrica [Pregabalin], Morphine, Codeine, Penicillins    Isolation:  None   Infection:  None   Code Status:  Prior    Ht:  160 cm (63\")   Wt:  68 kg (150 lb)    Admission Cmt:  None   Principal Problem:  Renal mass [N28.89] More...                 Active Insurance as of 8/8/2019     Primary Coverage     Payor Plan Insurance Group Employer/Plan Group    [employer] [id number]     Payor Plan Address Payor Plan Phone Number Payor Plan Fax Number Effective Dates    [address] [phone]  1/1/2017 - None Entered    [facility] [id number]       Subscriber Name Subscriber Birth Date Member ID       KARANFABBY [birth year]/6/1952 [id number]                 Emergency Contacts      (Rel.) Home Phone Work Phone Mobile Phone    Fabby Russo (Spouse) [phone] -- [phone]    NigelJayla (Daughter) -- -- [phone]               Discharge Summary      Federico Nicole MD at 8/10/2019 11:37 AM              [facility] Medicine Services  DISCHARGE SUMMARY       Date of Admission: 8/8/2019  Date of Discharge:  8/10/2019  Primary Care Physician: Grzegorz Michel Jr., MD    Presenting Problem/History of Present Illness:  Hematemesis, presence of nausea not specified [K92.0]  Hematemesis, presence of nausea not specified [K92.0]     Final Discharge Diagnoses:  Active " Hospital Problems    Diagnosis   • **Renal mass     Added automatically from request for surgery [id number]     • Hematemesis   • Elevated liver enzymes   • NSAID long-term use   • Nausea   • Hyperlipidemia   • HTN (hypertension), benign   • Gastroesophageal reflux disease     Gaviscon to be added as needed.       Consults:   1.  Dr. Greco, Urology  2.  Dr. Garcia, Gastroenterology    Procedures Performed:   1.  Uteroscope per Dr. Greco on 8/9/19    Pertinent Test Results:   Lab Results (last 7 days)     Procedure Component Value Units Date/Time    Comprehensive Metabolic Panel [[id number]]  (Abnormal) Collected:  08/10/19 0702    Specimen:  Blood Updated:  08/10/19 0740     Glucose 129 mg/dL      BUN 11 mg/dL      Creatinine 0.62 mg/dL      Sodium 141 mmol/L      Potassium 4.8 mmol/L      Chloride 108 mmol/L      CO2 27.0 mmol/L      Calcium 9.5 mg/dL      Total Protein 7.4 g/dL      Albumin 3.90 g/dL      ALT (SGPT) 449 U/L      AST (SGOT) 191 U/L      Alkaline Phosphatase 185 U/L      Total Bilirubin 0.4 mg/dL      eGFR Non African Amer 98 mL/min/1.73      Globulin 3.5 gm/dL      A/G Ratio 1.1 g/dL      BUN/Creatinine Ratio 17.7     Anion Gap 6.0 mmol/L     Narrative:       GFR Normal >60  Chronic Kidney Disease <60  Kidney Failure <15    CBC & Differential [[id number]] Collected:  08/10/19 0702    Specimen:  Blood Updated:  08/10/19 0733    Narrative:       The following orders were created for panel order CBC & Differential.  Procedure                               Abnormality         Status                     ---------                               -----------         ------                     CBC Auto Differential[[id number]]        Abnormal            Final result                 Please view results for these tests on the individual orders.    CBC Auto Differential [[id number]]  (Abnormal) Collected:  08/10/19 0702    Specimen:  Blood Updated:  08/10/19 0733     WBC 10.39 10*3/mm3      RBC 4.40 10*6/mm3       Hemoglobin 12.4 g/dL      Hematocrit 39.4 %      MCV 89.5 fL      MCH 28.2 pg      MCHC 31.5 g/dL      RDW 17.7 %      RDW-SD 58.1 fl      MPV 10.0 fL      Platelets 201 10*3/mm3      Neutrophil % 72.9 %      Lymphocyte % 12.9 %      Monocyte % 12.4 %      Eosinophil % 0.6 %      Basophil % 0.3 %      Immature Grans % 0.9 %      Neutrophils, Absolute 7.58 10*3/mm3      Lymphocytes, Absolute 1.34 10*3/mm3      Monocytes, Absolute 1.29 10*3/mm3      Eosinophils, Absolute 0.06 10*3/mm3      Basophils, Absolute 0.03 10*3/mm3      Immature Grans, Absolute 0.09 10*3/mm3      nRBC 0.0 /100 WBC     Hemoglobin A1c [[id number]]  (Abnormal) Collected:  08/09/19 0454    Specimen:  Blood Updated:  08/09/19 0622     Hemoglobin A1C 6.5 %     Narrative:       Less than 6.0           Non-Diabetic Range  6.0-7.0                 ADA Therapeutic Target  Greater than 7.0        Action Suggested    TSH [[id number]]  (Normal) Collected:  08/09/19 0454    Specimen:  Blood Updated:  08/09/19 0611     TSH 0.528 mIU/mL     Lipid Panel [[id number]]  (Abnormal) Collected:  08/09/19 0454    Specimen:  Blood Updated:  08/09/19 0553     Total Cholesterol 154 mg/dL      Triglycerides 166 mg/dL      HDL Cholesterol 38 mg/dL      LDL Cholesterol  85 mg/dL      LDL/HDL Ratio 2.18    Comprehensive Metabolic Panel [[id number]]  (Abnormal) Collected:  08/09/19 0454    Specimen:  Blood Updated:  08/09/19 0542     Glucose 94 mg/dL      BUN 12 mg/dL      Creatinine 0.54 mg/dL      Sodium 140 mmol/L      Potassium 4.5 mmol/L      Chloride 112 mmol/L      CO2 22.0 mmol/L      Calcium 9.5 mg/dL      Total Protein 7.3 g/dL      Albumin 3.70 g/dL      ALT (SGPT) 497 U/L      AST (SGOT) 319 U/L      Alkaline Phosphatase 171 U/L      Total Bilirubin 0.5 mg/dL      eGFR Non African Amer 114 mL/min/1.73      Globulin 3.6 gm/dL      A/G Ratio 1.0 g/dL      BUN/Creatinine Ratio 22.2     Anion Gap 6.0 mmol/L     Narrative:       GFR Normal >60  Chronic Kidney Disease  <60  Kidney Failure <15    CBC Auto Differential [[id number]]  (Abnormal) Collected:  08/09/19 0454    Specimen:  Blood Updated:  08/09/19 0527     WBC 6.16 10*3/mm3      RBC 4.33 10*6/mm3      Hemoglobin 12.5 g/dL      Hematocrit 37.9 %      MCV 87.5 fL      MCH 28.9 pg      MCHC 33.0 g/dL      RDW 18.2 %      RDW-SD 58.0 fl      MPV 10.5 fL      Platelets 217 10*3/mm3      Neutrophil % 62.1 %      Lymphocyte % 15.9 %      Monocyte % 17.5 %      Eosinophil % 1.9 %      Basophil % 1.0 %      Immature Grans % 1.6 %      Neutrophils, Absolute 3.82 10*3/mm3      Lymphocytes, Absolute 0.98 10*3/mm3      Monocytes, Absolute 1.08 10*3/mm3      Eosinophils, Absolute 0.12 10*3/mm3      Basophils, Absolute 0.06 10*3/mm3      Immature Grans, Absolute 0.10 10*3/mm3      nRBC 0.0 /100 WBC     Hepatitis Panel, Acute [[id number]]  (Normal) Collected:  08/08/19 1327    Specimen:  Blood Updated:  08/08/19 1937     HCV S/C Ratio 0.04     Hepatitis C Ab Negative     Hep A IgM Negative     Hep B C IgM Negative     Hepatitis B Surface Ag Negative    POC Occult Blood Stool [[id number]]  (Normal) Collected:  08/08/19 1655    Specimen:  Stool Updated:  08/08/19 1657     Fecal Occult Blood Negative     Lot Number 147     Expiration Date 12/31/2019     DEVELOPER LOT NUMBER 147     DEVELOPER EXPIRATION DATE 12/31/2019     Positive Control Positive     Negative Control Negative    POC Occult Blood Stool [[id number]]  (Normal) Collected:  08/08/19 1649    Specimen:  Stool Updated:  08/08/19 1650     Fecal Occult Blood Negative     Lot Number 147     Expiration Date 12,312,019     DEVELOPER LOT NUMBER 147     DEVELOPER EXPIRATION DATE 12,312,019     Positive Control Positive     Negative Control Negative    [place] Draw [[id number]] Collected:  08/08/19 1327    Specimen:  Blood Updated:  08/08/19 1431    Narrative:       The following orders were created for panel order [place] Draw.  Procedure                               Abnormality          Status                     ---------                               -----------         ------                     Light Blue Top[[id number]]                                   Final result               Green Top (Gel)[[id number]]                                  Final result               Lavender Top[[id number]]                                     Final result               Red Top[[id number]]                                          Final result                 Please view results for these tests on the individual orders.    Light Blue Top [[id number]] Collected:  08/08/19 1328    Specimen:  Blood Updated:  08/08/19 1431     Extra Tube hold for add-on     Comment: Auto resulted       Green Top (Gel) [[id number]] Collected:  08/08/19 1327    Specimen:  Blood Updated:  08/08/19 1431     Extra Tube Hold for add-ons.     Comment: Auto resulted.       Lavender Top [[id number]] Collected:  08/08/19 1327    Specimen:  Blood Updated:  08/08/19 1431     Extra Tube hold for add-on     Comment: Auto resulted       Red Top [[id number]] Collected:  08/08/19 1327    Specimen:  Blood Updated:  08/08/19 1431     Extra Tube Hold for add-ons.     Comment: Auto resulted.       [place] Draw [[id number]] Collected:  08/08/19 1327    Specimen:  Blood Updated:  08/08/19 1431    Narrative:       The following orders were created for panel order [place] Draw.  Procedure                               Abnormality         Status                     ---------                               -----------         ------                     Light Blue Top[[id number]]                                   Final result               Green Top (Gel)[[id number]]                                  Final result               Lavender Top[[id number]]                                     Final result               Red Top[[id number]]                                          Final result                 Please view results for these tests on the individual orders.    Light  Blue Top [[id number]] Collected:  08/08/19 1328    Specimen:  Blood Updated:  08/08/19 1431     Extra Tube hold for add-on     Comment: Auto resulted       Green Top (Gel) [[id number]] Collected:  08/08/19 1327    Specimen:  Blood Updated:  08/08/19 1431     Extra Tube Hold for add-ons.     Comment: Auto resulted.       Lavender Top [[id number]] Collected:  08/08/19 1327    Specimen:  Blood Updated:  08/08/19 1431     Extra Tube hold for add-on     Comment: Auto resulted       Red Top [[id number]] Collected:  08/08/19 1327    Specimen:  Blood Updated:  08/08/19 1431     Extra Tube Hold for add-ons.     Comment: Auto resulted.       Lipase [[id number]]  (Normal) Collected:  08/08/19 1327    Specimen:  Blood Updated:  08/08/19 1359     Lipase 42 U/L     Amylase [[id number]]  (Normal) Collected:  08/08/19 1327    Specimen:  Blood Updated:  08/08/19 1359     Amylase 87 U/L     Comprehensive Metabolic Panel [[id number]]  (Abnormal) Collected:  08/08/19 1327    Specimen:  Blood Updated:  08/08/19 1358     Glucose 117 mg/dL      BUN 15 mg/dL      Creatinine 0.56 mg/dL      Sodium 139 mmol/L      Potassium 4.2 mmol/L      Chloride 112 mmol/L      CO2 19.0 mmol/L      Calcium 9.8 mg/dL      Total Protein 7.7 g/dL      Albumin 4.10 g/dL      ALT (SGPT) 436 U/L      AST (SGOT) 253 U/L      Alkaline Phosphatase 193 U/L      Total Bilirubin 0.4 mg/dL      eGFR Non African Amer 110 mL/min/1.73      Globulin 3.6 gm/dL      A/G Ratio 1.1 g/dL      BUN/Creatinine Ratio 26.8     Anion Gap 8.0 mmol/L     Narrative:       GFR Normal >60  Chronic Kidney Disease <60  Kidney Failure <15    Protime-INR [[id number]]  (Normal) Collected:  08/08/19 1328    Specimen:  Blood Updated:  08/08/19 1357     Protime 13.4 Seconds      INR 0.99    aPTT [[id number]]  (Abnormal) Collected:  08/08/19 1328    Specimen:  Blood Updated:  08/08/19 1357     PTT 41.4 seconds     Lactic Acid, Plasma [[id number]]  (Normal) Collected:  08/08/19 1327    Specimen:  Blood  Updated:  08/08/19 1355     Lactate 0.9 mmol/L     Urinalysis With Culture If Indicated - Urine, Clean Catch [[id number]]  (Abnormal) Collected:  08/08/19 1254    Specimen:  Urine, Clean Catch Updated:  08/08/19 1352     Color, UA Yellow     Appearance, UA Clear     pH, UA <=5.0     Specific Gravity, UA 1.022     Glucose, UA Negative     Ketones, UA Negative     Bilirubin, UA Negative     Blood, UA Negative     Protein, UA Negative     Leuk Esterase, UA Trace     Nitrite, UA Negative     Urobilinogen, UA 0.2 E.U./dL    Urinalysis, Microscopic Only - Urine, Clean Catch [[id number]]  (Abnormal) Collected:  08/08/19 1254    Specimen:  Urine, Clean Catch Updated:  08/08/19 1352     RBC, UA 3-5 /HPF      WBC, UA 3-5 /HPF      Bacteria, UA None Seen /HPF      Squamous Epithelial Cells, UA 0-2 /HPF      Hyaline Casts, UA 3-6 /LPF      Methodology Automated Microscopy    CBC & Differential [[id number]] Collected:  08/08/19 1327    Specimen:  Blood Updated:  08/08/19 1350    Narrative:       The following orders were created for panel order CBC & Differential.  Procedure                               Abnormality         Status                     ---------                               -----------         ------                     CBC Auto Differential[[id number]]        Abnormal            Final result                 Please view results for these tests on the individual orders.    CBC Auto Differential [[id number]]  (Abnormal) Collected:  08/08/19 1327    Specimen:  Blood Updated:  08/08/19 1350     WBC 8.66 10*3/mm3      RBC 4.48 10*6/mm3      Hemoglobin 12.5 g/dL      Hematocrit 38.8 %      MCV 86.6 fL      MCH 27.9 pg      MCHC 32.2 g/dL      RDW 18.3 %      RDW-SD 57.6 fl      MPV 10.4 fL      Platelets 214 10*3/mm3      Neutrophil % 75.9 %      Lymphocyte % 10.0 %      Monocyte % 11.3 %      Eosinophil % 0.7 %      Basophil % 0.6 %      Immature Grans % 1.5 %      Neutrophils, Absolute 6.57 10*3/mm3      Lymphocytes,  Absolute 0.87 10*3/mm3      Monocytes, Absolute 0.98 10*3/mm3      Eosinophils, Absolute 0.06 10*3/mm3      Basophils, Absolute 0.05 10*3/mm3      Immature Grans, Absolute 0.13 10*3/mm3      nRBC 0.0 /100 WBC         Imaging Results (last 7 days)     Procedure Component Value Units Date/Time    XR Pyelogram Retrograde [[id number]] Collected:  08/09/19 1648     Updated:  08/09/19 1653    Narrative:       EXAMINATION: Right retrograde pyelogram 08/09/2019     Fluoroscopy time: 1.2 minutes.     DOSE: 3.90 mGy. 4 images are submitted for interpretation.     FINDINGS: C-arm was used intraoperatively during performance of a  right-sided retrograde pyelogram. Initially there was a questionable  filling defect at the UPJ. However, subsequent images demonstrate no  definite filling defect within the renal pelvis although there is some  blunting of the calyces. Subsequently a double-J ureteral stent is left  in place and is well-positioned. The films are available to the OR for  review.  This report was finalized on 08/09/2019 16:50 by Dr. Jadon Duron MD.    US Liver [[id number]] Collected:  08/09/19 0830     Updated:  08/09/19 0840    Narrative:       EXAMINATION: US LIVER-     8/9/2019 7:00 AM CDT     HISTORY: elevated liver enzymes     Grayscale and color flow ultrasound evaluation of the liver and right  kidney.     Absent gallbladder.  No focal liver abnormality.  No biliary dilation. CBD = 8 mm.     Right kidney = 127 x 45 x 55 mm.  Incidental 9 x 8 mm right renal cyst.  Small nonobstructing right renal stone noted.     There is mild dilation of the upper pole collecting system. The  appearance of an infiltrative mass within the central and lower right  kidney is much better appreciated on the CT exam.     Summary:  1. Normal liver.  2. Right renal mass better appreciated on the CT exam performed  yesterday.  This report was finalized on 08/09/2019 08:37 by Dr. Chase Rios MD.    XR Chest 2 View [[id number]]  Collected:  08/09/19 0755     Updated:  08/09/19 0759    Narrative:       Exam: XR CHEST 2 VW-     Indication: Preoperative evaluation     Comparison: None available.     Findings:     The cardiac silhouette is normal in size.  Calcified granuloma in the right midlung zone.  No consolidation, pleural effusion, or pneumothorax.  No suspicious osseous finding.       Impression:          No acute findings.  This report was finalized on 08/09/2019 07:56 by Dr. Conner Helton MD.    CT Abdomen Pelvis With Contrast [[id number]] Collected:  08/08/19 1447     Updated:  08/08/19 1513    Narrative:       CT ABDOMEN AND PELVIS WITH CONTRAST 8/8/2019 2:20 PM CDT     HISTORY: Epigastric pain and vomiting     COMPARISON: CT scan dated 08/30/2018.      DLP: 3 and 17 mGy cm     TECHNIQUE: Following the intravenous administration of contrast, helical  CT tomographic images of the abdomen and pelvis were acquired. Coronal  reformatted images were also provided for review.      FINDINGS:   The lung bases and base of the heart are unremarkable.      LIVER: No focal liver lesion. The hepatic vasculature is patent.      BILIARY SYSTEM: The gallbladder has been removed. There is no  intrahepatic or extrahepatic ductal dilation.      PANCREAS: No focal pancreatic lesion.      SPLEEN: Unremarkable.      KIDNEYS AND ADRENALS: Adrenal glands are unremarkable. There is a 5 mm  nonobstructing left upper pole renal stone with additional 6 mm left  lower pole stone. There is an infiltrative process which appears to be  involving the right renal sinus as well as a portion of the right  inferior renal pole. This has increased from the earlier comparison CT,  with near complete replacement of the sinus fat at the right inferior  pole. There appears to be heterogeneously enhancing/hypoenhancing  portions of the right inferior renal pole. The overall extent of this  process measures up to 4.8 cm (series 3-image 35). There are several  small  nonobstructing calculi within right upper and right lower pole  calyces. The right renal pelvis is nondilated. The ureters are  nondilated.     RETROPERITONEUM: No enlarged aortocaval lymph node measuring 1.3 cm  short axis (series 2 image image 31). Slightly prominent aortocaval node  just inferior to this measuring up to 0.9 cm short axis. There are 2  prominent retrocaval lymph nodes as well with one measuring 1.3 cm short  axis and a second measuring 0.9 cm short axis.     GI TRACT: Moderate hiatal hernia. The stomach is nondistended. Small  bowel is nondistended.  There appears to be a segment of circumferential  wall thickening along a solitary loop of small bowel in the low mid  abdomen (series 2-image 56 and series 3-image 25). Scattered colonic  diverticula. Submucosal fat deposition along the ascending colon. No  obvious wall thickening along the colon. Appendix is not well  visualized.     OTHER: No mesenteric adenopathy. No intraperitoneal free fluid or free  air. Dense atheromatous calcification along the abdominal aorta without  aneurysm formation. The osseous structures and soft tissues demonstrate  no worrisome lesions. Spinal scoliotic curvature.      PELVIS: No mass lesion, fluid collection or significant lymphadenopathy  is seen in the pelvis. The urinary bladder is normal in appearance.       Impression:       1. Enlarging infiltrative process involving the right renal sinus and  right inferior renal pole. This appears most likely to be an enhancing  and infiltrative soft tissue mass, either a transitional cell carcinoma  or perhaps infiltrative renal cell carcinoma. 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 This seems unlikely to reflect an acute  process such as pyelonephritis with adjacent reactive adenopathy.  2. Questionable circumferential wall thickening along the segment of  small bowel in the lower abdomen, perhaps a  mild enteritis.  3. Colonic diverticulosis without acute diverticulitis. No inflammatory  wall thickening along the colon at this time.        This report was finalized on 08/08/2019 15:10 by Dr Marco Griggs, .        History of Present Illness on Day of Discharge:   Currently sitting up in bed.  Eager to be discharged home.  She feels better but still tired.  Thinks she can rest better at home.       Hospital Course:  The patient is a 63 y.o. female who presented to [facility] with hematemesis.  The patient has a past medical history significant for anemia, anxiety, arthritis, skin cancer, colitis, COPD, depression, fibromyalgia, GERD, hypercholesterolemia, hypertension and seizure disorder.  The patient complained of vomiting blood on the day of admission x 1 episode.  She has a history of microscopic colitis and was recently taken off of her Protonix and has been having reflux symptoms.  She sees Dr. Garcia as an outpatient.  In the emergency department she was taken down for a CT scan of her abdomen and pelvis which showed a suspected renal mass.    She had a CT scan of her abdomen and pelvis back in August 2018 that showed fuzzy right renal cortical enhancement and striated nephrogram.  She was taken to the OR by Dr. Greco for a uteroscopy with right renal pelvic biopsy and stent.  Pathology from that showed inflammatory process and no malignant changes noted.    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  Her liver functions were noted to be elevated as well.     She was taken back to the operating room yesterday per Dr. Greco for another uteroscopy with biopsy and right ureteral stent placement.  She tolerated the procedure well.    Today the patient is stable for discharge home.  Dr. Greco's office will contact the patient with pathology results and formulate a plan once biopsy has been resulted.  The patient will  "follow-up in his office on Friday for stent removal.    The patient also needs to follow-up with her primary care provider Dr. Grzegorz Michel in 1 week.  She is stable good condition for discharge home today with her .    Condition on Discharge:  Stable    Physical Exam on Discharge:  /60 (BP Location: Left arm, Patient Position: Lying)   Pulse 96   Temp 97.9 °F (36.6 °C) (Oral)   Resp 16   Ht 160 cm (63\")   Wt 68 kg (150 lb)   SpO2 96%   BMI 26.57 kg/m²    Physical Exam   Constitutional: She is oriented to person, place, and time. She appears well-developed and well-nourished.   Sitting up in bed.  NAD.   HENT:   Head: Normocephalic and atraumatic.   Eyes: Conjunctivae and EOM are normal. Pupils are equal, round, and reactive to light.   Neck: Neck supple. No JVD present. No thyromegaly present.   Cardiovascular: Normal rate, regular rhythm, normal heart sounds and intact distal pulses. Exam reveals no gallop and no friction rub.   No murmur heard.  Pulmonary/Chest: Effort normal and breath sounds normal. No respiratory distress. She has no wheezes. She has no rales. She exhibits no tenderness.   Abdominal: Soft. Bowel sounds are normal. She exhibits no distension. There is no tenderness. There is no rebound and no guarding.   Musculoskeletal: Normal range of motion. She exhibits no edema, tenderness or deformity.   Lymphadenopathy:     She has no cervical adenopathy.   Neurological: She is alert and oriented to person, place, and time.   Skin: Skin is warm and dry. No rash noted.   Psychiatric: She has a normal mood and affect. Her behavior is normal. Judgment and thought content normal.   Nursing note and vitals reviewed.    Discharge Disposition:  Home or Self Care    Discharge Medications:     Discharge Medications      New Medications      Instructions Start Date   oxybutynin 5 MG tablet  Commonly known as:  DITROPAN   5 mg, Oral, 3 Times Daily      pantoprazole 40 MG EC tablet  Commonly known " as:  PROTONIX   40 mg, Oral, Daily      tamsulosin 0.4 MG capsule 24 hr capsule  Commonly known as:  FLOMAX   0.4 mg, Oral, Daily   Start Date:  8/11/2019        Continue These Medications      Instructions Start Date   ARIPiprazole 10 MG tablet  Commonly known as:  ABILIFY   No dose, route, or frequency recorded.      cetirizine 10 MG tablet  Commonly known as:  zyrTEC   10 mg, Oral, Daily      colestipol 1 g tablet  Commonly known as:  COLESTID   TAKE 1 TABLET TWICE A DAY      DULoxetine 60 MG capsule  Commonly known as:  CYMBALTA   No dose, route, or frequency recorded.      HYDROcodone-acetaminophen 7.5-325 MG per tablet  Commonly known as:  NORCO   1-2 tablets, Oral, Every 4 Hours PRN      PEPTO-BISMOL 262 MG tablet  Generic drug:  Bismuth Subsalicylate   Oral, 2 po qid       PROAIR  (90 Base) MCG/ACT inhaler  Generic drug:  albuterol sulfate HFA   USE TWO PUFFS BY MOUTH EVERY 4 TO 6 HOURS AS NEEDED FOR WHEEZING      SYMBICORT 160-4.5 MCG/ACT inhaler  Generic drug:  budesonide-formoterol   No dose, route, or frequency recorded.         Stop These Medications    famotidine 20 MG tablet  Commonly known as:  PEPCID     metoprolol tartrate 50 MG tablet  Commonly known as:  LOPRESSOR     omeprazole 40 MG capsule  Commonly known as:  priLOSEC          Discharge Diet:   Diet Instructions     Diet: Regular      Discharge Diet:  Regular        Activity at Discharge:   Activity Instructions     Activity as Tolerated          Discharge Care Plan/Instructions:   1.  Follow up with PCP in one week   2.  Follow up with Urology in 1-2 weeks   3.  Keep scheduled appointment with Dr. Garcia.     Follow-up Appointments:   Future Appointments   Date Time Provider Department Center   8/26/2019  1:00 PM Danis Garcia MD [facility] None     Test Results Pending at Discharge: None    YOLY Loving  08/10/19  11:37 AM    Time: 32 minutes     I personally evaluated and examined the patient in conjunction with Arun  YOLY Martin and agree with the assessment, treatment plan, and disposition of the patient as recorded by her. My history, exam, and further recommendations are: I have reviewed and agree with the plans. Beck Nicole MD  08/10/19  12:45 PM      Electronically signed by Federico Nicole MD at 8/10/2019 12:45 PM

## 2019-08-10 NOTE — PLAN OF CARE
Problem: Patient Care Overview  Goal: Plan of Care Review  Outcome: Ongoing (interventions implemented as appropriate)   08/10/19 0151   Coping/Psychosocial   Plan of Care Reviewed With patient   Plan of Care Review   Progress no change   OTHER   Outcome Summary Pt. had pain meds right at shift change, no c/o pain since; IVF continue; O2 weaned to 1L, plan to take off at 4am vitals if O2 Sats are adequate; up ad ramos; voiding; will continue to monitor.     Goal: Individualization and Mutuality  Outcome: Ongoing (interventions implemented as appropriate)    Goal: Discharge Needs Assessment  Outcome: Ongoing (interventions implemented as appropriate)    Goal: Interprofessional Rounds/Family Conf  Outcome: Ongoing (interventions implemented as appropriate)      Problem: Gastrointestinal Bleeding (Adult)  Goal: Signs and Symptoms of Listed Potential Problems Will be Absent, Minimized or Managed (Gastrointestinal Bleeding)  Outcome: Ongoing (interventions implemented as appropriate)

## 2019-08-10 NOTE — PROGRESS NOTES
CC: Hematemesis    Subjective:   No further nausea or emesis reported.  Hemoglobin remained stable.  No bleeding noted.      Current Facility-Administered Medications:   •  albuterol (PROVENTIL) nebulizer solution 0.083% 2.5 mg/3mL, 2.5 mg, Nebulization, Q4H PRN, Federico Nicole MD  •  ARIPiprazole (ABILIFY) tablet 10 mg, 10 mg, Oral, Daily, Federico Nicole MD, 10 mg at 08/10/19 0827  •  budesonide-formoterol (SYMBICORT) 160-4.5 MCG/ACT inhaler 2 puff, 2 puff, Inhalation, BID - RT, Federico Nicole MD, 2 puff at 08/09/19 1111  •  butalbital-acetaminophen-caffeine (FIORICET, ESGIC) -40 MG per tablet 1 tablet, 1 tablet, Oral, Q4H PRN, Arun Martin APRN  •  DULoxetine (CYMBALTA) DR capsule 60 mg, 60 mg, Oral, Daily, Federico Nicole MD, 60 mg at 08/10/19 0827  •  lactated ringers infusion, 100 mL/hr, Intravenous, Continuous, Eye, YONNY Woods, Last Rate: 100 mL/hr at 08/10/19 0402, 100 mL/hr at 08/10/19 0402  •  nicotine (NICODERM CQ) 21 MG/24HR patch 1 patch, 1 patch, Transdermal, Q24H, Federico Nicole MD, 1 patch at 08/09/19 1900  •  ondansetron (ZOFRAN) injection 4 mg, 4 mg, Intravenous, Q6H PRN, Federico Nicole MD, 4 mg at 08/09/19 0922  •  oxybutynin (DITROPAN) tablet 5 mg, 5 mg, Oral, TID, Yifan Greco MD, 5 mg at 08/10/19 0828  •  pantoprazole (PROTONIX) injection 40 mg, 40 mg, Intravenous, Q12H, Federico Nicole MD, 40 mg at 08/10/19 0828  •  [COMPLETED] Insert peripheral IV, , , Once **AND** sodium chloride 0.9 % flush 10 mL, 10 mL, Intravenous, PRN, Shoulders, YOLY Flores  •  sodium chloride 0.9 % flush 3 mL, 3 mL, Intravenous, Q12H, Federico Nicole MD, 3 mL at 08/10/19 0828  •  sodium chloride 0.9 % flush 3-10 mL, 3-10 mL, Intravenous, PRN, Federico Nicole MD  •  tamsulosin (FLOMAX) 24 hr capsule 0.4 mg, 0.4 mg, Oral, Daily, GrecoYifan MD, 0.4 mg at 08/10/19 0828  •  traMADol (ULTRAM) tablet 50 mg, 50 mg, Oral, Q6H PRN, Federico Nicole MD, 50 mg at 08/10/19 0743  •   zolpidem (AMBIEN) tablet 5 mg, 5 mg, Oral, Nightly PRN, Nael Hoffman, , 5 mg at 08/09/19 2338    Review of Systems   Respiratory: Negative.    Cardiovascular: Negative.    Gastrointestinal: Negative.          Vital Signs:  Temp:  [97.4 °F (36.3 °C)-98.6 °F (37 °C)] 97.9 °F (36.6 °C)  Heart Rate:  [] 96  Resp:  [13-18] 16  BP: (105-131)/(60-88) 105/60  Body mass index is 26.57 kg/m².     Physical Exam   Constitutional: She is oriented to person, place, and time.   Cardiovascular: Normal rate.   Pulmonary/Chest: Effort normal.   Abdominal: Soft.   Neurological: She is alert and oriented to person, place, and time.        Results Review:     LABS:   Results from last 7 days   Lab Units 08/10/19  0702 08/09/19  0454 08/08/19  1327   WBC 10*3/mm3 10.39 6.16 8.66   HEMOGLOBIN g/dL 12.4 12.5 12.5   HEMATOCRIT % 39.4 37.9 38.8   PLATELETS 10*3/mm3 201 217 214       Results from last 7 days   Lab Units 08/10/19  0702 08/09/19  0454 08/08/19  1327   SODIUM mmol/L 141 140 139   POTASSIUM mmol/L 4.8 4.5 4.2   CHLORIDE mmol/L 108 112* 112*   CO2 mmol/L 27.0 22.0* 19.0*   BUN mg/dL 11 12 15   CREATININE mg/dL 0.62 0.54 0.56   CALCIUM mg/dL 9.5 9.5 9.8   BILIRUBIN mg/dL 0.4 0.5 0.4   ALK PHOS U/L 185* 171* 193*   ALT (SGPT) U/L 449* 497* 436*   AST (SGOT) U/L 191* 319* 253*   GLUCOSE mg/dL 129* 94 117*       Results from last 7 days   Lab Units 08/08/19  1328   INR  0.99       Lab Results   Lab Value Date/Time    LIPASE 42 08/08/2019 1327    LIPASE 182 08/22/2018 1546    LIPASE 35 08/25/2016 1905    LIPASE 192 06/13/2016 1835       Radiology:    Imaging Results (last 24 hours)     Procedure Component Value Units Date/Time    XR Pyelogram Retrograde [[id number]] Collected:  08/09/19 1648     Updated:  08/09/19 1653    Narrative:       EXAMINATION: Right retrograde pyelogram 08/09/2019     Fluoroscopy time: 1.2 minutes.     DOSE: 3.90 mGy. 4 images are submitted for interpretation.     FINDINGS: C-arm was used  intraoperatively during performance of a  right-sided retrograde pyelogram. Initially there was a questionable  filling defect at the UPJ. However, subsequent images demonstrate no  definite filling defect within the renal pelvis although there is some  blunting of the calyces. Subsequently a double-J ureteral stent is left  in place and is well-positioned. The films are available to the OR for  review.  This report was finalized on 08/09/2019 16:50 by Dr. Jadon Duron MD.          Impression/Plan:    Patient Active Problem List   Diagnosis   • Laryngopharyngeal reflux   • Hoarseness   • Tobacco use disorder   • Abnormal liver function tests   • Generalized abdominal pain   • Nausea   • Obesity, unspecified obesity severity, unspecified obesity type   • Tobacco abuse counseling   • Renal mass   • Diarrhea in adult patient   • Colitis   • Gastroesophageal reflux disease   • Abdominal pain, epigastric   • NSAID long-term use   • HTN (hypertension), benign   • Hematemesis   • Elevated liver enzymes   • Hyperlipidemia       1.  Hematemesis.  Hemoglobin hematocrit remained stable throughout.  At this point I do not think we have to proceed with endoscopy.  She does seem to be better on PPI.  She has been off PPIs because of her microscopic colitis.  I think it is okay to resume Protonix as outpatient for her reflux symptoms.  She is tolerating a regular diet.  From GI standpoint she can be discharged today and she has scheduled follow-up with us in a few weeks.    EMR Dragon/transcription disclaimer: Much of this encounter note is electronic transcription/translation of spoken language to printed text.  The electronic translation of spoken language may permit erroneous, or at times, nonsensical words or phrases to be inadvertently transcribed.  Although I have reviewed the note for such errors, some may still exist.      Danis Garcia MD  08/10/19  10:49 AM

## 2019-08-10 NOTE — DISCHARGE SUMMARY
[facility] Medicine Services  DISCHARGE SUMMARY       Date of Admission: 8/8/2019  Date of Discharge:  8/10/2019  Primary Care Physician: Grzegorz Michel Jr., MD    Presenting Problem/History of Present Illness:  Hematemesis, presence of nausea not specified [K92.0]  Hematemesis, presence of nausea not specified [K92.0]     Final Discharge Diagnoses:  Active Hospital Problems    Diagnosis   • **Renal mass     Added automatically from request for surgery [id number]     • Hematemesis   • Elevated liver enzymes   • NSAID long-term use   • Nausea   • Hyperlipidemia   • HTN (hypertension), benign   • Gastroesophageal reflux disease     Gaviscon to be added as needed.       Consults:   1.  Dr. Greco, Urology  2.  Dr. Garcia, Gastroenterology    Procedures Performed:   1.  Uteroscope per Dr. Greco on 8/9/19    Pertinent Test Results:   Lab Results (last 7 days)     Procedure Component Value Units Date/Time    Comprehensive Metabolic Panel [[id number]]  (Abnormal) Collected:  08/10/19 0702    Specimen:  Blood Updated:  08/10/19 0740     Glucose 129 mg/dL      BUN 11 mg/dL      Creatinine 0.62 mg/dL      Sodium 141 mmol/L      Potassium 4.8 mmol/L      Chloride 108 mmol/L      CO2 27.0 mmol/L      Calcium 9.5 mg/dL      Total Protein 7.4 g/dL      Albumin 3.90 g/dL      ALT (SGPT) 449 U/L      AST (SGOT) 191 U/L      Alkaline Phosphatase 185 U/L      Total Bilirubin 0.4 mg/dL      eGFR Non African Amer 98 mL/min/1.73      Globulin 3.5 gm/dL      A/G Ratio 1.1 g/dL      BUN/Creatinine Ratio 17.7     Anion Gap 6.0 mmol/L     Narrative:       GFR Normal >60  Chronic Kidney Disease <60  Kidney Failure <15    CBC & Differential [[id number]] Collected:  08/10/19 0702    Specimen:  Blood Updated:  08/10/19 0733    Narrative:       The following orders were created for panel order CBC & Differential.  Procedure                               Abnormality         Status                     ---------                                -----------         ------                     CBC Auto Differential[[id number]]        Abnormal            Final result                 Please view results for these tests on the individual orders.    CBC Auto Differential [[id number]]  (Abnormal) Collected:  08/10/19 0702    Specimen:  Blood Updated:  08/10/19 0733     WBC 10.39 10*3/mm3      RBC 4.40 10*6/mm3      Hemoglobin 12.4 g/dL      Hematocrit 39.4 %      MCV 89.5 fL      MCH 28.2 pg      MCHC 31.5 g/dL      RDW 17.7 %      RDW-SD 58.1 fl      MPV 10.0 fL      Platelets 201 10*3/mm3      Neutrophil % 72.9 %      Lymphocyte % 12.9 %      Monocyte % 12.4 %      Eosinophil % 0.6 %      Basophil % 0.3 %      Immature Grans % 0.9 %      Neutrophils, Absolute 7.58 10*3/mm3      Lymphocytes, Absolute 1.34 10*3/mm3      Monocytes, Absolute 1.29 10*3/mm3      Eosinophils, Absolute 0.06 10*3/mm3      Basophils, Absolute 0.03 10*3/mm3      Immature Grans, Absolute 0.09 10*3/mm3      nRBC 0.0 /100 WBC     Hemoglobin A1c [[id number]]  (Abnormal) Collected:  08/09/19 0454    Specimen:  Blood Updated:  08/09/19 0622     Hemoglobin A1C 6.5 %     Narrative:       Less than 6.0           Non-Diabetic Range  6.0-7.0                 ADA Therapeutic Target  Greater than 7.0        Action Suggested    TSH [[id number]]  (Normal) Collected:  08/09/19 0454    Specimen:  Blood Updated:  08/09/19 0611     TSH 0.528 mIU/mL     Lipid Panel [[id number]]  (Abnormal) Collected:  08/09/19 0454    Specimen:  Blood Updated:  08/09/19 0553     Total Cholesterol 154 mg/dL      Triglycerides 166 mg/dL      HDL Cholesterol 38 mg/dL      LDL Cholesterol  85 mg/dL      LDL/HDL Ratio 2.18    Comprehensive Metabolic Panel [[id number]]  (Abnormal) Collected:  08/09/19 0454    Specimen:  Blood Updated:  08/09/19 0542     Glucose 94 mg/dL      BUN 12 mg/dL      Creatinine 0.54 mg/dL      Sodium 140 mmol/L      Potassium 4.5 mmol/L      Chloride 112 mmol/L      CO2 22.0 mmol/L       Calcium 9.5 mg/dL      Total Protein 7.3 g/dL      Albumin 3.70 g/dL      ALT (SGPT) 497 U/L      AST (SGOT) 319 U/L      Alkaline Phosphatase 171 U/L      Total Bilirubin 0.5 mg/dL      eGFR Non African Amer 114 mL/min/1.73      Globulin 3.6 gm/dL      A/G Ratio 1.0 g/dL      BUN/Creatinine Ratio 22.2     Anion Gap 6.0 mmol/L     Narrative:       GFR Normal >60  Chronic Kidney Disease <60  Kidney Failure <15    CBC Auto Differential [[id number]]  (Abnormal) Collected:  08/09/19 0454    Specimen:  Blood Updated:  08/09/19 0527     WBC 6.16 10*3/mm3      RBC 4.33 10*6/mm3      Hemoglobin 12.5 g/dL      Hematocrit 37.9 %      MCV 87.5 fL      MCH 28.9 pg      MCHC 33.0 g/dL      RDW 18.2 %      RDW-SD 58.0 fl      MPV 10.5 fL      Platelets 217 10*3/mm3      Neutrophil % 62.1 %      Lymphocyte % 15.9 %      Monocyte % 17.5 %      Eosinophil % 1.9 %      Basophil % 1.0 %      Immature Grans % 1.6 %      Neutrophils, Absolute 3.82 10*3/mm3      Lymphocytes, Absolute 0.98 10*3/mm3      Monocytes, Absolute 1.08 10*3/mm3      Eosinophils, Absolute 0.12 10*3/mm3      Basophils, Absolute 0.06 10*3/mm3      Immature Grans, Absolute 0.10 10*3/mm3      nRBC 0.0 /100 WBC     Hepatitis Panel, Acute [[id number]]  (Normal) Collected:  08/08/19 1327    Specimen:  Blood Updated:  08/08/19 1937     HCV S/C Ratio 0.04     Hepatitis C Ab Negative     Hep A IgM Negative     Hep B C IgM Negative     Hepatitis B Surface Ag Negative    POC Occult Blood Stool [[id number]]  (Normal) Collected:  08/08/19 1655    Specimen:  Stool Updated:  08/08/19 1657     Fecal Occult Blood Negative     Lot Number 147     Expiration Date 12/31/2019     DEVELOPER LOT NUMBER 147     DEVELOPER EXPIRATION DATE 12/31/2019     Positive Control Positive     Negative Control Negative    POC Occult Blood Stool [[id number]]  (Normal) Collected:  08/08/19 1649    Specimen:  Stool Updated:  08/08/19 1650     Fecal Occult Blood Negative     Lot Number 147     Expiration Date  12,312,019     DEVELOPER LOT NUMBER 147     DEVELOPER EXPIRATION DATE 12,312,019     Positive Control Positive     Negative Control Negative    [place] Draw [[id number]] Collected:  08/08/19 1327    Specimen:  Blood Updated:  08/08/19 1431    Narrative:       The following orders were created for panel order [place] Draw.  Procedure                               Abnormality         Status                     ---------                               -----------         ------                     Light Blue Top[[id number]]                                   Final result               Green Top (Gel)[[id number]]                                  Final result               Lavender Top[[id number]]                                     Final result               Red Top[[id number]]                                          Final result                 Please view results for these tests on the individual orders.    Light Blue Top [[id number]] Collected:  08/08/19 1328    Specimen:  Blood Updated:  08/08/19 1431     Extra Tube hold for add-on     Comment: Auto resulted       Green Top (Gel) [[id number]] Collected:  08/08/19 1327    Specimen:  Blood Updated:  08/08/19 1431     Extra Tube Hold for add-ons.     Comment: Auto resulted.       Lavender Top [[id number]] Collected:  08/08/19 1327    Specimen:  Blood Updated:  08/08/19 1431     Extra Tube hold for add-on     Comment: Auto resulted       Red Top [[id number]] Collected:  08/08/19 1327    Specimen:  Blood Updated:  08/08/19 1431     Extra Tube Hold for add-ons.     Comment: Auto resulted.       [place] Draw [[id number]] Collected:  08/08/19 1327    Specimen:  Blood Updated:  08/08/19 1431    Narrative:       The following orders were created for panel order [place] Draw.  Procedure                               Abnormality         Status                     ---------                               -----------         ------                     Light Blue Top[[id number]]                                    Final result               Green Top (Gel)[[id number]]                                  Final result               Lavender Top[[id number]]                                     Final result               Red Top[[id number]]                                          Final result                 Please view results for these tests on the individual orders.    Light Blue Top [[id number]] Collected:  08/08/19 1328    Specimen:  Blood Updated:  08/08/19 1431     Extra Tube hold for add-on     Comment: Auto resulted       Green Top (Gel) [[id number]] Collected:  08/08/19 1327    Specimen:  Blood Updated:  08/08/19 1431     Extra Tube Hold for add-ons.     Comment: Auto resulted.       Lavender Top [[id number]] Collected:  08/08/19 1327    Specimen:  Blood Updated:  08/08/19 1431     Extra Tube hold for add-on     Comment: Auto resulted       Red Top [[id number]] Collected:  08/08/19 1327    Specimen:  Blood Updated:  08/08/19 1431     Extra Tube Hold for add-ons.     Comment: Auto resulted.       Lipase [[id number]]  (Normal) Collected:  08/08/19 1327    Specimen:  Blood Updated:  08/08/19 1359     Lipase 42 U/L     Amylase [[id number]]  (Normal) Collected:  08/08/19 1327    Specimen:  Blood Updated:  08/08/19 1359     Amylase 87 U/L     Comprehensive Metabolic Panel [[id number]]  (Abnormal) Collected:  08/08/19 1327    Specimen:  Blood Updated:  08/08/19 1358     Glucose 117 mg/dL      BUN 15 mg/dL      Creatinine 0.56 mg/dL      Sodium 139 mmol/L      Potassium 4.2 mmol/L      Chloride 112 mmol/L      CO2 19.0 mmol/L      Calcium 9.8 mg/dL      Total Protein 7.7 g/dL      Albumin 4.10 g/dL      ALT (SGPT) 436 U/L      AST (SGOT) 253 U/L      Alkaline Phosphatase 193 U/L      Total Bilirubin 0.4 mg/dL      eGFR Non African Amer 110 mL/min/1.73      Globulin 3.6 gm/dL      A/G Ratio 1.1 g/dL      BUN/Creatinine Ratio 26.8     Anion Gap 8.0 mmol/L     Narrative:       GFR Normal >60  Chronic Kidney Disease  <60  Kidney Failure <15    Protime-INR [[id number]]  (Normal) Collected:  08/08/19 1328    Specimen:  Blood Updated:  08/08/19 1357     Protime 13.4 Seconds      INR 0.99    aPTT [[id number]]  (Abnormal) Collected:  08/08/19 1328    Specimen:  Blood Updated:  08/08/19 1357     PTT 41.4 seconds     Lactic Acid, Plasma [[id number]]  (Normal) Collected:  08/08/19 1327    Specimen:  Blood Updated:  08/08/19 1355     Lactate 0.9 mmol/L     Urinalysis With Culture If Indicated - Urine, Clean Catch [[id number]]  (Abnormal) Collected:  08/08/19 1254    Specimen:  Urine, Clean Catch Updated:  08/08/19 1352     Color, UA Yellow     Appearance, UA Clear     pH, UA <=5.0     Specific Gravity, UA 1.022     Glucose, UA Negative     Ketones, UA Negative     Bilirubin, UA Negative     Blood, UA Negative     Protein, UA Negative     Leuk Esterase, UA Trace     Nitrite, UA Negative     Urobilinogen, UA 0.2 E.U./dL    Urinalysis, Microscopic Only - Urine, Clean Catch [[id number]]  (Abnormal) Collected:  08/08/19 1254    Specimen:  Urine, Clean Catch Updated:  08/08/19 1352     RBC, UA 3-5 /HPF      WBC, UA 3-5 /HPF      Bacteria, UA None Seen /HPF      Squamous Epithelial Cells, UA 0-2 /HPF      Hyaline Casts, UA 3-6 /LPF      Methodology Automated Microscopy    CBC & Differential [[id number]] Collected:  08/08/19 1327    Specimen:  Blood Updated:  08/08/19 1350    Narrative:       The following orders were created for panel order CBC & Differential.  Procedure                               Abnormality         Status                     ---------                               -----------         ------                     CBC Auto Differential[[id number]]        Abnormal            Final result                 Please view results for these tests on the individual orders.    CBC Auto Differential [[id number]]  (Abnormal) Collected:  08/08/19 1327    Specimen:  Blood Updated:  08/08/19 1350     WBC 8.66 10*3/mm3      RBC 4.48 10*6/mm3       Hemoglobin 12.5 g/dL      Hematocrit 38.8 %      MCV 86.6 fL      MCH 27.9 pg      MCHC 32.2 g/dL      RDW 18.3 %      RDW-SD 57.6 fl      MPV 10.4 fL      Platelets 214 10*3/mm3      Neutrophil % 75.9 %      Lymphocyte % 10.0 %      Monocyte % 11.3 %      Eosinophil % 0.7 %      Basophil % 0.6 %      Immature Grans % 1.5 %      Neutrophils, Absolute 6.57 10*3/mm3      Lymphocytes, Absolute 0.87 10*3/mm3      Monocytes, Absolute 0.98 10*3/mm3      Eosinophils, Absolute 0.06 10*3/mm3      Basophils, Absolute 0.05 10*3/mm3      Immature Grans, Absolute 0.13 10*3/mm3      nRBC 0.0 /100 WBC         Imaging Results (last 7 days)     Procedure Component Value Units Date/Time    XR Pyelogram Retrograde [[id number]] Collected:  08/09/19 1648     Updated:  08/09/19 1653    Narrative:       EXAMINATION: Right retrograde pyelogram 08/09/2019     Fluoroscopy time: 1.2 minutes.     DOSE: 3.90 mGy. 4 images are submitted for interpretation.     FINDINGS: C-arm was used intraoperatively during performance of a  right-sided retrograde pyelogram. Initially there was a questionable  filling defect at the UPJ. However, subsequent images demonstrate no  definite filling defect within the renal pelvis although there is some  blunting of the calyces. Subsequently a double-J ureteral stent is left  in place and is well-positioned. The films are available to the OR for  review.  This report was finalized on 08/09/2019 16:50 by Dr. Jadon Duron MD.    US Liver [[id number]] Collected:  08/09/19 0830     Updated:  08/09/19 0840    Narrative:       EXAMINATION: US LIVER-     8/9/2019 7:00 AM CDT     HISTORY: elevated liver enzymes     Grayscale and color flow ultrasound evaluation of the liver and right  kidney.     Absent gallbladder.  No focal liver abnormality.  No biliary dilation. CBD = 8 mm.     Right kidney = 127 x 45 x 55 mm.  Incidental 9 x 8 mm right renal cyst.  Small nonobstructing right renal stone noted.     There is mild  dilation of the upper pole collecting system. The  appearance of an infiltrative mass within the central and lower right  kidney is much better appreciated on the CT exam.     Summary:  1. Normal liver.  2. Right renal mass better appreciated on the CT exam performed  yesterday.  This report was finalized on 08/09/2019 08:37 by Dr. Chase Rios MD.    XR Chest 2 View [[id number]] Collected:  08/09/19 0755     Updated:  08/09/19 0759    Narrative:       Exam: XR CHEST 2 VW-     Indication: Preoperative evaluation     Comparison: None available.     Findings:     The cardiac silhouette is normal in size.  Calcified granuloma in the right midlung zone.  No consolidation, pleural effusion, or pneumothorax.  No suspicious osseous finding.       Impression:          No acute findings.  This report was finalized on 08/09/2019 07:56 by Dr. Conner Helton MD.    CT Abdomen Pelvis With Contrast [[id number]] Collected:  08/08/19 1447     Updated:  08/08/19 1513    Narrative:       CT ABDOMEN AND PELVIS WITH CONTRAST 8/8/2019 2:20 PM CDT     HISTORY: Epigastric pain and vomiting     COMPARISON: CT scan dated 08/30/2018.      DLP: 3 and 17 mGy cm     TECHNIQUE: Following the intravenous administration of contrast, helical  CT tomographic images of the abdomen and pelvis were acquired. Coronal  reformatted images were also provided for review.      FINDINGS:   The lung bases and base of the heart are unremarkable.      LIVER: No focal liver lesion. The hepatic vasculature is patent.      BILIARY SYSTEM: The gallbladder has been removed. There is no  intrahepatic or extrahepatic ductal dilation.      PANCREAS: No focal pancreatic lesion.      SPLEEN: Unremarkable.      KIDNEYS AND ADRENALS: Adrenal glands are unremarkable. There is a 5 mm  nonobstructing left upper pole renal stone with additional 6 mm left  lower pole stone. There is an infiltrative process which appears to be  involving the right renal sinus as well as a portion  of the right  inferior renal pole. This has increased from the earlier comparison CT,  with near complete replacement of the sinus fat at the right inferior  pole. There appears to be heterogeneously enhancing/hypoenhancing  portions of the right inferior renal pole. The overall extent of this  process measures up to 4.8 cm (series 3-image 35). There are several  small nonobstructing calculi within right upper and right lower pole  calyces. The right renal pelvis is nondilated. The ureters are  nondilated.     RETROPERITONEUM: No enlarged aortocaval lymph node measuring 1.3 cm  short axis (series 2 image image 31). Slightly prominent aortocaval node  just inferior to this measuring up to 0.9 cm short axis. There are 2  prominent retrocaval lymph nodes as well with one measuring 1.3 cm short  axis and a second measuring 0.9 cm short axis.     GI TRACT: Moderate hiatal hernia. The stomach is nondistended. Small  bowel is nondistended.  There appears to be a segment of circumferential  wall thickening along a solitary loop of small bowel in the low mid  abdomen (series 2-image 56 and series 3-image 25). Scattered colonic  diverticula. Submucosal fat deposition along the ascending colon. No  obvious wall thickening along the colon. Appendix is not well  visualized.     OTHER: No mesenteric adenopathy. No intraperitoneal free fluid or free  air. Dense atheromatous calcification along the abdominal aorta without  aneurysm formation. The osseous structures and soft tissues demonstrate  no worrisome lesions. Spinal scoliotic curvature.      PELVIS: No mass lesion, fluid collection or significant lymphadenopathy  is seen in the pelvis. The urinary bladder is normal in appearance.       Impression:       1. Enlarging infiltrative process involving the right renal sinus and  right inferior renal pole. This appears most likely to be an enhancing  and infiltrative soft tissue mass, either a transitional cell carcinoma  or  perhaps infiltrative renal cell carcinoma. 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 This seems unlikely to reflect an acute  process such as pyelonephritis with adjacent reactive adenopathy.  2. Questionable circumferential wall thickening along the segment of  small bowel in the lower abdomen, perhaps a mild enteritis.  3. Colonic diverticulosis without acute diverticulitis. No inflammatory  wall thickening along the colon at this time.        This report was finalized on 08/08/2019 15:10 by Dr Marco Griggs, .        History of Present Illness on Day of Discharge:   Currently sitting up in bed.  Eager to be discharged home.  She feels better but still tired.  Thinks she can rest better at home.       Hospital Course:  The patient is a 63 y.o. female who presented to [facility] with hematemesis.  The patient has a past medical history significant for anemia, anxiety, arthritis, skin cancer, colitis, COPD, depression, fibromyalgia, GERD, hypercholesterolemia, hypertension and seizure disorder.  The patient complained of vomiting blood on the day of admission x 1 episode.  She has a history of microscopic colitis and was recently taken off of her Protonix and has been having reflux symptoms.  She sees Dr. Garcia as an outpatient.  In the emergency department she was taken down for a CT scan of her abdomen and pelvis which showed a suspected renal mass.    She had a CT scan of her abdomen and pelvis back in August 2018 that showed fuzzy right renal cortical enhancement and striated nephrogram.  She was taken to the OR by Dr. Greco for a uteroscopy with right renal pelvic biopsy and stent.  Pathology from that showed inflammatory process and no malignant changes noted.    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  Her liver functions were noted to be elevated as  "well.     She was taken back to the operating room yesterday per Dr. Greco for another uteroscopy with biopsy and right ureteral stent placement.  She tolerated the procedure well.    Today the patient is stable for discharge home.  Dr. Greco's office will contact the patient with pathology results and formulate a plan once biopsy has been resulted.  The patient will follow-up in his office on Friday for stent removal.    The patient also needs to follow-up with her primary care provider Dr. Grzegorz Michel in 1 week.  She is stable good condition for discharge home today with her .    Condition on Discharge:  Stable    Physical Exam on Discharge:  /60 (BP Location: Left arm, Patient Position: Lying)   Pulse 96   Temp 97.9 °F (36.6 °C) (Oral)   Resp 16   Ht 160 cm (63\")   Wt 68 kg (150 lb)   SpO2 96%   BMI 26.57 kg/m²   Physical Exam   Constitutional: She is oriented to person, place, and time. She appears well-developed and well-nourished.   Sitting up in bed.  NAD.   HENT:   Head: Normocephalic and atraumatic.   Eyes: Conjunctivae and EOM are normal. Pupils are equal, round, and reactive to light.   Neck: Neck supple. No JVD present. No thyromegaly present.   Cardiovascular: Normal rate, regular rhythm, normal heart sounds and intact distal pulses. Exam reveals no gallop and no friction rub.   No murmur heard.  Pulmonary/Chest: Effort normal and breath sounds normal. No respiratory distress. She has no wheezes. She has no rales. She exhibits no tenderness.   Abdominal: Soft. Bowel sounds are normal. She exhibits no distension. There is no tenderness. There is no rebound and no guarding.   Musculoskeletal: Normal range of motion. She exhibits no edema, tenderness or deformity.   Lymphadenopathy:     She has no cervical adenopathy.   Neurological: She is alert and oriented to person, place, and time.   Skin: Skin is warm and dry. No rash noted.   Psychiatric: She has a normal mood and affect. Her " behavior is normal. Judgment and thought content normal.   Nursing note and vitals reviewed.    Discharge Disposition:  Home or Self Care    Discharge Medications:     Discharge Medications      New Medications      Instructions Start Date   oxybutynin 5 MG tablet  Commonly known as:  DITROPAN   5 mg, Oral, 3 Times Daily      pantoprazole 40 MG EC tablet  Commonly known as:  PROTONIX   40 mg, Oral, Daily      tamsulosin 0.4 MG capsule 24 hr capsule  Commonly known as:  FLOMAX   0.4 mg, Oral, Daily   Start Date:  8/11/2019        Continue These Medications      Instructions Start Date   ARIPiprazole 10 MG tablet  Commonly known as:  ABILIFY   No dose, route, or frequency recorded.      cetirizine 10 MG tablet  Commonly known as:  zyrTEC   10 mg, Oral, Daily      colestipol 1 g tablet  Commonly known as:  COLESTID   TAKE 1 TABLET TWICE A DAY      DULoxetine 60 MG capsule  Commonly known as:  CYMBALTA   No dose, route, or frequency recorded.      HYDROcodone-acetaminophen 7.5-325 MG per tablet  Commonly known as:  NORCO   1-2 tablets, Oral, Every 4 Hours PRN      PEPTO-BISMOL 262 MG tablet  Generic drug:  Bismuth Subsalicylate   Oral, 2 po qid       PROAIR  (90 Base) MCG/ACT inhaler  Generic drug:  albuterol sulfate HFA   USE TWO PUFFS BY MOUTH EVERY 4 TO 6 HOURS AS NEEDED FOR WHEEZING      SYMBICORT 160-4.5 MCG/ACT inhaler  Generic drug:  budesonide-formoterol   No dose, route, or frequency recorded.         Stop These Medications    famotidine 20 MG tablet  Commonly known as:  PEPCID     metoprolol tartrate 50 MG tablet  Commonly known as:  LOPRESSOR     omeprazole 40 MG capsule  Commonly known as:  priLOSEC          Discharge Diet:   Diet Instructions     Diet: Regular      Discharge Diet:  Regular        Activity at Discharge:   Activity Instructions     Activity as Tolerated          Discharge Care Plan/Instructions:   1.  Follow up with PCP in one week   2.  Follow up with Urology in 1-2 weeks   3.  Keep  scheduled appointment with Dr. Garcia.     Follow-up Appointments:   Future Appointments   Date Time Provider Department [place]   8/26/2019  1:00 PM Danis Garcia MD [facility] None     Test Results Pending at Discharge: None    YOLY Loving  08/10/19  11:37 AM    Time: 32 minutes     I personally evaluated and examined the patient in conjunction with YOLY Castellano and agree with the assessment, treatment plan, and disposition of the patient as recorded by her. My history, exam, and further recommendations are: I have reviewed and agree with the plans. KtCristal Nicole MD  08/10/19  12:45 PM

## 2019-08-16 ENCOUNTER — PROCEDURE VISIT (OUTPATIENT)
Dept: UROLOGY | Facility: CLINIC | Age: 64
End: 2019-08-16

## 2019-08-16 DIAGNOSIS — N28.89 RENAL MASS: Primary | ICD-10-CM

## 2019-08-16 PROCEDURE — 52310 CYSTOSCOPY AND TREATMENT: CPT | Performed by: UROLOGY

## 2019-08-16 NOTE — PROGRESS NOTES
Cystoscopy with stent removal    Indications: Status post ureteroscopy    Prep: Hibiclens solution    Instrumentation:Rigid cystoscope with 22 [place] sheath and 30° lens and Alligator grasping forceps    Procedure: After lidocaine jelly is instilled into the urethra for 10 minutes, the well-lubricated cystoscope was introduced through the urethra into the bladder.  The stent is seen protruding from the right side.  The alligator forceps or used to grasp the stent and pull it out the urethra.  The patient tolerated the procedure well.      I discussed the case with radiology.  They are very hesitant to do a renal mass biopsy.  They are worried about the central location of her mass.  Her cytology is pending the biopsies from renal pelvis were negative for transitional cell carcinoma.  I think the best course of action is to refer her down to [place] for renal mass biopsy.

## 2019-08-26 ENCOUNTER — OFFICE VISIT (OUTPATIENT)
Dept: GASTROENTEROLOGY | Facility: CLINIC | Age: 64
End: 2019-08-26

## 2019-08-26 VITALS
HEIGHT: 63 IN | DIASTOLIC BLOOD PRESSURE: 82 MMHG | HEART RATE: 123 BPM | SYSTOLIC BLOOD PRESSURE: 132 MMHG | WEIGHT: 145 LBS | OXYGEN SATURATION: 95 % | BODY MASS INDEX: 25.69 KG/M2

## 2019-08-26 DIAGNOSIS — R79.89 LFTS ABNORMAL: ICD-10-CM

## 2019-08-26 DIAGNOSIS — K21.9 GASTROESOPHAGEAL REFLUX DISEASE, ESOPHAGITIS PRESENCE NOT SPECIFIED: Primary | ICD-10-CM

## 2019-08-26 DIAGNOSIS — N28.89 RENAL MASS: ICD-10-CM

## 2019-08-26 PROCEDURE — 99213 OFFICE O/P EST LOW 20 MIN: CPT | Performed by: INTERNAL MEDICINE

## 2019-08-26 RX ORDER — PANTOPRAZOLE SODIUM 40 MG/1
40 TABLET, DELAYED RELEASE ORAL
Qty: 180 TABLET | Refills: 0 | Status: SHIPPED | OUTPATIENT
Start: 2019-08-26 | End: 2019-11-24

## 2019-08-26 NOTE — PROGRESS NOTES
[facility] Gastroenterology - Office note  8/26/2019    Heather Russo 1955     Chief Complaint   Patient presents with   • GI Problem     Here to discuss Colitis       HISTORY OF PRESENT ILLNESS    Heather Russo is a 64 y.o. female who presents for follow-up.  Reflux.  Also with a history of lymphocytic/collagenous colitis.  Recently felt to possibly be related to PPI therapy.  Recently admitted to the hospital with abnormality of her kidney concerning for malignancy.  Biopsies here were negative.  Plans are to refer to [place] for percutaneous biopsy.    Concerning her reflux.  She tells me she still having symptoms.  Only taking Protonix daily.    Past Medical History:   Diagnosis Date   • Anemia    • Anxiety    • Arthritis    • Cancer of skin     BCC OF NOSE   • Colitis    • COPD (chronic obstructive pulmonary disease) (CMS/HCC)    • Depression    • Fibromyalgia    • GERD (gastroesophageal reflux disease)    • Hx of colonic polyps    • Hypercholesteremia    • Hypertension    • Seizure (CMS/HCC)        Past Surgical History:   Procedure Laterality Date   • BREAST SURGERY     • CHOLECYSTECTOMY     • COLONOSCOPY  04/12/2017    Hemorrhoids and a few diverticula repeat exam in 3 years Dr. Cordero   • COLONOSCOPY N/A 5/2/2019    Collagenous colitis, Diverticulosis repeat exam in 5 years   • COLONOSCOPY W/ POLYPECTOMY  07/28/2016    Tubular adenoma proximal to the anus, 30 MM polyp in the cecum not resected, Diverticulosis ref to Dr. Cordero   • ENDOSCOPY  06/15/2016    Small hh, Negative for celiac disease   • ENDOSCOPY N/A 6/13/2019    HH otherwise normal exam   • RADICAL HYSTERECTOMY     • URETEROSCOPY Right 9/13/2018    Procedure: CYSTOSCOPY RIGHT RETROGRADE PYELOGRAM RIGHT URETEROSCOPY WITH BIOPSY OF RIGHT RENAL PELVIS LASER LITHOTRIPSY AND RIGHT URETERAL STENT INSERTION;  Surgeon: Yifan Greco MD;  Location: [facility] OR;  Service: Urology   • URETEROSCOPY Right 8/9/2019    Procedure: RIGHT FLEXIBLE  URETEROSCOPY WITH RENAL PELVIC BIOPSY AND RIGHT URETERAL STENT;  Surgeon: Yifan Greco MD;  Location: [facility];  Service: Urology         Current Outpatient Medications:   •  ARIPiprazole (ABILIFY) 10 MG tablet, , Disp: , Rfl:   •  Bismuth Subsalicylate (PEPTO-BISMOL) 262 MG tablet, Take  by mouth. 2 po qid, Disp: , Rfl:   •  cetirizine (zyrTEC) 10 MG tablet, Take 10 mg by mouth Daily., Disp: , Rfl:   •  colestipol (COLESTID) 1 g tablet, TAKE 1 TABLET TWICE A DAY, Disp: 180 tablet, Rfl: 3  •  DULoxetine (CYMBALTA) 60 MG capsule, , Disp: , Rfl:   •  HYDROcodone-acetaminophen (NORCO) 7.5-325 MG per tablet, Take 1-2 tablets by mouth Every 4 (Four) Hours As Needed for Moderate Pain  (Pain)., Disp: 12 tablet, Rfl: 0  •  oxybutynin (DITROPAN) 5 MG tablet, Take 1 tablet by mouth 3 (Three) Times a Day for 30 days., Disp: 90 tablet, Rfl: 0  •  pantoprazole (PROTONIX) 40 MG EC tablet, Take 1 tablet by mouth 2 (Two) Times a Day Before Meals for 90 days., Disp: 180 tablet, Rfl: 0  •  PROAIR  (90 BASE) MCG/ACT inhaler, USE TWO PUFFS BY MOUTH EVERY 4 TO 6 HOURS AS NEEDED FOR WHEEZING, Disp: , Rfl: 2  •  SYMBICORT 160-4.5 MCG/ACT inhaler, , Disp: , Rfl:   •  tamsulosin (FLOMAX) 0.4 MG capsule 24 hr capsule, Take 1 capsule by mouth Daily., Disp: 30 capsule, Rfl: 0    Allergies   Allergen Reactions   • Lyrica [Pregabalin] Swelling     LIPS AND FACE   • Morphine Itching   • Codeine Itching   • Penicillins Other (See Comments)     CHILDHOOD ALLERGY         Social History     Socioeconomic History   • Marital status:      Spouse name: Not on file   • Number of children: Not on file   • Years of education: Not on file   • Highest education level: Not on file   Tobacco Use   • Smoking status: Current Every Day Smoker     Packs/day: 1.00     Types: Cigarettes   • Smokeless tobacco: Never Used   Substance and Sexual Activity   • Alcohol use: No   • Drug use: No   • Sexual activity: Defer       Family History  "  Problem Relation Age of Onset   • Diabetes Sister    • Cancer Brother    • Colon cancer Neg Hx    • Colon polyps Neg Hx        Review of Systems   Respiratory: Negative.    Cardiovascular: Negative.    Gastrointestinal: Positive for abdominal pain.       Vitals:    08/26/19 1254   BP: 132/82   Pulse: (!) 123   SpO2: 95%   Weight: 65.8 kg (145 lb)   Height: 160 cm (63\")    Body mass index is 25.69 kg/m².    Physical Exam   Constitutional: She is oriented to person, place, and time. She appears well-developed and well-nourished.   HENT:   Head: Normocephalic.   Cardiovascular: Normal rate.   Pulmonary/Chest: Effort normal.   Abdominal: Soft. She exhibits no distension.   Neurological: She is alert and oriented to person, place, and time.   Skin: Skin is warm and dry.   Psychiatric: She has a normal mood and affect.           ASSESSMENT AND PLAN      1. Gastroesophageal reflux disease, esophagitis presence not specified  We will increase her Protonix to twice daily.  Continue Gaviscon liquid as needed.    2. Renal mass  Plans per urology       EMR Dragon/transcription disclaimer: Much of this encounter note is an electronic transcription/translation of spoken language to printed text.  The electronic translation of spoken language may permit erroneous, or at times, nonsensical words or phrases to be inadvertently transcribed.  Although I have reviewed the note for such errors, some may still exist.    Danis Garcia MD  8/26/2019  1:09 PM  "

## 2019-08-29 ENCOUNTER — TELEPHONE (OUTPATIENT)
Dept: GASTROENTEROLOGY | Facility: CLINIC | Age: 64
End: 2019-08-29

## 2019-08-29 NOTE — TELEPHONE ENCOUNTER
----- Message from Danis Garcia MD sent at 8/26/2019  1:11 PM CDT -----  Call and tell her I will order some labs to recheck her LFTs.  She can get them drawn anytime this week

## 2019-08-30 ENCOUNTER — OFFICE VISIT (OUTPATIENT)
Dept: INTERNAL MEDICINE | Facility: CLINIC | Age: 64
End: 2019-08-30

## 2019-08-30 VITALS
HEART RATE: 99 BPM | BODY MASS INDEX: 26.08 KG/M2 | SYSTOLIC BLOOD PRESSURE: 142 MMHG | OXYGEN SATURATION: 98 % | HEIGHT: 63 IN | DIASTOLIC BLOOD PRESSURE: 82 MMHG | WEIGHT: 147.2 LBS | TEMPERATURE: 98.3 F

## 2019-08-30 DIAGNOSIS — N28.89 RENAL MASS: ICD-10-CM

## 2019-08-30 DIAGNOSIS — R20.0 PAIN AND NUMBNESS OF LEFT UPPER EXTREMITY: Primary | ICD-10-CM

## 2019-08-30 DIAGNOSIS — M79.602 PAIN AND NUMBNESS OF LEFT UPPER EXTREMITY: Primary | ICD-10-CM

## 2019-08-30 DIAGNOSIS — R74.8 ELEVATED LIVER ENZYMES: ICD-10-CM

## 2019-08-30 PROCEDURE — 99204 OFFICE O/P NEW MOD 45 MIN: CPT | Performed by: FAMILY MEDICINE

## 2019-08-30 RX ORDER — ERGOCALCIFEROL 1.25 MG/1
CAPSULE ORAL
Status: ON HOLD | COMMUNITY
Start: 2019-07-27 | End: 2020-10-20

## 2019-08-30 RX ORDER — HYDROXYZINE 50 MG/1
50 TABLET, FILM COATED ORAL EVERY 8 HOURS PRN
Refills: 2 | Status: ON HOLD | COMMUNITY
Start: 2019-07-06 | End: 2020-10-20

## 2019-08-30 NOTE — PROGRESS NOTES
Subjective     Chief Complaint   Patient presents with   • Numbness     left hand and forearm        History of Present Illness   Pain and paresthesia left forearm and hand.  Onset two weeks  No trauma  Was in [place] for hematemesis and while there found mass on kidney. (awaiting call from [facility] for continued work up of kidney)  She notes she did have an IV in this hand  Nothing makes it better.     Patient's PMR from outside medical facility reviewed and noted.    Review of Systems     Otherwise complete ROS reviewed and negative except as mentioned in the HPI.    Past Medical History:   Past Medical History:   Diagnosis Date   • Anemia    • Anxiety    • Arthritis    • Cancer of skin     BCC OF NOSE   • Colitis    • COPD (chronic obstructive pulmonary disease) (CMS/HCC)    • Depression    • Fibromyalgia    • GERD (gastroesophageal reflux disease)    • Hx of colonic polyps    • Hypercholesteremia    • Hypertension    • Seizure (CMS/HCC)      Past Surgical History:  Past Surgical History:   Procedure Laterality Date   • BREAST SURGERY     • CHOLECYSTECTOMY     • COLONOSCOPY  04/12/2017    Hemorrhoids and a few diverticula repeat exam in 3 years Dr. Cordero   • COLONOSCOPY N/A 5/2/2019    Collagenous colitis, Diverticulosis repeat exam in 5 years   • COLONOSCOPY W/ POLYPECTOMY  07/28/2016    Tubular adenoma proximal to the anus, 30 MM polyp in the cecum not resected, Diverticulosis ref to Dr. Cordero   • ENDOSCOPY  06/15/2016    Small hh, Negative for celiac disease   • ENDOSCOPY N/A 6/13/2019    HH otherwise normal exam   • RADICAL HYSTERECTOMY     • URETEROSCOPY Right 9/13/2018    Procedure: CYSTOSCOPY RIGHT RETROGRADE PYELOGRAM RIGHT URETEROSCOPY WITH BIOPSY OF RIGHT RENAL PELVIS LASER LITHOTRIPSY AND RIGHT URETERAL STENT INSERTION;  Surgeon: Yifan Greco MD;  Location: [facility] OR;  Service: Urology   • URETEROSCOPY Right 8/9/2019    Procedure: RIGHT FLEXIBLE URETEROSCOPY WITH RENAL PELVIC BIOPSY  AND RIGHT URETERAL STENT;  Surgeon: Yifan Greco MD;  Location: [facility];  Service: Urology     Social History:  reports that she has been smoking cigarettes.  She has been smoking about 1.00 pack per day. She has never used smokeless tobacco. She reports that she does not drink alcohol or use drugs.    Family History: family history includes Cancer in her brother; Diabetes in her sister.       Allergies:  Allergies   Allergen Reactions   • Lyrica [Pregabalin] Swelling     LIPS AND FACE   • Morphine Itching   • Codeine Itching   • Penicillins Other (See Comments)     CHILDHOOD ALLERGY       Medications:  Prior to Admission medications    Medication Sig Start Date End Date Taking? Authorizing Provider   ARIPiprazole (ABILIFY) 10 MG tablet  9/19/16  Yes ProviderPerla MD   Bismuth Subsalicylate (PEPTO-BISMOL) 262 MG tablet Take  by mouth. 2 po qid   Yes ProviderPerla MD   cetirizine (zyrTEC) 10 MG tablet Take 10 mg by mouth Daily.   Yes Provider, MD Perla   colestipol (COLESTID) 1 g tablet TAKE 1 TABLET TWICE A DAY 4/29/19  Yes Becca Blackwood APRN   DULoxetine (CYMBALTA) 60 MG capsule  9/19/16  Yes ProviderPerla MD   oxybutynin (DITROPAN) 5 MG tablet Take 1 tablet by mouth 3 (Three) Times a Day for 30 days. 8/10/19 9/9/19 Yes Arun Martin APRN   pantoprazole (PROTONIX) 40 MG EC tablet Take 1 tablet by mouth 2 (Two) Times a Day Before Meals for 90 days. 8/26/19 11/24/19 Yes Danis Garcia MD   PROAIR  (90 BASE) MCG/ACT inhaler USE TWO PUFFS BY MOUTH EVERY 4 TO 6 HOURS AS NEEDED FOR WHEEZING 9/19/16  Yes ProviderPerla MD   SYMBICORT 160-4.5 MCG/ACT inhaler  8/1/16  Yes ProviderPerla MD   tamsulosin (FLOMAX) 0.4 MG capsule 24 hr capsule Take 1 capsule by mouth Daily. 8/11/19  Yes Arun Martin APRN   HYDROcodone-acetaminophen (NORCO) 7.5-325 MG per tablet Take 1-2 tablets by mouth Every 4 (Four) Hours As Needed for Moderate Pain  (Pain).  "9/13/18   Yifan Greco MD   hydrOXYzine (ATARAX) 50 MG tablet Take 50 mg by mouth Every 8 (Eight) Hours As Needed. for anxiety 7/6/19   Perla Bliss MD   vitamin D (ERGOCALCIFEROL) 67377 units capsule capsule  7/27/19   ProviderPerla MD       Objective     Vital Signs: /82 (BP Location: Left arm, Patient Position: Sitting, Cuff Size: Adult)   Pulse 99   Temp 98.3 °F (36.8 °C)   Ht 160 cm (63\")   Wt 66.8 kg (147 lb 3.2 oz)   SpO2 98%   Breastfeeding? No   BMI 26.08 kg/m²   Physical Exam   Constitutional: She is oriented to person, place, and time. She appears well-developed and well-nourished.   HENT:   Head: Normocephalic and atraumatic.   Eyes: Conjunctivae and EOM are normal. Pupils are equal, round, and reactive to light.   Neck: Normal range of motion. Neck supple.   Cardiovascular: Normal rate, regular rhythm, normal heart sounds and intact distal pulses.   Pulmonary/Chest: Effort normal and breath sounds normal.   Abdominal: Soft. Bowel sounds are normal.   Musculoskeletal: Normal range of motion. She exhibits no edema or deformity.   Neurological: She is alert and oriented to person, place, and time.    strength, resistive strength at wrist and elbow equal and strong.   Skin: Skin is warm and dry.   Psychiatric:   Affect flat   Nursing note and vitals reviewed.      Patient's Body mass index is 26.08 kg/m². BMI is within normal parameters. No follow-up required..      Results Reviewed:  Glucose   Date Value Ref Range Status   08/10/2019 129 (H) 70 - 100 mg/dL Final     BUN   Date Value Ref Range Status   08/10/2019 11 5 - 21 mg/dL Final     Creatinine   Date Value Ref Range Status   08/10/2019 0.62 0.50 - 1.40 mg/dL Final   08/30/2018 0.90 0.60 - 1.30 mg/dL Final     Comment:     Serial Number: [id number]:  [id number]     Sodium   Date Value Ref Range Status   08/10/2019 141 135 - 145 mmol/L Final     Potassium   Date Value Ref Range Status   08/10/2019 4.8 3.5 - " 5.3 mmol/L Final     Chloride   Date Value Ref Range Status   08/10/2019 108 98 - 110 mmol/L Final     CO2   Date Value Ref Range Status   08/10/2019 27.0 24.0 - 31.0 mmol/L Final     Calcium   Date Value Ref Range Status   08/10/2019 9.5 8.4 - 10.4 mg/dL Final     ALT (SGPT)   Date Value Ref Range Status   08/10/2019 449 (H) 0 - 54 U/L Final     AST (SGOT)   Date Value Ref Range Status   08/10/2019 191 (H) 7 - 45 U/L Final     WBC   Date Value Ref Range Status   08/10/2019 10.39 3.40 - 10.80 10*3/mm3 Final     Hematocrit   Date Value Ref Range Status   08/10/2019 39.4 34.0 - 46.6 % Final     Platelets   Date Value Ref Range Status   08/10/2019 201 140 - 450 10*3/mm3 Final     Total Cholesterol   Date Value Ref Range Status   08/09/2019 154 130 - 200 mg/dL Final     Triglycerides   Date Value Ref Range Status   08/09/2019 166 (H) 0 - 149 mg/dL Final     HDL Cholesterol   Date Value Ref Range Status   08/09/2019 38 (L) >=50 mg/dL Final     LDL Cholesterol    Date Value Ref Range Status   08/09/2019 85 0 - 99 mg/dL Final     LDL/HDL Ratio   Date Value Ref Range Status   08/09/2019 2.18  Final     Hemoglobin A1C   Date Value Ref Range Status   08/09/2019 6.5 (H) 4.8 - 5.9 % Final         Assessment / Plan     Assessment/Plan:  1. Pain and numbness of left upper extremity    - US Venous Doppler Upper Extremity Left (duplex)  - EMG & Nerve Conduction Test; Future    2. Elevated liver enzymes    - Comprehensive metabolic panel    3. Renal mass    Patient has not heard any thing since the two weeks following dc from hospital    Recommend calling Dr Greco's office to ascertain where they are in referral process to [facility].         Return in about 4 weeks (around 9/27/2019). unless patient needs to be seen sooner or acute issues arise.        I have discussed the patient results/orders and and plan/recommendation with them at today's visit.      Taylor Sullivan, DO   08/30/2019         Cheek Interpolation Flap Text: A decision was made to reconstruct the defect utilizing an interpolation axial flap and a staged reconstruction.  A telfa template was made of the defect.  This telfa template was then used to outline the Cheek Interpolation flap.  The donor area for the pedicle flap was then injected with anesthesia.  The flap was excised through the skin and subcutaneous tissue down to the layer of the underlying musculature.  The interpolation flap was carefully excised within this deep plane to maintain its blood supply.  The edges of the donor site were undermined.   The donor site was closed in a primary fashion.  The pedicle was then rotated into position and sutured.  Once the tube was sutured into place, adequate blood supply was confirmed with blanching and refill.  The pedicle was then wrapped with xeroform gauze and dressed appropriately with a telfa and gauze bandage to ensure continued blood supply and protect the attached pedicle.

## 2019-08-31 LAB
ALBUMIN SERPL-MCNC: 4.3 G/DL (ref 3.6–4.8)
ALBUMIN/GLOB SERPL: 1.2 {RATIO} (ref 1.2–2.2)
ALP SERPL-CCNC: 158 IU/L (ref 39–117)
ALT SERPL-CCNC: 70 IU/L (ref 0–32)
AST SERPL-CCNC: 50 IU/L (ref 0–40)
BILIRUB SERPL-MCNC: 0.3 MG/DL (ref 0–1.2)
BUN SERPL-MCNC: 14 MG/DL (ref 8–27)
BUN/CREAT SERPL: 18 (ref 12–28)
CALCIUM SERPL-MCNC: 9.7 MG/DL (ref 8.7–10.3)
CHLORIDE SERPL-SCNC: 108 MMOL/L (ref 96–106)
CO2 SERPL-SCNC: 20 MMOL/L (ref 20–29)
CREAT SERPL-MCNC: 0.77 MG/DL (ref 0.57–1)
GLOBULIN SER CALC-MCNC: 3.6 G/DL (ref 1.5–4.5)
GLUCOSE SERPL-MCNC: 120 MG/DL (ref 65–99)
POTASSIUM SERPL-SCNC: 4.6 MMOL/L (ref 3.5–5.2)
PROT SERPL-MCNC: 7.9 G/DL (ref 6–8.5)
SODIUM SERPL-SCNC: 143 MMOL/L (ref 134–144)

## 2019-10-01 ENCOUNTER — APPOINTMENT (OUTPATIENT)
Dept: NEUROLOGY | Facility: HOSPITAL | Age: 64
End: 2019-10-01

## 2019-11-14 ENCOUNTER — OFFICE VISIT (OUTPATIENT)
Dept: GASTROENTEROLOGY | Facility: CLINIC | Age: 64
End: 2019-11-14

## 2019-11-14 VITALS
HEIGHT: 63 IN | DIASTOLIC BLOOD PRESSURE: 80 MMHG | SYSTOLIC BLOOD PRESSURE: 138 MMHG | HEART RATE: 103 BPM | BODY MASS INDEX: 26.58 KG/M2 | OXYGEN SATURATION: 97 % | WEIGHT: 150 LBS

## 2019-11-14 DIAGNOSIS — K21.9 GASTROESOPHAGEAL REFLUX DISEASE WITHOUT ESOPHAGITIS: ICD-10-CM

## 2019-11-14 DIAGNOSIS — R19.7 DIARRHEA IN ADULT PATIENT: Primary | ICD-10-CM

## 2019-11-14 PROCEDURE — 99214 OFFICE O/P EST MOD 30 MIN: CPT | Performed by: INTERNAL MEDICINE

## 2019-11-14 NOTE — PROGRESS NOTES
[facility] - Office [facility]  11/14/2019    Heather Russo 1955     Chief Complaint   Patient presents with   • GI Problem     Has acid reflux real bad       HISTORY OF PRESENT ILLNESS    Heather Russo is a 64 y.o. female who presents for follow-up.  Having some increasing diarrhea over the past several weeks.  Also with increasing reflux.  History of collagenous colitis in the past.  Newly diagnosed lymphoma based on renal biopsy at [place].  He has an appointment to be evaluated there for further work-up of her lymphoma    Past Medical History:   Diagnosis Date   • Anemia    • Anxiety    • Arthritis    • Cancer of skin     BCC OF NOSE   • Colitis    • COPD (chronic obstructive pulmonary disease) (CMS/HCC)    • Depression    • Fibromyalgia    • GERD (gastroesophageal reflux disease)    • Hx of colonic polyps    • Hypercholesteremia    • Hypertension    • Seizure (CMS/HCC)        Past Surgical History:   Procedure Laterality Date   • BREAST SURGERY     • CHOLECYSTECTOMY     • COLONOSCOPY  04/12/2017    Hemorrhoids and a few diverticula repeat exam in 3 years Dr. Cordero   • COLONOSCOPY N/A 5/2/2019    Collagenous colitis, Diverticulosis repeat exam in 5 years   • COLONOSCOPY W/ POLYPECTOMY  07/28/2016    Tubular adenoma proximal to the anus, 30 MM polyp in the cecum not resected, Diverticulosis ref to Dr. Cordero   • ENDOSCOPY  06/15/2016    Small hh, Negative for celiac disease   • ENDOSCOPY N/A 6/13/2019    HH otherwise normal exam   • RADICAL HYSTERECTOMY     • URETEROSCOPY Right 9/13/2018    Procedure: CYSTOSCOPY RIGHT RETROGRADE PYELOGRAM RIGHT URETEROSCOPY WITH BIOPSY OF RIGHT RENAL PELVIS LASER LITHOTRIPSY AND RIGHT URETERAL STENT INSERTION;  Surgeon: Yifan Greco MD;  Location: [facility] OR;  Service: Urology   • URETEROSCOPY Right 8/9/2019    Procedure: RIGHT FLEXIBLE URETEROSCOPY WITH RENAL PELVIC BIOPSY AND RIGHT URETERAL STENT;  Surgeon: Yifan Greco MD;  Location: [facility]  OR;  Service: Urology         Current Outpatient Medications:   •  ARIPiprazole (ABILIFY) 10 MG tablet, , Disp: , Rfl:   •  Bismuth Subsalicylate (PEPTO-BISMOL) 262 MG tablet, Take  by mouth. 2 po qid, Disp: , Rfl:   •  cetirizine (zyrTEC) 10 MG tablet, Take 10 mg by mouth Daily., Disp: , Rfl:   •  colestipol (COLESTID) 1 g tablet, TAKE 1 TABLET TWICE A DAY, Disp: 180 tablet, Rfl: 3  •  DULoxetine (CYMBALTA) 60 MG capsule, , Disp: , Rfl:   •  hydrOXYzine (ATARAX) 50 MG tablet, Take 50 mg by mouth Every 8 (Eight) Hours As Needed. for anxiety, Disp: , Rfl: 2  •  pantoprazole (PROTONIX) 40 MG EC tablet, Take 1 tablet by mouth 2 (Two) Times a Day Before Meals for 90 days., Disp: 180 tablet, Rfl: 0  •  PROAIR  (90 BASE) MCG/ACT inhaler, USE TWO PUFFS BY MOUTH EVERY 4 TO 6 HOURS AS NEEDED FOR WHEEZING, Disp: , Rfl: 2  •  SYMBICORT 160-4.5 MCG/ACT inhaler, , Disp: , Rfl:   •  tamsulosin (FLOMAX) 0.4 MG capsule 24 hr capsule, Take 1 capsule by mouth Daily., Disp: 30 capsule, Rfl: 0  •  vitamin D (ERGOCALCIFEROL) 00469 units capsule capsule, , Disp: , Rfl:     Allergies   Allergen Reactions   • Lyrica [Pregabalin] Swelling     LIPS AND FACE   • Morphine Itching   • Codeine Itching   • Penicillins Other (See Comments)     CHILDHOOD ALLERGY         Social History     Socioeconomic History   • Marital status:      Spouse name: Not on file   • Number of children: Not on file   • Years of education: Not on file   • Highest education level: Not on file   Tobacco Use   • Smoking status: Current Every Day Smoker     Packs/day: 1.00     Types: Cigarettes   • Smokeless tobacco: Never Used   Substance and Sexual Activity   • Alcohol use: No   • Drug use: No   • Sexual activity: Defer       Family History   Problem Relation Age of Onset   • Diabetes Sister    • Cancer Brother    • Colon cancer Neg Hx    • Colon polyps Neg Hx        Review of Systems   Respiratory: Negative.    Cardiovascular: Negative.    Gastrointestinal:  "Negative.        Vitals:    11/14/19 1333   BP: 138/80   Pulse: 103   SpO2: 97%   Weight: 68 kg (150 lb)   Height: 160 cm (63\")    Body mass index is 26.57 kg/m².    Physical Exam   Constitutional: She is oriented to person, place, and time.   HENT:   Head: Normocephalic.   Cardiovascular: Normal rate.   Pulmonary/Chest: Effort normal.   Abdominal: Soft. Bowel sounds are normal. She exhibits no distension. There is no tenderness.   Neurological: She is alert and oriented to person, place, and time.   Skin: Skin is warm and dry.   Psychiatric: She has a normal mood and affect.       Lab Results - Last 18 Months   Lab Units 08/30/19  1146 08/10/19  0702  08/08/19  1327   WBC 10*3/mm3  --  10.39   < > 8.66   HEMOGLOBIN g/dL  --  12.4   < > 12.5   HEMATOCRIT %  --  39.4   < > 38.8   MCV fL  --  89.5   < > 86.6   PLATELETS 10*3/mm3  --  201   < > 214   GLUCOSE mg/dL  --  129*   < > 117*   BUN mg/dL 14 11   < > 15   CREATININE mg/dL 0.77 0.62   < > 0.56   SODIUM mmol/L 143 141   < > 139   POTASSIUM mmol/L 4.6 4.8   < > 4.2   CHLORIDE mmol/L 108* 108   < > 112*   TOTAL CO2 mmol/L 20  --   --   --    CO2 mmol/L  --  27.0   < > 19.0*   TOTAL PROTEIN g/dL  --  7.4   < > 7.7   ALBUMIN g/dL 4.3 3.90   < > 4.10   ALT (SGPT) IU/L 70* 449*   < > 436*   AST (SGOT) IU/L 50* 191*   < > 253*   ALK PHOS IU/L 158* 185*   < > 193*   BILIRUBIN mg/dL 0.3 0.4   < > 0.4   GLOBULIN gm/dL  --  3.5   < > 3.6   HEP B C IGM   --   --   --  Negative   HEP B S AG   --   --   --  Negative   HCV S/C RATIO   --   --   --  0.04    < > = values in this interval not displayed.         ASSESSMENT AND PLAN      1. Diarrhea in adult patient  History of collagenous colitis.  She is resumed her Pepto-Bismol.  If he continues would like to get stool studies.  No recent antibiotic use.    2. Gastroesophageal reflux disease without esophagitis  Continue Protonix for now.  If the diarrhea persists we may need to change her over to Pepcid.  I told her to increase " the Gaviscon.      She is being evaluated by [place] oncology for the new diagnosis of B-cell lymphoma       EMR Dragon/transcription disclaimer: Much of this encounter note is an electronic transcription/translation of spoken language to printed text.  The electronic translation of spoken language may permit erroneous, or at times, nonsensical words or phrases to be inadvertently transcribed.  Although I have reviewed the note for such errors, some may still exist.    Danis Garcia MD  11/14/2019  1:58 PM

## 2020-04-12 DIAGNOSIS — K58.0 IRRITABLE BOWEL SYNDROME WITH DIARRHEA: ICD-10-CM

## 2020-04-13 RX ORDER — MONTELUKAST SODIUM 4 MG/1
TABLET, CHEWABLE ORAL
Qty: 180 TABLET | Refills: 3 | Status: ON HOLD | OUTPATIENT
Start: 2020-04-13 | End: 2020-10-20

## 2020-07-02 DIAGNOSIS — K21.9 GASTROESOPHAGEAL REFLUX DISEASE, ESOPHAGITIS PRESENCE NOT SPECIFIED: Primary | ICD-10-CM

## 2020-07-06 RX ORDER — PANTOPRAZOLE SODIUM 40 MG/1
TABLET, DELAYED RELEASE ORAL
Qty: 60 TABLET | Refills: 5 | Status: SHIPPED | OUTPATIENT
Start: 2020-07-06 | End: 2020-08-24 | Stop reason: SDUPTHER

## 2020-08-10 ENCOUNTER — HOSPITAL ENCOUNTER (EMERGENCY)
Facility: HOSPITAL | Age: 65
Discharge: HOME | End: 2020-08-10
Admitting: EMERGENCY MEDICINE

## 2020-08-10 ENCOUNTER — APPOINTMENT (OUTPATIENT)
Dept: GENERAL RADIOLOGY | Facility: HOSPITAL | Age: 65
End: 2020-08-10

## 2020-08-10 ENCOUNTER — APPOINTMENT (OUTPATIENT)
Dept: CT IMAGING | Facility: HOSPITAL | Age: 65
End: 2020-08-10

## 2020-08-10 VITALS
RESPIRATION RATE: 20 BRPM | BODY MASS INDEX: 25.34 KG/M2 | WEIGHT: 143 LBS | DIASTOLIC BLOOD PRESSURE: 97 MMHG | HEART RATE: 95 BPM | TEMPERATURE: 98.3 F | SYSTOLIC BLOOD PRESSURE: 133 MMHG | OXYGEN SATURATION: 94 % | HEIGHT: 63 IN

## 2020-08-10 DIAGNOSIS — R42 POSITIONAL LIGHTHEADEDNESS: Primary | ICD-10-CM

## 2020-08-10 DIAGNOSIS — N39.0 ACUTE UTI: ICD-10-CM

## 2020-08-10 LAB
ALBUMIN SERPL-MCNC: 4.5 G/DL (ref 3.5–5.2)
ALBUMIN/GLOB SERPL: 1.6 G/DL
ALP SERPL-CCNC: 140 U/L (ref 39–117)
ALT SERPL W P-5'-P-CCNC: 7 U/L (ref 1–33)
ANION GAP SERPL CALCULATED.3IONS-SCNC: 13 MMOL/L (ref 5–15)
ANISOCYTOSIS BLD QL: ABNORMAL
AST SERPL-CCNC: 12 U/L (ref 1–32)
BACTERIA UR QL AUTO: ABNORMAL /HPF
BILIRUB SERPL-MCNC: 0.2 MG/DL (ref 0–1.2)
BILIRUB UR QL STRIP: NEGATIVE
BUN SERPL-MCNC: 11 MG/DL (ref 8–23)
BUN/CREAT SERPL: 17.2 (ref 7–25)
BURR CELLS BLD QL SMEAR: ABNORMAL
CALCIUM SPEC-SCNC: 9.8 MG/DL (ref 8.6–10.5)
CHLORIDE SERPL-SCNC: 111 MMOL/L (ref 98–107)
CLARITY UR: CLEAR
CLUMPED PLATELETS: PRESENT
CO2 SERPL-SCNC: 19 MMOL/L (ref 22–29)
COLOR UR: YELLOW
CREAT SERPL-MCNC: 0.64 MG/DL (ref 0.57–1)
DEPRECATED RDW RBC AUTO: 49.1 FL (ref 37–54)
EOSINOPHIL # BLD MANUAL: 0.17 10*3/MM3 (ref 0–0.4)
EOSINOPHIL NFR BLD MANUAL: 2 % (ref 0.3–6.2)
ERYTHROCYTE [DISTWIDTH] IN BLOOD BY AUTOMATED COUNT: 14 % (ref 12.3–15.4)
GFR SERPL CREATININE-BSD FRML MDRD: 94 ML/MIN/1.73
GLOBULIN UR ELPH-MCNC: 2.9 GM/DL
GLUCOSE BLDC GLUCOMTR-MCNC: 111 MG/DL (ref 70–130)
GLUCOSE SERPL-MCNC: 111 MG/DL (ref 65–99)
GLUCOSE UR STRIP-MCNC: NEGATIVE MG/DL
HCT VFR BLD AUTO: 40.8 % (ref 34–46.6)
HGB BLD-MCNC: 13.9 G/DL (ref 12–15.9)
HGB UR QL STRIP.AUTO: NEGATIVE
HOLD SPECIMEN: NORMAL
HOLD SPECIMEN: NORMAL
HYALINE CASTS UR QL AUTO: ABNORMAL /LPF
KETONES UR QL STRIP: NEGATIVE
LEUKOCYTE ESTERASE UR QL STRIP.AUTO: ABNORMAL
LYMPHOCYTES # BLD MANUAL: 0.26 10*3/MM3 (ref 0.7–3.1)
LYMPHOCYTES NFR BLD MANUAL: 3.1 % (ref 19.6–45.3)
LYMPHOCYTES NFR BLD MANUAL: 7.1 % (ref 5–12)
MAGNESIUM SERPL-MCNC: 2 MG/DL (ref 1.6–2.4)
MCH RBC QN AUTO: 32.7 PG (ref 26.6–33)
MCHC RBC AUTO-ENTMCNC: 34.1 G/DL (ref 31.5–35.7)
MCV RBC AUTO: 96 FL (ref 79–97)
MICROCYTES BLD QL: ABNORMAL
MONOCYTES # BLD AUTO: 0.59 10*3/MM3 (ref 0.1–0.9)
NEUTROPHILS # BLD AUTO: 7.27 10*3/MM3 (ref 1.7–7)
NEUTROPHILS NFR BLD MANUAL: 86.7 % (ref 42.7–76)
NEUTS BAND NFR BLD MANUAL: 1 % (ref 0–5)
NEUTS VAC BLD QL SMEAR: ABNORMAL
NITRITE UR QL STRIP: NEGATIVE
PH UR STRIP.AUTO: 6 [PH] (ref 5–8)
PLATELET # BLD AUTO: 114 10*3/MM3 (ref 140–450)
PMV BLD AUTO: 10.9 FL (ref 6–12)
POIKILOCYTOSIS BLD QL SMEAR: ABNORMAL
POLYCHROMASIA BLD QL SMEAR: ABNORMAL
POTASSIUM SERPL-SCNC: 3.8 MMOL/L (ref 3.5–5.2)
PROT SERPL-MCNC: 7.4 G/DL (ref 6–8.5)
PROT UR QL STRIP: NEGATIVE
RBC # BLD AUTO: 4.25 10*6/MM3 (ref 3.77–5.28)
RBC # UR: ABNORMAL /HPF
REF LAB TEST METHOD: ABNORMAL
SARS-COV-2 RNA RESP QL NAA+PROBE: NOT DETECTED
SMALL PLATELETS BLD QL SMEAR: ABNORMAL
SODIUM SERPL-SCNC: 143 MMOL/L (ref 136–145)
SP GR UR STRIP: 1.02 (ref 1–1.03)
SQUAMOUS #/AREA URNS HPF: ABNORMAL /HPF
TROPONIN T SERPL-MCNC: <0.01 NG/ML (ref 0–0.03)
TROPONIN T SERPL-MCNC: <0.01 NG/ML (ref 0–0.03)
UROBILINOGEN UR QL STRIP: ABNORMAL
WBC # BLD AUTO: 8.29 10*3/MM3 (ref 3.4–10.8)
WBC UR QL AUTO: ABNORMAL /HPF
WHOLE BLOOD HOLD SPECIMEN: NORMAL
WHOLE BLOOD HOLD SPECIMEN: NORMAL
YEAST URNS QL MICRO: ABNORMAL /HPF

## 2020-08-10 PROCEDURE — 71045 X-RAY EXAM CHEST 1 VIEW: CPT

## 2020-08-10 PROCEDURE — 80053 COMPREHEN METABOLIC PANEL: CPT

## 2020-08-10 PROCEDURE — 93005 ELECTROCARDIOGRAM TRACING: CPT

## 2020-08-10 PROCEDURE — 70450 CT HEAD/BRAIN W/O DYE: CPT

## 2020-08-10 PROCEDURE — 81001 URINALYSIS AUTO W/SCOPE: CPT

## 2020-08-10 PROCEDURE — 73523 X-RAY EXAM HIPS BI 5/> VIEWS: CPT

## 2020-08-10 PROCEDURE — 84484 ASSAY OF TROPONIN QUANT: CPT

## 2020-08-10 PROCEDURE — 84484 ASSAY OF TROPONIN QUANT: CPT | Performed by: PHYSICIAN ASSISTANT

## 2020-08-10 PROCEDURE — 93010 ELECTROCARDIOGRAM REPORT: CPT | Performed by: INTERNAL MEDICINE

## 2020-08-10 PROCEDURE — 93005 ELECTROCARDIOGRAM TRACING: CPT | Performed by: PHYSICIAN ASSISTANT

## 2020-08-10 PROCEDURE — 25010000003 LIDOCAINE 1 % SOLUTION 20 ML VIAL: Performed by: PHYSICIAN ASSISTANT

## 2020-08-10 PROCEDURE — 25010000002 CEFTRIAXONE PER 250 MG: Performed by: PHYSICIAN ASSISTANT

## 2020-08-10 PROCEDURE — 87635 SARS-COV-2 COVID-19 AMP PRB: CPT | Performed by: PHYSICIAN ASSISTANT

## 2020-08-10 PROCEDURE — 83735 ASSAY OF MAGNESIUM: CPT

## 2020-08-10 PROCEDURE — 85025 COMPLETE CBC W/AUTO DIFF WBC: CPT

## 2020-08-10 PROCEDURE — 96372 THER/PROPH/DIAG INJ SC/IM: CPT

## 2020-08-10 PROCEDURE — 99285 EMERGENCY DEPT VISIT HI MDM: CPT

## 2020-08-10 PROCEDURE — 82962 GLUCOSE BLOOD TEST: CPT

## 2020-08-10 PROCEDURE — 85007 BL SMEAR W/DIFF WBC COUNT: CPT

## 2020-08-10 RX ORDER — AMLODIPINE BESYLATE 5 MG/1
5 TABLET ORAL DAILY
COMMUNITY

## 2020-08-10 RX ORDER — CEFDINIR 300 MG/1
300 CAPSULE ORAL 2 TIMES DAILY
Qty: 14 CAPSULE | Refills: 0 | Status: SHIPPED | OUTPATIENT
Start: 2020-08-10 | End: 2020-10-19

## 2020-08-10 RX ORDER — CHOLESTYRAMINE LIGHT 4 G/5.7G
4 POWDER, FOR SUSPENSION ORAL 2 TIMES DAILY
Status: ON HOLD | COMMUNITY
End: 2020-10-20

## 2020-08-10 RX ORDER — MECLIZINE HYDROCHLORIDE 25 MG/1
25 TABLET ORAL ONCE
Status: COMPLETED | OUTPATIENT
Start: 2020-08-10 | End: 2020-08-10

## 2020-08-10 RX ORDER — METOPROLOL SUCCINATE 100 MG/1
100 TABLET, EXTENDED RELEASE ORAL DAILY
COMMUNITY

## 2020-08-10 RX ORDER — LORAZEPAM 0.5 MG/1
0.5 TABLET ORAL EVERY 8 HOURS PRN
COMMUNITY

## 2020-08-10 RX ORDER — SODIUM CHLORIDE 0.9 % (FLUSH) 0.9 %
10 SYRINGE (ML) INJECTION AS NEEDED
Status: DISCONTINUED | OUTPATIENT
Start: 2020-08-10 | End: 2020-08-10 | Stop reason: HOSPADM

## 2020-08-10 RX ORDER — BUTALBITAL, ACETAMINOPHEN, CAFFEINE AND CODEINE PHOSPHATE 300; 50; 40; 30 MG/1; MG/1; MG/1; MG/1
CAPSULE ORAL
Status: ON HOLD | COMMUNITY
End: 2021-05-21

## 2020-08-10 RX ORDER — ONDANSETRON 4 MG/1
4 TABLET, FILM COATED ORAL EVERY 8 HOURS PRN
COMMUNITY

## 2020-08-10 RX ORDER — DOXYCYCLINE HYCLATE 100 MG/1
100 CAPSULE ORAL 2 TIMES DAILY
Status: ON HOLD | COMMUNITY
End: 2020-10-20

## 2020-08-10 RX ORDER — CLONIDINE HYDROCHLORIDE 0.1 MG/1
0.1 TABLET ORAL 2 TIMES DAILY PRN
COMMUNITY

## 2020-08-10 RX ADMIN — MECLIZINE HYDROCHLORIDE 25 MG: 25 TABLET ORAL at 17:01

## 2020-08-10 RX ADMIN — CEFTRIAXONE SODIUM 1 G: 1 INJECTION, POWDER, FOR SOLUTION INTRAMUSCULAR; INTRAVENOUS at 20:22

## 2020-08-10 NOTE — ED PROVIDER NOTES
Subjective   History of Present Illness  Patient is a pleasant 64-year-old female who presents to ED with .  Chief complaint is weakness and fall.  The patient describes the symptoms began about 3 to 4 days ago.   reports that she began have several episodes of loose non-bloody diarrhea at that time.  She is making urine with her last urinary output being 3 hours prior to her to my assessment.  She denies associated fever or abdominal pain.  She denies any COVID exposure.  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  Her last treatment was 2 months ago she scheduled to go back to [place] next month for her next treatment.  The patient has chronic back pain already.  Last night, she became lightheaded and fell impacting both of her hips.  The patient reports laying there for about 45 minutes was able to ambulate without assistance any further.  Normally, she does not ambulate with assistance.    Specifically, the patient reports her lightheadedness only present with positional changes.  She denies any vertiginous-like symptoms whenever she is sitting there.  She denies any headache.  She just feels off balance.  She denies room spinning or patient spinning herself.  She denies any sense of chest pain, pressure, or tightness.  She has been removed off of many medications the past couple months but she is not sure what.  She denies any new additional medications.    Review of Systems   Constitutional: Positive for activity change and fatigue. Negative for fever.   HENT: Negative.    Eyes: Negative.    Respiratory: Negative.  Negative for cough, chest tightness and shortness of breath.    Cardiovascular: Negative for chest pain.   Gastrointestinal: Positive for diarrhea. Negative for abdominal pain.   Genitourinary: Negative.  Negative for decreased urine volume, dysuria and urgency.   Musculoskeletal: Positive for back pain. Negative for gait problem, neck pain and neck stiffness.   Skin:  Negative.    Neurological: Positive for dizziness, weakness and light-headedness.   Psychiatric/Behavioral: Negative.    All other systems reviewed and are negative.      Past Medical History:   Diagnosis Date   • Anemia    • Anxiety    • Arthritis    • Cancer of skin     BCC OF NOSE   • Colitis    • COPD (chronic obstructive pulmonary disease) (CMS/HCC)    • Depression    • Fibromyalgia    • Gastric ulcer    • GERD (gastroesophageal reflux disease)    • Hx of colonic polyps    • Hypercholesteremia    • Hypertension    • Seizure (CMS/HCC)    • Small cell B-cell lymphoma (CMS/HCC)    • Ulcerative colitis (CMS/HCC)        Allergies   Allergen Reactions   • Lyrica [Pregabalin] Swelling     LIPS AND FACE   • Morphine Itching   • Codeine Itching   • Penicillins Other (See Comments)     CHILDHOOD ALLERGY         Past Surgical History:   Procedure Laterality Date   • BREAST SURGERY     • CHOLECYSTECTOMY     • COLONOSCOPY  04/12/2017    Hemorrhoids and a few diverticula repeat exam in 3 years Dr. Cordero   • COLONOSCOPY N/A 5/2/2019    Collagenous colitis, Diverticulosis repeat exam in 5 years   • COLONOSCOPY W/ POLYPECTOMY  07/28/2016    Tubular adenoma proximal to the anus, 30 MM polyp in the cecum not resected, Diverticulosis ref to Dr. Cordero   • ENDOSCOPY  06/15/2016    Small hh, Negative for celiac disease   • ENDOSCOPY N/A 6/13/2019    HH otherwise normal exam   • RADICAL HYSTERECTOMY     • URETEROSCOPY Right 9/13/2018    Procedure: CYSTOSCOPY RIGHT RETROGRADE PYELOGRAM RIGHT URETEROSCOPY WITH BIOPSY OF RIGHT RENAL PELVIS LASER LITHOTRIPSY AND RIGHT URETERAL STENT INSERTION;  Surgeon: Yifan Greco MD;  Location: [facility] OR;  Service: Urology   • URETEROSCOPY Right 8/9/2019    Procedure: RIGHT FLEXIBLE URETEROSCOPY WITH RENAL PELVIC BIOPSY AND RIGHT URETERAL STENT;  Surgeon: Yifan Greco MD;  Location:  [facility] OR;  Service: Urology       Family History   Problem Relation Age of Onset   • Diabetes Sister     • Cancer Brother    • Colon cancer Neg Hx    • Colon polyps Neg Hx        Social History     Socioeconomic History   • Marital status:      Spouse name: Not on file   • Number of children: Not on file   • Years of education: Not on file   • Highest education level: Not on file   Tobacco Use   • Smoking status: Current Every Day Smoker     Packs/day: 1.00     Types: Cigarettes   • Smokeless tobacco: Never Used   Substance and Sexual Activity   • Alcohol use: No   • Drug use: No   • Sexual activity: Defer       Prior to Admission medications    Medication Sig Start Date End Date Taking? Authorizing Provider   ARIPiprazole (ABILIFY) 10 MG tablet  9/19/16   Perla Bliss MD   Bismuth Subsalicylate (PEPTO-BISMOL) 262 MG tablet Take  by mouth. 2 po qid    Perla Bliss MD   cetirizine (zyrTEC) 10 MG tablet Take 10 mg by mouth Daily.    Perla Bliss MD   colestipol (COLESTID) 1 g tablet TAKE 1 TABLET TWICE A DAY 4/13/20   Becca Blackwood APRN   DULoxetine (CYMBALTA) 60 MG capsule  9/19/16   Perla Bliss MD   hydrOXYzine (ATARAX) 50 MG tablet Take 50 mg by mouth Every 8 (Eight) Hours As Needed. for anxiety 7/6/19   Perla Bliss MD   pantoprazole (PROTONIX) 40 MG EC tablet TAKE ONE TABLET BY MOUTH TWICE DAILY 7/6/20   Becca Blackwood APRN   PROAIR  (90 BASE) MCG/ACT inhaler USE TWO PUFFS BY MOUTH EVERY 4 TO 6 HOURS AS NEEDED FOR WHEEZING 9/19/16   Perla Bliss MD   SYMBICORT 160-4.5 MCG/ACT inhaler  8/1/16   Perla Bliss MD   tamsulosin (FLOMAX) 0.4 MG capsule 24 hr capsule Take 1 capsule by mouth Daily. 8/11/19   Arun Martin APRN   vitamin D (ERGOCALCIFEROL) 50285 units capsule capsule  7/27/19   Perla Bliss MD       Medications   sodium chloride 0.9 % flush 10 mL (has no administration in time range)   meclizine (ANTIVERT) tablet 25 mg (25 mg Oral Given 8/10/20 4971)   cefTRIAXone (ROCEPHIN) 1 g in lidocaine  "(XYLOCAINE) 1 % IM only syringe (1 g Intramuscular Given 8/10/20 2022)       /97   Pulse 95   Temp 97.8 °F (36.6 °C) (Oral)   Resp 20   Ht 160 cm (63\")   Wt 64.9 kg (143 lb)   SpO2 94%   BMI 25.33 kg/m²       Objective   Physical Exam   Constitutional: She is oriented to person, place, and time. She appears well-developed and well-nourished. No distress.   HENT:   Head: Normocephalic and atraumatic.   Eyes: Pupils are equal, round, and reactive to light. Conjunctivae and EOM are normal.   Neck: Normal range of motion. Neck supple. No tracheal deviation present.   Cardiovascular: Normal rate, regular rhythm, normal heart sounds and intact distal pulses.   No murmur heard.  Pulmonary/Chest: Effort normal and breath sounds normal.   Abdominal: Soft. Bowel sounds are normal. She exhibits no distension and no mass. There is no tenderness. There is no rebound and no guarding.   Musculoskeletal: Normal range of motion. She exhibits no edema.   Neurological: She is alert and oriented to person, place, and time. She has normal reflexes. She exhibits normal muscle tone. Coordination normal.   Patient is generally weak but she does not demonstrate any focal deficits.    Cranial nerve evaluation:  No aphasia.  PERRLA. Normal visual fields. Normal smooth eye movement.   No facial assymetry.  Tongue is midline with normal gag reflex.   Symmetrical/normal sternocleidomastoid movement.   No pronator drift.  No sensory deficits.  No motor deficits.   No ataxia.    Skin: Skin is warm and dry. Capillary refill takes less than 2 seconds. She is not diaphoretic.   Psychiatric: She has a normal mood and affect. Her behavior is normal. Judgment and thought content normal.   Nursing note and vitals reviewed.      Procedures         Lab Results (last 24 hours)     Procedure Component Value Units Date/Time    CBC & Differential [[id number]] Collected:  08/10/20 1519    Specimen:  Blood from Arm, Right Updated:  08/10/20 1611    " Narrative:       The following orders were created for panel order CBC & Differential.  Procedure                               Abnormality         Status                     ---------                               -----------         ------                     CBC Auto Differential[[id number]]        Abnormal            Final result                 Please view results for these tests on the individual orders.    Comprehensive Metabolic Panel [[id number]]  (Abnormal) Collected:  08/10/20 1519    Specimen:  Blood from Arm, Right Updated:  08/10/20 1548     Glucose 111 mg/dL      BUN 11 mg/dL      Creatinine 0.64 mg/dL      Sodium 143 mmol/L      Potassium 3.8 mmol/L      Chloride 111 mmol/L      CO2 19.0 mmol/L      Calcium 9.8 mg/dL      Total Protein 7.4 g/dL      Albumin 4.50 g/dL      ALT (SGPT) 7 U/L      AST (SGOT) 12 U/L      Alkaline Phosphatase 140 U/L      Total Bilirubin 0.2 mg/dL      eGFR Non African Amer 94 mL/min/1.73      Globulin 2.9 gm/dL      A/G Ratio 1.6 g/dL      BUN/Creatinine Ratio 17.2     Anion Gap 13.0 mmol/L     Narrative:       GFR Normal >60  Chronic Kidney Disease <60  Kidney Failure <15      Troponin [[id number]]  (Normal) Collected:  08/10/20 1519    Specimen:  Blood from Arm, Right Updated:  08/10/20 1546     Troponin T <0.010 ng/mL     Narrative:       Troponin T Reference Range:  <= 0.03 ng/mL-   Negative for AMI  >0.03 ng/mL-     Abnormal for myocardial necrosis.  Clinicians would have to utilize clinical acumen, EKG, Troponin and serial changes to determine if it is an Acute Myocardial Infarction or myocardial injury due to an underlying chronic condition.       Results may be falsely decreased if patient taking Biotin.      Magnesium [[id number]]  (Normal) Collected:  08/10/20 1519    Specimen:  Blood from Arm, Right Updated:  08/10/20 1543     Magnesium 2.0 mg/dL     CBC Auto Differential [[id number]]  (Abnormal) Collected:  08/10/20 1519    Specimen:  Blood from Arm, Right Updated:   08/10/20 1611     WBC 8.29 10*3/mm3      RBC 4.25 10*6/mm3      Hemoglobin 13.9 g/dL      Hematocrit 40.8 %      MCV 96.0 fL      MCH 32.7 pg      MCHC 34.1 g/dL      RDW 14.0 %      RDW-SD 49.1 fl      MPV 10.9 fL      Platelets 114 10*3/mm3     Manual Differential [[id number]]  (Abnormal) Collected:  08/10/20 1519    Specimen:  Blood from Arm, Right Updated:  08/10/20 1611     Neutrophil % 86.7 %      Lymphocyte % 3.1 %      Monocyte % 7.1 %      Eosinophil % 2.0 %      Bands %  1.0 %      Neutrophils Absolute 7.27 10*3/mm3      Lymphocytes Absolute 0.26 10*3/mm3      Monocytes Absolute 0.59 10*3/mm3      Eosinophils Absolute 0.17 10*3/mm3      Anisocytosis Slight/1+     [place] Cells Slight/1+     Microcytes Slight/1+     Poikilocytes Slight/1+     Polychromasia Mod/2+     Vacuolated Neutrophils Slight/1+     Platelet Estimate Decreased     Clumped Platelets Present    Troponin [[id number]]  (Normal) Collected:  08/10/20 1519    Specimen:  Blood from Arm, Right Updated:  08/10/20 1718     Troponin T <0.010 ng/mL     Narrative:       Troponin T Reference Range:  <= 0.03 ng/mL-   Negative for AMI  >0.03 ng/mL-     Abnormal for myocardial necrosis.  Clinicians would have to utilize clinical acumen, EKG, Troponin and serial changes to determine if it is an Acute Myocardial Infarction or myocardial injury due to an underlying chronic condition.       Results may be falsely decreased if patient taking Biotin.      POC Glucose Once [[id number]]  (Normal) Collected:  08/10/20 1520    Specimen:  Blood Updated:  08/10/20 1533     Glucose 111 mg/dL      Comment: : [id number] [facility] ID: [id number]       COVID PRE-OP / PRE-PROCEDURE SCREENING ORDER (NO ISOLATION) - Swab, Nasopharynx [[id number]] Collected:  08/10/20 1702    Specimen:  Swab from Nasopharynx Updated:  08/10/20 1821    Narrative:       The following orders were created for panel order COVID PRE-OP / PRE-PROCEDURE SCREENING ORDER (NO ISOLATION) -  Swab, Nasopharynx.  Procedure                               Abnormality         Status                     ---------                               -----------         ------                     COVID-19,CEPHEID,COR/MAGI...[[id number]]  Normal              Final result                 Please view results for these tests on the individual orders.    COVID-19,CEPHEID,COR/MAGI/[facility] IN-HOUSE(OR EMERGENT/ADD-ON),NP SWAB IN TRANSPORT MEDIA 3-4 HR TAT - Swab, Nasopharynx [[id number]]  (Normal) Collected:  08/10/20 1702    Specimen:  Swab from Nasopharynx Updated:  08/10/20 1821     COVID19 Not Detected    Narrative:       Fact sheet for providers: https://www.fda.gov/media/424540/download     Fact sheet for patients: https://www.fda.gov/media/008142/download    Urinalysis With Microscopic If Indicated (No Culture) - Urine, Clean Catch [[id number]]  (Abnormal) Collected:  08/10/20 1909    Specimen:  Urine, Clean Catch Updated:  08/10/20 1939     Color, UA Yellow     Appearance, UA Clear     pH, UA 6.0     Specific Gravity, UA 1.020     Glucose, UA Negative     Ketones, UA Negative     Bilirubin, UA Negative     Blood, UA Negative     Protein, UA Negative     Leuk Esterase, UA Small (1+)     Nitrite, UA Negative     Urobilinogen, UA 0.2 E.U./dL    Urinalysis, Microscopic Only - Urine, Clean Catch [[id number]]  (Abnormal) Collected:  08/10/20 1909    Specimen:  Urine, Clean Catch Updated:  08/10/20 1939     RBC, UA 0-2 /HPF      WBC, UA 21-30 /HPF      Bacteria, UA 2+ /HPF      Squamous Epithelial Cells, UA 3-6 /HPF      Yeast, UA Moderate/2+ Yeast /HPF      Hyaline Casts, UA None Seen /LPF      Methodology Manual Light Microscopy          Ct Head Without Contrast    Result Date: 8/10/2020  Narrative: EXAMINATION: CT HEAD WO CONTRAST-   8/10/2020 6:02 PM CDT  HISTORY: dizziness  In order to have a CT radiation dose as low as reasonably achievable Automated Exposure Control was utilized for adjustment of the mA and/or KV according to  patient size.  DLP in mGycm= 571.  Axial, sagittal, and coronal noncontrast CT imaging of the head.  Comparison: None.  The visualized paranasal sinuses are clear.  The brain and ventricles have an age appropriate appearance. There is no hemorrhage or mass-effect. No acute infarction is seen.  No calvarial abnormality.      Impression: 1. No acute intracranial abnormality is seen.              This report was finalized on 08/10/2020 18:17 by Dr. Chase Rios MD.    Xr Chest 1 View    Result Date: 8/10/2020  Narrative: EXAM: XR CHEST 1 VW- - 8/10/2020 5:01 PM CDT  HISTORY: Weak/Dizzy/AMS triage protocol   COMPARISON: 8/9/2019.  TECHNIQUE:  1 images.  Frontal view of the chest.  FINDINGS:  No pneumothorax, pleural effusion or focal consolidation. Right lower lung granuloma. Cardiac mediastinal silhouette appear within normal limits. Chronic appearing left posterior rib fracture. No acute bony finding.       Impression: 1. No acute cardiopulmonary findings. This report was finalized on 08/10/2020 17:17 by Dr Jolly Abdullahi MD.    Xr Hips Bilateral With Or Without Pelvis 5 View    Result Date: 8/10/2020  Narrative: EXAMINATION: XR HIPS BILATERAL W OR WO PELVIS 5 VIEW-  8/10/2020 5:18 PM CDT  HISTORY: Fall injury. Pelvis and hip pain.  Pelvis and bilateral hips, 5 views.  Intact pelvic ring. SI joints are symmetric.  Degenerative changes seen within the lower lumbar spine.  Both proximal femurs are intact.  There is no hip fracture or dislocation.  Each acetabulum is intact.  Summary: 1. No acute bony abnormality is seen.  This report was finalized on 08/10/2020 17:49 by Dr. Chase Rios MD.      ED Course  ED Course as of Aug 10 2026   Mon Aug 10, 2020   1954 BP: 132/87 [TK]   2023 Patient has been reassessed.  She reports feeling better.  She feels comfortable go home.  I have educated the patient and her  all the laboratory data.  CT scan of the head was unremarkable.  Plain films did not show any signs of  fracture.  Did give her an injection of ceftriaxone while here for possible UTI with culture pending.  Recommend the patient follow-up with her primary care provider.  Return to ED for any acute worsening issues.  I have reviewed this case with Dr. Rodriguez. Patient and  voiced understanding.  She will be discharged in stable condition.    [TK]      ED Course User Index  [TK] Shawanda Gonzales PA          [facility]    Final diagnoses:   Positional lightheadedness   Acute UTI          Shawanda Gonzales PA  08/10/20 2026

## 2020-08-24 DIAGNOSIS — K21.9 GASTROESOPHAGEAL REFLUX DISEASE, ESOPHAGITIS PRESENCE NOT SPECIFIED: ICD-10-CM

## 2020-08-25 RX ORDER — PANTOPRAZOLE SODIUM 40 MG/1
40 TABLET, DELAYED RELEASE ORAL 2 TIMES DAILY
Qty: 60 TABLET | Refills: 5 | Status: SHIPPED | OUTPATIENT
Start: 2020-08-25 | End: 2021-02-19

## 2020-09-15 ENCOUNTER — TRANSCRIBE ORDERS (OUTPATIENT)
Dept: ADMINISTRATIVE | Facility: HOSPITAL | Age: 65
End: 2020-09-15

## 2020-09-15 DIAGNOSIS — G56.22 LESION OF LEFT ULNAR NERVE: Primary | ICD-10-CM

## 2020-09-28 ENCOUNTER — HOSPITAL ENCOUNTER (OUTPATIENT)
Dept: NEUROLOGY | Facility: HOSPITAL | Age: 65
Discharge: HOME | End: 2020-09-28
Admitting: FAMILY MEDICINE

## 2020-09-28 PROCEDURE — 95886 MUSC TEST DONE W/N TEST COMP: CPT

## 2020-09-28 PROCEDURE — 95909 NRV CNDJ TST 5-6 STUDIES: CPT

## 2020-10-14 ENCOUNTER — TRANSCRIBE ORDERS (OUTPATIENT)
Dept: ADMINISTRATIVE | Facility: HOSPITAL | Age: 65
End: 2020-10-14

## 2020-10-14 DIAGNOSIS — Z01.818 PREOPERATIVE TESTING: Primary | ICD-10-CM

## 2020-10-17 ENCOUNTER — LAB (OUTPATIENT)
Dept: LAB | Facility: HOSPITAL | Age: 65
End: 2020-10-17

## 2020-10-17 DIAGNOSIS — Z01.818 PREOPERATIVE TESTING: ICD-10-CM

## 2020-10-17 PROCEDURE — U0003 INFECTIOUS AGENT DETECTION BY NUCLEIC ACID (DNA OR RNA); SEVERE ACUTE RESPIRATORY SYNDROME CORONAVIRUS 2 (SARS-COV-2) (CORONAVIRUS DISEASE [COVID-19]), AMPLIFIED PROBE TECHNIQUE, MAKING USE OF HIGH THROUGHPUT TECHNOLOGIES AS DESCRIBED BY CMS-2020-01-R: HCPCS | Performed by: ORTHOPAEDIC SURGERY

## 2020-10-17 PROCEDURE — C9803 HOPD COVID-19 SPEC COLLECT: HCPCS

## 2020-10-18 PROBLEM — G56.22 CUBITAL TUNNEL SYNDROME ON LEFT: Status: ACTIVE | Noted: 2020-10-18

## 2020-10-18 LAB
COVID LABCORP PRIORITY: NORMAL
SARS-COV-2 RNA RESP QL NAA+PROBE: NOT DETECTED

## 2020-10-18 NOTE — OP NOTE
"Patient Name: King  MRN: [id number]  : 1955    DATE of SURGERY: 10/20/2020    SURGEON: Raymundo Kiran MD    ASSISTANT: Hawk Mason PA-C, was used as an assistant during this procedure and was utilized during patient positioning, exposure, wound closure, and postoperative dressing/splint application.      PREOPERATIVE DIAGNOSIS:  Left Cubital Tunnel Syndrome    POSTOPERATIVE DIAGNOSIS  Left cubital tunnel syndrome.    PROCEDURE PERFORMED  Left cubital tunnel release.    IMPLANTS  None.    ANESTHESIA USED  Laryngeal mask airway.    PREOPERATIVE INDICATIONS  Patient is a 65 y.o. female who presented clinically with complaints of numbness and tingling in the ulnar nerve distribution associated with severe atrophy of the intrinsic muscles of the hand and a \"claw hand\" deformity.  The patient had nerve conduction velocity study which showed severe cubital tunnel syndrome.  Having such severe findings, the patient wished to proceed with surgery understanding the risks, benefits, and alternatives.  The risks include but are not limited to that of anesthesia, bleeding, infection, pain, damage to local structures, need for further surgery.  We did discuss the possibility of having to anteriorly transpose the nerve if there was subluxation which was present.  I also discussed damage to the ulnar nerve itself including the first branch to the flexor carpi ulnaris muscle.  She understood that with her advanced nerve dysfunction, recovery of function would take months and would likely not be complete.    ESTIMATED BLOOD LOSS    10 mL     SPECIMENS  None.    DRAINS  None.    COMPLICATIONS  None.    PROCEDURE IN DETAIL  The patient was seen in the preoperative holding room.  Once again the informed consent was reviewed with the patient and signed.  The site of surgery was marked with the patient's agreement.  The patient was transported to the operating room where time out was performed identifying the correct " patient as well as the operative site.  2 grams of IV Kefzol were given as preoperative antibiotics.     The operative upper extremity was prepped and draped in the usual sterile fashion.  A tourniquet was placed above brachial, inflated to 200 mmHg and total tourniquet time was less than 30 minutes.    An incision was made just along the medial aspect of the elbow adjacent to the cubital tunnel in the medial epicondyle.  Soft tissue was dissected down to the level of the cubital tunnel. The ulnar nerve was identified.  The roof of the cubital tunnel was then released with a [place] blade while protecting the underlying nerve.  The nerve was then freed from all points of compression working proximally from the medial intermuscular septum distally to the arch of the supinator muscle.  The nerve was completely freed identifying small branches to the FCU muscle as well.  After a complete release of the nerve was performed, the elbow was taken through a range of motion showing mild subluxation of the nerve anteriorly. As a result a subcutaneous transposition of the nerve was performed.    The incision site was thoroughly irrigated followed by closure in layers.   The skin was closed with adhesive glue.  Sterile dressing was placed followed by a long arm splint placed in neutral, full arm rotation and slight extension of the elbow.       The patient was awakened from anesthesia, transported to the recovery room in stable condition.    DISCHARGE PLAN  Discharge home with family.  Follow up in 1 week for clinical check.  The patient will be 0-90 degrees for the first 4-6 weeks elbow range of motion.    Electronically signed by Raymundo Kiran MD on 10/20/2020 at 11:22 CDT

## 2020-10-18 NOTE — DISCHARGE INSTRUCTIONS
YOUR NEXT PAIN MEDICATION IS DUE AT______________         General Anesthesia, Adult, Care After  Refer to this sheet in the next few weeks. These instructions provide you with information on caring for yourself after your procedure. Your health care provider may also give you more specific instructions. Your treatment has been planned according to current medical practices, but problems sometimes occur. Call your health care provider if you have any problems or questions after your procedure.  WHAT TO EXPECT AFTER THE PROCEDURE  After the procedure, it is typical to experience:  · Sleepiness.  · Nausea and vomiting.  HOME CARE INSTRUCTIONS  · For the first 24 hours after general anesthesia:  ¨ Have a responsible person with you.  ¨ Do not drive a car. If you are alone, do not take public transportation.  ¨ Do not drink alcohol.  ¨ Do not take medicine that has not been prescribed by your health care provider.  ¨ Do not sign important papers or make important decisions.  ¨ You may resume a normal diet and activities as directed by your health care provider.  · Change bandages (dressings) as directed.  · If you have questions or problems that seem related to general anesthesia, call the hospital and ask for the anesthetist or anesthesiologist on call.  SEEK MEDICAL CARE IF:  · You have nausea and vomiting that continue the day after anesthesia.  · You develop a rash.  SEEK IMMEDIATE MEDICAL CARE IF:    · You have difficulty breathing.  · You have chest pain.  · You have any allergic problems.     This information is not intended to replace advice given to you by your health care provider. Make sure you discuss any questions you have with your health care provider.     Document Released: 03/26/2002 Document Revised: 01/08/2016 Document Reviewed: 07/03/2014  [employer] Interactive Patient Education ©2016 [employer]    CALL YOUR PHYSICIAN IF YOU EXPERIENCE  INCREASED PAIN NOT HELPED BY YOUR PAIN MEDICATION.    .                                               Fall Prevention in the Home      Falls can cause injuries. They can happen to people of all ages. There are many things you can do to make your home safe and to help prevent falls.    WHAT CAN I DO ON THE OUTSIDE OF MY HOME?  · Regularly fix the edges of walkways and driveways and fix any cracks.  · Remove anything that might make you trip as you walk through a door, such as a raised step or threshold.  · Trim any bushes or trees on the path to your home.  · Use bright outdoor lighting.  · Clear any walking paths of anything that might make someone trip, such as rocks or tools.  · Regularly check to see if handrails are loose or broken. Make sure that both sides of any steps have handrails.  · Any raised decks and porches should have guardrails on the edges.  · Have any leaves, snow, or ice cleared regularly.  · Use sand or salt on walking paths during winter.  · Clean up any spills in your garage right away. This includes oil or grease spills.  WHAT CAN I DO IN THE BATHROOM?    · Use night lights.  · Install grab bars by the toilet and in the tub and shower. Do not use towel bars as grab bars.  · Use non-skid mats or decals in the tub or shower.  · If you need to sit down in the shower, use a plastic, non-slip stool.  · Keep the floor dry. Clean up any water that spills on the floor as soon as it happens.  · Remove soap buildup in the tub or shower regularly.  · Attach bath mats securely with double-sided non-slip rug tape.  · Do not have throw rugs and other things on the floor that can make you trip.  WHAT CAN I DO IN THE BEDROOM?  · Use night lights.  · Make sure that you have a light by your bed that is easy to reach.  · Do not use any sheets or blankets that are too big for your bed. They should not hang down onto the floor.  · Have a firm chair that has side arms. You can use this for support while you get dressed.  · Do not have throw rugs and other things on the floor  that can make you trip.  WHAT CAN I DO IN THE KITCHEN?  · Clean up any spills right away.  · Avoid walking on wet floors.  · Keep items that you use a lot in easy-to-reach places.  · If you need to reach something above you, use a strong step stool that has a grab bar.  · Keep electrical cords out of the way.  · Do not use floor polish or wax that makes floors slippery. If you must use wax, use non-skid floor wax.  · Do not have throw rugs and other things on the floor that can make you trip.  WHAT CAN I DO WITH MY STAIRS?  · Do not leave any items on the stairs.  · Make sure that there are handrails on both sides of the stairs and use them. Fix handrails that are broken or loose. Make sure that handrails are as long as the stairways.  · Check any carpeting to make sure that it is firmly attached to the stairs. Fix any carpet that is loose or worn.  · Avoid having throw rugs at the top or bottom of the stairs. If you do have throw rugs, attach them to the floor with carpet tape.  · Make sure that you have a light switch at the top of the stairs and the bottom of the stairs. If you do not have them, ask someone to add them for you.  WHAT ELSE CAN I DO TO HELP PREVENT FALLS?  · Wear shoes that:  ¨ Do not have high heels.  ¨ Have rubber bottoms.  ¨ Are comfortable and fit you well.  ¨ Are closed at the toe. Do not wear sandals.  · If you use a stepladder:  ¨ Make sure that it is fully opened. Do not climb a closed stepladder.  ¨ Make sure that both sides of the stepladder are locked into place.  ¨ Ask someone to hold it for you, if possible.  · Clearly jacqueline and make sure that you can see:  ¨ Any grab bars or handrails.  ¨ First and last steps.  ¨ Where the edge of each step is.  · Use tools that help you move around (mobility aids) if they are needed. These include:  ¨ Canes.  ¨ Walkers.  ¨ Scooters.  ¨ Crutches.  · Turn on the lights when you go into a dark area. Replace any light bulbs as soon as they burn  out.  · Set up your furniture so you have a clear path. Avoid moving your furniture around.  · If any of your floors are uneven, fix them.  · If there are any pets around you, be aware of where they are.  · Review your medicines with your doctor. Some medicines can make you feel dizzy. This can increase your chance of falling.  Ask your doctor what other things that you can do to help prevent falls.     This information is not intended to replace advice given to you by your health care provider. Make sure you discuss any questions you have with your health care provider.     Document Released: 10/14/2010 Document Revised: 05/03/2016 Document Reviewed: 01/22/2016  [employer] Interactive Patient Education ©2016 [employer]     PATIENT/FAMILY/RESPONSIBLE PARTY VERBALIZES UNDERSTANDING OF ABOVE EDUCATION.  COPY OF PAIN SCALE GIVEN AND REVIEWED WITH VERBALIZED UNDERSTANDING.                              UPPER EXTREMITY POST-OP INSTRUCTIONS - DR. DUENAS    IMPORTANT PHONE NUMBERS:  • For emergencies, please call 911  • You may reach Dr. Duenas and clinical staff at [phone] 8:00 am-5:00 pm  • After 5pm or on the weekends, please call [phone]  • Call immediately if you have any of the following symptoms:     Elevated temperature above 101.5 degrees for more than 48 hours after surgery     Persistent drainage from wound     Severe pain around surgical site    Sling use: The sling is provided for your comfort and to ensure proper healing of your repair following surgery. Please place the abduction pillow with the curved side against your side and the sling on the side of the pillow. Your surgery requires that you wear the sling if noted below.  ____ For comfort. Remove sling 24 hours and begin range of motion exercises  ____ At all times except bathing, dressing, and therapy. Also wear the sling during sleep.  _x___ No sling required    Bathing:  ___No bandages, no restrictions!!  ___You may remove you dressing  and shower on the 3rd day after surgery (Ex. Tues surgery, shower on Friday)  ** if you are told to it is ok to remove your dressing and shower, DO NOT SOAK your incisions in a tub.  _x__Keep splint clean, dry, and intact. DO NOT place foreign objects into your splint.      Dressings: Keep dressing/splint intact unless instructed otherwise below. SOME DRAINAGE IS NORMAL!    • DO NOT touch or apply ointment to the incision.    • DO NOT remove the steri-strips over the incisions (if you have steri-strips). They will         generally fall off on their own or can be removed 1 weeksafter surgery.    • If you have yellow gauze and it comes off, do not worry about it. Leave them off.   • Signs of infection that warrant a phone call to our clinical line:     o Excessive drainage or redness     o Red streaking coming away from the incision  o Increased pain  o Increased temperature above 101 degrees      Physical Therapy:        *  Your physical therapy status will be discussed with you postoperatively and at your first post-op appointment. Some injuries will not require physical therapy.      *  If you have a shoulder manipulation, please schedule therapy for the next day      Medications: You will be discharged with the appropriate medications following your surgery. Fill these at the pharmacy and take them as directed on the label. Not all of the medications below may be prescribed. Occasionally, other medications may be prescribed with specific instructions.    Percocet/Lortab (oxycodone/hydrocodone with tylenol) - Pain Medication, will cause drowsiness, possibly itchiness (this is NOT an allergy - use benadryl or an over the counter allergy medication such as Claritin or Zyrtec)     o Take 1-2 tablets every 4-6 hours. DO NOT EXCEED 4,000mg of Tylenol in 24 hours.  **DO NOT MIX WITH ALCOHOL, DRIVE WHILE TAKING, OR TAKE with extra TYLENOL**    Colace (Docusate) - stool softener, used for constipation. Take this only if  you feel constipated.      Zofran (Ondansetron) or Phenergan - Anti-nausea medication, will cause drowsiness      **If you are running low on pain medications, please notify us if you need a refill 24-48 hours prior to when you run out, so we can make arrangements to refill the prescription for you if we determine is necessary

## 2020-10-19 ENCOUNTER — APPOINTMENT (OUTPATIENT)
Dept: PREADMISSION TESTING | Facility: HOSPITAL | Age: 65
End: 2020-10-19

## 2020-10-19 VITALS
BODY MASS INDEX: 27.42 KG/M2 | RESPIRATION RATE: 18 BRPM | OXYGEN SATURATION: 96 % | SYSTOLIC BLOOD PRESSURE: 130 MMHG | DIASTOLIC BLOOD PRESSURE: 82 MMHG | WEIGHT: 154.76 LBS | HEART RATE: 86 BPM | HEIGHT: 63 IN

## 2020-10-19 LAB
ANION GAP SERPL CALCULATED.3IONS-SCNC: 12 MMOL/L (ref 5–15)
BUN SERPL-MCNC: 8 MG/DL (ref 8–23)
BUN/CREAT SERPL: 14.8 (ref 7–25)
CALCIUM SPEC-SCNC: 9.4 MG/DL (ref 8.6–10.5)
CHLORIDE SERPL-SCNC: 105 MMOL/L (ref 98–107)
CO2 SERPL-SCNC: 23 MMOL/L (ref 22–29)
CREAT SERPL-MCNC: 0.54 MG/DL (ref 0.57–1)
DEPRECATED RDW RBC AUTO: 43.8 FL (ref 37–54)
ERYTHROCYTE [DISTWIDTH] IN BLOOD BY AUTOMATED COUNT: 13.3 % (ref 12.3–15.4)
GFR SERPL CREATININE-BSD FRML MDRD: 114 ML/MIN/1.73
GLUCOSE SERPL-MCNC: 118 MG/DL (ref 65–99)
HCT VFR BLD AUTO: 38.2 % (ref 34–46.6)
HGB BLD-MCNC: 12.9 G/DL (ref 12–15.9)
MCH RBC QN AUTO: 30.4 PG (ref 26.6–33)
MCHC RBC AUTO-ENTMCNC: 33.8 G/DL (ref 31.5–35.7)
MCV RBC AUTO: 90.1 FL (ref 79–97)
PLATELET # BLD AUTO: 192 10*3/MM3 (ref 140–450)
PMV BLD AUTO: 9.2 FL (ref 6–12)
POTASSIUM SERPL-SCNC: 3.1 MMOL/L (ref 3.5–5.2)
RBC # BLD AUTO: 4.24 10*6/MM3 (ref 3.77–5.28)
SODIUM SERPL-SCNC: 140 MMOL/L (ref 136–145)
WBC # BLD AUTO: 6.53 10*3/MM3 (ref 3.4–10.8)

## 2020-10-19 PROCEDURE — 80048 BASIC METABOLIC PNL TOTAL CA: CPT | Performed by: ORTHOPAEDIC SURGERY

## 2020-10-19 PROCEDURE — 36415 COLL VENOUS BLD VENIPUNCTURE: CPT

## 2020-10-19 PROCEDURE — 85027 COMPLETE CBC AUTOMATED: CPT | Performed by: ORTHOPAEDIC SURGERY

## 2020-10-19 NOTE — DISCHARGE INSTRUCTIONS
DAY OF SURGERY INSTRUCTIONS        YOUR SURGEON: Dr. Kiran    PROCEDURE: Left Cubital Tunnel Release    DATE OF SURGERY: October 20, 2020    ARRIVAL TIME: AS DIRECTED BY OFFICE    YOU MAY TAKE THE FOLLOWING MEDICATION(S) THE MORNING OF SURGERY WITH A SIP OF WATER: Amlodipine, Clonidine, Lorazepam, Metoprolol      ALL OTHER HOME MEDICATION CHECK WITH YOUR PHYSICIAN                      MANAGING PAIN AFTER SURGERY    We know you are probably wondering what your pain will be like after surgery.  Following surgery it is unrealistic to expect you will not have pain.   Pain is how our bodies let us know that something is wrong or cautions us to be careful.  That said, our goal is to make your pain tolerable.    Methods we may use to treat your pain include (oral or IV medications, PCAs, epidurals, nerve blocks, etc.)   While some procedures require IV pain medications for a short time after surgery, transitioning to pain medications by mouth allows for better management of pain.   Your nurse will encourage you to take oral pain medications whenever possible.  IV medications work almost immediately, but only last a short while.  Taking medications by mouth allows for a more constant level of medication in your blood stream for a longer period of time.      Once your pain is out of control it is harder to get back under control.  It is important you are aware when your next dose of pain medication is due.  If you are admitted, your nurse may write the time of your next dose on the white board in your room to help you remember.      We are interested in your pain and encourage you to inform us about aggravating factors during your visit.   Many times a simple repositioning every few hours can make a big difference.    If your physician says it is okay, do not let your pain prevent you from getting out of bed. Be sure to call your nurse for assistance prior to getting up so you do not fall.      Before surgery, please  decide your tolerable pain goal.  These faces help describe the pain ratings we use on a 0-10 scale.   Be prepared to tell us your goal and whether or not you take pain or anxiety medications at home.          BEFORE YOU COME TO THE HOSPITAL  (Pre-op instructions)  • Do not eat, drink, smoke or chew gum after midnight the night before surgery.  This also includes no mints.  • Morning of surgery take only the medicines you have been instructed with a sip of water unless otherwise instructed  by your physician.  • Do not shave, wear makeup or dark nail polish.  • Remove all jewelry including rings.  • Leave anything you consider valuable at home.  • Leave your suitcase in the car until after your surgery.  • Bring the following with you if applicable:  o Picture ID and insurance, Medicare or Medicaid cards  o Co-pay/deductible required by insurance (cash, check, credit card)  o Copy of advance directive, living will or power-of- documents if not brought to PAT  o CPAP or BIPAP mask and tubing  o Relaxation aids ( book, magazine), etc.  o Hearing aids                        ON THE DAY OF SURGERY  · On the day of surgery check in at registration located at the main entrance of the hospital.   ? You will be registered and given a beeper with instructions where to wait in the main lobby.  ? When your beeper lights up and vibrates a member of the Outpatient Surgery staff will meet you at the double doors under the stair steps and escort you to your preoperative room.   · You may have cloth compression devices placed on your legs. These help to prevent blood clots and reduce swelling in your legs.  · An IV may be inserted into one of your veins.  · In the operating room, you may be given one or more of the following:  ? A medicine to help you relax (sedative).  ? A medicine to numb the area (local anesthetic).  ? A medicine to make you fall asleep (general anesthetic).  ? A medicine that is injected into an area of  "your body to numb everything below the injection site (regional anesthetic).  · Your surgical site will be marked or identified.  · You may be given an antibiotic through your IV to help prevent infection.  Contact a health care provider if you:  · Develop a fever of more than 100.4°F (38°C) or other feelings of illness during the 48 hours before your surgery.  · Have symptoms that get worse.  Have questions or concerns about your surgery    General Anesthesia/Surgery, Adult  General anesthesia is the use of medicines to make a person \"go to sleep\" (unconscious) for a medical procedure. General anesthesia must be used for certain procedures, and is often recommended for procedures that:  · Last a long time.  · Require you to be still or in an unusual position.  · Are major and can cause blood loss.  The medicines used for general anesthesia are called general anesthetics. As well as making you unconscious for a certain amount of time, these medicines:  · Prevent pain.  · Control your blood pressure.  · Relax your muscles.  Tell a health care provider about:  · Any allergies you have.  · All medicines you are taking, including vitamins, herbs, eye drops, creams, and over-the-counter medicines.  · Any problems you or family members have had with anesthetic medicines.  · Types of anesthetics you have had in the past.  · Any blood disorders you have.  · Any surgeries you have had.  · Any medical conditions you have.  · Any recent upper respiratory, chest, or ear infections.  · Any history of:  ? Heart or lung conditions, such as heart failure, sleep apnea, asthma, or chronic obstructive pulmonary disease (COPD).  ?  service.  ? Depression or anxiety.  · Any tobacco or drug use, including marijuana or alcohol use.  · Whether you are pregnant or may be pregnant.  What are the risks?  Generally, this is a safe procedure. However, problems may occur, including:  · Allergic reaction.  · Lung and heart " problems.  · Inhaling food or liquid from the stomach into the lungs (aspiration).  · Nerve injury.  · Air in the bloodstream, which can lead to stroke.  · Extreme agitation or confusion (delirium) when you wake up from the anesthetic.  · Waking up during your procedure and being unable to move. This is rare.  These problems are more likely to develop if you are having a major surgery or if you have an advanced or serious medical condition. You can prevent some of these complications by answering all of your health care provider's questions thoroughly and by following all instructions before your procedure.  General anesthesia can cause side effects, including:  · Nausea or vomiting.  · A sore throat from the breathing tube.  · Hoarseness.  · Wheezing or coughing.  · Shaking chills.  · Tiredness.  · Body aches.  · Anxiety.  · Sleepiness or drowsiness.  · Confusion or agitation.  RISKS AND COMPLICATIONS OF SURGERY  Your health care provider will discuss possible risks and complications with you before surgery. Common risks and complications include:    · Problems due to the use of anesthetics.  · Blood loss and replacement (does not apply to minor surgical procedures).  · Temporary increase in pain due to surgery.  · Uncorrected pain or problems that the surgery was meant to correct.  · Infection.  · New damage.    What happens before the procedure?    Medicines  Ask your health care provider about:  · Changing or stopping your regular medicines. This is especially important if you are taking diabetes medicines or blood thinners.  · Taking medicines such as aspirin and ibuprofen. These medicines can thin your blood. Do not take these medicines unless your health care provider tells you to take them.  · Taking over-the-counter medicines, vitamins, herbs, and supplements. Do not take these during the week before your procedure unless your health care provider approves them.  General instructions  · Starting 3-6 weeks  before the procedure, do not use any products that contain nicotine or tobacco, such as cigarettes and e-cigarettes. If you need help quitting, ask your health care provider.  · If you brush your teeth on the morning of the procedure, make sure to spit out all of the toothpaste.  · Tell your health care provider if you become ill or develop a cold, cough, or fever.  · If instructed by your health care provider, bring your sleep apnea device with you on the day of your surgery (if applicable).  · Ask your health care provider if you will be going home the same day, the following day, or after a longer hospital stay.  ? Plan to have someone take you home from the hospital or clinic.  ? Plan to have a responsible adult care for you for at least 24 hours after you leave the hospital or clinic. This is important.  What happens during the procedure?  · You will be given anesthetics through both of the following:  ? A mask placed over your nose and mouth.  ? An IV in one of your veins.  · You may receive a medicine to help you relax (sedative).  · After you are unconscious, a breathing tube may be inserted down your throat to help you breathe. This will be removed before you wake up.  · An anesthesia specialist will stay with you throughout your procedure. He or she will:  ? Keep you comfortable and safe by continuing to give you medicines and adjusting the amount of medicine that you get.  ? Monitor your blood pressure, pulse, and oxygen levels to make sure that the anesthetics do not cause any problems.  The procedure may vary among health care providers and hospitals.  What happens after the procedure?  · Your blood pressure, temperature, heart rate, breathing rate, and blood oxygen level will be monitored until the medicines you were given have worn off.  · You will wake up in a recovery area. You may wake up slowly.  · If you feel anxious or agitated, you may be given medicine to help you calm down.  · If you will be  going home the same day, your health care provider may check to make sure you can walk, drink, and urinate.  · Your health care provider will treat any pain or side effects you have before you go home.  · Do not drive for 24 hours if you were given a sedative.  Summary  · General anesthesia is used to keep you still and prevent pain during a procedure.  · It is important to tell your healthcare provider about your medical history and any surgeries you have had, and previous experience with anesthesia.  · Follow your healthcare provider’s instructions about when to stop eating, drinking, or taking certain medicines before your procedure.  · Plan to have someone take you home from the hospital or clinic.  This information is not intended to replace advice given to you by your health care provider. Make sure you discuss any questions you have with your health care provider.  Document Released: 03/26/2009 Document Revised: 08/03/2018 Document Reviewed: 08/03/2018  [employer] Interactive Patient Education © 2019 [employer]       Fall Prevention in Hospitals, Adult  As a hospital patient, your condition and the treatments you receive can increase your risk for falls. Some additional risk factors for falls in a hospital include:  · Being in an unfamiliar environment.  · Being on bed rest.  · Your surgery.  · Taking certain medicines.  · Your tubing requirements, such as intravenous (IV) therapy or catheters.  It is important that you learn how to decrease fall risks while at the hospital. Below are important tips that can help prevent falls.  SAFETY TIPS FOR PREVENTING FALLS  Talk about your risk of falling.  · Ask your health care provider why you are at risk for falling. Is it your medicine, illness, tubing placement, or something else?  · Make a plan with your health care provider to keep you safe from falls.  · Ask your health care provider or pharmacist about side effects of your medicines. Some medicines can make  you dizzy or affect your coordination.  Ask for help.  · Ask for help before getting out of bed. You may need to press your call button.  · Ask for assistance in getting safely to the toilet.  · Ask for a walker or cane to be put at your bedside. Ask that most of the side rails on your bed be placed up before your health care provider leaves the room.  · Ask family or friends to sit with you.  · Ask for things that are out of your reach, such as your glasses, hearing aids, telephone, bedside table, or call button.  Follow these tips to avoid falling:  · Stay lying or seated, rather than standing, while waiting for help.  · Wear rubber-soled slippers or shoes whenever you walk in the hospital.  · Avoid quick, sudden movements.  ¨ Change positions slowly.  ¨ Sit on the side of your bed before standing.  ¨ Stand up slowly and wait before you start to walk.  · Let your health care provider know if there is a spill on the floor.  · Pay careful attention to the medical equipment, electrical cords, and tubes around you.  · When you need help, use your call button by your bed or in the bathroom. Wait for one of your health care providers to help you.  · If you feel dizzy or unsure of your footing, return to bed and wait for assistance.  · Avoid being distracted by the TV, telephone, or another person in your room.  · Do not lean or support yourself on rolling objects, such as IV poles or bedside tables.     This information is not intended to replace advice given to you by your health care provider. Make sure you discuss any questions you have with your health care provider.     Document Released: 12/15/2001 Document Revised: 01/08/2016 Document Reviewed: 08/25/2013  [employer] Interactive Patient Education ©2016 [employer]       [facility]  CHG 4% Patient Instruction Sheet    Chlorhexidine Before Surgery  Chlorhexidine gluconate (CHG) is a germ-killing (antiseptic) solution that is used to clean the skin. It  gets rid of the bacteria that normally live on the skin. Cleaning your skin with CHG before surgery helps lower the risk for infection after surgery.    How to use CHG solution  · You will take 2 showers, one shower the night before surgery, the second shower the morning of surgery before coming to the hospital.  · Use CHG only as told by your health care provider, and follow the instructions on the label.  · Use CHG solution while taking a shower. Follow these steps when using CHG solution (unless your health care provider gives you different instructions):  1. Start the shower.  2. Use your normal soap and shampoo to wash your face and hair.  3. Turn off the shower or move out of the shower stream.  4. Pour the CHG onto a clean washcloth. Do not use any type of brush or rough-edged sponge.  5. Starting at your neck, lather your body down to your toes. Make sure you:  6. Pay special attention to the part of your body where you will be having surgery. Scrub this area for at least 1 minute.  7. Use the full amount of CHG as directed. Usually, this is one half bottle for each shower.  8. Do not use CHG on your head or face. If the solution gets into your ears or eyes, rinse them well with water.  9. Avoid your genital area.  10. Avoid any areas of skin that have broken skin, cuts, or scrapes.  11. Scrub your back and under your arms. Make sure to wash skin folds.  12. Let the lather sit on your skin for 1-2 minutes or as long as told by your health care  provider.  13. Thoroughly rinse your entire body in the shower. Make sure that all body creases and crevices are rinsed well.  14. Dry off with a clean towel. Do not put any substances on your body afterward, such as powder, lotion, or perfume.  15. Put on clean clothes or pajamas.  16. If it is the night before your surgery, sleep in clean sheets.    What are the risks?  Risks of using CHG include:  · A skin reaction.  · Hearing loss, if CHG gets in your ears.  · Eye  injury, if CHG gets in your eyes and is not rinsed out.  · The CHG product catching fire.  Make sure that you avoid smoking and flames after applying CHG to your skin.  Do not use CHG:  · If you have a chlorhexidine allergy or have previously reacted to chlorhexidine.  · On babies younger than 2 months of age.      On the day of surgery, when you are taken to your room in Outpatient Surgery you will be given a CHG prepackaged cloth to wipe the site for your surgery.  How to use CHG prepackaged cloths  · Follow the instructions on the label.  · Use the CHG cloth on clean, dry skin. Follow these steps when using a CHG cloth (unless your health care provider gives you different instructions):  1. Using the CHG cloth, vigorously scrub the part of your body where you will be having surgery. Scrub using a back-and-forth motion for 3 minutes. The area on your body should be completely wet with CHG when you are finished scrubbing.  2. Do not rinse. Discard the cloth and let the area air-dry for 1 minute. Do not put any substances on your body afterward, such as powder, lotion, or perfume.  Contact a health care provider if:  · Your skin gets irritated after scrubbing.  · You have questions about using your solution or cloth.  Get help right away if:  · Your eyes become very red or swollen.  · Your eyes itch badly.  · Your skin itches badly and is red or swollen.  · Your hearing changes.  · You have trouble seeing.  · You have swelling or tingling in your mouth or throat.  · You have trouble breathing.  · You swallow any chlorhexidine.  Summary  · Chlorhexidine gluconate (CHG) is a germ-killing (antiseptic) solution that is used to clean the skin. Cleaning your skin with CHG before surgery helps lower the risk for infection after surgery.  · You may be given CHG to use at home. It may be in a bottle or in a prepackaged cloth to use on your skin. Carefully follow your health care provider's instructions and the instructions  on the product label.  · Do not use CHG if you have a chlorhexidine allergy.  · Contact your health care provider if your skin gets irritated after scrubbing.  This information is not intended to replace advice given to you by your health care provider. Make sure you discuss any questions you have with your health care provider.  Document Released: 09/11/2013 Document Revised: 11/15/2018 Document Reviewed: 11/15/2018  [employer] Interactive Patient Education © 2019 [employer]          PATIENT/FAMILY/RESPONSIBLE PARTY VERBALIZES UNDERSTANDING OF ABOVE EDUCATION.  COPY OF PAIN SCALE GIVEN AND REVIEWED WITH VERBALIZED UNDERSTANDING.

## 2020-10-20 ENCOUNTER — HOSPITAL ENCOUNTER (OUTPATIENT)
Facility: HOSPITAL | Age: 65
Setting detail: HOSPITAL OUTPATIENT SURGERY
Discharge: HOME | End: 2020-10-20
Attending: ORTHOPAEDIC SURGERY | Admitting: ORTHOPAEDIC SURGERY

## 2020-10-20 ENCOUNTER — ANESTHESIA (OUTPATIENT)
Dept: PERIOP | Facility: HOSPITAL | Age: 65
End: 2020-10-20

## 2020-10-20 ENCOUNTER — ANESTHESIA EVENT (OUTPATIENT)
Dept: PERIOP | Facility: HOSPITAL | Age: 65
End: 2020-10-20

## 2020-10-20 VITALS
HEART RATE: 76 BPM | TEMPERATURE: 97.5 F | OXYGEN SATURATION: 92 % | RESPIRATION RATE: 16 BRPM | DIASTOLIC BLOOD PRESSURE: 69 MMHG | SYSTOLIC BLOOD PRESSURE: 138 MMHG

## 2020-10-20 DIAGNOSIS — G56.22 CUBITAL TUNNEL SYNDROME ON LEFT: Primary | ICD-10-CM

## 2020-10-20 PROCEDURE — 25010000002 DEXAMETHASONE PER 1 MG: Performed by: ANESTHESIOLOGY

## 2020-10-20 PROCEDURE — 25010000002 FENTANYL CITRATE (PF) 100 MCG/2ML SOLUTION: Performed by: NURSE ANESTHETIST, CERTIFIED REGISTERED

## 2020-10-20 PROCEDURE — 25010000002 ONDANSETRON PER 1 MG: Performed by: NURSE ANESTHETIST, CERTIFIED REGISTERED

## 2020-10-20 PROCEDURE — 25010000002 PROPOFOL 10 MG/ML EMULSION: Performed by: NURSE ANESTHETIST, CERTIFIED REGISTERED

## 2020-10-20 RX ORDER — NALOXONE HCL 0.4 MG/ML
0.4 VIAL (ML) INJECTION AS NEEDED
Status: DISCONTINUED | OUTPATIENT
Start: 2020-10-20 | End: 2020-10-20 | Stop reason: HOSPADM

## 2020-10-20 RX ORDER — PROPOFOL 10 MG/ML
VIAL (ML) INTRAVENOUS AS NEEDED
Status: DISCONTINUED | OUTPATIENT
Start: 2020-10-20 | End: 2020-10-20 | Stop reason: SURG

## 2020-10-20 RX ORDER — DEXAMETHASONE SODIUM PHOSPHATE 4 MG/ML
4 INJECTION, SOLUTION INTRA-ARTICULAR; INTRALESIONAL; INTRAMUSCULAR; INTRAVENOUS; SOFT TISSUE ONCE AS NEEDED
Status: COMPLETED | OUTPATIENT
Start: 2020-10-20 | End: 2020-10-20

## 2020-10-20 RX ORDER — ONDANSETRON 2 MG/ML
4 INJECTION INTRAMUSCULAR; INTRAVENOUS ONCE AS NEEDED
Status: DISCONTINUED | OUTPATIENT
Start: 2020-10-20 | End: 2020-10-20 | Stop reason: HOSPADM

## 2020-10-20 RX ORDER — SODIUM CHLORIDE 0.9 % (FLUSH) 0.9 %
10 SYRINGE (ML) INJECTION AS NEEDED
Status: DISCONTINUED | OUTPATIENT
Start: 2020-10-20 | End: 2020-10-20 | Stop reason: HOSPADM

## 2020-10-20 RX ORDER — OXYCODONE AND ACETAMINOPHEN 10; 325 MG/1; MG/1
1 TABLET ORAL ONCE AS NEEDED
Status: COMPLETED | OUTPATIENT
Start: 2020-10-20 | End: 2020-10-20

## 2020-10-20 RX ORDER — LIDOCAINE HYDROCHLORIDE 10 MG/ML
0.5 INJECTION, SOLUTION EPIDURAL; INFILTRATION; INTRACAUDAL; PERINEURAL ONCE AS NEEDED
Status: DISCONTINUED | OUTPATIENT
Start: 2020-10-20 | End: 2020-10-20 | Stop reason: HOSPADM

## 2020-10-20 RX ORDER — SODIUM CHLORIDE 0.9 % (FLUSH) 0.9 %
10 SYRINGE (ML) INJECTION EVERY 12 HOURS SCHEDULED
Status: DISCONTINUED | OUTPATIENT
Start: 2020-10-20 | End: 2020-10-20 | Stop reason: HOSPADM

## 2020-10-20 RX ORDER — SODIUM CHLORIDE 0.9 % (FLUSH) 0.9 %
3 SYRINGE (ML) INJECTION EVERY 12 HOURS SCHEDULED
Status: DISCONTINUED | OUTPATIENT
Start: 2020-10-20 | End: 2020-10-20 | Stop reason: HOSPADM

## 2020-10-20 RX ORDER — ONDANSETRON 2 MG/ML
INJECTION INTRAMUSCULAR; INTRAVENOUS AS NEEDED
Status: DISCONTINUED | OUTPATIENT
Start: 2020-10-20 | End: 2020-10-20 | Stop reason: SURG

## 2020-10-20 RX ORDER — ONDANSETRON 4 MG/1
4 TABLET, FILM COATED ORAL EVERY 8 HOURS PRN
Qty: 10 TABLET | Refills: 0 | Status: SHIPPED | OUTPATIENT
Start: 2020-10-20

## 2020-10-20 RX ORDER — SODIUM CHLORIDE, SODIUM LACTATE, POTASSIUM CHLORIDE, CALCIUM CHLORIDE 600; 310; 30; 20 MG/100ML; MG/100ML; MG/100ML; MG/100ML
100 INJECTION, SOLUTION INTRAVENOUS CONTINUOUS
Status: DISCONTINUED | OUTPATIENT
Start: 2020-10-20 | End: 2020-10-20 | Stop reason: HOSPADM

## 2020-10-20 RX ORDER — FLUMAZENIL 0.1 MG/ML
0.2 INJECTION INTRAVENOUS AS NEEDED
Status: DISCONTINUED | OUTPATIENT
Start: 2020-10-20 | End: 2020-10-20 | Stop reason: HOSPADM

## 2020-10-20 RX ORDER — FENTANYL CITRATE 50 UG/ML
INJECTION, SOLUTION INTRAMUSCULAR; INTRAVENOUS AS NEEDED
Status: DISCONTINUED | OUTPATIENT
Start: 2020-10-20 | End: 2020-10-20 | Stop reason: SURG

## 2020-10-20 RX ORDER — LABETALOL HYDROCHLORIDE 5 MG/ML
5 INJECTION, SOLUTION INTRAVENOUS
Status: DISCONTINUED | OUTPATIENT
Start: 2020-10-20 | End: 2020-10-20 | Stop reason: HOSPADM

## 2020-10-20 RX ORDER — MIDAZOLAM HYDROCHLORIDE 1 MG/ML
1 INJECTION INTRAMUSCULAR; INTRAVENOUS
Status: DISCONTINUED | OUTPATIENT
Start: 2020-10-20 | End: 2020-10-20 | Stop reason: HOSPADM

## 2020-10-20 RX ORDER — FENTANYL CITRATE 50 UG/ML
25 INJECTION, SOLUTION INTRAMUSCULAR; INTRAVENOUS
Status: DISCONTINUED | OUTPATIENT
Start: 2020-10-20 | End: 2020-10-20 | Stop reason: HOSPADM

## 2020-10-20 RX ORDER — HYDROCODONE BITARTRATE AND ACETAMINOPHEN 7.5; 325 MG/1; MG/1
TABLET ORAL
Qty: 15 TABLET | Refills: 0 | Status: SHIPPED | OUTPATIENT
Start: 2020-10-20

## 2020-10-20 RX ORDER — SODIUM CHLORIDE 0.9 % (FLUSH) 0.9 %
3-10 SYRINGE (ML) INJECTION AS NEEDED
Status: DISCONTINUED | OUTPATIENT
Start: 2020-10-20 | End: 2020-10-20 | Stop reason: HOSPADM

## 2020-10-20 RX ORDER — SODIUM CHLORIDE, SODIUM LACTATE, POTASSIUM CHLORIDE, CALCIUM CHLORIDE 600; 310; 30; 20 MG/100ML; MG/100ML; MG/100ML; MG/100ML
100 INJECTION, SOLUTION INTRAVENOUS CONTINUOUS PRN
Status: DISCONTINUED | OUTPATIENT
Start: 2020-10-20 | End: 2020-10-20 | Stop reason: HOSPADM

## 2020-10-20 RX ORDER — ACETAMINOPHEN 500 MG
1000 TABLET ORAL ONCE
Status: COMPLETED | OUTPATIENT
Start: 2020-10-20 | End: 2020-10-20

## 2020-10-20 RX ORDER — IBUPROFEN 600 MG/1
600 TABLET ORAL ONCE AS NEEDED
Status: DISCONTINUED | OUTPATIENT
Start: 2020-10-20 | End: 2020-10-20 | Stop reason: HOSPADM

## 2020-10-20 RX ADMIN — LIDOCAINE HYDROCHLORIDE 60 MG: 20 INJECTION, SOLUTION INTRAVENOUS at 10:47

## 2020-10-20 RX ADMIN — FENTANYL CITRATE 100 MCG: 50 INJECTION, SOLUTION INTRAMUSCULAR; INTRAVENOUS at 10:47

## 2020-10-20 RX ADMIN — SODIUM CHLORIDE, POTASSIUM CHLORIDE, SODIUM LACTATE AND CALCIUM CHLORIDE: 600; 310; 30; 20 INJECTION, SOLUTION INTRAVENOUS at 10:44

## 2020-10-20 RX ADMIN — SODIUM CHLORIDE, POTASSIUM CHLORIDE, SODIUM LACTATE AND CALCIUM CHLORIDE 100 ML/HR: 600; 310; 30; 20 INJECTION, SOLUTION INTRAVENOUS at 09:08

## 2020-10-20 RX ADMIN — ONDANSETRON HYDROCHLORIDE 4 MG: 2 SOLUTION INTRAMUSCULAR; INTRAVENOUS at 11:04

## 2020-10-20 RX ADMIN — ACETAMINOPHEN 1000 MG: 500 TABLET, FILM COATED ORAL at 09:54

## 2020-10-20 RX ADMIN — CEFAZOLIN 1 G: 1 INJECTION, POWDER, FOR SOLUTION INTRAMUSCULAR; INTRAVENOUS; PARENTERAL at 10:54

## 2020-10-20 RX ADMIN — DEXAMETHASONE SODIUM PHOSPHATE 4 MG: 4 INJECTION, SOLUTION INTRAMUSCULAR; INTRAVENOUS at 09:54

## 2020-10-20 RX ADMIN — PROPOFOL 100 MG: 10 INJECTION, EMULSION INTRAVENOUS at 10:57

## 2020-10-20 RX ADMIN — OXYCODONE HYDROCHLORIDE AND ACETAMINOPHEN 1 TABLET: 10; 325 TABLET ORAL at 11:52

## 2020-10-20 RX ADMIN — PROPOFOL 150 MG: 10 INJECTION, EMULSION INTRAVENOUS at 10:47

## 2020-10-20 NOTE — H&P
Pt Name: Heather Russo  MRN: [id number]  YOB: 1955  Date of evaluation: 10/20/2020    H&P including current review of systems was updated in the paper chart and/or the document previously scanned into the record.  There have been no significant changes or new problems since the original evaluation.  The patient's problems continue and indications for contemplated procedure have not changed.    Electronically signed by Raymundo Kiran MD on 10/20/2020 at 09:32 CDT

## 2020-10-20 NOTE — BRIEF OP NOTE
TENNIS ELBOW RELEASE  Progress Note    Heather Russo  10/20/2020    Pre-op Diagnosis:   G56.22       Post-Op Diagnosis Codes:     * Cubital tunnel syndrome on left [G56.22]    Procedure/CPT® Codes:  OH REVISE ULNAR NERVE AT ELBOW [28123]      Procedure(s):  LEFT CUBITAL TUNNEL RELEASE    Surgeon(s):  Raymundo Kiran MD    Anesthesia: General    Staff:   Circulator: Petra Boyle RN  Scrub Person: Valerie Navas  Assistant: Hawk Mason PA-C  Assistant: Hawk Mason PA-C      Estimated Blood Loss: minimal    Urine Voided: * No values recorded between 10/20/2020 10:40 AM and 10/20/2020 11:21 AM *    Specimens:                None          Drains: * No LDAs found *    Findings: see op note    Complications: none    Assistant: Hawk Mason PA-C  was responsible for performing the following activities: Retraction and their skilled assistance was necessary for the success of this case.    Raymundo Kiran MD     Date: 10/20/2020  Time: 11:21 CDT

## 2020-10-20 NOTE — ANESTHESIA PROCEDURE NOTES
Airway  Urgency: elective    Date/Time: 10/20/2020 10:48 AM  Airway not difficult    General Information and Staff    Patient location during procedure: OR  CRNA: Dwayne Dinero CRNA    Indications and Patient Condition  Indications for airway management: airway protection    Preoxygenated: yes  Mask difficulty assessment: 1 - vent by mask    Final Airway Details  Final airway type: supraglottic airway      Successful airway: classic and unique  Size 3    Number of attempts at approach: 1  Assessment: lips, teeth, and gum same as pre-op

## 2020-10-20 NOTE — ANESTHESIA POSTPROCEDURE EVALUATION
Patient: Heather Russo    Procedure Summary     Date: 10/20/20 Room / Location:  [facility] OR  /  [facility] OR    Anesthesia Start: 1044 Anesthesia Stop: 1125    Procedure: LEFT CUBITAL TUNNEL RELEASE (Left Elbow) Diagnosis:       Cubital tunnel syndrome on left      (G56.22)    Surgeon: Raymundo Kiran MD Provider: Dwayne Dinero CRNA    Anesthesia Type: general ASA Status: 2          Anesthesia Type: general    Vitals  Vitals Value Taken Time   /63 10/20/20 1346   Temp 97.5 °F (36.4 °C) 10/20/20 1345   Pulse 82 10/20/20 1347   Resp 12 10/20/20 1345   SpO2 96 % 10/20/20 1346   Vitals shown include unvalidated device data.        Post Anesthesia Care and Evaluation    Patient location during evaluation: PACU  Patient participation: complete - patient participated  Level of consciousness: awake and alert  Pain management: adequate  Airway patency: patent  Anesthetic complications: No anesthetic complications    Cardiovascular status: acceptable  Respiratory status: acceptable  Hydration status: acceptable    Comments: Blood pressure 164/74, pulse 85, temperature 97.5 °F (36.4 °C), temperature source Temporal, resp. rate 16, SpO2 94 %, not currently breastfeeding.    Pt discharged from PACU based on jaja score >8

## 2020-10-20 NOTE — ANESTHESIA PREPROCEDURE EVALUATION
Anesthesia Evaluation     Patient summary reviewed and Nursing notes reviewed   no history of anesthetic complications:  NPO Solid Status: > 8 hours  NPO Liquid Status: > 4 hours           Airway   Mallampati: I  TM distance: >3 FB  Neck ROM: full  Dental    (+) edentulous    Pulmonary    (+) a smoker Current Smoked day of surgery, COPD,   Cardiovascular   Exercise tolerance: good (4-7 METS)    ECG reviewed    (+) hypertension, hyperlipidemia,   (-) past MI, CAD, dysrhythmias, angina, cardiac stents      Neuro/Psych  (+) seizures (no current medications, x1) well controlled, psychiatric history Anxiety,     GI/Hepatic/Renal/Endo    (+)  GERD poorly controlled,  renal disease (renal CA, lymphoma),   (-) liver disease, diabetes    Musculoskeletal     (+) myalgias,   Abdominal    Substance History      OB/GYN          Other   arthritis,    history of cancer (lymphoma) remission                      Anesthesia Plan    ASA 2     general     intravenous induction     Anesthetic plan, all risks, benefits, and alternatives have been provided, discussed and informed consent has been obtained with: patient.

## 2021-02-19 DIAGNOSIS — K21.9 GASTROESOPHAGEAL REFLUX DISEASE: ICD-10-CM

## 2021-02-19 RX ORDER — PANTOPRAZOLE SODIUM 40 MG/1
TABLET, DELAYED RELEASE ORAL
Qty: 180 TABLET | Refills: 1 | Status: SHIPPED | OUTPATIENT
Start: 2021-02-19 | End: 2021-08-20

## 2021-03-31 DIAGNOSIS — K58.0 IRRITABLE BOWEL SYNDROME WITH DIARRHEA: ICD-10-CM

## 2021-03-31 RX ORDER — MONTELUKAST SODIUM 4 MG/1
TABLET, CHEWABLE ORAL
Qty: 180 TABLET | Refills: 3 | Status: ON HOLD | OUTPATIENT
Start: 2021-03-31 | End: 2021-05-21

## 2021-04-12 ENCOUNTER — APPOINTMENT (OUTPATIENT)
Dept: CT IMAGING | Facility: HOSPITAL | Age: 66
End: 2021-04-12

## 2021-04-12 ENCOUNTER — TELEPHONE (OUTPATIENT)
Dept: GASTROENTEROLOGY | Facility: CLINIC | Age: 66
End: 2021-04-12

## 2021-04-12 ENCOUNTER — HOSPITAL ENCOUNTER (EMERGENCY)
Facility: HOSPITAL | Age: 66
Discharge: HOME | End: 2021-04-12
Admitting: EMERGENCY MEDICINE

## 2021-04-12 VITALS
HEIGHT: 63 IN | OXYGEN SATURATION: 94 % | TEMPERATURE: 97.8 F | BODY MASS INDEX: 29.41 KG/M2 | SYSTOLIC BLOOD PRESSURE: 133 MMHG | RESPIRATION RATE: 18 BRPM | HEART RATE: 86 BPM | WEIGHT: 166 LBS | DIASTOLIC BLOOD PRESSURE: 87 MMHG

## 2021-04-12 DIAGNOSIS — K92.0 HEMATEMESIS WITH NAUSEA: ICD-10-CM

## 2021-04-12 DIAGNOSIS — K57.90 DIVERTICULOSIS: ICD-10-CM

## 2021-04-12 DIAGNOSIS — I77.819 DILATION OF AORTA (HCC): ICD-10-CM

## 2021-04-12 DIAGNOSIS — N20.0 RENAL CALCULI: ICD-10-CM

## 2021-04-12 DIAGNOSIS — K44.9 HIATAL HERNIA: Primary | ICD-10-CM

## 2021-04-12 LAB
ALBUMIN SERPL-MCNC: 4.1 G/DL (ref 3.5–5.2)
ALBUMIN/GLOB SERPL: 1.2 G/DL
ALP SERPL-CCNC: 149 U/L (ref 39–117)
ALT SERPL W P-5'-P-CCNC: 7 U/L (ref 1–33)
ANION GAP SERPL CALCULATED.3IONS-SCNC: 14 MMOL/L (ref 5–15)
AST SERPL-CCNC: 10 U/L (ref 1–32)
BACTERIA UR QL AUTO: ABNORMAL /HPF
BASOPHILS # BLD AUTO: 0.04 10*3/MM3 (ref 0–0.2)
BASOPHILS NFR BLD AUTO: 0.5 % (ref 0–1.5)
BILIRUB SERPL-MCNC: <0.2 MG/DL (ref 0–1.2)
BILIRUB UR QL STRIP: NEGATIVE
BUN SERPL-MCNC: 11 MG/DL (ref 8–23)
BUN/CREAT SERPL: 16.4 (ref 7–25)
CALCIUM SPEC-SCNC: 9.6 MG/DL (ref 8.6–10.5)
CHLORIDE SERPL-SCNC: 106 MMOL/L (ref 98–107)
CLARITY UR: CLEAR
CO2 SERPL-SCNC: 21 MMOL/L (ref 22–29)
COLOR UR: YELLOW
CREAT SERPL-MCNC: 0.67 MG/DL (ref 0.57–1)
DEPRECATED RDW RBC AUTO: 51.8 FL (ref 37–54)
DEVELOPER EXPIRATION DATE: ABNORMAL
DEVELOPER LOT NUMBER: 187
EOSINOPHIL # BLD AUTO: 0.08 10*3/MM3 (ref 0–0.4)
EOSINOPHIL NFR BLD AUTO: 1.1 % (ref 0.3–6.2)
ERYTHROCYTE [DISTWIDTH] IN BLOOD BY AUTOMATED COUNT: 15.9 % (ref 12.3–15.4)
EXPIRATION DATE: ABNORMAL
FECAL OCCULT BLOOD SCREEN, POC: POSITIVE
GFR SERPL CREATININE-BSD FRML MDRD: 89 ML/MIN/1.73
GLOBULIN UR ELPH-MCNC: 3.3 GM/DL
GLUCOSE SERPL-MCNC: 96 MG/DL (ref 65–99)
GLUCOSE UR STRIP-MCNC: NEGATIVE MG/DL
HCT VFR BLD AUTO: 40.6 % (ref 34–46.6)
HGB BLD-MCNC: 13.2 G/DL (ref 12–15.9)
HGB UR QL STRIP.AUTO: ABNORMAL
HOLD SPECIMEN: NORMAL
HOLD SPECIMEN: NORMAL
IMM GRANULOCYTES # BLD AUTO: 0.04 10*3/MM3 (ref 0–0.05)
IMM GRANULOCYTES NFR BLD AUTO: 0.5 % (ref 0–0.5)
KETONES UR QL STRIP: NEGATIVE
LEUKOCYTE ESTERASE UR QL STRIP.AUTO: ABNORMAL
LIPASE SERPL-CCNC: 14 U/L (ref 13–60)
LYMPHOCYTES # BLD AUTO: 0.48 10*3/MM3 (ref 0.7–3.1)
LYMPHOCYTES NFR BLD AUTO: 6.6 % (ref 19.6–45.3)
Lab: 187
MCH RBC QN AUTO: 29.2 PG (ref 26.6–33)
MCHC RBC AUTO-ENTMCNC: 32.5 G/DL (ref 31.5–35.7)
MCV RBC AUTO: 89.8 FL (ref 79–97)
MONOCYTES # BLD AUTO: 0.62 10*3/MM3 (ref 0.1–0.9)
MONOCYTES NFR BLD AUTO: 8.5 % (ref 5–12)
NEGATIVE CONTROL: NEGATIVE
NEUTROPHILS NFR BLD AUTO: 6.05 10*3/MM3 (ref 1.7–7)
NEUTROPHILS NFR BLD AUTO: 82.8 % (ref 42.7–76)
NITRITE UR QL STRIP: NEGATIVE
NRBC BLD AUTO-RTO: 0 /100 WBC (ref 0–0.2)
PH UR STRIP.AUTO: 5.5 [PH] (ref 5–8)
PLATELET # BLD AUTO: 185 10*3/MM3 (ref 140–450)
PMV BLD AUTO: 9.6 FL (ref 6–12)
POSITIVE CONTROL: POSITIVE
POTASSIUM SERPL-SCNC: 4.3 MMOL/L (ref 3.5–5.2)
PROT SERPL-MCNC: 7.4 G/DL (ref 6–8.5)
PROT UR QL STRIP: NEGATIVE
RBC # BLD AUTO: 4.52 10*6/MM3 (ref 3.77–5.28)
RBC # UR: ABNORMAL /HPF
REF LAB TEST METHOD: ABNORMAL
SODIUM SERPL-SCNC: 141 MMOL/L (ref 136–145)
SP GR UR STRIP: 1.01 (ref 1–1.03)
SQUAMOUS #/AREA URNS HPF: ABNORMAL /HPF
UROBILINOGEN UR QL STRIP: ABNORMAL
WBC # BLD AUTO: 7.31 10*3/MM3 (ref 3.4–10.8)
WBC UR QL AUTO: ABNORMAL /HPF
WHOLE BLOOD HOLD SPECIMEN: NORMAL
WHOLE BLOOD HOLD SPECIMEN: NORMAL

## 2021-04-12 PROCEDURE — 99284 EMERGENCY DEPT VISIT MOD MDM: CPT

## 2021-04-12 PROCEDURE — 25010000002 IOPAMIDOL 61 % SOLUTION: Performed by: PHYSICIAN ASSISTANT

## 2021-04-12 PROCEDURE — 85025 COMPLETE CBC W/AUTO DIFF WBC: CPT | Performed by: PHYSICIAN ASSISTANT

## 2021-04-12 PROCEDURE — 74177 CT ABD & PELVIS W/CONTRAST: CPT

## 2021-04-12 PROCEDURE — 80053 COMPREHEN METABOLIC PANEL: CPT | Performed by: PHYSICIAN ASSISTANT

## 2021-04-12 PROCEDURE — 96374 THER/PROPH/DIAG INJ IV PUSH: CPT

## 2021-04-12 PROCEDURE — 83690 ASSAY OF LIPASE: CPT | Performed by: PHYSICIAN ASSISTANT

## 2021-04-12 PROCEDURE — 81001 URINALYSIS AUTO W/SCOPE: CPT | Performed by: PHYSICIAN ASSISTANT

## 2021-04-12 PROCEDURE — 82270 OCCULT BLOOD FECES: CPT | Performed by: PHYSICIAN ASSISTANT

## 2021-04-12 RX ORDER — PANTOPRAZOLE SODIUM 40 MG/10ML
40 INJECTION, POWDER, LYOPHILIZED, FOR SOLUTION INTRAVENOUS ONCE
Status: COMPLETED | OUTPATIENT
Start: 2021-04-12 | End: 2021-04-12

## 2021-04-12 RX ORDER — SODIUM CHLORIDE 0.9 % (FLUSH) 0.9 %
10 SYRINGE (ML) INJECTION AS NEEDED
Status: DISCONTINUED | OUTPATIENT
Start: 2021-04-12 | End: 2021-04-12 | Stop reason: HOSPADM

## 2021-04-12 RX ADMIN — IOPAMIDOL 100 ML: 612 INJECTION, SOLUTION INTRAVENOUS at 15:26

## 2021-04-12 RX ADMIN — PANTOPRAZOLE SODIUM 40 MG: 40 INJECTION, POWDER, FOR SOLUTION INTRAVENOUS at 14:50

## 2021-04-12 NOTE — DISCHARGE INSTRUCTIONS
Please call Dr. Garcia to schedule an appointment for further discussion/management of your hiatal hernia.   Incidentally your CT today showed:   -- stones in both kidneys (which are causing no problems at this time)   -- an increase in size of your abdominal aorta (2.9 cm, continue to follow up with your family doctor)   -- diverticulosis with no current infection        Hiatal Hernia    A hiatal hernia occurs when part of the stomach slides above the muscle that separates the abdomen from the chest (diaphragm). A person can be born with a hiatal hernia (congenital), or it may develop over time. In almost all cases of hiatal hernia, only the top part of the stomach pushes through the diaphragm.  Many people have a hiatal hernia with no symptoms. The larger the hernia, the more likely it is that you will have symptoms. In some cases, a hiatal hernia allows stomach acid to flow back into the tube that carries food from your mouth to your stomach (esophagus). This may cause heartburn symptoms. Severe heartburn symptoms may mean that you have developed a condition called gastroesophageal reflux disease (GERD).  What are the causes?  This condition is caused by a weakness in the opening (hiatus) where the esophagus passes through the diaphragm to attach to the upper part of the stomach. A person may be born with a weakness in the hiatus, or a weakness can develop over time.  What increases the risk?  This condition is more likely to develop in:  · Older people. Age is a major risk factor for a hiatal hernia, especially if you are over the age of 50.  · Pregnant women.  · People who are overweight.  · People who have frequent constipation.  What are the signs or symptoms?  Symptoms of this condition usually develop in the form of GERD symptoms. Symptoms include:  · Heartburn.  · Belching.  · Indigestion.  · Trouble swallowing.  · Coughing or wheezing.  · Sore throat.  · Hoarseness.  · Chest pain.  · Nausea and  vomiting.  How is this diagnosed?  This condition may be diagnosed during testing for GERD. Tests that may be done include:  · X-rays of your stomach or chest.  · An upper gastrointestinal (GI) series. This is an X-ray exam of your GI tract that is taken after you swallow a chalky liquid that shows up clearly on the X-ray.  · Endoscopy. This is a procedure to look into your stomach using a thin, flexible tube that has a tiny camera and light on the end of it.  How is this treated?  This condition may be treated by:  · Dietary and lifestyle changes to help reduce GERD symptoms.  · Medicines. These may include:  ? Over-the-counter antacids.  ? Medicines that make your stomach empty more quickly.  ? Medicines that block the production of stomach acid (H2 blockers).  ? Stronger medicines to reduce stomach acid (proton pump inhibitors).  · Surgery to repair the hernia, if other treatments are not helping.  If you have no symptoms, you may not need treatment.  Follow these instructions at home:  Lifestyle and activity  · Do not use any products that contain nicotine or tobacco, such as cigarettes and e-cigarettes. If you need help quitting, ask your health care provider.  · Try to achieve and maintain a healthy body weight.  · Avoid putting pressure on your abdomen. Anything that puts pressure on your abdomen increases the amount of acid that may be pushed up into your esophagus.  ? Avoid bending over, especially after eating.  ? Raise the head of your bed by putting blocks under the legs. This keeps your head and esophagus higher than your stomach.  ? Do not wear tight clothing around your chest or stomach.  ? Try not to strain when having a bowel movement, when urinating, or when lifting heavy objects.  Eating and drinking  · Avoid foods that can worsen GERD symptoms. These may include:  ? Fatty foods, like fried foods.  ? Citrus fruits, like oranges or lemon.  ? Other foods and drinks that contain acid, like orange  juice or tomatoes.  ? Spicy food.  ? Chocolate.  · Eat frequent small meals instead of three large meals a day. This helps prevent your stomach from getting too full.  ? Eat slowly.  ? Do not lie down right after eating.  ? Do not eat 1-2 hours before bed.  · Do not drink beverages with caffeine. These include cola, coffee, cocoa, and tea.  · Do not drink alcohol.  General instructions  · Take over-the-counter and prescription medicines only as told by your health care provider.  · Keep all follow-up visits as told by your health care provider. This is important.  Contact a health care provider if:  · Your symptoms are not controlled with medicines or lifestyle changes.  · You are having trouble swallowing.  · You have coughing or wheezing that will not go away.  Get help right away if:  · Your pain is getting worse.  · Your pain spreads to your arms, neck, jaw, teeth, or back.  · You have shortness of breath.  · You sweat for no reason.  · You feel sick to your stomach (nauseous) or you vomit.  · You vomit blood.  · You have bright red blood in your stools.  · You have black, tarry stools.  This information is not intended to replace advice given to you by your health care provider. Make sure you discuss any questions you have with your health care provider.  Document Revised: 11/30/2018 Document Reviewed: 07/23/2018  Elsevier Patient Education © 2021 [employer]

## 2021-04-12 NOTE — ED PROVIDER NOTES
"Subjective   History of Present Illness    Patient is a 65-year-old female presenting to ED with hematemesis.  PMH significant for GERD, CBD, anxiety, history of anemia, fibromyalgia, colitis, history of colonic polyps, small cell B cell lymphoma, history of numerous colonoscopies with polypectomies, EGD.  Patient reports 2 days ago she was having \"a reflux episode\" where she had normal burning in her epigastric region which radiates up to her throat and she subsequently had an episode of hematemesis described as \"not bright blood but not really dark blood.\"  Patient denies any coffee-ground emesis.  Patient reports since that time she has had her normal \"burning throughout my reflux region.\"  Patient denies any other abdominal pain.  Patient denies any further episodes of emesis, hematemesis, or changes to her chronic diarrhea.  Patient denies any black/bloody/tarry stools.  Patient denies any fevers, chills, diaphoresis, or known recent illness.  Patient reports a history of frequent colonoscopies with Dr. Garcia and has been at the bedside feels the last one was within the past year with no concerning findings.  Patient denies use of any blood thinners, aspirin, or bleeding of any other source including easy gum bleeding, epistaxis, vaginal bleeding, easy bruising.   did note patient was recently treated for a lip infection but denies any other recent illnesses, antibiotic use.    Records reviewed show patient was last seen in the ED on 8/10/2020 for positional lightheadedness and acute UTI.    Patient routinely call the GI office for refills and was last seen in the office on 2/18/2020 for her collagenous colitis.    Patient routinely follows up with hematology and oncology for management of her renal lymphoma.    Review of Systems   Constitutional: Negative.  Negative for chills, diaphoresis and fever.   HENT: Negative.  Negative for nosebleeds and sore throat.    Eyes: Negative.    Respiratory: " Negative.    Cardiovascular: Negative.    Gastrointestinal: Positive for nausea and vomiting (x1 episode of hematemesis). Negative for abdominal pain, constipation and diarrhea (no change in chronic loose stools).   Genitourinary: Negative.  Negative for dysuria, flank pain and hematuria.   Musculoskeletal: Negative.    Skin: Negative.  Negative for pallor.   Neurological: Negative.  Negative for dizziness.   Hematological: Does not bruise/bleed easily.   Psychiatric/Behavioral: Negative.    All other systems reviewed and are negative.      Past Medical History:   Diagnosis Date   • Anemia    • Anxiety    • Arthritis    • Cancer of skin     BCC OF NOSE   • Colitis    • COPD (chronic obstructive pulmonary disease) (CMS/HCC)    • Depression    • Fibromyalgia    • Gastric ulcer    • GERD (gastroesophageal reflux disease)    • History of transfusion    • Hx of colonic polyps    • Hypercholesteremia    • Hypertension    • Seizure (CMS/HCC)    • Small cell B-cell lymphoma (CMS/HCC)    • Ulcerative colitis (CMS/HCC)        Allergies   Allergen Reactions   • Lyrica [Pregabalin] Swelling     LIPS AND FACE   • Morphine Itching   • Codeine Itching   • Penicillins Unknown - Low Severity     CHILDHOOD ALLERGY         Past Surgical History:   Procedure Laterality Date   • BREAST SURGERY     • CHOLECYSTECTOMY     • COLONOSCOPY  04/12/2017    Hemorrhoids and a few diverticula repeat exam in 3 years Dr. Cordero   • COLONOSCOPY N/A 5/2/2019    Collagenous colitis, Diverticulosis repeat exam in 5 years   • COLONOSCOPY W/ POLYPECTOMY  07/28/2016    Tubular adenoma proximal to the anus, 30 MM polyp in the cecum not resected, Diverticulosis ref to Dr. Cordero   • ENDOSCOPY  06/15/2016    Small hh, Negative for celiac disease   • ENDOSCOPY N/A 6/13/2019    HH otherwise normal exam   • RADICAL HYSTERECTOMY     • TENNIS ELBOW RELEASE Left 10/20/2020    Procedure: LEFT CUBITAL TUNNEL RELEASE;  Surgeon: Raymundo Kiran MD;  Location: [facility]  OR;  Service: Orthopedics;  Laterality: Left;   • URETEROSCOPY Right 9/13/2018    Procedure: CYSTOSCOPY RIGHT RETROGRADE PYELOGRAM RIGHT URETEROSCOPY WITH BIOPSY OF RIGHT RENAL PELVIS LASER LITHOTRIPSY AND RIGHT URETERAL STENT INSERTION;  Surgeon: Yifan Greco MD;  Location: [facility] OR;  Service: Urology   • URETEROSCOPY Right 8/9/2019    Procedure: RIGHT FLEXIBLE URETEROSCOPY WITH RENAL PELVIC BIOPSY AND RIGHT URETERAL STENT;  Surgeon: Yifan Greco MD;  Location: [facility] OR;  Service: Urology       Family History   Problem Relation Age of Onset   • Diabetes Sister    • Cancer Brother    • Colon cancer Neg Hx    • Colon polyps Neg Hx        Social History     Socioeconomic History   • Marital status:      Spouse name: Not on file   • Number of children: Not on file   • Years of education: Not on file   • Highest education level: Not on file   Tobacco Use   • Smoking status: Current Every Day Smoker     Packs/day: 1.00     Types: Cigarettes   • Smokeless tobacco: Never Used   Vaping Use   • Vaping Use: Never used   Substance and Sexual Activity   • Alcohol use: No   • Drug use: No   • Sexual activity: Defer           Objective   Physical Exam  Vitals and nursing note reviewed. Exam conducted with a chaperone present (Chaperone present for entire examination, Zane RN).   Constitutional:       General: She is not in acute distress.     Appearance: Normal appearance. She is well-developed and overweight. She is not ill-appearing or diaphoretic.   HENT:      Head: Normocephalic.      Mouth/Throat:      Mouth: Mucous membranes are moist.      Pharynx: Oropharynx is clear. Uvula midline. No pharyngeal swelling, posterior oropharyngeal erythema or uvula swelling.      Tonsils: No tonsillar exudate.      Comments: Healing area to mid upper and lower lip with no evidence of infection, no lesions    Upper dentures noted.  Remainder of inspection of oral cavity as well as posterior oropharynx with no  abnormalities.  Eyes:      General: No scleral icterus.     Conjunctiva/sclera: Conjunctivae normal.      Pupils: Pupils are equal, round, and reactive to light.   Cardiovascular:      Rate and Rhythm: Normal rate and regular rhythm.   Pulmonary:      Effort: Pulmonary effort is normal. No respiratory distress.      Breath sounds: Normal breath sounds.   Chest:      Chest wall: No tenderness (No reproducible tenderness to palpitation of chest wall).   Abdominal:      General: Bowel sounds are normal.      Palpations: Abdomen is soft.      Tenderness: There is no abdominal tenderness. There is no right CVA tenderness, left CVA tenderness or guarding.   Genitourinary:     Exam position: Knee-chest position.      Rectum: Guaiac result positive. External hemorrhoid (Circumferentially, no evidence of thrombosis, no tenderness to palpitation) present. No tenderness or anal fissure. Normal anal tone.   Musculoskeletal:         General: Normal range of motion.      Cervical back: Normal range of motion and neck supple.      Right lower leg: No edema.      Left lower leg: No edema.   Skin:     General: Skin is warm and dry.      Coloration: Skin is not jaundiced or pale.   Neurological:      General: No focal deficit present.      Mental Status: She is alert and oriented to person, place, and time.      Gait: Gait normal.   Psychiatric:         Mood and Affect: Mood normal.         Behavior: Behavior normal. Behavior is cooperative.         Procedures           ED Course                                           MDM  Number of Diagnoses or Management Options     Amount and/or Complexity of Data Reviewed  Clinical lab tests: reviewed and ordered  Tests in the radiology section of CPT®: reviewed and ordered  Tests in the medicine section of CPT®: ordered and reviewed  Decide to obtain previous medical records or to obtain history from someone other than the patient: yes  Obtain history from someone other than the patient: yes  ()  Review and summarize past medical records: yes  Discuss the patient with other providers: yes      Patient is a 65-year-old female presenting to ED with hematemesis.  PMH significant for GERD, CBD, anxiety, history of anemia, fibromyalgia, colitis, history of colonic polyps, small cell B cell lymphoma, history of numerous colonoscopies with polypectomies, EGD.  Hemoccult testing positive.  CBC was normal with blood cell count at 7.31, stable H&H 13.2/40.6.  Elevated relative neutrophils 80 to 20%, decreased lymphocytes 6.6%.  CMP with bicarb of 21, elevated alk phos 149 and no further acute findings.  No anion gap.  Urine studies showed: Small leukocytes, negative nitrates, 0-2 WBC, no bacteria, 0-2 squamous epithelium, no RBCs.  Patient had a CT of the abdomen and pelvis with IV contrast only which showed: Large hiatal hernia, hyper dense material within the hernia presumably related to ingested material, nonobstructing bilateral renal calculi, colonic diverticulosis, dilation of the infrarenal abdominal aorta to 2.9 cm, atherosclerotic disease.  Case was discussed with Dr. Simone Reynaga who is in agreement with offering admission for further management of GI bleeding as well as possible upper GI endoscopy.  Patient rested comfortably in bed during evaluation with no nausea, emesis, and no need for pain control.  Discussed with  and patient lab and imaging findings.  Offered admission.  Discussed risk, benefits, and alternatives to inpatient versus outpatient therapy.  Patient as well as  would prefer to do procedures on an outpatient basis and patient declined admission overnight.  Advised of need to call her GI provider, Dr. Garcia, to schedule a follow-up appointment.  Discussed need for continued PCP follow-up for further incidental findings on CT imaging.  Discussed return precautions need for immediate return to ED should she develop any new or worsening symptoms.  Patient able to  tolerate p.o. fluids.  No further questions, concerns, needs at this time patient stable for discharge.      Final diagnoses:   Hiatal hernia   Hematemesis with nausea   Renal calculi   Diverticulosis   Dilation of aorta (CMS/HCC)       ED Disposition  ED Disposition     ED Disposition Condition Comment    Discharge Stable           Danis Garcia MD  [address] [facility] [address]  [place] [id number] [phone]    Schedule an appointment as soon as possible for a visit      Grzegorz Michel Jr., MD  [address]  [facility] [id number] [phone]    Schedule an appointment as soon as possible for a visit in 2 days      [facility] Emergency Department  [address] [facility] [address] [phone]    As needed         Medication List      No changes were made to your prescriptions during this visit.          Erich Moreno PA-C  04/12/21 [id number]

## 2021-04-12 NOTE — TELEPHONE ENCOUNTER
Patient called stating she is feeling weak and had an episode of reflux and all that came up was pure blood told her she needs to go to [facility] ER and let the ER physician evaluate her and she agreed to do so.

## 2021-04-15 ENCOUNTER — OFFICE VISIT (OUTPATIENT)
Dept: GASTROENTEROLOGY | Facility: CLINIC | Age: 66
End: 2021-04-15

## 2021-04-15 VITALS
HEIGHT: 63 IN | BODY MASS INDEX: 29.59 KG/M2 | DIASTOLIC BLOOD PRESSURE: 80 MMHG | HEART RATE: 78 BPM | WEIGHT: 167 LBS | OXYGEN SATURATION: 96 % | SYSTOLIC BLOOD PRESSURE: 130 MMHG | TEMPERATURE: 97.3 F

## 2021-04-15 DIAGNOSIS — E66.9 OBESITY, UNSPECIFIED OBESITY SEVERITY, UNSPECIFIED OBESITY TYPE: ICD-10-CM

## 2021-04-15 DIAGNOSIS — Z71.6 TOBACCO ABUSE COUNSELING: ICD-10-CM

## 2021-04-15 DIAGNOSIS — K21.9 GASTROESOPHAGEAL REFLUX DISEASE, UNSPECIFIED WHETHER ESOPHAGITIS PRESENT: Primary | ICD-10-CM

## 2021-04-15 DIAGNOSIS — K92.0 HEMATEMESIS, PRESENCE OF NAUSEA NOT SPECIFIED: ICD-10-CM

## 2021-04-15 DIAGNOSIS — R19.7 DIARRHEA IN ADULT PATIENT: ICD-10-CM

## 2021-04-15 DIAGNOSIS — R19.5 HEME POSITIVE STOOL: ICD-10-CM

## 2021-04-15 DIAGNOSIS — I10 HTN (HYPERTENSION), BENIGN: ICD-10-CM

## 2021-04-15 PROCEDURE — 99214 OFFICE O/P EST MOD 30 MIN: CPT | Performed by: CLINICAL NURSE SPECIALIST

## 2021-04-15 RX ORDER — SODIUM, POTASSIUM,MAG SULFATES 17.5-3.13G
SOLUTION, RECONSTITUTED, ORAL ORAL
Qty: 177 ML | Refills: 0 | Status: ON HOLD | OUTPATIENT
Start: 2021-04-15 | End: 2021-05-21

## 2021-04-15 NOTE — PROGRESS NOTES
Heather Russo  1955    4/15/2021  Chief Complaint   Patient presents with   • GI Problem     Had episode of spitting up blood and dark stools     Subjective   HPI  Heather Russo is a 65 y.o. female who presents with a complaint of one episode of hematemesis leading her to the ER for further evaluation. She had a flare in her reflux 2 days prior to presenting to the ER on 4/13/21 with not bright red blood but not really dark blood emesis. No abdominal pain. No further episodes. No anticoagulants.  Her last Endoscopy was in 2019. Her hemoccult was positive. She had a stable H/H at 13.2/40.6. She was discharged home. She is on protonix for hx of reflux. Today she says she has no problems with her bowels other than her usual intermittent diarrhea. her reflux is stable. No BRBPR. No melena. No wt loss.   Past Medical History:   Diagnosis Date   • Anemia    • Anxiety    • Arthritis    • Cancer of skin     BCC OF NOSE   • Colitis    • COPD (chronic obstructive pulmonary disease) (CMS/HCC)    • Depression    • Fibromyalgia    • Gastric ulcer    • GERD (gastroesophageal reflux disease)    • History of transfusion    • Hx of colonic polyps    • Hypercholesteremia    • Hypertension    • Seizure (CMS/HCC)    • Small cell B-cell lymphoma (CMS/HCC)    • Ulcerative colitis (CMS/HCC)      Past Surgical History:   Procedure Laterality Date   • BREAST SURGERY     • CHOLECYSTECTOMY     • COLONOSCOPY  04/12/2017    Hemorrhoids and a few diverticula repeat exam in 3 years Dr. Cordero   • COLONOSCOPY N/A 5/2/2019    Collagenous colitis, Diverticulosis repeat exam in 5 years   • COLONOSCOPY W/ POLYPECTOMY  07/28/2016    Tubular adenoma proximal to the anus, 30 MM polyp in the cecum not resected, Diverticulosis ref to Dr. Cordero   • ENDOSCOPY  06/15/2016    Small hh, Negative for celiac disease   • ENDOSCOPY N/A 6/13/2019    HH otherwise normal exam   • RADICAL HYSTERECTOMY     • TENNIS ELBOW RELEASE Left 10/20/2020    Procedure: LEFT CUBITAL  TUNNEL RELEASE;  Surgeon: Raymundo Kiran MD;  Location: [facility] OR;  Service: Orthopedics;  Laterality: Left;   • URETEROSCOPY Right 9/13/2018    Procedure: CYSTOSCOPY RIGHT RETROGRADE PYELOGRAM RIGHT URETEROSCOPY WITH BIOPSY OF RIGHT RENAL PELVIS LASER LITHOTRIPSY AND RIGHT URETERAL STENT INSERTION;  Surgeon: Yifan Greco MD;  Location: [facility] OR;  Service: Urology   • URETEROSCOPY Right 8/9/2019    Procedure: RIGHT FLEXIBLE URETEROSCOPY WITH RENAL PELVIC BIOPSY AND RIGHT URETERAL STENT;  Surgeon: iYfan Greco MD;  Location: [facility] OR;  Service: Urology       Outpatient Medications Marked as Taking for the 4/15/21 encounter (Office Visit) with Becca Blackwood APRN   Medication Sig Dispense Refill   • amLODIPine (NORVASC) 5 MG tablet Take 5 mg by mouth Daily.     • ARIPiprazole (ABILIFY) 10 MG tablet Take 10 mg by mouth Daily.     • Butalbital-APAP-Caff-Cod (Fioricet/Codeine) -71-30 MG capsule Take  by mouth.     • cetirizine (zyrTEC) 10 MG tablet Take 10 mg by mouth Daily.     • cloNIDine (CATAPRES) 0.1 MG tablet Take 0.1 mg by mouth 2 (Two) Times a Day As Needed for High Blood Pressure.     • colestipol (COLESTID) 1 g tablet TAKE 1 TABLET TWICE A  tablet 3   • DULoxetine (CYMBALTA) 60 MG capsule Take 60 mg by mouth Daily.     • HYDROcodone-acetaminophen (NORCO) 7.5-325 MG per tablet 1 tab po q6 hrs prn pain 15 tablet 0   • LORazepam (ATIVAN) 0.5 MG tablet Take 0.5 mg by mouth Every 8 (Eight) Hours As Needed for Anxiety.     • metoprolol succinate XL (TOPROL-XL) 100 MG 24 hr tablet Take 100 mg by mouth Daily.     • ondansetron (ZOFRAN) 4 MG tablet Take 4 mg by mouth Every 8 (Eight) Hours As Needed for Nausea or Vomiting.     • ondansetron (Zofran) 4 MG tablet Take 1 tablet by mouth Every 8 (Eight) Hours As Needed for Nausea or Vomiting. 10 tablet 0   • pantoprazole (PROTONIX) 40 MG EC tablet TAKE 1 TABLET BY MOUTH TWICE A  tablet 1   • PROAIR  (90 BASE)  MCG/ACT inhaler USE TWO PUFFS BY MOUTH EVERY 4 TO 6 HOURS AS NEEDED FOR WHEEZING  2     Allergies   Allergen Reactions   • Lyrica [Pregabalin] Swelling     LIPS AND FACE   • Morphine Itching   • Codeine Itching   • Penicillins Unknown - Low Severity     CHILDHOOD ALLERGY       Social History     Socioeconomic History   • Marital status:      Spouse name: Not on file   • Number of children: Not on file   • Years of education: Not on file   • Highest education level: Not on file   Tobacco Use   • Smoking status: Current Every Day Smoker     Packs/day: 1.00     Types: Cigarettes   • Smokeless tobacco: Never Used   Vaping Use   • Vaping Use: Never used   Substance and Sexual Activity   • Alcohol use: No   • Drug use: No   • Sexual activity: Defer     Family History   Problem Relation Age of Onset   • Diabetes Sister    • Cancer Brother    • Colon cancer Neg Hx    • Colon polyps Neg Hx      Health Maintenance   Topic Date Due   • ANNUAL PHYSICAL  Never done   • Pneumococcal Vaccine 0-64 (1 of 1 - PPSV23) Never done   • TDAP/TD VACCINES (1 - Tdap) Never done   • ZOSTER VACCINE (1 of 2) Never done   • MAMMOGRAM  Never done   • PAP SMEAR  Never done   • LIPID PANEL  08/09/2020   • COVID-19 Vaccine (2 - Moderna 2-dose series) 04/21/2021   • INFLUENZA VACCINE  08/01/2021   • COLONOSCOPY  05/02/2024   • HEPATITIS C SCREENING  Completed     Review of Systems   Constitutional: Negative for activity change, appetite change, chills, diaphoresis, fatigue, fever and unexpected weight change.   HENT: Negative for ear pain, hearing loss, mouth sores, sore throat, trouble swallowing and voice change.    Eyes: Negative.    Respiratory: Negative for cough, choking, shortness of breath and wheezing.    Cardiovascular: Negative for chest pain and palpitations.   Gastrointestinal: Negative for abdominal pain, blood in stool, constipation, diarrhea, nausea and vomiting.        Hemetemsis x1, heme positive stools   Endocrine: Negative  "for cold intolerance and heat intolerance.   Genitourinary: Negative for decreased urine volume, dysuria, frequency, hematuria and urgency.   Musculoskeletal: Negative for back pain, gait problem and myalgias.   Skin: Negative for color change, pallor and rash.   Allergic/Immunologic: Negative for food allergies and immunocompromised state.   Neurological: Negative for dizziness, tremors, seizures, syncope, weakness, light-headedness, numbness and headaches.   Hematological: Negative for adenopathy. Does not bruise/bleed easily.   Psychiatric/Behavioral: Negative for agitation and confusion. The patient is not nervous/anxious.    All other systems reviewed and are negative.    Objective   Vitals:    04/15/21 1019   BP: 130/80   Pulse: 78   Temp: 97.3 °F (36.3 °C)   SpO2: 96%   Weight: 75.8 kg (167 lb)   Height: 160 cm (63\")     Body mass index is 29.58 kg/m².  Physical Exam  Constitutional:       Appearance: She is well-developed.   HENT:      Head: Normocephalic and atraumatic.   Eyes:      Pupils: Pupils are equal, round, and reactive to light.   Neck:      Trachea: No tracheal deviation.   Cardiovascular:      Rate and Rhythm: Normal rate and regular rhythm.      Heart sounds: Normal heart sounds. No murmur heard.   No friction rub. No gallop.    Pulmonary:      Effort: Pulmonary effort is normal. No respiratory distress.      Breath sounds: Normal breath sounds. No wheezing or rales.   Chest:      Chest wall: No tenderness.   Abdominal:      General: Bowel sounds are normal. There is no distension.      Palpations: Abdomen is soft. Abdomen is not rigid.      Tenderness: There is no abdominal tenderness. There is no guarding or rebound.   Musculoskeletal:         General: No tenderness or deformity. Normal range of motion.      Cervical back: Normal range of motion and neck supple.   Skin:     General: Skin is warm and dry.      Coloration: Skin is not pale.      Findings: No rash.   Neurological:      Mental " Status: She is alert and oriented to person, place, and time.      Deep Tendon Reflexes: Reflexes are normal and symmetric.   Psychiatric:         Behavior: Behavior normal.         Thought Content: Thought content normal.         Judgment: Judgment normal.       Assessment/Plan   Diagnoses and all orders for this visit:    1. Gastroesophageal reflux disease, unspecified whether esophagitis present (Primary)  -     Cancel: Case Request; Standing  -     Cancel: Follow Anesthesia Guidelines / Standing Orders; Future  -     Cancel: Obtain Informed Consent; Future  -     Cancel: Implement Anesthesia Orders Day of Procedure; Standing  -     Cancel: Obtain Informed Consent; Standing  -     Cancel: Case Request    2. Hematemesis, presence of nausea not specified    3. Heme positive stool  -     Case Request; Standing  -     Follow Anesthesia Guidelines / Standing Orders; Future  -     Obtain Informed Consent; Future  -     Implement Anesthesia Orders Day of Procedure; Standing  -     Obtain Informed Consent; Standing  -     Verify Bowel Prep Was Successful; Standing  -     Case Request  -     Suprep Bowel Prep Kit 17.5-3.13-1.6 GM/177ML solution oral solution; Take as directed by office instructions provided  Dispense: 177 mL; Refill: 0    4. Obesity, unspecified obesity severity, unspecified obesity type    5. Tobacco abuse counseling    6. HTN (hypertension), benign  Comments:  cont BP medication the day of procedure    7. Diarrhea in adult patient  Comments:  chronic ongoing issue for her.     I discussed non pharmaceutical treatment of gerd.  This includes gradually losing weight to achieve ideal body wt., elevation of the head of bed by 4-6 inches, nothing to eat or drink 3 hours prior to lying down, avoiding tight clothing, stress reduction, tobacco cessation, reduction of alcohol intake, and dietary restrictions (avoiding caffeine, coffee, fatty foods, mints, chocolate, spicy foods and tomato based sauces as much as  possible).    No NSAIDS.    ESOPHAGOGASTRODUODENOSCOPY WITH ANESTHESIA (N/A), COLONOSCOPY WITH ANESTHESIA (N/A)  Part of this note may be an electronic transcription/translation of spoken language to printed text using the Dragon Dictation System.  Body mass index is 29.58 kg/m².  No follow-ups on file.    Patient's Body mass index is 29.58 kg/m². BMI is above normal parameters. Recommendations include: nutrition counseling.      All risks, benefits, alternatives, and indications of colonoscopy and/or Endoscopy procedure have been discussed with the patient. Risks to include perforation of the colon requiring possible surgery or colostomy, risk of bleeding from biopsies or removal of colon tissue, possibility of missing a colon polyp or cancer, or adverse drug reaction.  Benefits to include the diagnosis and management of disease of the colon and rectum. Alternatives to include barium enema, radiographic evaluation, lab testing or no intervention. Pt verbalizes understanding and agrees.     Becca Blackwood, APRN  4/15/2021  10:39 CDT          If you smoke or use tobacco, 4 minutes reading provided  Steps to Quit Smoking  Smoking tobacco can be harmful to your health and can affect almost every organ in your body. Smoking puts you, and those around you, at risk for developing many serious chronic diseases. Quitting smoking is difficult, but it is one of the best things that you can do for your health. It is never too late to quit.  What are the benefits of quitting smoking?  When you quit smoking, you lower your risk of developing serious diseases and conditions, such as:  · Lung cancer or lung disease, such as COPD.  · Heart disease.  · Stroke.  · Heart attack.  · Infertility.  · Osteoporosis and bone fractures.  Additionally, symptoms such as coughing, wheezing, and shortness of breath may get better when you quit. You may also find that you get sick less often because your body is stronger at fighting off  colds and infections. If you are pregnant, quitting smoking can help to reduce your chances of having a baby of low birth weight.  How do I get ready to quit?  When you decide to quit smoking, create a plan to make sure that you are successful. Before you quit:  · Pick a date to quit. Set a date within the next two weeks to give you time to prepare.  · Write down the reasons why you are quitting. Keep this list in places where you will see it often, such as on your bathroom mirror or in your car or wallet.  · Identify the people, places, things, and activities that make you want to smoke (triggers) and avoid them. Make sure to take these actions:  ¨ Throw away all cigarettes at home, at work, and in your car.  ¨ Throw away smoking accessories, such as ashtrays and lighters.  ¨ Clean your car and make sure to empty the ashtray.  ¨ Clean your home, including curtains and carpets.  · Tell your family, friends, and coworkers that you are quitting. Support from your loved ones can make quitting easier.  · Talk with your health care provider about your options for quitting smoking.  · Find out what treatment options are covered by your health insurance.  What strategies can I use to quit smoking?  Talk with your healthcare provider about different strategies to quit smoking. Some strategies include:  · Quitting smoking altogether instead of gradually lessening how much you smoke over a period of time. Research shows that quitting “cold turkey” is more successful than gradually quitting.  · Attending in-person counseling to help you build problem-solving skills. You are more likely to have success in quitting if you attend several counseling sessions. Even short sessions of 10 minutes can be effective.  · Finding resources and support systems that can help you to quit smoking and remain smoke-free after you quit. These resources are most helpful when you use them often. They can include:  ¨ Online chats with a  counselor.  ¨ Telephone quitlines.  ¨ Printed self-help materials.  ¨ Support groups or group counseling.  ¨ Text messaging programs.  ¨ Mobile phone applications.  · Taking medicines to help you quit smoking. (If you are pregnant or breastfeeding, talk with your health care provider first.) Some medicines contain nicotine and some do not. Both types of medicines help with cravings, but the medicines that include nicotine help to relieve withdrawal symptoms. Your health care provider may recommend:  ¨ Nicotine patches, gum, or lozenges.  ¨ Nicotine inhalers or sprays.  ¨ Non-nicotine medicine that is taken by mouth.  Talk with your health care provider about combining strategies, such as taking medicines while you are also receiving in-person counseling. Using these two strategies together makes you more likely to succeed in quitting than if you used either strategy on its own.  If you are pregnant or breastfeeding, talk with your health care provider about finding counseling or other support strategies to quit smoking. Do not take medicine to help you quit smoking unless told to do so by your health care provider.  What things can I do to make it easier to quit?  Quitting smoking might feel overwhelming at first, but there is a lot that you can do to make it easier. Take these important actions:  · Reach out to your family and friends and ask that they support and encourage you during this time. Call telephone quitlines, reach out to support groups, or work with a counselor for support.  · Ask people who smoke to avoid smoking around you.  · Avoid places that trigger you to smoke, such as bars, parties, or smoke-break areas at work.  · Spend time around people who do not smoke.  · Lessen stress in your life, because stress can be a smoking trigger for some people. To lessen stress, try:  ¨ Exercising regularly.  ¨ Deep-breathing exercises.  ¨ Yoga.  ¨ Meditating.  ¨ Performing a body scan. This involves closing  your eyes, scanning your body from head to toe, and noticing which parts of your body are particularly tense. Purposefully relax the muscles in those areas.  · Download or purchase mobile phone or tablet apps (applications) that can help you stick to your quit plan by providing reminders, tips, and encouragement. There are many free apps, such as QuitGuide from the CDC (Centers for Disease Control and Prevention). You can find other support for quitting smoking (smoking cessation) through smokefree.gov and other websites.  How will I feel when I quit smoking?  Within the first 24 hours of quitting smoking, you may start to feel some withdrawal symptoms. These symptoms are usually most noticeable 2-3 days after quitting, but they usually do not last beyond 2-3 weeks. Changes or symptoms that you might experience include:  · Mood swings.  · Restlessness, anxiety, or irritation.  · Difficulty concentrating.  · Dizziness.  · Strong cravings for sugary foods in addition to nicotine.  · Mild weight gain.  · Constipation.  · Nausea.  · Coughing or a sore throat.  · Changes in how your medicines work in your body.  · A depressed mood.  · Difficulty sleeping (insomnia).  After the first 2-3 weeks of quitting, you may start to notice more positive results, such as:  · Improved sense of smell and taste.  · Decreased coughing and sore throat.  · Slower heart rate.  · Lower blood pressure.  · Clearer skin.  · The ability to breathe more easily.  · Fewer sick days.  Quitting smoking is very challenging for most people. Do not get discouraged if you are not successful the first time. Some people need to make many attempts to quit before they achieve long-term success. Do your best to stick to your quit plan, and talk with your health care provider if you have any questions or concerns.  This information is not intended to replace advice given to you by your health care provider. Make sure you discuss any questions you have with  your health care provider.  Document Released: 12/12/2002 Document Revised: 08/15/2017 Document Reviewed: 05/03/2016  Elsevier Interactive Patient Education © 2017 [employer]

## 2021-04-16 PROBLEM — R19.5 HEME POSITIVE STOOL: Status: ACTIVE | Noted: 2021-04-16

## 2021-04-27 ENCOUNTER — TRANSCRIBE ORDERS (OUTPATIENT)
Dept: GASTROENTEROLOGY | Facility: CLINIC | Age: 66
End: 2021-04-27

## 2021-04-27 DIAGNOSIS — Z01.818 PREOPERATIVE TESTING: Primary | ICD-10-CM

## 2021-04-30 ENCOUNTER — LAB (OUTPATIENT)
Dept: LAB | Facility: HOSPITAL | Age: 66
End: 2021-04-30

## 2021-04-30 LAB — SARS-COV-2 ORF1AB RESP QL NAA+PROBE: NOT DETECTED

## 2021-04-30 PROCEDURE — U0004 COV-19 TEST NON-CDC HGH THRU: HCPCS | Performed by: INTERNAL MEDICINE

## 2021-04-30 PROCEDURE — C9803 HOPD COVID-19 SPEC COLLECT: HCPCS | Performed by: INTERNAL MEDICINE

## 2021-05-13 ENCOUNTER — TRANSCRIBE ORDERS (OUTPATIENT)
Dept: LAB | Facility: HOSPITAL | Age: 66
End: 2021-05-13

## 2021-05-13 DIAGNOSIS — Z01.818 PREOP TESTING: Primary | ICD-10-CM

## 2021-05-18 ENCOUNTER — LAB (OUTPATIENT)
Dept: LAB | Facility: HOSPITAL | Age: 66
End: 2021-05-18

## 2021-05-18 LAB — SARS-COV-2 ORF1AB RESP QL NAA+PROBE: NOT DETECTED

## 2021-05-18 PROCEDURE — U0004 COV-19 TEST NON-CDC HGH THRU: HCPCS | Performed by: INTERNAL MEDICINE

## 2021-05-18 PROCEDURE — C9803 HOPD COVID-19 SPEC COLLECT: HCPCS | Performed by: INTERNAL MEDICINE

## 2021-05-21 ENCOUNTER — ANESTHESIA EVENT (OUTPATIENT)
Dept: GASTROENTEROLOGY | Facility: HOSPITAL | Age: 66
End: 2021-05-21

## 2021-05-21 ENCOUNTER — ANESTHESIA (OUTPATIENT)
Dept: GASTROENTEROLOGY | Facility: HOSPITAL | Age: 66
End: 2021-05-21

## 2021-05-21 ENCOUNTER — HOSPITAL ENCOUNTER (OUTPATIENT)
Facility: HOSPITAL | Age: 66
Setting detail: HOSPITAL OUTPATIENT SURGERY
Discharge: HOME | End: 2021-05-21
Attending: INTERNAL MEDICINE | Admitting: INTERNAL MEDICINE

## 2021-05-21 VITALS
SYSTOLIC BLOOD PRESSURE: 135 MMHG | HEIGHT: 63 IN | OXYGEN SATURATION: 98 % | RESPIRATION RATE: 22 BRPM | BODY MASS INDEX: 29.58 KG/M2 | TEMPERATURE: 97.2 F | DIASTOLIC BLOOD PRESSURE: 82 MMHG | HEART RATE: 86 BPM

## 2021-05-21 DIAGNOSIS — R19.5 HEME POSITIVE STOOL: ICD-10-CM

## 2021-05-21 PROCEDURE — 87081 CULTURE SCREEN ONLY: CPT | Performed by: INTERNAL MEDICINE

## 2021-05-21 PROCEDURE — 25010000002 PROPOFOL 10 MG/ML EMULSION: Performed by: NURSE ANESTHETIST, CERTIFIED REGISTERED

## 2021-05-21 PROCEDURE — 45378 DIAGNOSTIC COLONOSCOPY: CPT | Performed by: INTERNAL MEDICINE

## 2021-05-21 PROCEDURE — 43239 EGD BIOPSY SINGLE/MULTIPLE: CPT | Performed by: INTERNAL MEDICINE

## 2021-05-21 RX ORDER — SODIUM CHLORIDE 0.9 % (FLUSH) 0.9 %
10 SYRINGE (ML) INJECTION AS NEEDED
Status: DISCONTINUED | OUTPATIENT
Start: 2021-05-21 | End: 2021-05-21 | Stop reason: HOSPADM

## 2021-05-21 RX ORDER — LIDOCAINE HYDROCHLORIDE 20 MG/ML
INJECTION, SOLUTION EPIDURAL; INFILTRATION; INTRACAUDAL; PERINEURAL AS NEEDED
Status: DISCONTINUED | OUTPATIENT
Start: 2021-05-21 | End: 2021-05-21 | Stop reason: SURG

## 2021-05-21 RX ORDER — PROPOFOL 10 MG/ML
VIAL (ML) INTRAVENOUS AS NEEDED
Status: DISCONTINUED | OUTPATIENT
Start: 2021-05-21 | End: 2021-05-21 | Stop reason: SURG

## 2021-05-21 RX ORDER — SODIUM CHLORIDE 9 MG/ML
500 INJECTION, SOLUTION INTRAVENOUS CONTINUOUS PRN
Status: DISCONTINUED | OUTPATIENT
Start: 2021-05-21 | End: 2021-05-21 | Stop reason: HOSPADM

## 2021-05-21 RX ADMIN — PROPOFOL 150 MG: 10 INJECTION, EMULSION INTRAVENOUS at 13:03

## 2021-05-21 RX ADMIN — LIDOCAINE HYDROCHLORIDE 40 MG: 20 INJECTION, SOLUTION EPIDURAL; INFILTRATION; INTRACAUDAL; PERINEURAL at 12:57

## 2021-05-21 RX ADMIN — SODIUM CHLORIDE: 0.9 INJECTION, SOLUTION INTRAVENOUS at 12:34

## 2021-05-21 RX ADMIN — PROPOFOL 150 MG: 10 INJECTION, EMULSION INTRAVENOUS at 12:57

## 2021-05-21 NOTE — ANESTHESIA PREPROCEDURE EVALUATION
Anesthesia Evaluation     Patient summary reviewed   no history of anesthetic complications:  NPO Solid Status: > 8 hours             Airway   Mallampati: II  Dental    (+) lower dentures and upper dentures    Pulmonary    (+) a smoker, COPD,   Cardiovascular   Exercise tolerance: good (4-7 METS)    (+) hypertension,       Neuro/Psych- negative ROS  GI/Hepatic/Renal/Endo    (+)  GERD,      Musculoskeletal     Abdominal    Substance History      OB/GYN          Other      history of cancer (renal)                    Anesthesia Plan    ASA 2     MAC       Anesthetic plan, all risks, benefits, and alternatives have been provided, discussed and informed consent has been obtained with: patient.

## 2021-05-21 NOTE — ANESTHESIA POSTPROCEDURE EVALUATION
"Patient: Heather Russo    Procedure Summary     Date: 05/21/21 Room / Location: [facility] 4 / [facility]    Anesthesia Start: 1249 Anesthesia Stop: 1320    Procedures:       ESOPHAGOGASTRODUODENOSCOPY WITH ANESTHESIA (N/A )      COLONOSCOPY WITH ANESTHESIA (N/A ) Diagnosis:       Heme positive stool      (Heme positive stool [R19.5])    Surgeons: Danis Garcia MD Provider: Yifan Rosa CRNA    Anesthesia Type: MAC ASA Status: 2          Anesthesia Type: MAC    Vitals  Vitals Value Taken Time   /82 05/21/21 1340   Temp     Pulse 85 05/21/21 1342   Resp 22 05/21/21 1340   SpO2 100 % 05/21/21 1342   Vitals shown include unvalidated device data.        Post Anesthesia Care and Evaluation    Patient location during evaluation: bedside  Patient participation: complete - patient participated  Level of consciousness: awake and awake and alert  Pain score: 0  Pain management: adequate  Airway patency: patent  Anesthetic complications: No anesthetic complications  PONV Status: none  Cardiovascular status: acceptable  Respiratory status: acceptable  Hydration status: acceptable    Comments: Patient discharged according to acceptable Macey score per RN assessment. See nursing records for further information.     Blood pressure 135/82, pulse 86, temperature 97.2 °F (36.2 °C), temperature source Temporal, resp. rate 22, height 160 cm (63\"), SpO2 98 %, not currently breastfeeding.        "

## 2021-05-22 LAB — UREASE TISS QL: NEGATIVE

## 2021-08-20 DIAGNOSIS — K21.9 GASTROESOPHAGEAL REFLUX DISEASE: ICD-10-CM

## 2021-08-20 RX ORDER — PANTOPRAZOLE SODIUM 40 MG/1
TABLET, DELAYED RELEASE ORAL
Qty: 180 TABLET | Refills: 3 | Status: SHIPPED | OUTPATIENT
Start: 2021-08-20 | End: 2021-08-30 | Stop reason: SDUPTHER

## 2021-08-20 NOTE — TELEPHONE ENCOUNTER
Rx Refill Note  Requested Prescriptions     Signed Prescriptions Disp Refills   • pantoprazole (PROTONIX) 40 MG EC tablet 180 tablet 3     Sig: TAKE 1 TABLET BY MOUTH TWICE A DAY     Authorizing Provider: KYLER ZAPATA     Ordering User: ADE AGUAYO      Last office visit with prescribing clinician: 4/15/2021      Next office visit with prescribing clinician: Visit date not found            Ade Aguayo MA  08/20/21, 08:00 CDT

## 2021-08-30 DIAGNOSIS — K21.9 GASTROESOPHAGEAL REFLUX DISEASE: ICD-10-CM

## 2021-08-30 RX ORDER — PANTOPRAZOLE SODIUM 40 MG/1
40 TABLET, DELAYED RELEASE ORAL 2 TIMES DAILY
Qty: 180 TABLET | Refills: 3 | Status: SHIPPED | OUTPATIENT
Start: 2021-08-30 | End: 2022-11-28

## 2021-11-03 ENCOUNTER — OFFICE VISIT (OUTPATIENT)
Dept: GASTROENTEROLOGY | Facility: CLINIC | Age: 66
End: 2021-11-03

## 2021-11-03 ENCOUNTER — LAB (OUTPATIENT)
Dept: LAB | Facility: HOSPITAL | Age: 66
End: 2021-11-03

## 2021-11-03 VITALS
HEART RATE: 86 BPM | BODY MASS INDEX: 30.12 KG/M2 | DIASTOLIC BLOOD PRESSURE: 72 MMHG | TEMPERATURE: 97.1 F | OXYGEN SATURATION: 95 % | HEIGHT: 63 IN | WEIGHT: 170 LBS | SYSTOLIC BLOOD PRESSURE: 136 MMHG

## 2021-11-03 DIAGNOSIS — R19.7 DIARRHEA IN ADULT PATIENT: Primary | ICD-10-CM

## 2021-11-03 DIAGNOSIS — K58.0 IRRITABLE BOWEL SYNDROME WITH DIARRHEA: ICD-10-CM

## 2021-11-03 DIAGNOSIS — R19.7 DIARRHEA IN ADULT PATIENT: ICD-10-CM

## 2021-11-03 DIAGNOSIS — Z71.6 TOBACCO ABUSE COUNSELING: ICD-10-CM

## 2021-11-03 LAB
ADV 40+41 DNA STL QL NAA+NON-PROBE: NOT DETECTED
ASTRO TYP 1-8 RNA STL QL NAA+NON-PROBE: NOT DETECTED
C CAYETANENSIS DNA STL QL NAA+NON-PROBE: NOT DETECTED
C COLI+JEJ+UPSA DNA STL QL NAA+NON-PROBE: NOT DETECTED
C DIFF TOX GENS STL QL NAA+PROBE: NEGATIVE
CRYPTOSP DNA STL QL NAA+NON-PROBE: NOT DETECTED
E HISTOLYT DNA STL QL NAA+NON-PROBE: NOT DETECTED
EAEC PAA PLAS AGGR+AATA ST NAA+NON-PRB: NOT DETECTED
EC STX1+STX2 GENES STL QL NAA+NON-PROBE: NOT DETECTED
EPEC EAE GENE STL QL NAA+NON-PROBE: NOT DETECTED
ETEC LTA+ST1A+ST1B TOX ST NAA+NON-PROBE: NOT DETECTED
G LAMBLIA DNA STL QL NAA+NON-PROBE: NOT DETECTED
NOROVIRUS GI+II RNA STL QL NAA+NON-PROBE: NOT DETECTED
P SHIGELLOIDES DNA STL QL NAA+NON-PROBE: NOT DETECTED
RVA RNA STL QL NAA+NON-PROBE: NOT DETECTED
S ENT+BONG DNA STL QL NAA+NON-PROBE: NOT DETECTED
SAPO I+II+IV+V RNA STL QL NAA+NON-PROBE: NOT DETECTED
SHIGELLA SP+EIEC IPAH ST NAA+NON-PROBE: NOT DETECTED
V CHOL+PARA+VUL DNA STL QL NAA+NON-PROBE: NOT DETECTED
V CHOLERAE DNA STL QL NAA+NON-PROBE: NOT DETECTED
Y ENTEROCOL DNA STL QL NAA+NON-PROBE: NOT DETECTED

## 2021-11-03 PROCEDURE — 87209 SMEAR COMPLEX STAIN: CPT | Performed by: CLINICAL NURSE SPECIALIST

## 2021-11-03 PROCEDURE — 83630 LACTOFERRIN FECAL (QUAL): CPT

## 2021-11-03 PROCEDURE — 87177 OVA AND PARASITES SMEARS: CPT | Performed by: CLINICAL NURSE SPECIALIST

## 2021-11-03 PROCEDURE — 87493 C DIFF AMPLIFIED PROBE: CPT | Performed by: CLINICAL NURSE SPECIALIST

## 2021-11-03 PROCEDURE — 0097U HC BIOFIRE FILMARRAY GI PANEL: CPT | Performed by: CLINICAL NURSE SPECIALIST

## 2021-11-03 PROCEDURE — 99214 OFFICE O/P EST MOD 30 MIN: CPT | Performed by: CLINICAL NURSE SPECIALIST

## 2021-11-03 NOTE — PROGRESS NOTES
Heather Russo  1955    11/3/2021  Chief Complaint   Patient presents with   • GI Problem     Diarrhea     Subjective   HPI  Heather Russo is a 66 y.o. female who presents with a complaint of diarrhea she says severe over the past month. She has taken antibiotics for her lip she says prior to the diarrhea from starting. She is going approx 12 times per day watery stool. She does have hemorrhoids. No abdominal pain. No nausea or vomiting. Recent colonoscopy reviewed.      Past Medical History:   Diagnosis Date   • Anemia    • Anxiety    • Arthritis    • Cancer of skin     BCC OF NOSE   • Colitis    • COPD (chronic obstructive pulmonary disease) (HCC)    • Depression    • Fibromyalgia    • Gastric ulcer    • GERD (gastroesophageal reflux disease)    • History of transfusion    • Hx of colonic polyps    • Hypercholesteremia    • Hypertension    • Seizure (HCC)    • Small cell B-cell lymphoma (HCC)    • Ulcerative colitis (HCC)      Past Surgical History:   Procedure Laterality Date   • BREAST SURGERY     • CHOLECYSTECTOMY     • COLONOSCOPY  04/12/2017    Hemorrhoids and a few diverticula repeat exam in 3 years Dr. Cordero   • COLONOSCOPY N/A 5/2/2019    Collagenous colitis, Diverticulosis repeat exam in 5 years   • COLONOSCOPY N/A 5/21/2021    Fair prep repeat exam in 1 year   • COLONOSCOPY W/ POLYPECTOMY  07/28/2016    Tubular adenoma proximal to the anus, 30 MM polyp in the cecum not resected, Diverticulosis ref to Dr. Cordero   • ENDOSCOPY  06/15/2016    Small hh, Negative for celiac disease   • ENDOSCOPY N/A 6/13/2019    HH otherwise normal exam   • ENDOSCOPY N/A 5/21/2021    Large HH   • RADICAL HYSTERECTOMY     • TENNIS ELBOW RELEASE Left 10/20/2020    Procedure: LEFT CUBITAL TUNNEL RELEASE;  Surgeon: Raymundo Kiran MD;  Location: [facility];  Service: Orthopedics;  Laterality: Left;   • URETEROSCOPY Right 9/13/2018    Procedure: CYSTOSCOPY RIGHT RETROGRADE PYELOGRAM RIGHT URETEROSCOPY WITH BIOPSY OF  RIGHT RENAL PELVIS LASER LITHOTRIPSY AND RIGHT URETERAL STENT INSERTION;  Surgeon: Yifan Greco MD;  Location:  [facility];  Service: Urology   • URETEROSCOPY Right 8/9/2019    Procedure: RIGHT FLEXIBLE URETEROSCOPY WITH RENAL PELVIC BIOPSY AND RIGHT URETERAL STENT;  Surgeon: Yifan Greco MD;  Location:  [facility];  Service: Urology       Outpatient Medications Marked as Taking for the 11/3/21 encounter (Office Visit) with Becca Blackwood APRN   Medication Sig Dispense Refill   • amLODIPine (NORVASC) 5 MG tablet Take 5 mg by mouth Daily.     • ARIPiprazole (ABILIFY) 10 MG tablet Take 10 mg by mouth Daily.     • cetirizine (zyrTEC) 10 MG tablet Take 10 mg by mouth Daily.     • cloNIDine (CATAPRES) 0.1 MG tablet Take 0.1 mg by mouth 2 (Two) Times a Day As Needed for High Blood Pressure.     • DULoxetine (CYMBALTA) 60 MG capsule Take 60 mg by mouth Daily.     • HYDROcodone-acetaminophen (NORCO) 7.5-325 MG per tablet 1 tab po q6 hrs prn pain 15 tablet 0   • LORazepam (ATIVAN) 0.5 MG tablet Take 0.5 mg by mouth Every 8 (Eight) Hours As Needed for Anxiety.     • metoprolol succinate XL (TOPROL-XL) 100 MG 24 hr tablet Take 100 mg by mouth Daily.     • ondansetron (ZOFRAN) 4 MG tablet Take 4 mg by mouth Every 8 (Eight) Hours As Needed for Nausea or Vomiting.     • ondansetron (Zofran) 4 MG tablet Take 1 tablet by mouth Every 8 (Eight) Hours As Needed for Nausea or Vomiting. 10 tablet 0   • pantoprazole (PROTONIX) 40 MG EC tablet Take 1 tablet by mouth 2 (Two) Times a Day. 180 tablet 3   • PROAIR  (90 BASE) MCG/ACT inhaler USE TWO PUFFS BY MOUTH EVERY 4 TO 6 HOURS AS NEEDED FOR WHEEZING  2     Allergies   Allergen Reactions   • Lyrica [Pregabalin] Swelling     LIPS AND FACE   • Morphine Itching   • Codeine Itching   • Penicillins Unknown - Low Severity     CHILDHOOD ALLERGY       Social History     Socioeconomic History   • Marital status:    Tobacco Use   • Smoking status: Current Every  Day Smoker     Packs/day: 1.00     Types: Cigarettes   • Smokeless tobacco: Never Used   Vaping Use   • Vaping Use: Never used   Substance and Sexual Activity   • Alcohol use: No   • Drug use: No   • Sexual activity: Defer     Family History   Problem Relation Age of Onset   • Diabetes Sister    • Cancer Brother    • Colon cancer Neg Hx    • Colon polyps Neg Hx      Health Maintenance   Topic Date Due   • DXA SCAN  Never done   • ANNUAL PHYSICAL  Never done   • Pneumococcal Vaccine 65+ (1 of 2 - PPSV23) Never done   • TDAP/TD VACCINES (1 - Tdap) Never done   • ZOSTER VACCINE (1 of 2) Never done   • MAMMOGRAM  Never done   • PAP SMEAR  Never done   • LIPID PANEL  08/09/2020   • INFLUENZA VACCINE  Never done   • COLORECTAL CANCER SCREENING  05/21/2026   • HEPATITIS C SCREENING  Completed   • COVID-19 Vaccine  Completed     Review of Systems   Constitutional: Negative for activity change, appetite change, chills, diaphoresis, fatigue, fever and unexpected weight change.   HENT: Negative for ear pain, hearing loss, mouth sores, sore throat, trouble swallowing and voice change.    Eyes: Negative.    Respiratory: Negative for cough, choking, shortness of breath and wheezing.    Cardiovascular: Negative for chest pain and palpitations.   Gastrointestinal: Positive for diarrhea. Negative for abdominal pain, blood in stool, constipation, nausea and vomiting.   Endocrine: Negative for cold intolerance and heat intolerance.   Genitourinary: Negative for decreased urine volume, dysuria, frequency, hematuria and urgency.   Musculoskeletal: Negative for back pain, gait problem and myalgias.   Skin: Negative for color change, pallor and rash.   Allergic/Immunologic: Negative for food allergies and immunocompromised state.   Neurological: Negative for dizziness, tremors, seizures, syncope, weakness, light-headedness, numbness and headaches.   Hematological: Negative for adenopathy. Does not bruise/bleed easily.  "  Psychiatric/Behavioral: Negative for agitation and confusion. The patient is not nervous/anxious.    All other systems reviewed and are negative.    Objective   Vitals:    11/03/21 0846   BP: 136/72   Pulse: 86   Temp: 97.1 °F (36.2 °C)   SpO2: 95%   Weight: 77.1 kg (170 lb)   Height: 160 cm (63\")     Body mass index is 30.11 kg/m².  Physical Exam  Constitutional:       Appearance: She is well-developed.   HENT:      Head: Normocephalic and atraumatic.   Eyes:      Pupils: Pupils are equal, round, and reactive to light.   Neck:      Trachea: No tracheal deviation.   Cardiovascular:      Rate and Rhythm: Normal rate and regular rhythm.      Heart sounds: Normal heart sounds. No murmur heard.  No friction rub. No gallop.    Pulmonary:      Effort: Pulmonary effort is normal. No respiratory distress.      Breath sounds: Normal breath sounds. No wheezing or rales.   Chest:      Chest wall: No tenderness.   Abdominal:      General: Bowel sounds are normal. There is no distension.      Palpations: Abdomen is soft. Abdomen is not rigid.      Tenderness: There is no abdominal tenderness. There is no guarding or rebound.   Musculoskeletal:         General: No tenderness or deformity. Normal range of motion.      Cervical back: Normal range of motion and neck supple.   Skin:     General: Skin is warm and dry.      Coloration: Skin is not pale.      Findings: No rash.   Neurological:      Mental Status: She is alert and oriented to person, place, and time.      Deep Tendon Reflexes: Reflexes are normal and symmetric.   Psychiatric:         Behavior: Behavior normal.         Thought Content: Thought content normal.         Judgment: Judgment normal.       Assessment/Plan   Diagnoses and all orders for this visit:    1. Diarrhea in adult patient (Primary)  -     Gastrointestinal Panel, PCR - Stool, Per Rectum  -     Fecal Lactoferrin - Stool, Per Rectum; Future  -     Ova & Parasite Examination - Stool, Per Rectum  -     " Clostridium Difficile Toxin - Stool, Per Rectum    2. Irritable bowel syndrome with diarrhea    3. Tobacco abuse counseling    No artificial sweeteners  No sugar high fat greasy foods.   Fiber suggested  Will rule out infectious she has been on antibiotics recently she says.     Part of this note may be an electronic transcription/translation of spoken language to printed text using the Dragon Dictation System.  Body mass index is 30.11 kg/m².  No follow-ups on file.    Patient's Body mass index is 30.11 kg/m². indicating that she is obese (BMI >30). Obesity-related health conditions include the following: none. Obesity is unchanged. BMI is is above average; BMI management plan is completed. We discussed portion control and increasing exercise..      All risks, benefits, alternatives, and indications of colonoscopy and/or Endoscopy procedure have been discussed with the patient. Risks to include perforation of the colon requiring possible surgery or colostomy, risk of bleeding from biopsies or removal of colon tissue, possibility of missing a colon polyp or cancer, or adverse drug reaction.  Benefits to include the diagnosis and management of disease of the colon and rectum. Alternatives to include barium enema, radiographic evaluation, lab testing or no intervention. Pt verbalizes understanding and agrees.     Becca Blackwood, APRN  11/3/2021  09:06 CDT          If you smoke or use tobacco, 4 minutes reading provided  Steps to Quit Smoking  Smoking tobacco can be harmful to your health and can affect almost every organ in your body. Smoking puts you, and those around you, at risk for developing many serious chronic diseases. Quitting smoking is difficult, but it is one of the best things that you can do for your health. It is never too late to quit.  What are the benefits of quitting smoking?  When you quit smoking, you lower your risk of developing serious diseases and conditions, such as:  · Lung cancer or  lung disease, such as COPD.  · Heart disease.  · Stroke.  · Heart attack.  · Infertility.  · Osteoporosis and bone fractures.  Additionally, symptoms such as coughing, wheezing, and shortness of breath may get better when you quit. You may also find that you get sick less often because your body is stronger at fighting off colds and infections. If you are pregnant, quitting smoking can help to reduce your chances of having a baby of low birth weight.  How do I get ready to quit?  When you decide to quit smoking, create a plan to make sure that you are successful. Before you quit:  · Pick a date to quit. Set a date within the next two weeks to give you time to prepare.  · Write down the reasons why you are quitting. Keep this list in places where you will see it often, such as on your bathroom mirror or in your car or wallet.  · Identify the people, places, things, and activities that make you want to smoke (triggers) and avoid them. Make sure to take these actions:  ¨ Throw away all cigarettes at home, at work, and in your car.  ¨ Throw away smoking accessories, such as ashtrays and lighters.  ¨ Clean your car and make sure to empty the ashtray.  ¨ Clean your home, including curtains and carpets.  · Tell your family, friends, and coworkers that you are quitting. Support from your loved ones can make quitting easier.  · Talk with your health care provider about your options for quitting smoking.  · Find out what treatment options are covered by your health insurance.  What strategies can I use to quit smoking?  Talk with your healthcare provider about different strategies to quit smoking. Some strategies include:  · Quitting smoking altogether instead of gradually lessening how much you smoke over a period of time. Research shows that quitting “cold turkey” is more successful than gradually quitting.  · Attending in-person counseling to help you build problem-solving skills. You are more likely to have success in  quitting if you attend several counseling sessions. Even short sessions of 10 minutes can be effective.  · Finding resources and support systems that can help you to quit smoking and remain smoke-free after you quit. These resources are most helpful when you use them often. They can include:  ¨ Online chats with a counselor.  ¨ Telephone quitlines.  ¨ Printed self-help materials.  ¨ Support groups or group counseling.  ¨ Text messaging programs.  ¨ Mobile phone applications.  · Taking medicines to help you quit smoking. (If you are pregnant or breastfeeding, talk with your health care provider first.) Some medicines contain nicotine and some do not. Both types of medicines help with cravings, but the medicines that include nicotine help to relieve withdrawal symptoms. Your health care provider may recommend:  ¨ Nicotine patches, gum, or lozenges.  ¨ Nicotine inhalers or sprays.  ¨ Non-nicotine medicine that is taken by mouth.  Talk with your health care provider about combining strategies, such as taking medicines while you are also receiving in-person counseling. Using these two strategies together makes you more likely to succeed in quitting than if you used either strategy on its own.  If you are pregnant or breastfeeding, talk with your health care provider about finding counseling or other support strategies to quit smoking. Do not take medicine to help you quit smoking unless told to do so by your health care provider.  What things can I do to make it easier to quit?  Quitting smoking might feel overwhelming at first, but there is a lot that you can do to make it easier. Take these important actions:  · Reach out to your family and friends and ask that they support and encourage you during this time. Call telephone quitlines, reach out to support groups, or work with a counselor for support.  · Ask people who smoke to avoid smoking around you.  · Avoid places that trigger you to smoke, such as bars, parties,  or smoke-break areas at work.  · Spend time around people who do not smoke.  · Lessen stress in your life, because stress can be a smoking trigger for some people. To lessen stress, try:  ¨ Exercising regularly.  ¨ Deep-breathing exercises.  ¨ Yoga.  ¨ Meditating.  ¨ Performing a body scan. This involves closing your eyes, scanning your body from head to toe, and noticing which parts of your body are particularly tense. Purposefully relax the muscles in those areas.  · Download or purchase mobile phone or tablet apps (applications) that can help you stick to your quit plan by providing reminders, tips, and encouragement. There are many free apps, such as QuitGuide from the CDC (Centers for Disease Control and Prevention). You can find other support for quitting smoking (smoking cessation) through smokefree.gov and other websites.  How will I feel when I quit smoking?  Within the first 24 hours of quitting smoking, you may start to feel some withdrawal symptoms. These symptoms are usually most noticeable 2-3 days after quitting, but they usually do not last beyond 2-3 weeks. Changes or symptoms that you might experience include:  · Mood swings.  · Restlessness, anxiety, or irritation.  · Difficulty concentrating.  · Dizziness.  · Strong cravings for sugary foods in addition to nicotine.  · Mild weight gain.  · Constipation.  · Nausea.  · Coughing or a sore throat.  · Changes in how your medicines work in your body.  · A depressed mood.  · Difficulty sleeping (insomnia).  After the first 2-3 weeks of quitting, you may start to notice more positive results, such as:  · Improved sense of smell and taste.  · Decreased coughing and sore throat.  · Slower heart rate.  · Lower blood pressure.  · Clearer skin.  · The ability to breathe more easily.  · Fewer sick days.  Quitting smoking is very challenging for most people. Do not get discouraged if you are not successful the first time. Some people need to make many attempts  to quit before they achieve long-term success. Do your best to stick to your quit plan, and talk with your health care provider if you have any questions or concerns.  This information is not intended to replace advice given to you by your health care provider. Make sure you discuss any questions you have with your health care provider.  Document Released: 12/12/2002 Document Revised: 08/15/2017 Document Reviewed: 05/03/2016  Elsevier Interactive Patient Education © 2017 [employer]

## 2021-11-05 LAB
LACTOFERRIN STL QL LA: POSITIVE
O+P SPEC MICRO: NORMAL
O+P STL CONC: NORMAL

## 2022-02-24 NOTE — ADDENDUM NOTE
Addendum  created 09/14/18 0842 by Yifan Rosa, CRNA    Anesthesia Intra Meds edited       PRINCIPAL DISCHARGE DIAGNOSIS  Diagnosis: Lumbar radiculopathy  Assessment and Plan of Treatment:

## 2022-11-26 DIAGNOSIS — K21.9 GASTROESOPHAGEAL REFLUX DISEASE: ICD-10-CM

## 2022-11-28 RX ORDER — PANTOPRAZOLE SODIUM 40 MG/1
TABLET, DELAYED RELEASE ORAL
Qty: 180 TABLET | Refills: 3 | Status: SHIPPED | OUTPATIENT
Start: 2022-11-28

## 2023-09-24 ENCOUNTER — APPOINTMENT (OUTPATIENT)
Dept: GENERAL RADIOLOGY | Facility: HOSPITAL | Age: 68
DRG: 193 | End: 2023-09-24
Payer: MEDICARE

## 2023-09-24 ENCOUNTER — APPOINTMENT (OUTPATIENT)
Dept: CT IMAGING | Facility: HOSPITAL | Age: 68
DRG: 193 | End: 2023-09-24
Payer: MEDICARE

## 2023-09-24 ENCOUNTER — HOSPITAL ENCOUNTER (INPATIENT)
Facility: HOSPITAL | Age: 68
LOS: 3 days | Discharge: HOME | DRG: 193 | End: 2023-09-27
Attending: FAMILY MEDICINE | Admitting: FAMILY MEDICINE
Payer: MEDICARE

## 2023-09-24 DIAGNOSIS — I10 PRIMARY HYPERTENSION: ICD-10-CM

## 2023-09-24 DIAGNOSIS — R09.02 HYPOXIA: ICD-10-CM

## 2023-09-24 DIAGNOSIS — J18.9 PNEUMONIA OF LEFT LOWER LOBE DUE TO INFECTIOUS ORGANISM: Primary | ICD-10-CM

## 2023-09-24 DIAGNOSIS — J44.1 COPD EXACERBATION: ICD-10-CM

## 2023-09-24 LAB
A-A DO2: 38.9 MMHG
ALBUMIN SERPL-MCNC: 4.5 G/DL (ref 3.5–5.2)
ALBUMIN/GLOB SERPL: 1.6 G/DL
ALP SERPL-CCNC: 77 U/L (ref 39–117)
ALT SERPL W P-5'-P-CCNC: 6 U/L (ref 1–33)
ANION GAP SERPL CALCULATED.3IONS-SCNC: 15 MMOL/L (ref 5–15)
APTT PPP: 26.6 SECONDS (ref 24.5–36)
ARTERIAL PATENCY WRIST A: ABNORMAL
AST SERPL-CCNC: 8 U/L (ref 1–32)
ATMOSPHERIC PRESS: 749 MMHG
BASE EXCESS BLDA CALC-SCNC: 1.5 MMOL/L (ref 0–2)
BASOPHILS # BLD AUTO: 0.05 10*3/MM3 (ref 0–0.2)
BASOPHILS NFR BLD AUTO: 0.6 % (ref 0–1.5)
BDY SITE: ABNORMAL
BILIRUB SERPL-MCNC: <0.2 MG/DL (ref 0–1.2)
BODY TEMPERATURE: 37 C
BUN SERPL-MCNC: 9 MG/DL (ref 8–23)
BUN/CREAT SERPL: 15.8 (ref 7–25)
CALCIUM SPEC-SCNC: 9.9 MG/DL (ref 8.6–10.5)
CHLORIDE SERPL-SCNC: 103 MMOL/L (ref 98–107)
CO2 SERPL-SCNC: 24 MMOL/L (ref 22–29)
COHGB MFR BLD: 8.5 % (ref 0–5)
CREAT SERPL-MCNC: 0.57 MG/DL (ref 0.57–1)
D-LACTATE SERPL-SCNC: 0.5 MMOL/L (ref 0.5–2)
DEPRECATED RDW RBC AUTO: 56.4 FL (ref 37–54)
EGFRCR SERPLBLD CKD-EPI 2021: 99.7 ML/MIN/1.73
EOSINOPHIL # BLD AUTO: 0.06 10*3/MM3 (ref 0–0.4)
EOSINOPHIL NFR BLD AUTO: 0.7 % (ref 0.3–6.2)
ERYTHROCYTE [DISTWIDTH] IN BLOOD BY AUTOMATED COUNT: 16.8 % (ref 12.3–15.4)
FLUAV RNA RESP QL NAA+PROBE: NOT DETECTED
FLUBV RNA RESP QL NAA+PROBE: NOT DETECTED
GEN 5 2HR TROPONIN T REFLEX: 9 NG/L
GLOBULIN UR ELPH-MCNC: 2.8 GM/DL
GLUCOSE SERPL-MCNC: 139 MG/DL (ref 65–99)
HCO3 BLDA-SCNC: 27 MMOL/L (ref 20–26)
HCT VFR BLD AUTO: 43.8 % (ref 34–46.6)
HCT VFR BLD CALC: 40.1 % (ref 38–51)
HGB BLD-MCNC: 13.7 G/DL (ref 12–15.9)
HGB BLDA-MCNC: 13.1 G/DL (ref 12–16)
HOLD SPECIMEN: NORMAL
HOLD SPECIMEN: NORMAL
IMM GRANULOCYTES # BLD AUTO: 0.05 10*3/MM3 (ref 0–0.05)
IMM GRANULOCYTES NFR BLD AUTO: 0.6 % (ref 0–0.5)
INR PPP: 0.97 (ref 0.91–1.09)
LYMPHOCYTES # BLD AUTO: 0.6 10*3/MM3 (ref 0.7–3.1)
LYMPHOCYTES NFR BLD AUTO: 6.7 % (ref 19.6–45.3)
Lab: ABNORMAL
MAGNESIUM SERPL-MCNC: 1.8 MG/DL (ref 1.6–2.4)
MCH RBC QN AUTO: 28.5 PG (ref 26.6–33)
MCHC RBC AUTO-ENTMCNC: 31.3 G/DL (ref 31.5–35.7)
MCV RBC AUTO: 91.3 FL (ref 79–97)
METHGB BLD QL: 0.8 % (ref 0–3)
MODALITY: ABNORMAL
MONOCYTES # BLD AUTO: 0.58 10*3/MM3 (ref 0.1–0.9)
MONOCYTES NFR BLD AUTO: 6.5 % (ref 5–12)
NEUTROPHILS NFR BLD AUTO: 7.56 10*3/MM3 (ref 1.7–7)
NEUTROPHILS NFR BLD AUTO: 84.9 % (ref 42.7–76)
NRBC BLD AUTO-RTO: 0 /100 WBC (ref 0–0.2)
OXYHGB MFR BLDV: 83.1 % (ref 94–99)
PCO2 BLDA: 44.7 MM HG (ref 35–45)
PCO2 TEMP ADJ BLD: 44.7 MM HG (ref 35–45)
PH BLDA: 7.39 PH UNITS (ref 7.35–7.45)
PH, TEMP CORRECTED: 7.39 PH UNITS (ref 7.35–7.45)
PLATELET # BLD AUTO: 192 10*3/MM3 (ref 140–450)
PMV BLD AUTO: 9 FL (ref 6–12)
PO2 BLDA: 58.2 MM HG (ref 83–108)
PO2 TEMP ADJ BLD: 58.2 MM HG (ref 83–108)
POTASSIUM BLDA-SCNC: 3.1 MMOL/L (ref 3.5–5.2)
POTASSIUM SERPL-SCNC: 2.9 MMOL/L (ref 3.5–5.2)
PROT SERPL-MCNC: 7.3 G/DL (ref 6–8.5)
PROTHROMBIN TIME: 13 SECONDS (ref 11.8–14.8)
RBC # BLD AUTO: 4.8 10*6/MM3 (ref 3.77–5.28)
SAO2 % BLDCOA: 91.5 % (ref 94–99)
SARS-COV-2 RNA RESP QL NAA+PROBE: NOT DETECTED
SODIUM BLDA-SCNC: 145 MMOL/L (ref 136–145)
SODIUM SERPL-SCNC: 142 MMOL/L (ref 136–145)
TROPONIN T DELTA: -2 NG/L
TROPONIN T SERPL HS-MCNC: 11 NG/L
VENTILATOR MODE: ABNORMAL
WBC NRBC COR # BLD: 8.9 10*3/MM3 (ref 3.4–10.8)
WHOLE BLOOD HOLD COAG: NORMAL
WHOLE BLOOD HOLD SPECIMEN: NORMAL

## 2023-09-24 PROCEDURE — 84484 ASSAY OF TROPONIN QUANT: CPT | Performed by: NURSE PRACTITIONER

## 2023-09-24 PROCEDURE — 71275 CT ANGIOGRAPHY CHEST: CPT

## 2023-09-24 PROCEDURE — 93005 ELECTROCARDIOGRAM TRACING: CPT | Performed by: NURSE PRACTITIONER

## 2023-09-24 PROCEDURE — 0 POTASSIUM CHLORIDE 10 MEQ/100ML SOLUTION: Performed by: NURSE PRACTITIONER

## 2023-09-24 PROCEDURE — 83050 HGB METHEMOGLOBIN QUAN: CPT

## 2023-09-24 PROCEDURE — 25010000002 PROCHLORPERAZINE 10 MG/2ML SOLUTION: Performed by: NURSE PRACTITIONER

## 2023-09-24 PROCEDURE — 83605 ASSAY OF LACTIC ACID: CPT | Performed by: NURSE PRACTITIONER

## 2023-09-24 PROCEDURE — 99285 EMERGENCY DEPT VISIT HI MDM: CPT

## 2023-09-24 PROCEDURE — 85610 PROTHROMBIN TIME: CPT | Performed by: NURSE PRACTITIONER

## 2023-09-24 PROCEDURE — 25010000002 ONDANSETRON PER 1 MG: Performed by: NURSE PRACTITIONER

## 2023-09-24 PROCEDURE — 94799 UNLISTED PULMONARY SVC/PX: CPT

## 2023-09-24 PROCEDURE — 85730 THROMBOPLASTIN TIME PARTIAL: CPT | Performed by: NURSE PRACTITIONER

## 2023-09-24 PROCEDURE — 87150 DNA/RNA AMPLIFIED PROBE: CPT | Performed by: NURSE PRACTITIONER

## 2023-09-24 PROCEDURE — 80053 COMPREHEN METABOLIC PANEL: CPT | Performed by: NURSE PRACTITIONER

## 2023-09-24 PROCEDURE — 25010000002 METHYLPREDNISOLONE PER 125 MG: Performed by: NURSE PRACTITIONER

## 2023-09-24 PROCEDURE — 85025 COMPLETE CBC W/AUTO DIFF WBC: CPT | Performed by: NURSE PRACTITIONER

## 2023-09-24 PROCEDURE — 25010000002 KETOROLAC TROMETHAMINE PER 15 MG: Performed by: NURSE PRACTITIONER

## 2023-09-24 PROCEDURE — 25510000001 IOPAMIDOL PER 1 ML: Performed by: NURSE PRACTITIONER

## 2023-09-24 PROCEDURE — 36600 WITHDRAWAL OF ARTERIAL BLOOD: CPT

## 2023-09-24 PROCEDURE — 87636 SARSCOV2 & INF A&B AMP PRB: CPT | Performed by: NURSE PRACTITIONER

## 2023-09-24 PROCEDURE — 87147 CULTURE TYPE IMMUNOLOGIC: CPT | Performed by: NURSE PRACTITIONER

## 2023-09-24 PROCEDURE — 93010 ELECTROCARDIOGRAM REPORT: CPT | Performed by: INTERNAL MEDICINE

## 2023-09-24 PROCEDURE — 25010000002 CEFTRIAXONE PER 250 MG: Performed by: NURSE PRACTITIONER

## 2023-09-24 PROCEDURE — 25010000002 AZITHROMYCIN PER 500 MG: Performed by: NURSE PRACTITIONER

## 2023-09-24 PROCEDURE — 94761 N-INVAS EAR/PLS OXIMETRY MLT: CPT

## 2023-09-24 PROCEDURE — 71045 X-RAY EXAM CHEST 1 VIEW: CPT

## 2023-09-24 PROCEDURE — 87040 BLOOD CULTURE FOR BACTERIA: CPT | Performed by: NURSE PRACTITIONER

## 2023-09-24 PROCEDURE — 83735 ASSAY OF MAGNESIUM: CPT | Performed by: NURSE PRACTITIONER

## 2023-09-24 PROCEDURE — 25010000002 DIPHENHYDRAMINE PER 50 MG: Performed by: NURSE PRACTITIONER

## 2023-09-24 PROCEDURE — 82805 BLOOD GASES W/O2 SATURATION: CPT

## 2023-09-24 PROCEDURE — 25010000002 ENOXAPARIN PER 10 MG: Performed by: FAMILY MEDICINE

## 2023-09-24 PROCEDURE — 94640 AIRWAY INHALATION TREATMENT: CPT

## 2023-09-24 PROCEDURE — 82375 ASSAY CARBOXYHB QUANT: CPT

## 2023-09-24 RX ORDER — SODIUM CHLORIDE 0.9 % (FLUSH) 0.9 %
10 SYRINGE (ML) INJECTION AS NEEDED
Status: DISCONTINUED | OUTPATIENT
Start: 2023-09-24 | End: 2023-09-27 | Stop reason: HOSPADM

## 2023-09-24 RX ORDER — ONDANSETRON 2 MG/ML
4 INJECTION INTRAMUSCULAR; INTRAVENOUS EVERY 6 HOURS PRN
Status: DISCONTINUED | OUTPATIENT
Start: 2023-09-24 | End: 2023-09-27 | Stop reason: HOSPADM

## 2023-09-24 RX ORDER — IPRATROPIUM BROMIDE AND ALBUTEROL SULFATE 2.5; .5 MG/3ML; MG/3ML
3 SOLUTION RESPIRATORY (INHALATION) ONCE
Status: COMPLETED | OUTPATIENT
Start: 2023-09-24 | End: 2023-09-24

## 2023-09-24 RX ORDER — IPRATROPIUM BROMIDE AND ALBUTEROL SULFATE 2.5; .5 MG/3ML; MG/3ML
3 SOLUTION RESPIRATORY (INHALATION)
Status: DISCONTINUED | OUTPATIENT
Start: 2023-09-24 | End: 2023-09-27 | Stop reason: HOSPADM

## 2023-09-24 RX ORDER — FAMOTIDINE 10 MG/ML
20 INJECTION, SOLUTION INTRAVENOUS ONCE
Status: COMPLETED | OUTPATIENT
Start: 2023-09-24 | End: 2023-09-24

## 2023-09-24 RX ORDER — METHYLPREDNISOLONE SODIUM SUCCINATE 125 MG/2ML
125 INJECTION, POWDER, LYOPHILIZED, FOR SOLUTION INTRAMUSCULAR; INTRAVENOUS ONCE
Status: COMPLETED | OUTPATIENT
Start: 2023-09-24 | End: 2023-09-24

## 2023-09-24 RX ORDER — KETOROLAC TROMETHAMINE 15 MG/ML
15 INJECTION, SOLUTION INTRAMUSCULAR; INTRAVENOUS ONCE
Status: COMPLETED | OUTPATIENT
Start: 2023-09-24 | End: 2023-09-24

## 2023-09-24 RX ORDER — POTASSIUM CHLORIDE 750 MG/1
40 CAPSULE, EXTENDED RELEASE ORAL ONCE
Status: COMPLETED | OUTPATIENT
Start: 2023-09-24 | End: 2023-09-24

## 2023-09-24 RX ORDER — ENOXAPARIN SODIUM 100 MG/ML
40 INJECTION SUBCUTANEOUS NIGHTLY
Status: DISCONTINUED | OUTPATIENT
Start: 2023-09-24 | End: 2023-09-27 | Stop reason: HOSPADM

## 2023-09-24 RX ORDER — POTASSIUM CHLORIDE 7.45 MG/ML
10 INJECTION INTRAVENOUS ONCE
Status: COMPLETED | OUTPATIENT
Start: 2023-09-24 | End: 2023-09-24

## 2023-09-24 RX ORDER — ACETAMINOPHEN 325 MG/1
650 TABLET ORAL EVERY 6 HOURS PRN
Status: DISCONTINUED | OUTPATIENT
Start: 2023-09-24 | End: 2023-09-27 | Stop reason: HOSPADM

## 2023-09-24 RX ORDER — ARIPIPRAZOLE 10 MG/1
10 TABLET ORAL DAILY
Status: DISCONTINUED | OUTPATIENT
Start: 2023-09-25 | End: 2023-09-27 | Stop reason: HOSPADM

## 2023-09-24 RX ORDER — CLONIDINE HYDROCHLORIDE 0.1 MG/1
0.1 TABLET ORAL ONCE
Status: COMPLETED | OUTPATIENT
Start: 2023-09-24 | End: 2023-09-24

## 2023-09-24 RX ORDER — LORAZEPAM 0.5 MG/1
0.5 TABLET ORAL EVERY 8 HOURS PRN
Status: DISCONTINUED | OUTPATIENT
Start: 2023-09-24 | End: 2023-09-27 | Stop reason: HOSPADM

## 2023-09-24 RX ORDER — PANTOPRAZOLE SODIUM 40 MG/1
40 TABLET, DELAYED RELEASE ORAL 2 TIMES DAILY
Status: DISCONTINUED | OUTPATIENT
Start: 2023-09-24 | End: 2023-09-27 | Stop reason: HOSPADM

## 2023-09-24 RX ORDER — CETIRIZINE HYDROCHLORIDE 10 MG/1
10 TABLET ORAL DAILY
Status: DISCONTINUED | OUTPATIENT
Start: 2023-09-25 | End: 2023-09-27 | Stop reason: HOSPADM

## 2023-09-24 RX ORDER — PREDNISONE 20 MG/1
40 TABLET ORAL DAILY
Status: DISCONTINUED | OUTPATIENT
Start: 2023-09-25 | End: 2023-09-27 | Stop reason: HOSPADM

## 2023-09-24 RX ORDER — HYDROCODONE BITARTRATE AND ACETAMINOPHEN 7.5; 325 MG/1; MG/1
1 TABLET ORAL EVERY 6 HOURS PRN
Status: DISCONTINUED | OUTPATIENT
Start: 2023-09-24 | End: 2023-09-27 | Stop reason: HOSPADM

## 2023-09-24 RX ORDER — GUAIFENESIN 600 MG/1
1200 TABLET, EXTENDED RELEASE ORAL EVERY 12 HOURS SCHEDULED
Status: DISCONTINUED | OUTPATIENT
Start: 2023-09-24 | End: 2023-09-27 | Stop reason: HOSPADM

## 2023-09-24 RX ORDER — AMLODIPINE BESYLATE 10 MG/1
10 TABLET ORAL DAILY
Status: DISCONTINUED | OUTPATIENT
Start: 2023-09-25 | End: 2023-09-25

## 2023-09-24 RX ORDER — PROCHLORPERAZINE EDISYLATE 5 MG/ML
5 INJECTION INTRAMUSCULAR; INTRAVENOUS EVERY 6 HOURS PRN
Status: DISCONTINUED | OUTPATIENT
Start: 2023-09-24 | End: 2023-09-27 | Stop reason: HOSPADM

## 2023-09-24 RX ORDER — DIPHENHYDRAMINE HYDROCHLORIDE 50 MG/ML
25 INJECTION INTRAMUSCULAR; INTRAVENOUS ONCE
Status: COMPLETED | OUTPATIENT
Start: 2023-09-24 | End: 2023-09-24

## 2023-09-24 RX ORDER — CLONIDINE HYDROCHLORIDE 0.2 MG/1
0.2 TABLET ORAL EVERY 12 HOURS SCHEDULED
Status: DISCONTINUED | OUTPATIENT
Start: 2023-09-24 | End: 2023-09-25

## 2023-09-24 RX ORDER — DULOXETIN HYDROCHLORIDE 30 MG/1
60 CAPSULE, DELAYED RELEASE ORAL DAILY
Status: DISCONTINUED | OUTPATIENT
Start: 2023-09-25 | End: 2023-09-27 | Stop reason: HOSPADM

## 2023-09-24 RX ORDER — POTASSIUM CHLORIDE 20 MEQ/1
40 TABLET, EXTENDED RELEASE ORAL ONCE
Status: COMPLETED | OUTPATIENT
Start: 2023-09-24 | End: 2023-09-24

## 2023-09-24 RX ORDER — ONDANSETRON 2 MG/ML
4 INJECTION INTRAMUSCULAR; INTRAVENOUS ONCE
Status: COMPLETED | OUTPATIENT
Start: 2023-09-24 | End: 2023-09-24

## 2023-09-24 RX ORDER — METOPROLOL SUCCINATE 100 MG/1
100 TABLET, EXTENDED RELEASE ORAL DAILY
Status: DISCONTINUED | OUTPATIENT
Start: 2023-09-25 | End: 2023-09-25

## 2023-09-24 RX ADMIN — POTASSIUM CHLORIDE 40 MEQ: 10 CAPSULE, COATED, EXTENDED RELEASE ORAL at 19:38

## 2023-09-24 RX ADMIN — POTASSIUM CHLORIDE 40 MEQ: 1500 TABLET, EXTENDED RELEASE ORAL at 11:15

## 2023-09-24 RX ADMIN — KETOROLAC TROMETHAMINE 15 MG: 15 INJECTION, SOLUTION INTRAMUSCULAR; INTRAVENOUS at 11:05

## 2023-09-24 RX ADMIN — ENOXAPARIN SODIUM 40 MG: 100 INJECTION SUBCUTANEOUS at 21:13

## 2023-09-24 RX ADMIN — IPRATROPIUM BROMIDE AND ALBUTEROL SULFATE 3 ML: .5; 3 SOLUTION RESPIRATORY (INHALATION) at 21:11

## 2023-09-24 RX ADMIN — METHYLPREDNISOLONE SODIUM SUCCINATE 125 MG: 125 INJECTION, POWDER, LYOPHILIZED, FOR SOLUTION INTRAMUSCULAR; INTRAVENOUS at 16:43

## 2023-09-24 RX ADMIN — POTASSIUM CHLORIDE 10 MEQ: 7.46 INJECTION, SOLUTION INTRAVENOUS at 11:08

## 2023-09-24 RX ADMIN — IOPAMIDOL 100 ML: 755 INJECTION, SOLUTION INTRAVENOUS at 14:30

## 2023-09-24 RX ADMIN — AZITHROMYCIN DIHYDRATE 500 MG: 500 INJECTION, POWDER, LYOPHILIZED, FOR SOLUTION INTRAVENOUS at 17:56

## 2023-09-24 RX ADMIN — GUAIFENESIN 1200 MG: 600 TABLET, EXTENDED RELEASE ORAL at 21:13

## 2023-09-24 RX ADMIN — DIPHENHYDRAMINE HYDROCHLORIDE 25 MG: 50 INJECTION, SOLUTION INTRAMUSCULAR; INTRAVENOUS at 08:48

## 2023-09-24 RX ADMIN — CLONIDINE HYDROCHLORIDE 0.1 MG: 0.1 TABLET ORAL at 11:05

## 2023-09-24 RX ADMIN — FAMOTIDINE 20 MG: 10 INJECTION INTRAVENOUS at 15:43

## 2023-09-24 RX ADMIN — CLONIDINE HYDROCHLORIDE 0.2 MG: 0.2 TABLET ORAL at 21:13

## 2023-09-24 RX ADMIN — PROCHLORPERAZINE EDISYLATE 5 MG: 5 INJECTION, SOLUTION INTRAMUSCULAR; INTRAVENOUS at 08:47

## 2023-09-24 RX ADMIN — CEFTRIAXONE 1000 MG: 1 INJECTION, POWDER, FOR SOLUTION INTRAMUSCULAR; INTRAVENOUS at 16:54

## 2023-09-24 RX ADMIN — PANTOPRAZOLE SODIUM 40 MG: 40 TABLET, DELAYED RELEASE ORAL at 21:13

## 2023-09-24 RX ADMIN — IPRATROPIUM BROMIDE AND ALBUTEROL SULFATE 3 ML: .5; 3 SOLUTION RESPIRATORY (INHALATION) at 13:19

## 2023-09-24 RX ADMIN — ONDANSETRON 4 MG: 2 INJECTION INTRAMUSCULAR; INTRAVENOUS at 15:43

## 2023-09-24 RX ADMIN — HYDROCODONE BITARTRATE AND ACETAMINOPHEN 1 TABLET: 7.5; 325 TABLET ORAL at 23:04

## 2023-09-24 NOTE — ED NOTES
Nursing report ED to floor  Heather Russo  68 y.o.  female    HPI:   Chief Complaint   Patient presents with    Hypertension    Nausea    Headache       Admitting doctor:   Ephraim Cunningham MD    Consulting provider(s):  Consults       No orders found from 8/26/2023 to 9/25/2023.             Admitting diagnosis:   The primary encounter diagnosis was Pneumonia of left lower lobe due to infectious organism. A diagnosis of Hypoxia was also pertinent to this visit.    Code status:   Current Code Status       Date Active Code Status Order ID Comments User Context       Prior            Allergies:   Lyrica [pregabalin], Morphine, Codeine, and Penicillins    Intake and Output  No intake or output data in the 24 hours ending 09/24/23 1743    Weight:       09/24/23  0753   Weight: 58.1 kg (128 lb)       Most recent vitals:   Vitals:    09/24/23 1045 09/24/23 1046 09/24/23 1319 09/24/23 1327   BP:  174/85     Pulse: 74 83 68 75   Resp:   16    Temp:       SpO2: (!) 87% (!) 88% (!) 89%    Weight:       Height:         Oxygen Therapy: .    Active LDAs/IV Access:   Lines, Drains & Airways       Active LDAs       Name Placement date Placement time Site Days    Peripheral IV 09/24/23 0815 Posterior;Right Hand 09/24/23  0815  Hand  less than 1    Peripheral IV 09/24/23 1643 Posterior;Right Forearm 09/24/23 1643  Forearm  less than 1                    Labs (abnormal labs have a star):   Labs Reviewed   COMPREHENSIVE METABOLIC PANEL - Abnormal; Notable for the following components:       Result Value    Glucose 139 (*)     Potassium 2.9 (*)     All other components within normal limits    Narrative:     GFR Normal >60  Chronic Kidney Disease <60  Kidney Failure <15     TROPONIN - Abnormal; Notable for the following components:    HS Troponin T 11 (*)     All other components within normal limits    Narrative:     High Sensitive Troponin T Reference Range:  <10.0 ng/L- Negative Female for AMI  <15.0 ng/L- Negative Male for  AMI  >=10 - Abnormal Female indicating possible myocardial injury.  >=15 - Abnormal Male indicating possible myocardial injury.   Clinicians would have to utilize clinical acumen, EKG, Troponin, and serial changes to determine if it is an Acute Myocardial Infarction or myocardial injury due to an underlying chronic condition.        CBC WITH AUTO DIFFERENTIAL - Abnormal; Notable for the following components:    MCHC 31.3 (*)     RDW 16.8 (*)     RDW-SD 56.4 (*)     Neutrophil % 84.9 (*)     Lymphocyte % 6.7 (*)     Immature Grans % 0.6 (*)     Neutrophils, Absolute 7.56 (*)     Lymphocytes, Absolute 0.60 (*)     All other components within normal limits   BLOOD GAS, ARTERIAL W/CO-OXIMETRY - Abnormal; Notable for the following components:    pO2, Arterial 58.2 (*)     HCO3, Arterial 27.0 (*)     O2 Saturation, Arterial 91.5 (*)     Oxyhemoglobin 83.1 (*)     Carboxyhemoglobin 8.5 (*)     Potassium, Arterial 3.1 (*)     pO2, Temperature Corrected 58.2 (*)     All other components within normal limits   COVID-19 AND FLU A/B, NP SWAB IN TRANSPORT MEDIA 8-12 HR TAT - Normal    Narrative:     Fact sheet for providers: https://www.fda.gov/media/133708/download    Fact sheet for patients: https://www.fda.gov/media/548737/download    Test performed by PCR.   PROTIME-INR - Normal   APTT - Normal   HIGH SENSITIVITIY TROPONIN T 2HR - Normal    Narrative:     High Sensitive Troponin T Reference Range:  <10.0 ng/L- Negative Female for AMI  <15.0 ng/L- Negative Male for AMI  >=10 - Abnormal Female indicating possible myocardial injury.  >=15 - Abnormal Male indicating possible myocardial injury.   Clinicians would have to utilize clinical acumen, EKG, Troponin, and serial changes to determine if it is an Acute Myocardial Infarction or myocardial injury due to an underlying chronic condition.        MAGNESIUM - Normal   LACTIC ACID, PLASMA - Normal   BLOOD CULTURE   BLOOD CULTURE   RAINBOW DRAW    Narrative:     The following  orders were created for panel order [facility] Draw.  Procedure                               Abnormality         Status                     ---------                               -----------         ------                     Green Top (Gel)[[id number]]                                  Final result               Lavender Top[[id number]]                                     Final result               Red Top[[id number]]                                          Final result               Light Blue Top[[id number]]                                   Final result                 Please view results for these tests on the individual orders.   BLOOD GAS, ARTERIAL W/CO-OXIMETRY   GREEN TOP   LAVENDER TOP   RED TOP   LIGHT BLUE TOP   CBC AND DIFFERENTIAL    Narrative:     The following orders were created for panel order CBC & Differential.  Procedure                               Abnormality         Status                     ---------                               -----------         ------                     CBC Auto Differential[[id number]]        Abnormal            Final result                 Please view results for these tests on the individual orders.       Meds given in ED:   Medications   sodium chloride 0.9 % flush 10 mL (has no administration in time range)   sodium chloride 0.9 % flush 10 mL (has no administration in time range)   prochlorperazine (COMPAZINE) injection 5 mg (5 mg Intravenous Given 9/24/23 0847)   azithromycin (ZITHROMAX) 500 mg in sodium chloride 0.9 % 250 mL IVPB-VTB (has no administration in time range)   diphenhydrAMINE (BENADRYL) injection 25 mg (25 mg Intravenous Given 9/24/23 0848)   cloNIDine (CATAPRES) tablet 0.1 mg (0.1 mg Oral Given 9/24/23 1105)   potassium chloride (K-DUR,KLOR-CON) CR tablet 40 mEq (40 mEq Oral Given 9/24/23 1115)   potassium chloride 10 mEq in 100 mL IVPB (0 mEq Intravenous Stopped 9/24/23 1200)   ketorolac (TORADOL) injection 15 mg (15 mg Intravenous Given 9/24/23 1105)    ipratropium-albuterol (DUO-NEB) nebulizer solution 3 mL (3 mL Nebulization Given 9/24/23 1319)   iopamidol (ISOVUE-370) 76 % injection 100 mL (100 mL Intravenous Given 9/24/23 1430)   ondansetron (ZOFRAN) injection 4 mg (4 mg Intravenous Given 9/24/23 1543)   famotidine (PEPCID) injection 20 mg (20 mg Intravenous Given 9/24/23 1543)   cefTRIAXone (ROCEPHIN) 1,000 mg in sodium chloride 0.9 % 100 mL IVPB (0 mg Intravenous Stopped 9/24/23 1743)   methylPREDNISolone sodium succinate (SOLU-Medrol) injection 125 mg (125 mg Intravenous Given 9/24/23 1643)           NIH Stroke Scale:       Isolation/Infection(s):  No active isolations   COVID (rule out)     COVID Testing  Collected .  Resulted .    Nursing report ED to floor:  Mental status: .  Ambulatory status: .  Precautions: .    ED nurse phone extentsion- ..

## 2023-09-24 NOTE — H&P
[facility]  HISTORY AND PHYSICAL    Date of Admission: 9/24/2023  Primary Care Physician: Grzegorz Michel Jr., MD    Subjective   Primary Historian: Patient    Chief Complaint: SOA    History of Present Illness  Mrs. Russo is a 68 year old lady who resides in [place], [place].  She has a history of HTN, HLD, and GERD.  She follows with Dr. Grzegorz Michel for primary care support.      She presents to the ER with SOA.  She has had an associated cough.  In the ER, she was hypoxic.  Initial oxygen saturation was 87% on room air.  ABG showed a PO2 of 58.2.  CTA chest shows a LLL infiltrate.  She is being admitted for further evaluation and management.          Review of Systems   Constitutional:  Positive for fatigue. Negative for fever.   HENT:  Negative for congestion and ear pain.    Eyes:  Negative for pain and visual disturbance.   Respiratory:  Positive for cough and shortness of breath. Negative for wheezing.    Cardiovascular:  Negative for chest pain and palpitations.   Gastrointestinal:  Negative for diarrhea, nausea and vomiting.   Endocrine: Negative for heat intolerance.   Genitourinary:  Negative for dysuria and frequency.   Musculoskeletal:  Negative for arthralgias and back pain.   Skin:  Negative for rash and wound.   Neurological:  Negative for dizziness and light-headedness.   Psychiatric/Behavioral:  Negative for confusion. The patient is not nervous/anxious.    All other systems reviewed and are negative.       Otherwise complete ROS reviewed and negative except as mentioned in the HPI.    Past Medical History:   Past Medical History:   Diagnosis Date    Anemia     Anxiety     Arthritis     Cancer of skin     BCC OF NOSE    Colitis     COPD (chronic obstructive pulmonary disease)     Depression     Fibromyalgia     Gastric ulcer     GERD (gastroesophageal reflux disease)     History of transfusion     Hx of colonic polyps     Hypercholesteremia      Hypertension     Seizure     Small cell B-cell lymphoma     Ulcerative colitis      Past Surgical History:  Past Surgical History:   Procedure Laterality Date    BREAST SURGERY      CHOLECYSTECTOMY      COLONOSCOPY  04/12/2017    Hemorrhoids and a few diverticula repeat exam in 3 years Dr. Cordero    COLONOSCOPY N/A 5/2/2019    Collagenous colitis, Diverticulosis repeat exam in 5 years    COLONOSCOPY N/A 5/21/2021    Fair prep repeat exam in 1 year    COLONOSCOPY W/ POLYPECTOMY  07/28/2016    Tubular adenoma proximal to the anus, 30 MM polyp in the cecum not resected, Diverticulosis ref to Dr. Cordero    ENDOSCOPY  06/15/2016    Small hh, Negative for celiac disease    ENDOSCOPY N/A 6/13/2019    HH otherwise normal exam    ENDOSCOPY N/A 5/21/2021    Large HH    RADICAL HYSTERECTOMY      TENNIS ELBOW RELEASE Left 10/20/2020    Procedure: LEFT CUBITAL TUNNEL RELEASE;  Surgeon: Raymundo Kiran MD;  Location:  [facility];  Service: Orthopedics;  Laterality: Left;    URETEROSCOPY Right 9/13/2018    Procedure: CYSTOSCOPY RIGHT RETROGRADE PYELOGRAM RIGHT URETEROSCOPY WITH BIOPSY OF RIGHT RENAL PELVIS LASER LITHOTRIPSY AND RIGHT URETERAL STENT INSERTION;  Surgeon: Yifan Greco MD;  Location:  [facility];  Service: Urology    URETEROSCOPY Right 8/9/2019    Procedure: RIGHT FLEXIBLE URETEROSCOPY WITH RENAL PELVIC BIOPSY AND RIGHT URETERAL STENT;  Surgeon: Yifan Greco MD;  Location:  [facility];  Service: Urology     Social History:  reports that she has been smoking cigarettes. She has been smoking an average of 1 pack per day. She has never used smokeless tobacco. She reports that she does not drink alcohol and does not use drugs.    Family History: family history includes Cancer in her brother; Diabetes in her sister.       Allergies:  Allergies   Allergen Reactions    Lyrica [Pregabalin] Swelling     LIPS AND FACE    Morphine Itching    Codeine Itching    Penicillins Unknown - Low Severity     CHILDHOOD  "ALLERGY         Medications:  Prior to Admission medications    Medication Sig Start Date End Date Taking? Authorizing Provider   amLODIPine (NORVASC) 5 MG tablet Take 2 tablets by mouth Daily.    Perla Bliss MD   ARIPiprazole (ABILIFY) 10 MG tablet Take 10 mg by mouth Daily. 9/19/16   Perla Bliss MD   cetirizine (zyrTEC) 10 MG tablet Take 10 mg by mouth Daily.    Perla Bliss MD   cloNIDine (CATAPRES) 0.1 MG tablet Take 0.1 mg by mouth 2 (Two) Times a Day As Needed for High Blood Pressure.    Perla Bliss MD   DULoxetine (CYMBALTA) 60 MG capsule Take 60 mg by mouth Daily. 9/19/16   Perla Bliss MD   HYDROcodone-acetaminophen (NORCO) 7.5-325 MG per tablet 1 tab po q6 hrs prn pain 10/20/20   Raymundo Kiran MD   LORazepam (ATIVAN) 0.5 MG tablet Take 0.5 mg by mouth Every 8 (Eight) Hours As Needed for Anxiety.    Perla Bliss MD   metoprolol succinate XL (TOPROL-XL) 100 MG 24 hr tablet Take 100 mg by mouth Daily.    Perla Bliss MD   ondansetron (ZOFRAN) 4 MG tablet Take 4 mg by mouth Every 8 (Eight) Hours As Needed for Nausea or Vomiting.    Perla Bliss MD   ondansetron (Zofran) 4 MG tablet Take 1 tablet by mouth Every 8 (Eight) Hours As Needed for Nausea or Vomiting. 10/20/20   Raymundo Kiran MD   pantoprazole (PROTONIX) 40 MG EC tablet TAKE 1 TABLET BY MOUTH TWICE A DAY 11/28/22   Becca Blackwood APRN   PROAIR  (90 BASE) MCG/ACT inhaler USE TWO PUFFS BY MOUTH EVERY 4 TO 6 HOURS AS NEEDED FOR WHEEZING 9/19/16   Perla Bliss MD     I have utilized all available immediate resources to obtain, update, or review the patient's current medications (including all prescriptions, over-the-counter products, herbals, cannabis/cannabidiol products, and vitamin/mineral/dietary (nutritional) supplements).    Objective     Vital Signs: /85   Pulse 75   Temp 98.1 °F (36.7 °C)   Resp 16   Ht 160 cm (63\")   Wt " 58.1 kg (128 lb)   SpO2 (!) 89%   BMI 22.67 kg/m²   Physical Exam  Constitutional:       Appearance: Normal appearance. She is well-developed.   HENT:      Head: Normocephalic and atraumatic.      Right Ear: Tympanic membrane, ear canal and external ear normal.      Left Ear: Tympanic membrane, ear canal and external ear normal.      Nose: Nose normal.      Mouth/Throat:      Mouth: Mucous membranes are moist.      Pharynx: Oropharynx is clear.   Eyes:      Extraocular Movements: Extraocular movements intact.      Conjunctiva/sclera: Conjunctivae normal.      Pupils: Pupils are equal, round, and reactive to light.   Cardiovascular:      Rate and Rhythm: Normal rate and regular rhythm.      Heart sounds: Normal heart sounds.   Pulmonary:      Effort: Pulmonary effort is normal.      Breath sounds: Decreased breath sounds and rhonchi present.   Abdominal:      General: Bowel sounds are normal. There is no distension.      Palpations: Abdomen is soft.      Tenderness: There is no abdominal tenderness.   Musculoskeletal:         General: Normal range of motion.      Cervical back: Normal range of motion and neck supple.   Skin:     General: Skin is warm and dry.   Neurological:      Mental Status: She is alert and oriented to person, place, and time.   Psychiatric:         Mood and Affect: Mood normal.         Speech: Speech normal.         Behavior: Behavior normal.         Thought Content: Thought content normal.         Judgment: Judgment normal.            Results Reviewed:  Lab Results (last 24 hours)       Procedure Component Value Units Date/Time    Lactic Acid, Plasma [[id number]]  (Normal) Collected: 09/24/23 1651    Specimen: Blood Updated: 09/24/23 1716     Lactate 0.5 mmol/L     Blood Culture - Blood, Arm, Left [[id number]] Collected: 09/24/23 1651    Specimen: Blood from Arm, Left Updated: 09/24/23 1710    Blood Culture - Blood, Arm, Left [[id number]] Collected: 09/24/23 1651    Specimen: Blood from Arm,  Left Updated: 09/24/23 1710    COVID-19 and FLU A/B PCR - Swab, Nasopharynx [[id number]]  (Normal) Collected: 09/24/23 1121    Specimen: Swab from Nasopharynx Updated: 09/24/23 1151     COVID19 Not Detected     Influenza A PCR Not Detected     Influenza B PCR Not Detected    Narrative:      Fact sheet for providers: https://www.fda.gov/media/672330/download    Fact sheet for patients: https://www.fda.gov/media/529397/download    Test performed by PCR.    Blood Gas, Arterial With Co-Ox [[id number]]  (Abnormal) Collected: 09/24/23 1121    Specimen: Arterial Blood Updated: 09/24/23 1120     Site Right Brachial     Lebron's Test N/A     pH, Arterial 7.389 pH units      pCO2, Arterial 44.7 mm Hg      pO2, Arterial 58.2 mm Hg      Comment: 84 Value below reference range        HCO3, Arterial 27.0 mmol/L      Comment: 83 Value above reference range        Base Excess, Arterial 1.5 mmol/L      O2 Saturation, Arterial 91.5 %      Comment: 84 Value below reference range        Hemoglobin, Blood Gas 13.1 g/dL      Hematocrit, Blood Gas 40.1 %      Oxyhemoglobin 83.1 %      Comment: 84 Value below reference range        Methemoglobin 0.80 %      Carboxyhemoglobin 8.5 %      Comment: 83 Value above reference range        A-a DO2 38.9 mmHg      Temperature 37.0 C      Sodium, Arterial 145 mmol/L      Comment: 83 Value above reference range        Potassium, Arterial 3.1 mmol/L      Barometric Pressure for Blood Gas 749 mmHg      Modality Room Air     Ventilator Mode NA     Collected by [id number]     Comment: Meter: [id number]     :  [id number]        pH, Temp Corrected 7.389 pH Units      pCO2, Temperature Corrected 44.7 mm Hg      pO2, Temperature Corrected 58.2 mm Hg     Magnesium [[id number]]  (Normal) Collected: 09/24/23 1044    Specimen: Blood Updated: 09/24/23 1113     Magnesium 1.8 mg/dL     High Sensitivity Troponin T 2Hr [[id number]]  (Normal) Collected: 09/24/23 1044    Specimen: Blood Updated: 09/24/23 1108     HS  Troponin T 9 ng/L      Troponin T Delta -2 ng/L     Narrative:      High Sensitive Troponin T Reference Range:  <10.0 ng/L- Negative Female for AMI  <15.0 ng/L- Negative Male for AMI  >=10 - Abnormal Female indicating possible myocardial injury.  >=15 - Abnormal Male indicating possible myocardial injury.   Clinicians would have to utilize clinical acumen, EKG, Troponin, and serial changes to determine if it is an Acute Myocardial Infarction or myocardial injury due to an underlying chronic condition.         [place] Draw [[id number]] Collected: 09/24/23 0814    Specimen: Blood Updated: 09/24/23 0915    Narrative:      The following orders were created for panel order [facility] Draw.  Procedure                               Abnormality         Status                     ---------                               -----------         ------                     Green Top (Gel)[[id number]]                                  Final result               Lavender Top[[id number]]                                     Final result               Red Top[[id number]]                                          Final result               Light Blue Top[[id number]]                                   Final result                 Please view results for these tests on the individual orders.    Green Top (Gel) [[id number]] Collected: 09/24/23 0814    Specimen: Blood Updated: 09/24/23 0915     Extra Tube Hold for add-ons.     Comment: Auto resulted.       Light Blue Top [[id number]] Collected: 09/24/23 0814    Specimen: Blood Updated: 09/24/23 0915     Extra Tube Hold for add-ons.     Comment: Auto resulted       Lavender Top [[id number]] Collected: 09/24/23 0814    Specimen: Blood Updated: 09/24/23 0915     Extra Tube hold for add-on     Comment: Auto resulted       Red Top [[id number]] Collected: 09/24/23 0814    Specimen: Blood Updated: 09/24/23 0915     Extra Tube Hold for add-ons.     Comment: Auto resulted.       Comprehensive Metabolic Panel  [[id number]]  (Abnormal) Collected: 09/24/23 0814    Specimen: Blood Updated: 09/24/23 0851     Glucose 139 mg/dL      BUN 9 mg/dL      Creatinine 0.57 mg/dL      Sodium 142 mmol/L      Potassium 2.9 mmol/L      Chloride 103 mmol/L      CO2 24.0 mmol/L      Calcium 9.9 mg/dL      Total Protein 7.3 g/dL      Albumin 4.5 g/dL      ALT (SGPT) 6 U/L      AST (SGOT) 8 U/L      Alkaline Phosphatase 77 U/L      Total Bilirubin <0.2 mg/dL      Globulin 2.8 gm/dL      A/G Ratio 1.6 g/dL      BUN/Creatinine Ratio 15.8     Anion Gap 15.0 mmol/L      eGFR 99.7 mL/min/1.73     Narrative:      GFR Normal >60  Chronic Kidney Disease <60  Kidney Failure <15      High Sensitivity Troponin T [[id number]]  (Abnormal) Collected: 09/24/23 0814    Specimen: Blood Updated: 09/24/23 0848     HS Troponin T 11 ng/L     Narrative:      High Sensitive Troponin T Reference Range:  <10.0 ng/L- Negative Female for AMI  <15.0 ng/L- Negative Male for AMI  >=10 - Abnormal Female indicating possible myocardial injury.  >=15 - Abnormal Male indicating possible myocardial injury.   Clinicians would have to utilize clinical acumen, EKG, Troponin, and serial changes to determine if it is an Acute Myocardial Infarction or myocardial injury due to an underlying chronic condition.         Protime-INR [[id number]]  (Normal) Collected: 09/24/23 0814    Specimen: Blood Updated: 09/24/23 0840     Protime 13.0 Seconds      INR 0.97    aPTT [[id number]]  (Normal) Collected: 09/24/23 0814    Specimen: Blood Updated: 09/24/23 0840     PTT 26.6 seconds     CBC & Differential [[id number]]  (Abnormal) Collected: 09/24/23 0814    Specimen: Blood Updated: 09/24/23 0831    Narrative:      The following orders were created for panel order CBC & Differential.  Procedure                               Abnormality         Status                     ---------                               -----------         ------                     CBC Auto Differential[[id number]]         Abnormal            Final result                 Please view results for these tests on the individual orders.    CBC Auto Differential [[id number]]  (Abnormal) Collected: 09/24/23 0814    Specimen: Blood Updated: 09/24/23 0831     WBC 8.90 10*3/mm3      RBC 4.80 10*6/mm3      Hemoglobin 13.7 g/dL      Hematocrit 43.8 %      MCV 91.3 fL      MCH 28.5 pg      MCHC 31.3 g/dL      RDW 16.8 %      RDW-SD 56.4 fl      MPV 9.0 fL      Platelets 192 10*3/mm3      Neutrophil % 84.9 %      Lymphocyte % 6.7 %      Monocyte % 6.5 %      Eosinophil % 0.7 %      Basophil % 0.6 %      Immature Grans % 0.6 %      Neutrophils, Absolute 7.56 10*3/mm3      Lymphocytes, Absolute 0.60 10*3/mm3      Monocytes, Absolute 0.58 10*3/mm3      Eosinophils, Absolute 0.06 10*3/mm3      Basophils, Absolute 0.05 10*3/mm3      Immature Grans, Absolute 0.05 10*3/mm3      nRBC 0.0 /100 WBC           Imaging Results (Last 24 Hours)       Procedure Component Value Units Date/Time    CT Angiogram Chest [[id number]] Collected: 09/24/23 1436     Updated: 09/24/23 1443    Narrative:      EXAMINATION: CT ANGIOGRAM CHEST-      9/24/2023 2:05 PM CDT     HISTORY: Chest pain and hypoxia.     In order to have a CT radiation dose as low as reasonably achievable  Automated Exposure Control was utilized for adjustment of the mA and/or  KV according to patient size.     DLP in mGycm= 235.        CT Angiogram Chest with IV contrast.  CT angiography protocol.  CT imaging with bolus IV contrast injection.  Under concurrent supervision axial, sagittal, coronal,  three-dimensional, and MIP data sets were constructed on an independent  work station.     Heart size is within normal limits.  A moderate size hiatal hernia is present.  Coronary artery calcification is present.  Normal size thoracic aorta.  No mediastinal mass.     Symmetric and normally opacified pulmonary arteries.  No pulmonary embolism.     Hyperexpanded lungs.  Upper lobe emphysema.     Dependent  infiltrate within both lower lobes.  The infiltrate is greater on the LEFT side and is concerning for  developing lower lobe pneumonia.     There is no pneumothorax or heart failure.       Impression:      1. No pulmonary embolism.  2. Chronic lung changes.  3. Posterior LEFT lower lobe infiltrate suspicious for developing  pneumonia.                                         This report was signed and finalized on 9/24/2023 2:40 PM CDT by Dr. Chase Rios MD.       XR Chest 1 View [[id number]] Collected: 09/24/23 0841     Updated: 09/24/23 0846    Narrative:      EXAMINATION: XR CHEST 1 VW-     9/24/2023 8:33 AM CDT     HISTORY: chest pain     FINDINGS:  1 view chest x-ray.     Comparison is made with August 10, 2020.        Heart size is normal.  The mediastinum is within normal limits.     The lungs are normally expanded with no pneumonia or pneumothorax.  Mild chronic appearing interstitial disease with a few calcified  granulomas.  No congestive failure changes.       Impression:      1. No acute disease.           This report was signed and finalized on 9/24/2023 8:42 AM CDT by Dr. Chase Rios MD.             I have personally reviewed and interpreted the radiology studies and ECG obtained at time of admission.     Assessment / Plan   Assessment:   Active Hospital Problems    Diagnosis     **Pneumonia        Treatment Plan  The patient will be admitted to my service here at [facility].     1.  Pneumonia  -IV abx  -Blood/sputum cultures    2.  Hypoxia  -IV abx    3.  GERD  -Protonix    4.  COPD  -Nebs PRN    5.  HTN  -Norvasc  -Metoprolol    6.  HLD  -Check Lipid panel        Medical Decision Making  Number and Complexity of problems: 6 problems, high complexity--acute pneumonia represents threat to life/function    Risk:  Decision on admission     Differential Diagnosis: pneumonia    Conditions and Status        Condition is worsening.     [facility] Data  1:  Tests/Documents/Independent Historians:  (3)  A:  Prior external notes from unique source reviewed:  B.  Review of the Results of Each Unique Test: (3) CBC, CMP, Lactic Acid  C.  Assessment requiring an independent Historian:    2.  Independent interpretation of Tests: (2)  A.  Radiology Interpretation: (1) CTA Chest:  No PE, LLL infiltrate  B:  EKG/Telemetry Interpretation: (1) NSR    3:  Discussion of management or Test interpretation  N/a       Care Planning  Shared decision making: patient agreeable to treatment plan  Code status and discussions: full code    Disposition  Social Determinants of Health that impact treatment or disposition: n/a  Estimated length of stay is 2-3 days.     I confirmed that the patient's advanced care plan is present, code status is documented, and a surrogate decision maker is listed in the patient's medical record.     The patient's surrogate decision maker is her .     The patient was seen and examined by me on 9/24/23 at 1810.    Electronically signed by Ephraim Cunningham MD, 09/24/23, 17:28 CDT.

## 2023-09-24 NOTE — ED PROVIDER NOTES
Subjective   History of Present Illness  Patient is a 68-year-old female who presents to the ER with chief complaints of hypertension.  Patient states for the past 2 weeks she has had worsening elevated blood pressures.  She was evaluated by her PCP, Dr. Michel, on Wednesday who changed her blood pressure medication.  Patient has had mild chest wall discomfort, reports substernal chest pressure at times associated with her hypertension.  She denies any chest pain currently.  Patient has history of migraines and states that the elevated blood pressure has caused her to have more headaches.  She denies this being the worst headache of her life.  She has had nausea without any vomiting.  She reports photophobia at times.  She was uncertain if the migraines were actually causing the elevation in her blood pressures.  Patient has had a cough for the past several weeks as well.  At times it is productive.  She denies any known fevers.  She states that she was told she has COPD however has never been evaluated by pulmonology and currently does not use home oxygen.  Past medical history significant for anemia, anxiety, arthritis, colitis, COPD, depression, fibromyalgia, gastric ulcers, GERD, colon polyps, hyperlipidemia, hypertension, small cell B-cell lymphoma, ulcerative colitis, seizures      Review of Systems   Constitutional: Negative.  Negative for fever.   HENT:  Positive for congestion.    Eyes:  Positive for photophobia.   Respiratory:  Positive for cough and shortness of breath.    Cardiovascular:  Positive for chest pain.   Gastrointestinal:  Positive for nausea. Negative for abdominal pain, constipation, diarrhea and vomiting.   Genitourinary: Negative.  Negative for dysuria.   Musculoskeletal: Negative.  Negative for back pain.   Skin: Negative.    Neurological:  Positive for headaches.   All other systems reviewed and are negative.    Past Medical History:   Diagnosis Date    Anemia     Anxiety     Arthritis      Cancer of skin     BCC OF NOSE    Colitis     COPD (chronic obstructive pulmonary disease)     Depression     Fibromyalgia     Gastric ulcer     GERD (gastroesophageal reflux disease)     History of transfusion     Hx of colonic polyps     Hypercholesteremia     Hypertension     Seizure     Small cell B-cell lymphoma     Ulcerative colitis        Allergies   Allergen Reactions    Lyrica [Pregabalin] Swelling     LIPS AND FACE    Morphine Itching    Codeine Itching    Penicillins Unknown - Low Severity     CHILDHOOD ALLERGY         Past Surgical History:   Procedure Laterality Date    BREAST SURGERY      CHOLECYSTECTOMY      COLONOSCOPY  04/12/2017    Hemorrhoids and a few diverticula repeat exam in 3 years Dr. Cordero    COLONOSCOPY N/A 5/2/2019    Collagenous colitis, Diverticulosis repeat exam in 5 years    COLONOSCOPY N/A 5/21/2021    Fair prep repeat exam in 1 year    COLONOSCOPY W/ POLYPECTOMY  07/28/2016    Tubular adenoma proximal to the anus, 30 MM polyp in the cecum not resected, Diverticulosis ref to Dr. Cordero    ENDOSCOPY  06/15/2016    Small hh, Negative for celiac disease    ENDOSCOPY N/A 6/13/2019    HH otherwise normal exam    ENDOSCOPY N/A 5/21/2021    Large HH    RADICAL HYSTERECTOMY      TENNIS ELBOW RELEASE Left 10/20/2020    Procedure: LEFT CUBITAL TUNNEL RELEASE;  Surgeon: Raymundo Kiran MD;  Location:  [facility];  Service: Orthopedics;  Laterality: Left;    URETEROSCOPY Right 9/13/2018    Procedure: CYSTOSCOPY RIGHT RETROGRADE PYELOGRAM RIGHT URETEROSCOPY WITH BIOPSY OF RIGHT RENAL PELVIS LASER LITHOTRIPSY AND RIGHT URETERAL STENT INSERTION;  Surgeon: Yifan Greco MD;  Location:  [facility];  Service: Urology    URETEROSCOPY Right 8/9/2019    Procedure: RIGHT FLEXIBLE URETEROSCOPY WITH RENAL PELVIC BIOPSY AND RIGHT URETERAL STENT;  Surgeon: Yifan Greco MD;  Location:  [facility];  Service: Urology       Family History   Problem Relation Age of Onset    Diabetes Sister      Cancer Brother     Colon cancer Neg Hx     Colon polyps Neg Hx        Social History     Socioeconomic History    Marital status:    Tobacco Use    Smoking status: Every Day     Packs/day: 1.00     Types: Cigarettes    Smokeless tobacco: Never   Vaping Use    Vaping Use: Never used   Substance and Sexual Activity    Alcohol use: No    Drug use: No    Sexual activity: Defer           Objective   Physical Exam  Vitals and nursing note reviewed.   Constitutional:       Appearance: She is well-developed.   HENT:      Head: Normocephalic and atraumatic.      Right Ear: External ear normal.      Left Ear: External ear normal.      Nose: Nose normal.      Mouth/Throat:      Pharynx: Oropharynx is clear.   Eyes:      Extraocular Movements: Extraocular movements intact.      Conjunctiva/sclera: Conjunctivae normal.      Pupils: Pupils are equal, round, and reactive to light.   Cardiovascular:      Rate and Rhythm: Normal rate and regular rhythm.      Heart sounds: Normal heart sounds.   Pulmonary:      Effort: Pulmonary effort is normal.      Breath sounds: Wheezing present.   Abdominal:      General: Bowel sounds are normal.      Palpations: Abdomen is soft.   Musculoskeletal:         General: Normal range of motion.      Cervical back: Normal range of motion and neck supple.   Skin:     General: Skin is warm and dry.   Neurological:      Mental Status: She is alert and oriented to person, place, and time.   Psychiatric:         Mood and Affect: Mood normal.         Behavior: Behavior normal.         Thought Content: Thought content normal.         Judgment: Judgment normal.       Procedures           ED Course HEART Score for Major Cardiac Events - MDCalc  Calculated on Sep 24 2023 5:58 PM  4 points -> Moderate Score (4-6 points) Risk of MACE of 12-16.6%.  ED Course as of 09/25/23 1455   Sun Sep 24, 2023   1607 Patient walked around the ER and per the nursing staff remains around 85-88%pulse ox [TW]      ED Course  User Index  [TW] Peggy Fernandez APRN                                           Medical Decision Making  Patient is a 68-year-old female who presents to the ER with chief complaints of hypertension.  Patient states for the past 2 weeks she has had worsening elevated blood pressures.  She was evaluated by her PCP, Dr. Michel, on Wednesday who changed her blood pressure medication.  Patient has had mild chest wall discomfort, reports substernal chest pressure at times associated with her hypertension.  She denies any chest pain currently.  Patient has history of migraines and states that the elevated blood pressure has caused her to have more headaches.  She denies this being the worst headache of her life.  She has had nausea without any vomiting.  She reports photophobia at times.  She was uncertain if the migraines were actually causing the elevation in her blood pressures.  Patient has had a cough for the past several weeks as well.  At times it is productive.  She denies any known fevers.  She states that she was told she has COPD however has never been evaluated by pulmonology and currently does not use home oxygen.  Past medical history significant for anemia, anxiety, arthritis, colitis, COPD, depression, fibromyalgia, gastric ulcers, GERD, colon polyps, hyperlipidemia, hypertension, small cell B-cell lymphoma, ulcerative colitis, seizures  Differential diagnosis: ACS, pleurisy, pneumonia, COPD exacerbation, migraine, hypertension, and other    Labs Reviewed  COMPREHENSIVE METABOLIC PANEL - Abnormal; Notable for the following components:     Glucose                       139 (*)                Potassium                     2.9 (*)             All other components within normal limits         Narrative: GFR Normal >60                  Chronic Kidney Disease <60                  Kidney Failure <15                    TROPONIN - Abnormal; Notable for the following components:     HS Troponin T                  11 (*)              All other components within normal limits         Narrative: High Sensitive Troponin T Reference Range:                  <10.0 ng/L- Negative Female for AMI                  <15.0 ng/L- Negative Male for AMI                  >=10 - Abnormal Female indicating possible myocardial injury.                  >=15 - Abnormal Male indicating possible myocardial injury.                   Clinicians would have to utilize clinical acumen, EKG, Troponin, and serial changes to determine if it is an Acute Myocardial Infarction or myocardial injury due to an underlying chronic condition.                                       CBC WITH AUTO DIFFERENTIAL - Abnormal; Notable for the following components:     MCHC                          31.3 (*)               RDW                           16.8 (*)               RDW-SD                        56.4 (*)               Neutrophil %                  84.9 (*)               Lymphocyte %                  6.7 (*)                Immature Grans %              0.6 (*)                Neutrophils, Absolute         7.56 (*)               Lymphocytes, Absolute         0.60 (*)            All other components within normal limits  BLOOD GAS, ARTERIAL W/CO-OXIMETRY - Abnormal; Notable for the following components:     pO2, Arterial                 58.2 (*)               HCO3, Arterial                27.0 (*)               O2 Saturation, Arterial       91.5 (*)               Oxyhemoglobin                 83.1 (*)               Carboxyhemoglobin             8.5 (*)                Potassium, Arterial           3.1 (*)                pO2, Temperature Corrected    58.2 (*)            All other components within normal limits  COVID-19 AND FLU A/B, NP SWAB IN TRANSPORT MEDIA 8-12 HR TAT - Normal         Narrative: Fact sheet for providers: https://www.fda.gov/media/273429/download                                    Fact sheet for patients: https://www.fda.gov/media/515762/download                                     Test performed by PCR.  PROTIME-INR - Normal  APTT - Normal  HIGH SENSITIVITIY TROPONIN T 2HR - Normal         Narrative: High Sensitive Troponin T Reference Range:                  <10.0 ng/L- Negative Female for AMI                  <15.0 ng/L- Negative Male for AMI                  >=10 - Abnormal Female indicating possible myocardial injury.                  >=15 - Abnormal Male indicating possible myocardial injury.                   Clinicians would have to utilize clinical acumen, EKG, Troponin, and serial changes to determine if it is an Acute Myocardial Infarction or myocardial injury due to an underlying chronic condition.                                       MAGNESIUM - Normal  BLOOD CULTURE  BLOOD CULTURE  RAINBOW DRAW         Narrative: The following orders were created for panel order [place] Draw.                  Procedure                               Abnormality         Status                                     ---------                               -----------         ------                                     Green Top (Gel)[[id number]]                                  Final result                               Lavender Top[[id number]]                                     Final result                               Red Top[[id number]]                                          Final result                               Light Blue Top[[id number]]                                   Final result                                                 Please view results for these tests on the individual orders.  BLOOD GAS, ARTERIAL W/CO-OXIMETRY  LACTIC ACID, PLASMA  GREEN TOP  LAVENDER TOP  RED TOP  LIGHT BLUE TOP  CBC AND DIFFERENTIAL   CT Angiogram Chest   Final Result    1. No pulmonary embolism.    2. Chronic lung changes.    3. Posterior LEFT lower lobe infiltrate suspicious for developing    pneumonia.                                                                     This report  was signed and finalized on 9/24/2023 2:40 PM CDT by Dr. Chase Rios MD.          XR Chest 1 View   Final Result    1. No acute disease.                   This report was signed and finalized on 9/24/2023 8:42 AM CDT by Dr. Chase Rios MD.           Patient presented with hypertension, chest pain, migraines, and a cough.  We initially treated patient's headache with Benadryl and Compazine.  With continued complaints of headache symptoms patient received Toradol.  Patient's cardiac markers were unremarkable, EKGs were unremarkable.  Patient has had no chest pain while in the ER.  She had hypokalemia and received potassium replacement in the ER.  Nursing staff mention patient's oxygen level had dropped which assumed this was from medication given for headache.  We went ahead and added a CTA to further evaluate patient's complaints of cough and possible underlying COPD.  There does appear to be a left lower lobe underlying pneumonia.  Patient received a breathing treatment.  Once patient was more aroused after monitoring for quite some time we walked her around the ER and she remained hypoxic at 85 to 88%.  ABGs does reveal a PO2 at 58.  She also had an episode of vomiting and received nausea medication.  She has no abdominal pain.  Patient has not been evaluated by pulmonology, she has never had pulmonary function studies, and tells me she does not have home oxygen.  We have ordered antibiotics and steroids.  Given her hypoxia and underlying pneumonia we will go ahead and admit to the hospitalist services.  Please see their note for details.    HEART Score for Major Cardiac Events - MDCalc  Calculated on Sep 24 2023 5:58 PM  4 points -> Moderate Score (4-6 points) Risk of MACE of 12-16.6%.        Problems Addressed:  Hypoxia: acute illness or injury  Pneumonia of left lower lobe due to infectious organism: acute illness or injury    Amount and/or Complexity of Data Reviewed  Labs: ordered. Decision-making  details documented in ED Course.  Radiology: ordered. Decision-making details documented in ED Course.  ECG/medicine tests: ordered. Decision-making details documented in ED Course.  Discussion of management or test interpretation with external provider(s): Discussed with hospitalist who will be admitting the patient.  Please see their note for details.    Risk  Prescription drug management.  Decision regarding hospitalization.        Final diagnoses:   Pneumonia of left lower lobe due to infectious organism   Hypoxia       ED Disposition  ED Disposition       ED Disposition   Decision to Admit    Condition   --    Comment   Level of Care: Telemetry [5]   Diagnosis: Pneumonia [124163]   Admitting Physician: JAKE TANNER [[id number]]   Certification: I Certify That Inpatient Hospital Services Are Medically Necessary For Greater Than 2 Midnights                 No follow-up provider specified.       Medication List        ASK your doctor about these medications      pantoprazole 40 MG EC tablet  Commonly known as: PROTONIX  Ask about: Which instructions should I use?                 Peggy Fernandez, APRN  09/25/23 1836

## 2023-09-24 NOTE — NURSING NOTE
Pt admit from ER. A&O X4 c/o HA rated 9 states has been hurting all day. O2 @ 2l/m sat 94% denies SOA. Sinus 74 per tele. Oriented to room and nurse call system.

## 2023-09-25 PROBLEM — J44.1 COPD WITH ACUTE EXACERBATION: Status: ACTIVE | Noted: 2023-09-25

## 2023-09-25 PROBLEM — J44.1 COPD EXACERBATION: Status: ACTIVE | Noted: 2023-09-25

## 2023-09-25 PROBLEM — J96.01 ACUTE RESPIRATORY FAILURE WITH HYPOXIA: Status: ACTIVE | Noted: 2023-09-25

## 2023-09-25 LAB
ANION GAP SERPL CALCULATED.3IONS-SCNC: 12 MMOL/L (ref 5–15)
B PARAPERT DNA SPEC QL NAA+PROBE: NOT DETECTED
B PERT DNA SPEC QL NAA+PROBE: NOT DETECTED
BACTERIA BLD CULT: ABNORMAL
BOTTLE TYPE: ABNORMAL
BUN SERPL-MCNC: 8 MG/DL (ref 8–23)
BUN/CREAT SERPL: 18.2 (ref 7–25)
C PNEUM DNA NPH QL NAA+NON-PROBE: NOT DETECTED
CALCIUM SPEC-SCNC: 9.4 MG/DL (ref 8.6–10.5)
CHLORIDE SERPL-SCNC: 105 MMOL/L (ref 98–107)
CO2 SERPL-SCNC: 25 MMOL/L (ref 22–29)
CREAT SERPL-MCNC: 0.44 MG/DL (ref 0.57–1)
DEPRECATED RDW RBC AUTO: 56.8 FL (ref 37–54)
EGFRCR SERPLBLD CKD-EPI 2021: 106.2 ML/MIN/1.73
ERYTHROCYTE [DISTWIDTH] IN BLOOD BY AUTOMATED COUNT: 16.6 % (ref 12.3–15.4)
FLUAV SUBTYP SPEC NAA+PROBE: NOT DETECTED
FLUBV RNA ISLT QL NAA+PROBE: NOT DETECTED
GLUCOSE SERPL-MCNC: 96 MG/DL (ref 65–99)
HADV DNA SPEC NAA+PROBE: NOT DETECTED
HCOV 229E RNA SPEC QL NAA+PROBE: NOT DETECTED
HCOV HKU1 RNA SPEC QL NAA+PROBE: NOT DETECTED
HCOV NL63 RNA SPEC QL NAA+PROBE: NOT DETECTED
HCOV OC43 RNA SPEC QL NAA+PROBE: NOT DETECTED
HCT VFR BLD AUTO: 37.8 % (ref 34–46.6)
HGB BLD-MCNC: 11.8 G/DL (ref 12–15.9)
HMPV RNA NPH QL NAA+NON-PROBE: NOT DETECTED
HPIV1 RNA ISLT QL NAA+PROBE: NOT DETECTED
HPIV2 RNA SPEC QL NAA+PROBE: NOT DETECTED
HPIV3 RNA NPH QL NAA+PROBE: NOT DETECTED
HPIV4 P GENE NPH QL NAA+PROBE: NOT DETECTED
L PNEUMO1 AG UR QL IA: NEGATIVE
M PNEUMO IGG SER IA-ACNC: NOT DETECTED
MCH RBC QN AUTO: 29.4 PG (ref 26.6–33)
MCHC RBC AUTO-ENTMCNC: 31.2 G/DL (ref 31.5–35.7)
MCV RBC AUTO: 94.3 FL (ref 79–97)
PLATELET # BLD AUTO: 172 10*3/MM3 (ref 140–450)
PMV BLD AUTO: 8.8 FL (ref 6–12)
POTASSIUM SERPL-SCNC: 3.3 MMOL/L (ref 3.5–5.2)
QT INTERVAL: 388 MS
QT INTERVAL: 396 MS
QTC INTERVAL: 433 MS
QTC INTERVAL: 439 MS
RBC # BLD AUTO: 4.01 10*6/MM3 (ref 3.77–5.28)
RHINOVIRUS RNA SPEC NAA+PROBE: NOT DETECTED
RSV RNA NPH QL NAA+NON-PROBE: NOT DETECTED
S PNEUM AG SPEC QL LA: NEGATIVE
SARS-COV-2 RNA NPH QL NAA+NON-PROBE: NOT DETECTED
SODIUM SERPL-SCNC: 142 MMOL/L (ref 136–145)
WBC NRBC COR # BLD: 5.92 10*3/MM3 (ref 3.4–10.8)

## 2023-09-25 PROCEDURE — 94799 UNLISTED PULMONARY SVC/PX: CPT

## 2023-09-25 PROCEDURE — 85027 COMPLETE CBC AUTOMATED: CPT | Performed by: FAMILY MEDICINE

## 2023-09-25 PROCEDURE — 36415 COLL VENOUS BLD VENIPUNCTURE: CPT | Performed by: FAMILY MEDICINE

## 2023-09-25 PROCEDURE — 25010000002 ENOXAPARIN PER 10 MG: Performed by: FAMILY MEDICINE

## 2023-09-25 PROCEDURE — 99222 1ST HOSP IP/OBS MODERATE 55: CPT | Performed by: INTERNAL MEDICINE

## 2023-09-25 PROCEDURE — 87449 NOS EACH ORGANISM AG IA: CPT | Performed by: NURSE PRACTITIONER

## 2023-09-25 PROCEDURE — 0202U NFCT DS 22 TRGT SARS-COV-2: CPT | Performed by: NURSE PRACTITIONER

## 2023-09-25 PROCEDURE — 63710000001 PREDNISONE PER 1 MG: Performed by: FAMILY MEDICINE

## 2023-09-25 PROCEDURE — 87040 BLOOD CULTURE FOR BACTERIA: CPT | Performed by: NURSE PRACTITIONER

## 2023-09-25 PROCEDURE — 94761 N-INVAS EAR/PLS OXIMETRY MLT: CPT

## 2023-09-25 PROCEDURE — 0 CEFEPIME PER 500 MG: Performed by: INTERNAL MEDICINE

## 2023-09-25 PROCEDURE — 94664 DEMO&/EVAL PT USE INHALER: CPT

## 2023-09-25 PROCEDURE — 80048 BASIC METABOLIC PNL TOTAL CA: CPT | Performed by: FAMILY MEDICINE

## 2023-09-25 PROCEDURE — 25010000002 AZITHROMYCIN PER 500 MG: Performed by: FAMILY MEDICINE

## 2023-09-25 RX ORDER — NICOTINE 21 MG/24HR
1 PATCH, TRANSDERMAL 24 HOURS TRANSDERMAL
Status: DISCONTINUED | OUTPATIENT
Start: 2023-09-25 | End: 2023-09-27 | Stop reason: HOSPADM

## 2023-09-25 RX ORDER — HYDROCHLOROTHIAZIDE 25 MG/1
25 TABLET ORAL DAILY
COMMUNITY

## 2023-09-25 RX ORDER — FLUTICASONE PROPIONATE 50 MCG
2 SPRAY, SUSPENSION (ML) NASAL DAILY
Status: DISCONTINUED | OUTPATIENT
Start: 2023-09-25 | End: 2023-09-27 | Stop reason: HOSPADM

## 2023-09-25 RX ORDER — LOSARTAN POTASSIUM 50 MG/1
50 TABLET ORAL
Status: DISCONTINUED | OUTPATIENT
Start: 2023-09-25 | End: 2023-09-26

## 2023-09-25 RX ORDER — POTASSIUM CHLORIDE 750 MG/1
40 CAPSULE, EXTENDED RELEASE ORAL ONCE
Status: COMPLETED | OUTPATIENT
Start: 2023-09-25 | End: 2023-09-25

## 2023-09-25 RX ORDER — CARVEDILOL 12.5 MG/1
12.5 TABLET ORAL 2 TIMES DAILY WITH MEALS
COMMUNITY
End: 2023-09-27 | Stop reason: HOSPADM

## 2023-09-25 RX ORDER — BUTALBITAL, ACETAMINOPHEN AND CAFFEINE 50; 325; 40 MG/1; MG/1; MG/1
1 TABLET ORAL EVERY 4 HOURS PRN
Status: DISCONTINUED | OUTPATIENT
Start: 2023-09-25 | End: 2023-09-27 | Stop reason: HOSPADM

## 2023-09-25 RX ORDER — CARVEDILOL 6.25 MG/1
12.5 TABLET ORAL 2 TIMES DAILY WITH MEALS
Status: DISCONTINUED | OUTPATIENT
Start: 2023-09-25 | End: 2023-09-26

## 2023-09-25 RX ORDER — RIZATRIPTAN BENZOATE 10 MG/1
10 TABLET, ORALLY DISINTEGRATING ORAL 2 TIMES DAILY PRN
Status: DISCONTINUED | OUTPATIENT
Start: 2023-09-25 | End: 2023-09-27 | Stop reason: HOSPADM

## 2023-09-25 RX ORDER — ONDANSETRON 4 MG/1
4 TABLET, ORALLY DISINTEGRATING ORAL EVERY 8 HOURS PRN
COMMUNITY

## 2023-09-25 RX ORDER — SUMATRIPTAN 50 MG/1
50 TABLET, FILM COATED ORAL
Status: DISCONTINUED | OUTPATIENT
Start: 2023-09-25 | End: 2023-09-25

## 2023-09-25 RX ORDER — HYDROCHLOROTHIAZIDE 25 MG/1
25 TABLET ORAL DAILY
Status: DISCONTINUED | OUTPATIENT
Start: 2023-09-25 | End: 2023-09-27 | Stop reason: HOSPADM

## 2023-09-25 RX ORDER — LABETALOL HYDROCHLORIDE 5 MG/ML
10 INJECTION, SOLUTION INTRAVENOUS EVERY 4 HOURS PRN
Status: DISCONTINUED | OUTPATIENT
Start: 2023-09-25 | End: 2023-09-27 | Stop reason: HOSPADM

## 2023-09-25 RX ORDER — BUDESONIDE AND FORMOTEROL FUMARATE DIHYDRATE 160; 4.5 UG/1; UG/1
2 AEROSOL RESPIRATORY (INHALATION)
Status: DISCONTINUED | OUTPATIENT
Start: 2023-09-25 | End: 2023-09-27 | Stop reason: HOSPADM

## 2023-09-25 RX ORDER — BUTALBITAL, ACETAMINOPHEN AND CAFFEINE 50; 325; 40 MG/1; MG/1; MG/1
1 TABLET ORAL EVERY 4 HOURS PRN
COMMUNITY

## 2023-09-25 RX ORDER — HYDRALAZINE HYDROCHLORIDE 20 MG/ML
10 INJECTION INTRAMUSCULAR; INTRAVENOUS EVERY 4 HOURS PRN
Status: DISCONTINUED | OUTPATIENT
Start: 2023-09-25 | End: 2023-09-27 | Stop reason: HOSPADM

## 2023-09-25 RX ORDER — RIZATRIPTAN BENZOATE 10 MG/1
10 TABLET, ORALLY DISINTEGRATING ORAL ONCE AS NEEDED
COMMUNITY

## 2023-09-25 RX ORDER — AMITRIPTYLINE HYDROCHLORIDE 25 MG/1
25 TABLET, FILM COATED ORAL NIGHTLY
COMMUNITY

## 2023-09-25 RX ORDER — AMITRIPTYLINE HYDROCHLORIDE 25 MG/1
25 TABLET, FILM COATED ORAL NIGHTLY
Status: DISCONTINUED | OUTPATIENT
Start: 2023-09-25 | End: 2023-09-27 | Stop reason: HOSPADM

## 2023-09-25 RX ORDER — CARVEDILOL 6.25 MG/1
12.5 TABLET ORAL 2 TIMES DAILY WITH MEALS
Status: DISCONTINUED | OUTPATIENT
Start: 2023-09-25 | End: 2023-09-25

## 2023-09-25 RX ORDER — PANTOPRAZOLE SODIUM 40 MG/1
40 TABLET, DELAYED RELEASE ORAL 2 TIMES DAILY
COMMUNITY

## 2023-09-25 RX ADMIN — CEFEPIME 2000 MG: 2 INJECTION, POWDER, FOR SOLUTION INTRAVENOUS at 22:20

## 2023-09-25 RX ADMIN — AMITRIPTYLINE HYDROCHLORIDE 25 MG: 25 TABLET, FILM COATED ORAL at 22:22

## 2023-09-25 RX ADMIN — PANTOPRAZOLE SODIUM 40 MG: 40 TABLET, DELAYED RELEASE ORAL at 09:01

## 2023-09-25 RX ADMIN — BUDESONIDE AND FORMOTEROL FUMARATE DIHYDRATE 2 PUFF: 160; 4.5 AEROSOL RESPIRATORY (INHALATION) at 11:03

## 2023-09-25 RX ADMIN — METOPROLOL SUCCINATE 100 MG: 100 TABLET, FILM COATED, EXTENDED RELEASE ORAL at 08:58

## 2023-09-25 RX ADMIN — PREDNISONE 40 MG: 20 TABLET ORAL at 08:58

## 2023-09-25 RX ADMIN — NICOTINE 1 PATCH: 21 PATCH, EXTENDED RELEASE TRANSDERMAL at 11:34

## 2023-09-25 RX ADMIN — IPRATROPIUM BROMIDE AND ALBUTEROL SULFATE 3 ML: .5; 3 SOLUTION RESPIRATORY (INHALATION) at 10:56

## 2023-09-25 RX ADMIN — CLONIDINE HYDROCHLORIDE 0.2 MG: 0.2 TABLET ORAL at 08:58

## 2023-09-25 RX ADMIN — GUAIFENESIN 1200 MG: 600 TABLET, EXTENDED RELEASE ORAL at 22:22

## 2023-09-25 RX ADMIN — HYDROCODONE BITARTRATE AND ACETAMINOPHEN 1 TABLET: 7.5; 325 TABLET ORAL at 05:10

## 2023-09-25 RX ADMIN — DULOXETINE HYDROCHLORIDE 60 MG: 30 CAPSULE, DELAYED RELEASE ORAL at 08:58

## 2023-09-25 RX ADMIN — ARIPIPRAZOLE 10 MG: 10 TABLET ORAL at 08:58

## 2023-09-25 RX ADMIN — IPRATROPIUM BROMIDE AND ALBUTEROL SULFATE 3 ML: .5; 3 SOLUTION RESPIRATORY (INHALATION) at 19:48

## 2023-09-25 RX ADMIN — LOSARTAN POTASSIUM 50 MG: 50 TABLET, FILM COATED ORAL at 16:21

## 2023-09-25 RX ADMIN — AMLODIPINE BESYLATE 10 MG: 10 TABLET ORAL at 08:58

## 2023-09-25 RX ADMIN — IPRATROPIUM BROMIDE AND ALBUTEROL SULFATE 3 ML: .5; 3 SOLUTION RESPIRATORY (INHALATION) at 14:43

## 2023-09-25 RX ADMIN — Medication 10 ML: at 22:24

## 2023-09-25 RX ADMIN — RIZATRIPTAN BENZOATE 10 MG: 10 TABLET, ORALLY DISINTEGRATING ORAL at 12:38

## 2023-09-25 RX ADMIN — BUTALBITAL, ACETAMINOPHEN, AND CAFFEINE 1 TABLET: 50; 325; 40 TABLET ORAL at 18:46

## 2023-09-25 RX ADMIN — CARVEDILOL 12.5 MG: 6.25 TABLET, FILM COATED ORAL at 12:38

## 2023-09-25 RX ADMIN — HYDROCHLOROTHIAZIDE 25 MG: 25 TABLET ORAL at 16:21

## 2023-09-25 RX ADMIN — RIZATRIPTAN BENZOATE 10 MG: 10 TABLET, ORALLY DISINTEGRATING ORAL at 22:21

## 2023-09-25 RX ADMIN — CETIRIZINE HYDROCHLORIDE 10 MG: 10 TABLET ORAL at 08:58

## 2023-09-25 RX ADMIN — FLUTICASONE PROPIONATE 2 SPRAY: 50 SPRAY, METERED NASAL at 16:24

## 2023-09-25 RX ADMIN — ENOXAPARIN SODIUM 40 MG: 100 INJECTION SUBCUTANEOUS at 22:22

## 2023-09-25 RX ADMIN — BUDESONIDE AND FORMOTEROL FUMARATE DIHYDRATE 2 PUFF: 160; 4.5 AEROSOL RESPIRATORY (INHALATION) at 19:48

## 2023-09-25 RX ADMIN — AZITHROMYCIN DIHYDRATE 500 MG: 500 INJECTION, POWDER, LYOPHILIZED, FOR SOLUTION INTRAVENOUS at 18:22

## 2023-09-25 RX ADMIN — IPRATROPIUM BROMIDE AND ALBUTEROL SULFATE 3 ML: .5; 3 SOLUTION RESPIRATORY (INHALATION) at 06:13

## 2023-09-25 RX ADMIN — POTASSIUM CHLORIDE 40 MEQ: 10 CAPSULE, COATED, EXTENDED RELEASE ORAL at 16:20

## 2023-09-25 RX ADMIN — PANTOPRAZOLE SODIUM 40 MG: 40 TABLET, DELAYED RELEASE ORAL at 22:22

## 2023-09-25 RX ADMIN — CEFEPIME 2000 MG: 2 INJECTION, POWDER, FOR SOLUTION INTRAVENOUS at 14:01

## 2023-09-25 RX ADMIN — GUAIFENESIN 1200 MG: 600 TABLET, EXTENDED RELEASE ORAL at 08:58

## 2023-09-25 NOTE — CONSULTS
[facility] [place] [facility] [place]ITICAL CARE CONSULT - [facility]    23, 07:56 CDT  Patient Care Team:  Grzegorz Michel Jr., MD as PCP - General  Grzegorz Michel Jr., MD as PCP - Family Medicine  Rommel Graves MD as Consulting Physician (Otolaryngology)  Danis Garcia MD as Consulting Physician (Gastroenterology)  Name: Heather Russo  : 1955  MRN: [id number]  Contact Serial Number [id number]    Chief complaint: acute respiratory failure with hypoxia   HPI:  We have been consulted by Federico Nicole MD to see this 68 y.o. female patient.  Patient has known history of Hypertension, Hyperlipidemia,Migraines, Fibromyalgia, anxiety, GERD, anemia, seizures, small cell B cell lymphoma. She presented to the ER with complaints of high blood pressure. She was found to be hypoxic and was placed on 3L NC. She is currently on 1L with sat of 94%. She has COPD listed in her history. She states she has never had a pulmonary function study or seen a pulmonologist. She was not seen by pulmonology last time she was here but was advised she should make an appointment. She is a current every day smoker of 2 PPD x 37 years. She occasionally has the desire to quit. She denies smokeless tobacco use, drug use or alcohol use. She denies fever, chills, nights sweats.  Her cough is mostly dry but some times productive. She has a headache currently and attending is working on resuming her home medicine for this.  She denies swelling. She does state she has trouble laying flat as it makes her short of breath.  She had an Echo in Feb that shwoed EF of 55- 65%, unable to estimate RVSP. Her ABG yesterday showed pO2 of 58, pCO2 44, pH 7.389, Hco3 27. Potassium this am is a little low at 3.3. WBC normal at 5.92, Hgb stable at 11.8, Hct 37.8, plt 172. Respiratory culture pending collection, blood cultures in process. Resp viral panel all negative. CTA of chest was negative for pulmonary embolism, moderate size hiatal  hernia, emphysematous changes noted, bilateral lower lobe infiltrate with left >right. 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 [place] She has no known exposures. She has a cat in the home. She has seasonal allergies for which she takes antihistamine. She has never been allergy tested. She will get post nasal drip sometimes. She has never been tested for sleep apnea. Her  does report she snores. She has had issues with restless leg in the past. She is not on any oxygen at home. We have been asked to see this patient and advised on possible pneumonia and COPD. Await strep and legionella urine antigens. Descalate andtibiotics when appropriate. Continue Prednisone. Add Nicotine patches. Begin Symbicort. She uses Proair twice a day. She would benefit from an outpatient follow up in Pulmonary with a PFT. Further recommendations per Dr. Muir.     Past Medical History:   has a past medical history of Anemia, Anxiety, Arthritis, Cancer of skin, Colitis, COPD (chronic obstructive pulmonary disease), Depression, Fibromyalgia, Gastric ulcer, GERD (gastroesophageal reflux disease), History of transfusion, colonic polyps, Hypercholesteremia, Hypertension, Seizure, Small cell B-cell lymphoma, and Ulcerative colitis.   has a past surgical history that includes Breast surgery; Radical hysterectomy; Cholecystectomy; Colonoscopy w/ polypectomy (07/28/2016); Esophagogastroduodenoscopy (06/15/2016); Colonoscopy (04/12/2017); Ureteroscopy (Right, 9/13/2018); Colonoscopy (N/A, 5/2/2019); Esophagogastroduodenoscopy (N/A, 6/13/2019); Ureteroscopy (Right, 8/9/2019); Tennis Elbow Release (Left, 10/20/2020); Esophagogastroduodenoscopy (N/A, 5/21/2021); and Colonoscopy (N/A, 5/21/2021).  Allergies   Allergen Reactions    Lyrica [Pregabalin] Swelling     LIPS AND FACE    Morphine Itching    Codeine Itching    Penicillins Unknown - Low Severity     CHILDHOOD ALLERGY        Medications:  amLODIPine, 10 mg, Oral, Daily  ARIPiprazole, 10 mg, Oral, Daily  azithromycin, 500 mg, Intravenous, Q24H  cefTRIAXone, 1,000 mg, Intravenous, Q24H  cetirizine, 10 mg, Oral, Daily  cloNIDine, 0.2 mg, Oral, Q12H  DULoxetine, 60 mg, Oral, Daily  enoxaparin, 40 mg, Subcutaneous, Nightly  guaiFENesin, 1,200 mg, Oral, Q12H  ipratropium-albuterol, 3 mL, Nebulization, 4x Daily - RT  metoprolol succinate XL, 100 mg, Oral, Daily  pantoprazole, 40 mg, Oral, BID  predniSONE, 40 mg, Oral, Daily         Family History:  Family History   Problem Relation Age of Onset    Diabetes Sister     Cancer Brother     Colon cancer Neg Hx     Colon polyps Neg Hx      Social History:   reports that she has been smoking cigarettes. She has been smoking an average of 1 pack per day. She has never used smokeless tobacco. She reports that she does not drink alcohol and does not use drugs.    Physical Exam:   Temp:  [97.5 °F (36.4 °C)-98.7 °F (37.1 °C)] 97.5 °F (36.4 °C)  Heart Rate:  [55-83] 55  Resp:  [16-20] 16  BP: (121-195)/(68-93) 145/69  Intake/Output Summary (Last 24 hours) at 9/25/2023 0756  Last data filed at 9/25/2023 0600  Gross per 24 hour   Intake 240 ml   Output 550 ml   Net -310 ml         09/24/23  0753 09/24/23  1842   Weight: 58.1 kg (128 lb) 59.1 kg (130 lb 4 oz)     SpO2 Percentage    09/25/23 0359 09/25/23 0613 09/25/23 0621   SpO2: 97% 94% 94%     Body mass index is 23.07 kg/m².   Physical Exam  Vitals and nursing note reviewed.   Constitutional:       Appearance: Normal appearance.   HENT:      Head: Normocephalic and atraumatic.      Nose: Nose normal.      Mouth/Throat:      Mouth: Mucous membranes are moist.   Eyes:      General:         Right eye: No discharge.         Left eye: No discharge.      Conjunctiva/sclera: Conjunctivae normal.   Cardiovascular:      Rate and Rhythm: Normal rate and regular rhythm.      Heart sounds: No murmur heard.    No friction rub. No gallop.   Pulmonary:      Effort:  Pulmonary effort is normal.      Breath sounds: Normal breath sounds.   Abdominal:      General: Abdomen is flat. Bowel sounds are normal.      Palpations: Abdomen is soft.   Musculoskeletal:         General: No swelling.      Cervical back: Normal range of motion and neck supple.      Right lower leg: No edema.      Left lower leg: No edema.   Skin:     General: Skin is warm and dry.   Neurological:      General: No focal deficit present.      Mental Status: She is alert and oriented to person, place, and time.   Psychiatric:         Mood and Affect: Mood normal.         Behavior: Behavior normal.         Thought Content: Thought content normal.         Judgment: Judgment normal.       Electronically signed by YOLY Luna, 9/25/2023, 07:56 CDT       Physician Substantive Portion: Medical Decision Making  Result Review  Results from last 7 days   Lab Units 09/25/23  0705 09/24/23  0814   WBC 10*3/mm3 5.92 8.90   HEMOGLOBIN g/dL 11.8* 13.7   PLATELETS 10*3/mm3 172 192     Results from last 7 days   Lab Units 09/25/23  0705 09/24/23  1651 09/24/23  1121 09/24/23  1044 09/24/23  0814   SODIUM mmol/L 142  --   --   --  142   SODIUM, ARTERIAL mmol/L  --   --  145  --   --    POTASSIUM mmol/L 3.3*  --   --   --  2.9*   CO2 mmol/L 25.0  --   --   --  24.0   BUN mg/dL 8  --   --   --  9   CREATININE mg/dL 0.44*  --   --   --  0.57   MAGNESIUM mg/dL  --   --   --  1.8  --    GLUCOSE mg/dL 96  --   --   --  139*   LACTATE mmol/L  --  0.5  --   --   --      Results from last 7 days   Lab Units 09/24/23  1121   PH, ARTERIAL pH units 7.389   PCO2, ARTERIAL mm Hg 44.7   PO2 ART mm Hg 58.2*     Lab Results   Component Value Date    PROBNP 171.0 03/18/2018     Microbiology Results (last 10 days)       Procedure Component Value - Date/Time    Respiratory Panel PCR w/COVID-19(SARS-CoV-2) JONATHAN/LU/MAGI/PAD/COR/MAD/GONZALO In-House, NP Swab in [facility], 3-4 HR TAT - Swab, Nasopharynx [[id number]]  (Normal) Collected: 09/25/23  0603    Lab Status: Final result Specimen: Swab from Nasopharynx Updated: 09/25/23 0712     ADENOVIRUS, PCR Not Detected     Coronavirus 229E Not Detected     Coronavirus HKU1 Not Detected     Coronavirus NL63 Not Detected     Coronavirus OC43 Not Detected     COVID19 Not Detected     Human Metapneumovirus Not Detected     Human Rhinovirus/Enterovirus Not Detected     Influenza A PCR Not Detected     Influenza B PCR Not Detected     Parainfluenza Virus 1 Not Detected     Parainfluenza Virus 2 Not Detected     Parainfluenza Virus 3 Not Detected     Parainfluenza Virus 4 Not Detected     RSV, PCR Not Detected     Bordetella pertussis pcr Not Detected     Bordetella parapertussis PCR Not Detected     Chlamydophila pneumoniae PCR Not Detected     Mycoplasma pneumo by PCR Not Detected    Narrative:      In the setting of a positive respiratory panel with a viral infection PLUS a negative procalcitonin without other underlying concern for bacterial infection, consider observing off antibiotics or discontinuation of antibiotics and continue supportive care. If the respiratory panel is positive for atypical bacterial infection (Bordetella pertussis, Chlamydophila pneumoniae, or Mycoplasma pneumoniae), consider antibiotic de-escalation to target atypical bacterial infection.    Blood Culture - Blood, Arm, Left [[id number]]  (Abnormal) Collected: 09/24/23 1651    Lab Status: Preliminary result Specimen: Blood from Arm, Left Updated: 09/25/23 1048     Blood Culture Abnormal Stain     Gram Stain Anaerobic Bottle Gram positive cocci    COVID-19 and FLU A/B PCR - Swab, Nasopharynx [[id number]]  (Normal) Collected: 09/24/23 1121    Lab Status: Final result Specimen: Swab from Nasopharynx Updated: 09/24/23 1151     COVID19 Not Detected     Influenza A PCR Not Detected     Influenza B PCR Not Detected    Narrative:      Fact sheet for providers: https://www.fda.gov/media/149392/download    Fact sheet for patients:  https://www.fda.gov/media/876771/download    Test performed by PCR.             Recent radiology:   Imaging Results (Last 72 Hours)       Procedure Component Value Units Date/Time    CT Angiogram Chest [[id number]] Collected: 09/24/23 1436     Updated: 09/24/23 1443    Narrative:      EXAMINATION: CT ANGIOGRAM CHEST-      9/24/2023 2:05 PM CDT     HISTORY: Chest pain and hypoxia.     In order to have a CT radiation dose as low as reasonably achievable  Automated Exposure Control was utilized for adjustment of the mA and/or  KV according to patient size.     DLP in mGycm= 235.        CT Angiogram Chest with IV contrast.  CT angiography protocol.  CT imaging with bolus IV contrast injection.  Under concurrent supervision axial, sagittal, coronal,  three-dimensional, and MIP data sets were constructed on an independent  work station.     Heart size is within normal limits.  A moderate size hiatal hernia is present.  Coronary artery calcification is present.  Normal size thoracic aorta.  No mediastinal mass.     Symmetric and normally opacified pulmonary arteries.  No pulmonary embolism.     Hyperexpanded lungs.  Upper lobe emphysema.     Dependent infiltrate within both lower lobes.  The infiltrate is greater on the LEFT side and is concerning for  developing lower lobe pneumonia.     There is no pneumothorax or heart failure.       Impression:      1. No pulmonary embolism.  2. Chronic lung changes.  3. Posterior LEFT lower lobe infiltrate suspicious for developing  pneumonia.                                         This report was signed and finalized on 9/24/2023 2:40 PM CDT by Dr. Chase Rios MD.       XR Chest 1 View [[id number]] Collected: 09/24/23 0841     Updated: 09/24/23 0846    Narrative:      EXAMINATION: XR CHEST 1 VW-     9/24/2023 8:33 AM CDT     HISTORY: chest pain     FINDINGS:  1 view chest x-ray.     Comparison is made with August 10, 2020.        Heart size is normal.  The mediastinum is within  normal limits.     The lungs are normally expanded with no pneumonia or pneumothorax.  Mild chronic appearing interstitial disease with a few calcified  granulomas.  No congestive failure changes.       Impression:      1. No acute disease.           This report was signed and finalized on 9/24/2023 8:42 AM CDT by Dr. Chase Rios MD.               Personal review of imaging : CT scan shows emphysematous changes bibasilar scarring and fibrosis and also left lower lobe infiltrate suggesting of pneumonia.  Other test results:  Patient has positive blood culture for gram-positive cocci.  Arterial blood gas shows hypoxemia with low PO2  Independent review of ekg: Done.  Problem List as identified by Epic (may contain historical, inactive problems)    Pneumonia    Pulmonary Assessment:  Acute on chronic hypoxic respiratory failure  Chronic obstructive pulmonary disease with acute exacerbation/ongoing tobacco use  Left lower lobe pneumonia  Requiring supplemental oxygen  Bacteremia with positive blood culture  History of small cell B-cell lymphoma treated  Anxiety and depression  Hypertension  Hyperlipidemia  Ulcerative colitis  Allergic rhinitis  History of seizure disorder  Gastroesophageal reflux disease  Arthritis    Recommend/plan:   Patient presented [facility] yesterday with shortness of breath  She was seen as inpatient pulmonary consult.   was in the room at bedside.  She is an active smoker and continues to smoke and her  is a smoker as well.  She was noted to have COPD but was not on any inhalers or any oxygen at home and never had a PFT done.  Patient was vaccinated for COVID influenza pneumonia and did not have any COVID infection in the past.  Reportedly she was diagnosed with a small cell and B-cell lymphoma and was treated in [place] but did not receive any radiation  In addition to the shortness of breath she reported having nausea and vomiting and reported having acid reflux  disease.  Initial work-up in the ER showed patient had left lower lobe pneumonia and also noted to have hypokalemia  She was admitted as inpatient and started on IV Rocephin azithromycin getting Symbicort and DuoNeb  She is placed on IV and oral prednisone.  At the time of my visit she appeared comfortable on 2 L of oxygen  Arterial blood gas analysis showed hypoxemia but no hypercapnia.  She will continue antibiotic coverage.  Blood culture is positive for gram-positive cocci.  Rocephin changed to cefepime.  We will add vancomycin only if culture is positive for MRSA.  Continue azithromycin for now.  Urine for Legionella pneumococcal antigen pending.  Respiratory viral panel is negative  No evidence of COVID any metapneumovirus or rhinovirus noted.  Continue bronchodilator treatment.  Encouraged incentive spirometry and flutter valve  She also has allergic rhinosinusitis and started on fluticasone nasal spray and loratadine.  Smoking cessation counseling done.   also advised to quit smoking.  Nicotine patch started.  Titrate oxygen keep saturation more than 92%.  Home oxygen evaluation prior to discharge.  DVT and stress ulcer prophylaxis and pain and anxiety control.  Plan for outpatient pulmonary clinic follow-up with a PFT when she is more stable.  Patient also reported having snoring and may need work-up for sleep apnea as well.  Nutritional support physical activity as tolerated pain and anxiety control.  Repeat labs and imaging studies from time to time.  CODE STATUS: Full.  Overall prognosis: Guarded.  Care plan discussed with patient and her .  All questions answered.  We appreciate the consult and we will follow.  Time spent in seeing this patient as inpatient pulmonary consult was 45 minutes    This visit was performed by both a physician and an [facility] RN.  I personally evaluated and examined the patient.  I performed all aspects of the medical decision making as  documented.    Electronically signed by     Adair Muir MD,  Pulmonologist/Intensivist   9/25/2023, 11:39 CDT

## 2023-09-25 NOTE — PLAN OF CARE
Goal Outcome Evaluation:              Outcome Evaluation: NTN assessment complete. Pt denied food allergies. Reported lunch was good. Pt aware of weight loss but stated she has experienced this before and weight is usually regained. No nutrition-related concerns at this time. No PO meals recorded for review; 240 mL oral fluid total (admit to present). Encouraged menu selections. NTN following per protocol.          Include Size Of Lesion In Location Indication Statement: Yes

## 2023-09-25 NOTE — PLAN OF CARE
Goal Outcome Evaluation:  Plan of Care Reviewed With: patient, spouse        Progress: improving  Outcome Evaluation: BP improving. Headache still present. wean to 1L NC. no sputum for sample, still waiting.  at bedside.

## 2023-09-25 NOTE — PROGRESS NOTES
[facility] Medicine Services  INPATIENT PROGRESS NOTE    Patient Name: Heather Russo  Date of Admission: 9/24/2023  Today's Date: 09/25/23  Length of Stay: 1  Primary Care Physician: Grzegorz Michel Jr., MD    Subjective   Chief Complaint: Shortness of breath  HPI   Heather Russo is a 68-year-old female who presented to [facility] emergency department with complaints of hypertension worsening over the last 2 weeks, which her doctor has been titrating and changing medications.  States that she did have nausea and vomiting related to hypertensive episodes.  Admission to ED the, patient was 174/85.  Had mild chest wall discomfort with substernal chest pressure at times during hypertensive episodes.  No chest pain on admission to the ED, initial troponin was mildly elevated, secondary troponin was negative with a -2 delta.  EKG normal sinus rhythm without ST elevation.  Associated complaints of headaches and photophobia in conjunction with hypertensive episodes.  Additionally complained of shortness of breath with associated productive cough.  On admission to the emergency department she was hypoxic at 87% on room air.  ABG showed a PO2 of 58, CO2 T8.2, with a compensated pH of 7.389. CTA of the chest showed a posterior left lobe infiltrate suspicious for developing pneumonia.  No fevers, chills, and sputum has been dry and nonproductive.  Patient does not wear oxygen at home.  Patient additionally states she has never been formally diagnosed with COPD, but she was told on last admission that she should follow-up with pulmonary. She is a current 2 pack/day smoker. ED course visual 25 mg IV, Compazine 5 mg, clonidine 0.1 p.o., potassium 50 mEq even for potassium of 2.9, Toradol 15 mg IV, Zofran 4 mg, Pepcid 20 mg, Solu-Medrol 125, ceftriaxone 1 g, Zithromax 500 mg.    Today: Patient is resting comfortably in bed with her  Barber at her bedside.  Continues to have main  concern regarding her hypertension, overnight blood pressures were 121/69, 145/69, 169/76.  Since home Norvasc, Coreg,  hydrochlorothiazide, and as needed clonidine restarted.  Continue to follow.  Patient states she continues to have mild shortness of breath with exertion, complaints of orthopnea, after respiratory assessment patient has bronchial spasm with associated coughing.  Remains unable to produce any sputum.  Currently patient is 95% on 1 L of oxygen, weaned from 3 L overnight.  Reports a 2 pack/day smoking habit, nicotine patch offered and declined. Pulmonary consult per Dr. Muir and YOLY Olmedo, with recommendations for change of Rocephin to cefepime and initiation of vancomycin if culture is positive for MRSA.  Continue bronchodilators, steroids, incentive spirometer, and flutter valve. Initiated Flonase and Claritin, ideations for follow-up in pulmonary clinic with PFT when she is more stable.  No work-up may be required for sleep apnea.    Review of Systems   Constitutional:  Positive for fatigue. Negative for chills and fever.   HENT:  Positive for congestion and sneezing. Negative for sore throat and trouble swallowing.    Eyes:  Negative for pain and redness.   Respiratory:  Positive for cough, chest tightness, shortness of breath and wheezing.    Cardiovascular:  Positive for chest pain. Negative for palpitations.   Gastrointestinal:  Positive for nausea. Negative for abdominal distention, abdominal pain and diarrhea.   Endocrine: Negative for cold intolerance and heat intolerance.   Genitourinary:  Negative for dysuria and flank pain.   Musculoskeletal:  Negative for gait problem and joint swelling.   Skin:  Positive for pallor. Negative for wound.   Allergic/Immunologic: Negative for immunocompromised state.   Neurological:  Positive for weakness. Negative for dizziness and numbness.   Hematological:  Does not bruise/bleed easily.   Psychiatric/Behavioral:  Negative for confusion and  self-injury.     All pertinent negatives and positives are as above. All other systems have been reviewed and are negative unless otherwise stated.     Objective    Temp:  [97.5 °F (36.4 °C)-98.7 °F (37.1 °C)] 97.8 °F (36.6 °C)  Heart Rate:  [55-81] 81  Resp:  [16-20] 18  BP: (121-195)/(68-93) 182/88  Physical Exam  Vitals and nursing note reviewed.   Constitutional:       Appearance: She is ill-appearing.   HENT:      Head: Normocephalic and atraumatic.      Nose: Congestion and rhinorrhea present.      Mouth/Throat:      Mouth: Mucous membranes are moist.      Pharynx: Oropharynx is clear. No posterior oropharyngeal erythema.   Eyes:      Extraocular Movements: Extraocular movements intact.      Pupils: Pupils are equal, round, and reactive to light.   Cardiovascular:      Rate and Rhythm: Normal rate and regular rhythm.      Pulses: Normal pulses.   Pulmonary:      Effort: No tachypnea, accessory muscle usage or respiratory distress.      Breath sounds: No stridor. Examination of the right-upper field reveals wheezing. Examination of the left-upper field reveals wheezing. Examination of the right-middle field reveals wheezing. Examination of the left-middle field reveals wheezing. Examination of the right-lower field reveals decreased breath sounds. Examination of the left-lower field reveals decreased breath sounds.   Abdominal:      General: Abdomen is flat.      Palpations: Abdomen is soft.   Genitourinary:     Comments: Voiding  Musculoskeletal:         General: No tenderness.      Cervical back: Normal range of motion and neck supple.      Right lower leg: No edema.      Left lower leg: No edema.   Skin:     General: Skin is warm.      Capillary Refill: Capillary refill takes less than 2 seconds.      Coloration: Skin is pale.   Neurological:      General: No focal deficit present.      Mental Status: She is alert and oriented to person, place, and time.   Psychiatric:         Mood and Affect: Mood normal.          Thought Content: Thought content normal.         Judgment: Judgment normal.       Results Review:  I have reviewed the labs, radiology results, and diagnostic studies.    Laboratory Data:   Results from last 7 days   Lab Units 09/25/23  0705 09/24/23  0814   WBC 10*3/mm3 5.92 8.90   HEMOGLOBIN g/dL 11.8* 13.7   HEMATOCRIT % 37.8 43.8   PLATELETS 10*3/mm3 172 192        Results from last 7 days   Lab Units 09/25/23  0705 09/24/23  1121 09/24/23  0814   SODIUM mmol/L 142  --  142   SODIUM, ARTERIAL mmol/L  --  145  --    POTASSIUM mmol/L 3.3*  --  2.9*   CHLORIDE mmol/L 105  --  103   CO2 mmol/L 25.0  --  24.0   BUN mg/dL 8  --  9   CREATININE mg/dL 0.44*  --  0.57   CALCIUM mg/dL 9.4  --  9.9   BILIRUBIN mg/dL  --   --  <0.2   ALK PHOS U/L  --   --  77   ALT (SGPT) U/L  --   --  6   AST (SGOT) U/L  --   --  8   GLUCOSE mg/dL 96  --  139*       Microbiology Results (last 10 days)       Procedure Component Value - Date/Time    S. Pneumo Ag Urine or CSF - Urine, Urine, Clean Catch [[id number]]  (Normal) Collected: 09/25/23 1134    Lab Status: Final result Specimen: Urine, Clean Catch Updated: 09/25/23 1233     Strep Pneumo Ag Negative    Legionella Antigen, Urine - Urine, Urine, Clean Catch [[id number]]  (Normal) Collected: 09/25/23 1134    Lab Status: Final result Specimen: Urine, Clean Catch Updated: 09/25/23 1233     LEGIONELLA ANTIGEN, URINE Negative    Respiratory Panel PCR w/COVID-19(SARS-CoV-2) JONATHAN/LU/MAGI/PAD/COR/MAD/GONZALO In-House, NP Swab in UTM/VTM, 3-4 HR TAT - Swab, Nasopharynx [[id number]]  (Normal) Collected: 09/25/23 0603    Lab Status: Final result Specimen: Swab from Nasopharynx Updated: 09/25/23 0712     ADENOVIRUS, PCR Not Detected     Coronavirus 229E Not Detected     Coronavirus HKU1 Not Detected     Coronavirus NL63 Not Detected     Coronavirus OC43 Not Detected     COVID19 Not Detected     Human Metapneumovirus Not Detected     Human Rhinovirus/Enterovirus Not Detected     Influenza A PCR Not  Detected     Influenza B PCR Not Detected     Parainfluenza Virus 1 Not Detected     Parainfluenza Virus 2 Not Detected     Parainfluenza Virus 3 Not Detected     Parainfluenza Virus 4 Not Detected     RSV, PCR Not Detected     Bordetella pertussis pcr Not Detected     Bordetella parapertussis PCR Not Detected     Chlamydophila pneumoniae PCR Not Detected     Mycoplasma pneumo by PCR Not Detected    Narrative:      In the setting of a positive respiratory panel with a viral infection PLUS a negative procalcitonin without other underlying concern for bacterial infection, consider observing off antibiotics or discontinuation of antibiotics and continue supportive care. If the respiratory panel is positive for atypical bacterial infection (Bordetella pertussis, Chlamydophila pneumoniae, or Mycoplasma pneumoniae), consider antibiotic de-escalation to target atypical bacterial infection.    Blood Culture - Blood, Arm, Left [[id number]]  (Abnormal) Collected: 09/24/23 1651    Lab Status: Preliminary result Specimen: Blood from Arm, Left Updated: 09/25/23 1048     Blood Culture Abnormal Stain     Gram Stain Anaerobic Bottle Gram positive cocci    Blood Culture ID, PCR - Blood, Arm, Left [[id number]]  (Abnormal) Collected: 09/24/23 1651    Lab Status: Final result Specimen: Blood from Arm, Left Updated: 09/25/23 1221     BCID, PCR Staph spp, not aureus or lugdunensis. Identification by BCID2 PCR.     BOTTLE TYPE Anaerobic Bottle    COVID-19 and FLU A/B PCR - Swab, Nasopharynx [[id number]]  (Normal) Collected: 09/24/23 1121    Lab Status: Final result Specimen: Swab from Nasopharynx Updated: 09/24/23 1151     COVID19 Not Detected     Influenza A PCR Not Detected     Influenza B PCR Not Detected    Narrative:      Fact sheet for providers: https://www.fda.gov/media/569153/download    Fact sheet for patients: https://www.fda.gov/media/835627/download    Test performed by PCR.             Radiology Data:   Imaging Results (Last 24  Hours)       Procedure Component Value Units Date/Time    CT Angiogram Chest [[id number]] Collected: 09/24/23 1436     Updated: 09/24/23 1443    Narrative:      EXAMINATION: CT ANGIOGRAM CHEST-      9/24/2023 2:05 PM CDT     HISTORY: Chest pain and hypoxia.     In order to have a CT radiation dose as low as reasonably achievable  Automated Exposure Control was utilized for adjustment of the mA and/or  KV according to patient size.     DLP in mGycm= 235.        CT Angiogram Chest with IV contrast.  CT angiography protocol.  CT imaging with bolus IV contrast injection.  Under concurrent supervision axial, sagittal, coronal,  three-dimensional, and MIP data sets were constructed on an independent  work station.     Heart size is within normal limits.  A moderate size hiatal hernia is present.  Coronary artery calcification is present.  Normal size thoracic aorta.  No mediastinal mass.     Symmetric and normally opacified pulmonary arteries.  No pulmonary embolism.     Hyperexpanded lungs.  Upper lobe emphysema.     Dependent infiltrate within both lower lobes.  The infiltrate is greater on the LEFT side and is concerning for  developing lower lobe pneumonia.     There is no pneumothorax or heart failure.       Impression:      1. No pulmonary embolism.  2. Chronic lung changes.  3. Posterior LEFT lower lobe infiltrate suspicious for developing  pneumonia.                                         This report was signed and finalized on 9/24/2023 2:40 PM CDT by Dr. Chase Rios MD.             Scheduled Meds:  amitriptyline, 25 mg, Oral, Nightly  ARIPiprazole, 10 mg, Oral, Daily  azithromycin, 500 mg, Intravenous, Q24H  budesonide-formoterol, 2 puff, Inhalation, BID - RT  carvedilol, 12.5 mg, Oral, BID With Meals  cefepime, 2,000 mg, Intravenous, Once  cefepime, 2,000 mg, Intravenous, Q8H  cetirizine, 10 mg, Oral, Daily  DULoxetine, 60 mg, Oral, Daily  enoxaparin, 40 mg, Subcutaneous, Nightly  fluticasone, 2 spray, Each  Nare, Daily  guaiFENesin, 1,200 mg, Oral, Q12H  hydroCHLOROthiazide, 25 mg, Oral, Daily  ipratropium-albuterol, 3 mL, Nebulization, 4x Daily - RT  losartan, 50 mg, Oral, Q24H  nicotine, 1 patch, Transdermal, Q24H  pantoprazole, 40 mg, Oral, BID  potassium chloride, 40 mEq, Oral, Once  predniSONE, 40 mg, Oral, Daily       I have reviewed the patient's current medications.     Assessment/Plan   Assessment  Active Hospital Problems    Diagnosis     **Left lower lobe pneumonia, due to unspecified organism     Acute respiratory failure with hypoxia     Primary hypertension     COPD with acute exacerbation     Tobacco use        Treatment Plan  1.  Left lower lobe pneumonia due unspecified organism-presenting ED complaints of shortness of breath with associated productive cough.  On admission to ED, she was hypoxic at 87% on room air.  ABG showed a PO2 of 58, CO2 T8.2, with a compensated pH of 7.389. CTA of the chest showed a posterior left lobe infiltrate suspicious for developing pneumonia.  No fevers, chills, and sputum has been dry and nonproductive. Patient does not wear oxygen at home. 2 PPD smoker.  After preliminary blood culture gram-positive bacilli, Rocephin changed to cefepime per pulmonary consultation.  Continue azithromycin and vancomycin may be initiated if positive for MRSA.  Strep pneumonia negative, Legionella negative, respiratory PCR panel negative.  Discussed with YOLY Donald pulmonology today.  Blood culture gram-positive cocci not aureus or lugdunensis.  Will repeat ENRIQUE.  Likely contaminant.     2.  Primary hypertension- presented to ED with complaints of hypertension worsening over the last 2 weeks, which her doctor has been titrating and changing medications.  States that she did have nausea and vomiting related to hypertensive episodes.  Admission to ED the, patient was 174/85. overnight blood pressures were 121/69, 145/69, 169/76.  Continue home hydrochlorothiazide, Coreg,  discontinuation of Norvasc and initiation of losartan.  Vital signs every 4 hours continue to follow.    3.  Acute hypoxic respiratory failure-Hypoxic at 87% on room air in ED.  ABG  PO2 of 58, CO2 T8.2, with a compensated pH of 7.389.  Pulmonary consult initiated, with recommendations for continued corticosteroids, Mucinex, Symbicort, DuoNebs. supplemental oxygen, incentive spirometer and today initiated Flonase and Claritin.  Continue  azithromycin and changed Rocephin to cefepime discussed with YOLY Donald pulmonology today.    4.  Chronic obstructive pulmonary disease with acute exacerbation-never been formally diagnosed with COPD, never had formal PFTs .  Pulmonary consultation with recommendations for outpatient follow-up with PFTs at discharge.    5.  Tobacco abuse-chronic 2 pack/day smoker, smoking cessation initiated, patient declined nicotine patch at this time.    6.  Lovenox for VTE prophylaxis.    Medical Decision Making  Number and Complexity of problems: 5  Pneumonia due to infectious process, acute, high complexity, unchanged  Primary hypertension, acute on chronic, moderate complexity, improving  Acute hypoxic respiratory failure, acute on chronic, high complexity, unchanged  Chronic obstructive pulmonary disease with acute exacerbation, acute on chronic, moderate complexity, unchanged  Tobacco abuse, chronic, low complexity, stable  Differential Diagnosis: None    Conditions and Status        Condition is unchanged.     MDM Data  External documents reviewed: None  Cardiac tracing (EKG, telemetry) interpretation: EKG 9/25, reviewed sinus rhythm 74.  For night cardiac telemetry SB/S 47-64  Radiology interpretation:   CTA of the chest 9/24 reviewed 9/25  Chest x-ray 9/24 reviewed 9/25  Labs reviewed:   Troponin, lactate,PT/INR, PTT, magnesium, 9/24 reviewed 9/25  CMP 9/24, 9/25 reviewed 9/25 repeat in a.m.  CBC with differential 9/24, 9/20 5 repeat in a.m.  Blood cultures 9/24 reviewed 9/25  preliminary report gram-positive cocci in 1 bottle, continue to follow  Legionella and strep pneumonia 9/25 reviewed  Respiratory PCR, COVID, flu A/B reviewed 9/25    Any tests that were considered but not ordered: None     Decision rules/scores evaluated (example [id number], Wells, etc): COPD assessment score of 16 points, medium impact.     Discussed with: Dr. Nicole, patient, her  Barber, and YOLY Olmedo with pulmonology     Care Planning  Shared decision making: Dr. Nicole, patient, her  Barber, and YOLY Olmedo with pulmonology.  Patient agreeable to supplemental oxygen, IV antibiotics, smoking cessation, corticosteroid, and continued pulmonary consultation.  Code status and discussions: Full code per patient  Surrogate decision maker: Her  Barber    Disposition  Social Determinants of Health that impact treatment or disposition: Possible need for home oxygen  I expect the patient to be discharged to home in 2-3 days.     Electronically signed by YOLY Lees, 09/25/23, 14:12 CDT.    The above documentation resulted from a face-to-face encounter by me Jailyn FREDERICK, [facility].

## 2023-09-25 NOTE — CASE MANAGEMENT/SOCIAL WORK
Discharge Planning Assessment  [facility]     Patient Name: Heather Russo  MRN: [id number]  Today's Date: 9/25/2023    Admit Date: 9/24/2023        Discharge Needs Assessment       Row Name 09/25/23 0814       Living Environment    People in Home spouse    Current Living Arrangements home    Potentially Unsafe Housing Conditions none    Primary Care Provided by self    Provides Primary Care For no one    Family Caregiver if Needed spouse    Quality of Family Relationships helpful;involved;supportive       Resource/Environmental Concerns    Resource/Environmental Concerns none    Transportation Concerns none       Transition Planning    Patient/Family Anticipates Transition to home    Patient/Family Anticipated Services at Transition none    Transportation Anticipated family or friend will provide       Discharge Needs Assessment    Readmission Within the Last 30 Days no previous admission in last 30 days    Equipment Currently Used at Home none    Concerns to be Addressed denies needs/concerns at this time    Anticipated Changes Related to Illness none    Equipment Needed After Discharge none    Discharge Coordination/Progress PT resides at home with spouse and plans to dc to the same. PT was independent prior to admission and uses no DME. PT denies any needs at this time including HH. PT has a PCP and Rx coverage. SW will follow and assist as needed.                   Discharge Plan    No documentation.                 Continued Care and Services - Admitted Since 9/24/2023    Coordination has not been started for this encounter.          Demographic Summary    No documentation.                  Functional Status    No documentation.                  Psychosocial    No documentation.                  Abuse/Neglect    No documentation.                  Legal    No documentation.                  Substance Abuse    No documentation.                  Patient Forms    No documentation.                     Liz PEREZ  Joshua, RUBÉNW

## 2023-09-26 PROBLEM — E87.6 HYPOKALEMIA: Status: ACTIVE | Noted: 2023-09-26

## 2023-09-26 LAB
ANION GAP SERPL CALCULATED.3IONS-SCNC: 10 MMOL/L (ref 5–15)
BACTERIA SPEC AEROBE CULT: ABNORMAL
BUN SERPL-MCNC: 8 MG/DL (ref 8–23)
BUN/CREAT SERPL: 14.5 (ref 7–25)
CALCIUM SPEC-SCNC: 9.3 MG/DL (ref 8.6–10.5)
CHLORIDE SERPL-SCNC: 105 MMOL/L (ref 98–107)
CO2 SERPL-SCNC: 28 MMOL/L (ref 22–29)
CREAT SERPL-MCNC: 0.55 MG/DL (ref 0.57–1)
DEPRECATED RDW RBC AUTO: 59.7 FL (ref 37–54)
EGFRCR SERPLBLD CKD-EPI 2021: 100.6 ML/MIN/1.73
ERYTHROCYTE [DISTWIDTH] IN BLOOD BY AUTOMATED COUNT: 16.5 % (ref 12.3–15.4)
GLUCOSE SERPL-MCNC: 116 MG/DL (ref 65–99)
GRAM STN SPEC: ABNORMAL
HCT VFR BLD AUTO: 42.8 % (ref 34–46.6)
HGB BLD-MCNC: 12.8 G/DL (ref 12–15.9)
ISOLATED FROM: ABNORMAL
MCH RBC QN AUTO: 29.4 PG (ref 26.6–33)
MCHC RBC AUTO-ENTMCNC: 29.9 G/DL (ref 31.5–35.7)
MCV RBC AUTO: 98.2 FL (ref 79–97)
PLATELET # BLD AUTO: 178 10*3/MM3 (ref 140–450)
PMV BLD AUTO: 9.5 FL (ref 6–12)
POTASSIUM SERPL-SCNC: 3.3 MMOL/L (ref 3.5–5.2)
RBC # BLD AUTO: 4.36 10*6/MM3 (ref 3.77–5.28)
SODIUM SERPL-SCNC: 143 MMOL/L (ref 136–145)
WBC NRBC COR # BLD: 6.26 10*3/MM3 (ref 3.4–10.8)

## 2023-09-26 PROCEDURE — 94799 UNLISTED PULMONARY SVC/PX: CPT

## 2023-09-26 PROCEDURE — 25010000002 HYDRALAZINE PER 20 MG: Performed by: FAMILY MEDICINE

## 2023-09-26 PROCEDURE — 63710000001 PREDNISONE PER 1 MG: Performed by: FAMILY MEDICINE

## 2023-09-26 PROCEDURE — 0 CEFEPIME PER 500 MG: Performed by: INTERNAL MEDICINE

## 2023-09-26 PROCEDURE — 25010000002 AZITHROMYCIN PER 500 MG: Performed by: FAMILY MEDICINE

## 2023-09-26 PROCEDURE — 80048 BASIC METABOLIC PNL TOTAL CA: CPT | Performed by: FAMILY MEDICINE

## 2023-09-26 PROCEDURE — 99233 SBSQ HOSP IP/OBS HIGH 50: CPT | Performed by: INTERNAL MEDICINE

## 2023-09-26 PROCEDURE — 85027 COMPLETE CBC AUTOMATED: CPT | Performed by: FAMILY MEDICINE

## 2023-09-26 PROCEDURE — 36415 COLL VENOUS BLD VENIPUNCTURE: CPT | Performed by: FAMILY MEDICINE

## 2023-09-26 PROCEDURE — 25010000002 ENOXAPARIN PER 10 MG: Performed by: FAMILY MEDICINE

## 2023-09-26 PROCEDURE — 94664 DEMO&/EVAL PT USE INHALER: CPT

## 2023-09-26 RX ORDER — POTASSIUM CHLORIDE 750 MG/1
40 CAPSULE, EXTENDED RELEASE ORAL ONCE
Status: COMPLETED | OUTPATIENT
Start: 2023-09-26 | End: 2023-09-26

## 2023-09-26 RX ORDER — LOPERAMIDE HYDROCHLORIDE 2 MG/1
2 CAPSULE ORAL 4 TIMES DAILY PRN
Status: DISCONTINUED | OUTPATIENT
Start: 2023-09-26 | End: 2023-09-27 | Stop reason: HOSPADM

## 2023-09-26 RX ORDER — LOSARTAN POTASSIUM 50 MG/1
50 TABLET ORAL ONCE
Status: COMPLETED | OUTPATIENT
Start: 2023-09-26 | End: 2023-09-26

## 2023-09-26 RX ORDER — LOSARTAN POTASSIUM 50 MG/1
100 TABLET ORAL
Status: DISCONTINUED | OUTPATIENT
Start: 2023-09-27 | End: 2023-09-27 | Stop reason: HOSPADM

## 2023-09-26 RX ORDER — CARVEDILOL 25 MG/1
25 TABLET ORAL 2 TIMES DAILY WITH MEALS
Status: DISCONTINUED | OUTPATIENT
Start: 2023-09-26 | End: 2023-09-27 | Stop reason: HOSPADM

## 2023-09-26 RX ADMIN — LOPERAMIDE HYDROCHLORIDE 2 MG: 2 CAPSULE ORAL at 13:44

## 2023-09-26 RX ADMIN — ENOXAPARIN SODIUM 40 MG: 100 INJECTION SUBCUTANEOUS at 21:05

## 2023-09-26 RX ADMIN — PREDNISONE 40 MG: 20 TABLET ORAL at 08:54

## 2023-09-26 RX ADMIN — RIZATRIPTAN BENZOATE 10 MG: 10 TABLET, ORALLY DISINTEGRATING ORAL at 16:28

## 2023-09-26 RX ADMIN — CEFEPIME 2000 MG: 2 INJECTION, POWDER, FOR SOLUTION INTRAVENOUS at 21:03

## 2023-09-26 RX ADMIN — LORAZEPAM 0.5 MG: 0.5 TABLET ORAL at 21:05

## 2023-09-26 RX ADMIN — IPRATROPIUM BROMIDE AND ALBUTEROL SULFATE 3 ML: .5; 3 SOLUTION RESPIRATORY (INHALATION) at 15:02

## 2023-09-26 RX ADMIN — POTASSIUM CHLORIDE 40 MEQ: 10 CAPSULE, COATED, EXTENDED RELEASE ORAL at 11:00

## 2023-09-26 RX ADMIN — FLUTICASONE PROPIONATE 2 SPRAY: 50 SPRAY, METERED NASAL at 11:00

## 2023-09-26 RX ADMIN — PANTOPRAZOLE SODIUM 40 MG: 40 TABLET, DELAYED RELEASE ORAL at 21:05

## 2023-09-26 RX ADMIN — LOSARTAN POTASSIUM 50 MG: 50 TABLET, FILM COATED ORAL at 12:37

## 2023-09-26 RX ADMIN — CARVEDILOL 12.5 MG: 6.25 TABLET, FILM COATED ORAL at 08:54

## 2023-09-26 RX ADMIN — ARIPIPRAZOLE 10 MG: 10 TABLET ORAL at 08:54

## 2023-09-26 RX ADMIN — DULOXETINE HYDROCHLORIDE 60 MG: 30 CAPSULE, DELAYED RELEASE ORAL at 08:54

## 2023-09-26 RX ADMIN — CARVEDILOL 25 MG: 25 TABLET, FILM COATED ORAL at 18:45

## 2023-09-26 RX ADMIN — BUDESONIDE AND FORMOTEROL FUMARATE DIHYDRATE 2 PUFF: 160; 4.5 AEROSOL RESPIRATORY (INHALATION) at 06:18

## 2023-09-26 RX ADMIN — HYDRALAZINE HYDROCHLORIDE 10 MG: 20 INJECTION INTRAMUSCULAR; INTRAVENOUS at 11:01

## 2023-09-26 RX ADMIN — IPRATROPIUM BROMIDE AND ALBUTEROL SULFATE 3 ML: .5; 3 SOLUTION RESPIRATORY (INHALATION) at 18:56

## 2023-09-26 RX ADMIN — IPRATROPIUM BROMIDE AND ALBUTEROL SULFATE 3 ML: .5; 3 SOLUTION RESPIRATORY (INHALATION) at 10:33

## 2023-09-26 RX ADMIN — HYDROCHLOROTHIAZIDE 25 MG: 25 TABLET ORAL at 08:54

## 2023-09-26 RX ADMIN — BUDESONIDE AND FORMOTEROL FUMARATE DIHYDRATE 2 PUFF: 160; 4.5 AEROSOL RESPIRATORY (INHALATION) at 18:57

## 2023-09-26 RX ADMIN — GUAIFENESIN 1200 MG: 600 TABLET, EXTENDED RELEASE ORAL at 08:54

## 2023-09-26 RX ADMIN — AMITRIPTYLINE HYDROCHLORIDE 25 MG: 25 TABLET, FILM COATED ORAL at 21:05

## 2023-09-26 RX ADMIN — CETIRIZINE HYDROCHLORIDE 10 MG: 10 TABLET ORAL at 08:54

## 2023-09-26 RX ADMIN — POTASSIUM CHLORIDE 40 MEQ: 10 CAPSULE, COATED, EXTENDED RELEASE ORAL at 13:44

## 2023-09-26 RX ADMIN — AZITHROMYCIN DIHYDRATE 500 MG: 500 INJECTION, POWDER, LYOPHILIZED, FOR SOLUTION INTRAVENOUS at 18:45

## 2023-09-26 RX ADMIN — ACETAMINOPHEN 650 MG: 325 TABLET, FILM COATED ORAL at 11:00

## 2023-09-26 RX ADMIN — CEFEPIME 2000 MG: 2 INJECTION, POWDER, FOR SOLUTION INTRAVENOUS at 12:37

## 2023-09-26 RX ADMIN — LOSARTAN POTASSIUM 50 MG: 50 TABLET, FILM COATED ORAL at 08:54

## 2023-09-26 RX ADMIN — CEFEPIME 2000 MG: 2 INJECTION, POWDER, FOR SOLUTION INTRAVENOUS at 05:21

## 2023-09-26 RX ADMIN — PANTOPRAZOLE SODIUM 40 MG: 40 TABLET, DELAYED RELEASE ORAL at 09:03

## 2023-09-26 RX ADMIN — IPRATROPIUM BROMIDE AND ALBUTEROL SULFATE 3 ML: .5; 3 SOLUTION RESPIRATORY (INHALATION) at 06:12

## 2023-09-26 RX ADMIN — GUAIFENESIN 1200 MG: 600 TABLET, EXTENDED RELEASE ORAL at 21:03

## 2023-09-26 RX ADMIN — NICOTINE 1 PATCH: 21 PATCH, EXTENDED RELEASE TRANSDERMAL at 08:56

## 2023-09-26 NOTE — PROGRESS NOTES
[facility] Medicine Services  INPATIENT PROGRESS NOTE    Patient Name: Heather Russo  Date of Admission: 9/24/2023  Today's Date: 09/26/23  Length of Stay: 2  Primary Care Physician: Grzegorz Michel Jr., MD    Subjective   Chief Complaint: Shortness of breath  HPI   Heather Russo is a 68-year-old female who presented to [facility] ER 9/24 with complaints of hypertension worsening over the last 2 weeks, which her doctor has been titrating and changing medications.  States that she did have nausea and vomiting related to hypertensive episodes.  Admission to ED the, patient was 174/85.  She had mild chest wall discomfort with substernal chest pressure at times during hypertensive episodes.  No chest pain on admission to the ED, initial troponin was mildly elevated, secondary troponin was negative with a -2 delta.  EKG normal sinus rhythm without ST elevation.  Associated complaints of headaches and photophobia in conjunction with hypertensive episodes.  Additionally complained of shortness of breath with associated productive cough.  On admission to ED, she was hypoxic at 87% on room air.  ABG showed a PO2 of 58, CO2 58.2, with a compensated pH of 7.389. CTA of the chest showed a posterior left lobe infiltrate suspicious for developing pneumonia.  No fevers, chills, and sputum has been dry and nonproductive.  Patient does not wear oxygen at home.  Patient additionally states she has never been formally diagnosed with COPD, but she was told on last admission that she should follow-up with pulmonary. She is a current 2 pack/day smoker. ED course visual 25 mg IV, Compazine 5 mg, clonidine 0.1 p.o., potassium 50 mEq even for potassium of 2.9, Toradol 15 mg IV, Zofran 4 mg, Pepcid 20 mg, Solu-Medrol 125, ceftriaxone 1 g, Zithromax 500 mg.    Today:   Patient lying in bed.  Oxygen previously at 2 L and decreased to 1 L.  Saturation 93%.  Lungs sound fairly clear today and do not  appreciate much wheezing.  She has some congestion left posterior base.  No complaints of nausea, vomiting or abdominal pain.  Blood pressure remains elevated.  She was started losartan yesterday and will increase dose to 100 mg orally daily.  Discussed medication changes with patient.  Remains on Solu-Medrol and will decrease dose.  Pulmonary medicine following and changed antibiotics to cefepime.  Strep pneumonia and Legionella negative.  Respiratory PCR panel negative blood culture suspected contaminant.  ENRIQUE no growth at 24 hours.  Encouraged to be out of bed and ambulate.  PFT as outpatient when more stable.    Review of Systems   Constitutional:  Positive for fatigue. Negative for chills and fever.   HENT:  Positive for congestion. Negative for sore throat and trouble swallowing.    Eyes:  Negative for pain and redness.   Respiratory:  Positive for cough and shortness of breath. Negative for chest tightness and wheezing.    Cardiovascular:  Negative for chest pain and palpitations.   Gastrointestinal:  Negative for abdominal distention, abdominal pain, diarrhea and nausea.   Endocrine: Negative for cold intolerance and heat intolerance.   Genitourinary:  Negative for dysuria and flank pain.   Musculoskeletal:  Negative for gait problem and joint swelling.   Skin:  Negative for wound.   Allergic/Immunologic: Negative for immunocompromised state.   Neurological:  Positive for weakness. Negative for dizziness and numbness.   Hematological:  Does not bruise/bleed easily.   Psychiatric/Behavioral:  Negative for confusion and self-injury.       All pertinent negatives and positives are as above. All other systems have been reviewed and are negative unless otherwise stated.     Objective    Temp:  [97.8 °F (36.6 °C)-98.9 °F (37.2 °C)] 98.7 °F (37.1 °C)  Heart Rate:  [60-81] 62  Resp:  [16-18] 18  BP: (158-182)/(74-90) 172/90  Physical Exam  Vitals and nursing note reviewed.   Constitutional:       Comments: Sitting  up in bed.  Oxygen weaned to 1 L.   in room.   HENT:      Head: Normocephalic and atraumatic.      Nose: Congestion present. No rhinorrhea.      Mouth/Throat:      Mouth: Mucous membranes are moist.      Pharynx: Oropharynx is clear. No posterior oropharyngeal erythema.   Eyes:      Extraocular Movements: Extraocular movements intact.      Pupils: Pupils are equal, round, and reactive to light.   Cardiovascular:      Rate and Rhythm: Normal rate and regular rhythm.      Pulses: Normal pulses.   Pulmonary:      Effort: No tachypnea or accessory muscle usage.      Breath sounds: Examination of the right-lower field reveals decreased breath sounds. Examination of the left-lower field reveals decreased breath sounds. No wheezing.   Abdominal:      General: Abdomen is flat.      Palpations: Abdomen is soft.   Genitourinary:     Comments: Voiding  Musculoskeletal:         General: No tenderness.      Cervical back: Normal range of motion and neck supple.      Right lower leg: No edema.      Left lower leg: No edema.   Skin:     General: Skin is warm and dry.      Capillary Refill: Capillary refill takes less than 2 seconds.   Neurological:      General: No focal deficit present.      Mental Status: She is alert and oriented to person, place, and time.   Psychiatric:         Mood and Affect: Mood normal.         Thought Content: Thought content normal.         Judgment: Judgment normal.       Results Review:  I have reviewed the labs, radiology results, and diagnostic studies.    Laboratory Data:   Results from last 7 days   Lab Units 09/26/23  0227 09/25/23  0705 09/24/23  0814   WBC 10*3/mm3 6.26 5.92 8.90   HEMOGLOBIN g/dL 12.8 11.8* 13.7   HEMATOCRIT % 42.8 37.8 43.8   PLATELETS 10*3/mm3 178 172 192     Results from last 7 days   Lab Units 09/26/23  0407 09/25/23  0705 09/24/23  1121 09/24/23  0814   SODIUM mmol/L 143 142  --  142   SODIUM, ARTERIAL mmol/L  --   --  145  --    POTASSIUM mmol/L 3.3* 3.3*  --   2.9*   CHLORIDE mmol/L 105 105  --  103   CO2 mmol/L 28.0 25.0  --  24.0   BUN mg/dL 8 8  --  9   CREATININE mg/dL 0.55* 0.44*  --  0.57   CALCIUM mg/dL 9.3 9.4  --  9.9   BILIRUBIN mg/dL  --   --   --  <0.2   ALK PHOS U/L  --   --   --  77   ALT (SGPT) U/L  --   --   --  6   AST (SGOT) U/L  --   --   --  8   GLUCOSE mg/dL 116* 96  --  139*       Microbiology Results (last 10 days)       Procedure Component Value - Date/Time    Blood Culture With ENRIQUE - Blood, Hand, Left [[id number]]  (Normal) Collected: 09/25/23 1427    Lab Status: Preliminary result Specimen: Blood from Hand, Left Updated: 09/26/23 0245     Blood Culture No growth at less than 24 hours    S. Pneumo Ag Urine or CSF - Urine, Urine, Clean Catch [[id number]]  (Normal) Collected: 09/25/23 1134    Lab Status: Final result Specimen: Urine, Clean Catch Updated: 09/25/23 1233     Strep Pneumo Ag Negative    Legionella Antigen, Urine - Urine, Urine, Clean Catch [[id number]]  (Normal) Collected: 09/25/23 1134    Lab Status: Final result Specimen: Urine, Clean Catch Updated: 09/25/23 1233     LEGIONELLA ANTIGEN, URINE Negative    Respiratory Panel PCR w/COVID-19(SARS-CoV-2) JONATHAN/LU/MAGI/PAD/COR/MAD/GONZALO In-House, NP Swab in [facility] [place], 3-4 HR TAT - Swab, Nasopharynx [[id number]]  (Normal) Collected: 09/25/23 0603    Lab Status: Final result Specimen: Swab from Nasopharynx Updated: 09/25/23 0712     ADENOVIRUS, PCR Not Detected     Coronavirus 229E Not Detected     Coronavirus HKU1 Not Detected     Coronavirus NL63 Not Detected     Coronavirus OC43 Not Detected     COVID19 Not Detected     Human Metapneumovirus Not Detected     Human Rhinovirus/Enterovirus Not Detected     Influenza A PCR Not Detected     Influenza B PCR Not Detected     Parainfluenza Virus 1 Not Detected     Parainfluenza Virus 2 Not Detected     Parainfluenza Virus 3 Not Detected     Parainfluenza Virus 4 Not Detected     RSV, PCR Not Detected     Bordetella pertussis pcr Not Detected     Bordetella  parapertussis PCR Not Detected     Chlamydophila pneumoniae PCR Not Detected     Mycoplasma pneumo by PCR Not Detected    Narrative:      In the setting of a positive respiratory panel with a viral infection PLUS a negative procalcitonin without other underlying concern for bacterial infection, consider observing off antibiotics or discontinuation of antibiotics and continue supportive care. If the respiratory panel is positive for atypical bacterial infection (Bordetella pertussis, Chlamydophila pneumoniae, or Mycoplasma pneumoniae), consider antibiotic de-escalation to target atypical bacterial infection.    Blood Culture - Blood, Arm, Left [[id number]]  (Abnormal) Collected: 09/24/23 1651    Lab Status: Final result Specimen: Blood from Arm, Left Updated: 09/26/23 0554     Blood Culture Staphylococcus, coagulase negative     Isolated from Anaerobic Bottle     Gram Stain Anaerobic Bottle Gram positive cocci    Narrative:      Probable contaminant requires clinical correlation, susceptibility not performed unless requested by physician.      Blood Culture - Blood, Arm, Left [[id number]]  (Normal) Collected: 09/24/23 1651    Lab Status: Preliminary result Specimen: Blood from Arm, Left Updated: 09/25/23 1715     Blood Culture No growth at 24 hours    Blood Culture ID, PCR - Blood, Arm, Left [[id number]]  (Abnormal) Collected: 09/24/23 1651    Lab Status: Final result Specimen: Blood from Arm, Left Updated: 09/25/23 1221     BCID, PCR Staph spp, not aureus or lugdunensis. Identification by BCID2 PCR.     BOTTLE TYPE Anaerobic Bottle    COVID-19 and FLU A/B PCR - Swab, Nasopharynx [[id number]]  (Normal) Collected: 09/24/23 1121    Lab Status: Final result Specimen: Swab from Nasopharynx Updated: 09/24/23 1151     COVID19 Not Detected     Influenza A PCR Not Detected     Influenza B PCR Not Detected    Narrative:      Fact sheet for providers: https://www.fda.gov/media/578528/download    Fact sheet for patients:  https://www.fda.gov/media/474673/download    Test performed by PCR.           Imaging Results (All)       Procedure Component Value Units Date/Time    CT Angiogram Chest [[id number]] Collected: 09/24/23 1436     Updated: 09/24/23 1443    Narrative:      EXAMINATION: CT ANGIOGRAM CHEST-      9/24/2023 2:05 PM CDT     HISTORY: Chest pain and hypoxia.     In order to have a CT radiation dose as low as reasonably achievable  Automated Exposure Control was utilized for adjustment of the mA and/or  KV according to patient size.     DLP in mGycm= 235.        CT Angiogram Chest with IV contrast.  CT angiography protocol.  CT imaging with bolus IV contrast injection.  Under concurrent supervision axial, sagittal, coronal,  three-dimensional, and MIP data sets were constructed on an independent  work station.     Heart size is within normal limits.  A moderate size hiatal hernia is present.  Coronary artery calcification is present.  Normal size thoracic aorta.  No mediastinal mass.     Symmetric and normally opacified pulmonary arteries.  No pulmonary embolism.     Hyperexpanded lungs.  Upper lobe emphysema.     Dependent infiltrate within both lower lobes.  The infiltrate is greater on the LEFT side and is concerning for  developing lower lobe pneumonia.     There is no pneumothorax or heart failure.       Impression:      1. No pulmonary embolism.  2. Chronic lung changes.  3. Posterior LEFT lower lobe infiltrate suspicious for developing  pneumonia.   This report was signed and finalized on 9/24/2023 2:40 PM CDT by Dr. Chase Rios MD.       XR Chest 1 View [[id number]] Collected: 09/24/23 0841     Updated: 09/24/23 0846    Narrative:      EXAMINATION: XR CHEST 1 VW-     9/24/2023 8:33 AM CDT     HISTORY: chest pain     FINDINGS:  1 view chest x-ray.     Comparison is made with August 10, 2020.        Heart size is normal.  The mediastinum is within normal limits.     The lungs are normally expanded with no pneumonia or  pneumothorax.  Mild chronic appearing interstitial disease with a few calcified  granulomas.  No congestive failure changes.       Impression:      1. No acute disease.   This report was signed and finalized on 9/24/2023 8:42 AM CDT by Dr. Chase Rios MD.             Scheduled Meds:  amitriptyline, 25 mg, Oral, Nightly  ARIPiprazole, 10 mg, Oral, Daily  azithromycin, 500 mg, Intravenous, Q24H  budesonide-formoterol, 2 puff, Inhalation, BID - RT  carvedilol, 12.5 mg, Oral, BID With Meals  cefepime, 2,000 mg, Intravenous, Q8H  cetirizine, 10 mg, Oral, Daily  DULoxetine, 60 mg, Oral, Daily  enoxaparin, 40 mg, Subcutaneous, Nightly  fluticasone, 2 spray, Each Nare, Daily  guaiFENesin, 1,200 mg, Oral, Q12H  hydroCHLOROthiazide, 25 mg, Oral, Daily  ipratropium-albuterol, 3 mL, Nebulization, 4x Daily - RT  [START ON 9/27/2023] losartan, 100 mg, Oral, Q24H  losartan, 50 mg, Oral, Once  nicotine, 1 patch, Transdermal, Q24H  pantoprazole, 40 mg, Oral, BID  potassium chloride, 40 mEq, Oral, Once  potassium chloride, 40 mEq, Oral, Once  predniSONE, 40 mg, Oral, Daily       I have reviewed the patient's current medications.     Assessment/Plan   Assessment  Active Hospital Problems    Diagnosis     **Left lower lobe pneumonia, due to unspecified organism     Acute respiratory failure with hypoxia     Primary hypertension     COPD with acute exacerbation     Hypokalemia     COPD exacerbation     Tobacco use      Treatment Plan  1.  Left lower lobe pneumonia due to unspecified organism.  To ED 9/24 with shortness of breath, productive cough noted to be hypoxic with saturation 87% on room air.  PO2 58.  CTA chest showed posterior left lobe infiltrate suspicious for developing pneumonia.  Patient denied fever or chills.  Reports nonproductive cough.  Does not wear oxygen at home.  Blood cultures returned gram positive cocci and Rocephin changed to cefepime per pulmonary medicine.  Continue azithromycin.  Strep pneumonia negative,  Legionella negative, respiratory PCR panel negative.  Blood cultures Staph coagulase negative not aureus or lugdunensis, suspect contaminant.  ENRIQUE no growth less than 24 hours.     2.  Primary hypertension.  To ED with worsening hypertension over the past 2 weeks and PCP has been titrating and changing medications.  She reported nausea and vomiting related to elevated blood pressure.  Blood pressure 174/85 in ER.  Blood pressure remains elevated 172/90.  Coreg restarted 9/25.  Amlodipine discontinued 9/25 and losartan 50 mg initiated.  Increase losartan to 100 mg orally daily.  Continue HCTZ and Coreg.    3.  Acute hypoxic respiratory failure-Hypoxic at 87% on room air in ED.  ABG  PO2 of 58, CO2 58.2, with a compensated pH of 7.389.  Pulmonary consulted and recommends steroids, Mucinex, Symbicort, DuoNebs, supplemental oxygen.  Continue incentive spirometry.  Flonase and Claritin added 9/25.  Azithromycin continued.  Rocephin changed to cefepime on 9/25 per pulmonary.  Wean oxygen as tolerated.  Outpatient pulmonary function test after recovers from acute illness.    4.  Chronic obstructive pulmonary disease with acute exacerbation.  Patient denies formal diagnosis of COPD and no formal PFTs.  Pulmonary consulted and recommends outpatient PFTs at discharge.  Change Solu-Medrol to oral prednisone.    5.  Hypokalemia.  Potassium 3.3.  Replace potassium 40 mEq x 2 doses.  Repeat BMP in AM.    6.  Tobacco abuse-chronic 2 pack/day smoker, smoking cessation initiated, patient declined nicotine patch.    7.  Lovenox for VTE prophylaxis.    8.  Out of bed and ambulate    Medical Decision Making  Number and Complexity of problems: 5  Pneumonia due to infectious process, acute, high complexity, improving  Primary hypertension, acute on chronic, moderate complexity, improving, requiring medication changes  Acute hypoxic respiratory failure, acute on chronic, high complexity, improving  Chronic obstructive pulmonary disease  with acute exacerbation, acute on chronic, moderate complexity, unchanged  Tobacco abuse, chronic, low complexity, stable  Hypokalemia: Acute, moderate complexity    Differential Diagnosis: None    Conditions and Status        Condition is improving     MDM Data  External documents reviewed: None  Cardiac tracing (EKG, telemetry) normal sinus rhythm 57-80 on telemetry.  CTA of the chest 9/24 reviewed 9/26  Chest x-ray 9/24 reviewed 9/26  Labs reviewed:   BMP 9/26/2023.  Repeat BMP in AM.  CBC 9/26/2023.  Repeat CBC in AM.  Strep pneumonia, Legionella, respiratory PCR panel negative.  Blood culture with ENRIQUE no growth.    Any tests that were considered but not ordered: None     Decision rules/scores evaluated (example [id number], [place], etc): COPD assessment score of 16 points, medium impact.     Discussed with: Dr. Nicole, patient, her  Barber.     Care Planning  Shared decision making: Dr. Nicole, patient, her  Barber.  Patient agrees to wean oxygen, transition to oral prednisone, adjust blood pressure medications initiate losartan, out of bed and ambulate.  CODE STATUS and discussions: Full code  Surrogate decision maker: Her  Barber    Disposition  Social Determinants of Health that impact treatment or disposition: Possible need for home oxygen  I expect the patient to be discharged to home in 1-2 days.     Electronically signed by YOLY Lees, 09/26/23, 09:44 CDT.    The above documentation resulted from a face-to-face encounter by me Jailyn FREDERICK, [facility].

## 2023-09-26 NOTE — PROGRESS NOTES
[facility] [place]ULMONARY AND CRITICAL CARE PROGRESS NOTE - [facility]    Patient: Heather Russo  1955   MR# [id number]   Acct# [id number]  09/26/23   08:04 CDT  Referring Provider: Federico Nicole MD    Chief Complaint: HTN, hypoxia     Interval history: She remains hypertensive.  Her pulmonary status is improving.  She is currently on 1 L nasal cannula with a sat of 93%.  Strep and Legionella antigens are negative.  Potassium is stable at 3.3.  Her spouse is at bedside.  We discussed outpatient PFTs.  Meds:  amitriptyline, 25 mg, Oral, Nightly  ARIPiprazole, 10 mg, Oral, Daily  azithromycin, 500 mg, Intravenous, Q24H  budesonide-formoterol, 2 puff, Inhalation, BID - RT  carvedilol, 12.5 mg, Oral, BID With Meals  cefepime, 2,000 mg, Intravenous, Q8H  cetirizine, 10 mg, Oral, Daily  DULoxetine, 60 mg, Oral, Daily  enoxaparin, 40 mg, Subcutaneous, Nightly  fluticasone, 2 spray, Each Nare, Daily  guaiFENesin, 1,200 mg, Oral, Q12H  hydroCHLOROthiazide, 25 mg, Oral, Daily  ipratropium-albuterol, 3 mL, Nebulization, 4x Daily - RT  losartan, 50 mg, Oral, Q24H  nicotine, 1 patch, Transdermal, Q24H  pantoprazole, 40 mg, Oral, BID  predniSONE, 40 mg, Oral, Daily         Physical Exam:  SpO2 Percentage    09/26/23 0612 09/26/23 0621 09/26/23 0700   SpO2: 96% 92% 93%     Body mass index is 23.06 kg/m².   Temp:  [97.6 °F (36.4 °C)-98.9 °F (37.2 °C)] 98.7 °F (37.1 °C)  Heart Rate:  [60-81] 62  Resp:  [16-18] 18  BP: (158-182)/(74-90) 172/90  Intake/Output Summary (Last 24 hours) at 9/26/2023 0804  Last data filed at 9/25/2023 2220  Gross per 24 hour   Intake 220 ml   Output --   Net 220 ml     Physical Exam  Vitals and nursing note reviewed. Exam conducted with a chaperone present.   Constitutional:       Appearance: She is well-developed.      Interventions: Nasal cannula in place.   HENT:      Head: Normocephalic and atraumatic.   Eyes:      General: No scleral icterus.  Cardiovascular:      Rate and Rhythm:  Normal rate and regular rhythm.   Pulmonary:      Effort: Pulmonary effort is normal.   Abdominal:      General: There is no distension.      Palpations: Abdomen is soft.   Musculoskeletal:         General: Normal range of motion.      Cervical back: Normal range of motion and neck supple.   Skin:     General: Skin is warm and dry.   Neurological:      Mental Status: She is alert and oriented to person, place, and time.   Psychiatric:         Mood and Affect: Mood normal.         Behavior: Behavior normal.     Electronically signed by YOLY Antonio, 9/26/2023, 08:04 CDT      Physician substantive portion: medical decision making  Result Review  Laboratory Data:  Results from last 7 days   Lab Units 09/26/23  0227 09/25/23  0705 09/24/23  0814   WBC 10*3/mm3 6.26 5.92 8.90   HEMOGLOBIN g/dL 12.8 11.8* 13.7   PLATELETS 10*3/mm3 178 172 192     Results from last 7 days   Lab Units 09/26/23  0407 09/25/23  0705 09/24/23  1651 09/24/23  1121 09/24/23  0814   SODIUM mmol/L 143 142  --   --  142   SODIUM, ARTERIAL mmol/L  --   --   --  145  --    POTASSIUM mmol/L 3.3* 3.3*  --   --  2.9*   CO2 mmol/L 28.0 25.0  --   --  24.0   BUN mg/dL 8 8  --   --  9   CREATININE mg/dL 0.55* 0.44*  --   --  0.57   INR   --   --   --   --  0.97   LACTATE mmol/L  --   --  0.5  --   --      Results from last 7 days   Lab Units 09/24/23  1121   PH, ARTERIAL pH units 7.389   PCO2, ARTERIAL mm Hg 44.7   PO2 ART mm Hg 58.2*     Microbiology Results (last 10 days)       Procedure Component Value - Date/Time    Blood Culture With ENRIQUE - Blood, Hand, Left [[id number]]  (Normal) Collected: 09/25/23 1427    Lab Status: Preliminary result Specimen: Blood from Hand, Left Updated: 09/26/23 1445     Blood Culture No growth at 24 hours    S. Pneumo Ag Urine or CSF - Urine, Urine, Clean Catch [[id number]]  (Normal) Collected: 09/25/23 1134    Lab Status: Final result Specimen: Urine, Clean Catch Updated: 09/25/23 1233     Strep Pneumo Ag  Negative    Legionella Antigen, Urine - Urine, Urine, Clean Catch [[id number]]  (Normal) Collected: 09/25/23 1134    Lab Status: Final result Specimen: Urine, Clean Catch Updated: 09/25/23 1233     LEGIONELLA ANTIGEN, URINE Negative    Respiratory Panel PCR w/COVID-19(SARS-CoV-2) JONATHAN/LU/MAGI/PAD/COR/MAD/GONZALO In-House, NP Swab in UTM/VTM, 3-4 HR TAT - Swab, Nasopharynx [[id number]]  (Normal) Collected: 09/25/23 0603    Lab Status: Final result Specimen: Swab from Nasopharynx Updated: 09/25/23 0712     ADENOVIRUS, PCR Not Detected     Coronavirus 229E Not Detected     Coronavirus HKU1 Not Detected     Coronavirus NL63 Not Detected     Coronavirus OC43 Not Detected     COVID19 Not Detected     Human Metapneumovirus Not Detected     Human Rhinovirus/Enterovirus Not Detected     Influenza A PCR Not Detected     Influenza B PCR Not Detected     Parainfluenza Virus 1 Not Detected     Parainfluenza Virus 2 Not Detected     Parainfluenza Virus 3 Not Detected     Parainfluenza Virus 4 Not Detected     RSV, PCR Not Detected     Bordetella pertussis pcr Not Detected     Bordetella parapertussis PCR Not Detected     Chlamydophila pneumoniae PCR Not Detected     Mycoplasma pneumo by PCR Not Detected    Narrative:      In the setting of a positive respiratory panel with a viral infection PLUS a negative procalcitonin without other underlying concern for bacterial infection, consider observing off antibiotics or discontinuation of antibiotics and continue supportive care. If the respiratory panel is positive for atypical bacterial infection (Bordetella pertussis, Chlamydophila pneumoniae, or Mycoplasma pneumoniae), consider antibiotic de-escalation to target atypical bacterial infection.    Blood Culture - Blood, Arm, Left [[id number]]  (Abnormal) Collected: 09/24/23 1651    Lab Status: Final result Specimen: Blood from Arm, Left Updated: 09/26/23 0554     Blood Culture Staphylococcus, coagulase negative     Isolated from Anaerobic  Bottle     Gram Stain Anaerobic Bottle Gram positive cocci    Narrative:      Probable contaminant requires clinical correlation, susceptibility not performed unless requested by physician.      Blood Culture - Blood, Arm, Left [[id number]]  (Normal) Collected: 09/24/23 1651    Lab Status: Preliminary result Specimen: Blood from Arm, Left Updated: 09/26/23 1715     Blood Culture No growth at 2 days    Blood Culture ID, PCR - Blood, Arm, Left [[id number]]  (Abnormal) Collected: 09/24/23 1651    Lab Status: Final result Specimen: Blood from Arm, Left Updated: 09/25/23 1221     BCID, PCR Staph spp, not aureus or lugdunensis. Identification by BCID2 PCR.     BOTTLE TYPE Anaerobic Bottle    COVID-19 and FLU A/B PCR - Swab, Nasopharynx [[id number]]  (Normal) Collected: 09/24/23 1121    Lab Status: Final result Specimen: Swab from Nasopharynx Updated: 09/24/23 1151     COVID19 Not Detected     Influenza A PCR Not Detected     Influenza B PCR Not Detected    Narrative:      Fact sheet for providers: https://www.fda.gov/media/015860/download    Fact sheet for patients: https://www.fda.gov/media/347009/download    Test performed by PCR.           Recent films:  No radiology results from the last 24 hrs   Personal review of imaging : CXR shows bilateral hyperinflated lung fields and chronic changes and CT scan shows left lower lobe pneumonia in addition to COPD      Pulmonary Assessment:  Acute on chronic hypoxic respiratory failure  Chronic obstructive pulmonary disease with acute exacerbation with ongoing tobacco use  Left lower lobe pneumonia  Needed supplemental oxygen  Bacteremia with positive blood culture  History of small cell B-cell lymphoma treated  Anxiety and depression  Hypertension  Hyperlipidemia  Ulcerative colitis  Allergic rhinitis  History of seizure disorder  Gastroesophageal reflux disease  Arthritis    Recommend/plan:   Patient was seen in the follow-up visit in pulmonary rounds in medical floor today  No  family at bedside.  She was having her supper was sitting comfortably.  She is feeling better.  Oxygen has been discontinued she is on room air.  Oxygen saturation in the mid 90s.  She is afebrile.  Working with physical therapy  She is also doing incentive spirometry and using flutter valve  Continue current antibiotic coverage with cefepime and azithromycin is discontinued  Legionella urinary antigen is negative.  Urinary strep pneumo antigen is negative  Continue Symbicort and DuoNeb treatment and continue IV Solu-Medrol changed to oral steroid taper  Blood culture positive for staph species.  Not sure if this is contaminant or not.  If she is doing well she can be discharged home on oral antibiotics with doxycycline for 10 days or Augmentin for 10 days  Continue home bronchodilator treatment per home oxygen evaluation prior to the discharge.    I advised to get lab work tomorrow morning including a blood gas and chest x-ray  I she will need to work on tobacco cessation.  Currently on nicotine patch.  She will also need to follow-up with me in the pulmonary clinic in 2 months time with a PFT and a chest x-ray.  Continue DVT and stress ulcer prophylaxis and pain and anxiety control.  Nutritional support.  Increase physical activity as tolerated.  CODE STATUS: Full.  Overall prognosis: Improving.  We will follow      This visit was performed by both a physician and an Advanced Practice RN.  I performed all aspects of the medical decision making as documented.    Electronically signed by     Adair Muir MD,  Pulmonologist/Intensivist   9/26/2023, 17:49 CDT

## 2023-09-26 NOTE — PLAN OF CARE
Goal Outcome Evaluation:  Plan of Care Reviewed With: patient, spouse        Progress: improving  Outcome Evaluation: very pleasent pt. VS stable. Tele SR/SB. IV abx given. continued on 2L NC. Complaint of chronic migraine, home med given once.

## 2023-09-27 ENCOUNTER — APPOINTMENT (OUTPATIENT)
Dept: GENERAL RADIOLOGY | Facility: HOSPITAL | Age: 68
DRG: 193 | End: 2023-09-27
Payer: MEDICARE

## 2023-09-27 ENCOUNTER — READMISSION MANAGEMENT (OUTPATIENT)
Dept: CALL CENTER | Facility: HOSPITAL | Age: 68
End: 2023-09-27
Payer: MEDICARE

## 2023-09-27 VITALS
HEART RATE: 91 BPM | HEIGHT: 63 IN | BODY MASS INDEX: 23.07 KG/M2 | TEMPERATURE: 98.3 F | OXYGEN SATURATION: 94 % | SYSTOLIC BLOOD PRESSURE: 117 MMHG | DIASTOLIC BLOOD PRESSURE: 80 MMHG | RESPIRATION RATE: 16 BRPM | WEIGHT: 130.19 LBS

## 2023-09-27 PROBLEM — J18.9 PNEUMONIA, UNSPECIFIED ORGANISM: Status: ACTIVE | Noted: 2023-09-27

## 2023-09-27 LAB
ANION GAP SERPL CALCULATED.3IONS-SCNC: 12 MMOL/L (ref 5–15)
ARTERIAL PATENCY WRIST A: POSITIVE
ATMOSPHERIC PRESS: 750 MMHG
BASE EXCESS BLDA CALC-SCNC: 4.7 MMOL/L (ref 0–2)
BDY SITE: ABNORMAL
BODY TEMPERATURE: 37 C
BUN SERPL-MCNC: 13 MG/DL (ref 8–23)
BUN/CREAT SERPL: 22 (ref 7–25)
CALCIUM SPEC-SCNC: 10.5 MG/DL (ref 8.6–10.5)
CHLORIDE SERPL-SCNC: 101 MMOL/L (ref 98–107)
CO2 SERPL-SCNC: 27 MMOL/L (ref 22–29)
CREAT SERPL-MCNC: 0.59 MG/DL (ref 0.57–1)
DEPRECATED RDW RBC AUTO: 55.8 FL (ref 37–54)
EGFRCR SERPLBLD CKD-EPI 2021: 98.9 ML/MIN/1.73
ERYTHROCYTE [DISTWIDTH] IN BLOOD BY AUTOMATED COUNT: 16.4 % (ref 12.3–15.4)
GLUCOSE SERPL-MCNC: 107 MG/DL (ref 65–99)
HCO3 BLDA-SCNC: 28.4 MMOL/L (ref 20–26)
HCT VFR BLD AUTO: 43 % (ref 34–46.6)
HGB BLD-MCNC: 13.5 G/DL (ref 12–15.9)
INHALED O2 CONCENTRATION: 21 %
Lab: ABNORMAL
MCH RBC QN AUTO: 29.2 PG (ref 26.6–33)
MCHC RBC AUTO-ENTMCNC: 31.4 G/DL (ref 31.5–35.7)
MCV RBC AUTO: 93.1 FL (ref 79–97)
MODALITY: ABNORMAL
PCO2 BLDA: 37.8 MM HG (ref 35–45)
PCO2 TEMP ADJ BLD: 37.8 MM HG (ref 35–45)
PH BLDA: 7.48 PH UNITS (ref 7.35–7.45)
PH, TEMP CORRECTED: 7.48 PH UNITS (ref 7.35–7.45)
PLATELET # BLD AUTO: 209 10*3/MM3 (ref 140–450)
PMV BLD AUTO: 9.3 FL (ref 6–12)
PO2 BLDA: 66.2 MM HG (ref 83–108)
PO2 TEMP ADJ BLD: 66.2 MM HG (ref 83–108)
POTASSIUM SERPL-SCNC: 3.4 MMOL/L (ref 3.5–5.2)
RBC # BLD AUTO: 4.62 10*6/MM3 (ref 3.77–5.28)
SAO2 % BLDCOA: 93.9 % (ref 94–99)
SODIUM SERPL-SCNC: 140 MMOL/L (ref 136–145)
VENTILATOR MODE: ABNORMAL
WBC NRBC COR # BLD: 5.71 10*3/MM3 (ref 3.4–10.8)

## 2023-09-27 PROCEDURE — 94799 UNLISTED PULMONARY SVC/PX: CPT

## 2023-09-27 PROCEDURE — 63710000001 PREDNISONE PER 1 MG: Performed by: FAMILY MEDICINE

## 2023-09-27 PROCEDURE — 99232 SBSQ HOSP IP/OBS MODERATE 35: CPT | Performed by: INTERNAL MEDICINE

## 2023-09-27 PROCEDURE — 71045 X-RAY EXAM CHEST 1 VIEW: CPT

## 2023-09-27 PROCEDURE — 85027 COMPLETE CBC AUTOMATED: CPT | Performed by: FAMILY MEDICINE

## 2023-09-27 PROCEDURE — 80048 BASIC METABOLIC PNL TOTAL CA: CPT | Performed by: FAMILY MEDICINE

## 2023-09-27 PROCEDURE — 94761 N-INVAS EAR/PLS OXIMETRY MLT: CPT

## 2023-09-27 PROCEDURE — 94618 PULMONARY STRESS TESTING: CPT

## 2023-09-27 PROCEDURE — 0 CEFEPIME PER 500 MG: Performed by: INTERNAL MEDICINE

## 2023-09-27 PROCEDURE — 94664 DEMO&/EVAL PT USE INHALER: CPT

## 2023-09-27 PROCEDURE — 82803 BLOOD GASES ANY COMBINATION: CPT

## 2023-09-27 PROCEDURE — 36415 COLL VENOUS BLD VENIPUNCTURE: CPT | Performed by: FAMILY MEDICINE

## 2023-09-27 PROCEDURE — 36600 WITHDRAWAL OF ARTERIAL BLOOD: CPT

## 2023-09-27 RX ORDER — POTASSIUM CHLORIDE 750 MG/1
40 CAPSULE, EXTENDED RELEASE ORAL ONCE
Status: COMPLETED | OUTPATIENT
Start: 2023-09-27 | End: 2023-09-27

## 2023-09-27 RX ORDER — CARVEDILOL 25 MG/1
25 TABLET ORAL 2 TIMES DAILY WITH MEALS
Qty: 60 TABLET | Refills: 0 | Status: SHIPPED | OUTPATIENT
Start: 2023-09-27

## 2023-09-27 RX ORDER — LOSARTAN POTASSIUM 100 MG/1
100 TABLET ORAL
Qty: 30 TABLET | Refills: 0 | Status: SHIPPED | OUTPATIENT
Start: 2023-09-28

## 2023-09-27 RX ORDER — BUDESONIDE AND FORMOTEROL FUMARATE DIHYDRATE 160; 4.5 UG/1; UG/1
2 AEROSOL RESPIRATORY (INHALATION)
Refills: 0
Start: 2023-09-27

## 2023-09-27 RX ORDER — GUAIFENESIN 600 MG/1
1200 TABLET, EXTENDED RELEASE ORAL EVERY 12 HOURS SCHEDULED
Qty: 20 TABLET | Refills: 0 | Status: SHIPPED | OUTPATIENT
Start: 2023-09-27

## 2023-09-27 RX ORDER — CEFDINIR 300 MG/1
300 CAPSULE ORAL EVERY 12 HOURS SCHEDULED
Status: DISCONTINUED | OUTPATIENT
Start: 2023-09-27 | End: 2023-09-27 | Stop reason: HOSPADM

## 2023-09-27 RX ORDER — PREDNISONE 10 MG/1
TABLET ORAL
Qty: 7 TABLET | Refills: 0 | Status: SHIPPED | OUTPATIENT
Start: 2023-09-27

## 2023-09-27 RX ORDER — CEFDINIR 300 MG/1
300 CAPSULE ORAL EVERY 12 HOURS SCHEDULED
Qty: 8 CAPSULE | Refills: 0 | Status: SHIPPED | OUTPATIENT
Start: 2023-09-27 | End: 2023-10-01

## 2023-09-27 RX ADMIN — PANTOPRAZOLE SODIUM 40 MG: 40 TABLET, DELAYED RELEASE ORAL at 09:46

## 2023-09-27 RX ADMIN — CARVEDILOL 25 MG: 25 TABLET, FILM COATED ORAL at 07:41

## 2023-09-27 RX ADMIN — POTASSIUM CHLORIDE 40 MEQ: 10 CAPSULE, COATED, EXTENDED RELEASE ORAL at 09:46

## 2023-09-27 RX ADMIN — NICOTINE 1 PATCH: 21 PATCH, EXTENDED RELEASE TRANSDERMAL at 09:42

## 2023-09-27 RX ADMIN — DULOXETINE HYDROCHLORIDE 60 MG: 30 CAPSULE, DELAYED RELEASE ORAL at 09:43

## 2023-09-27 RX ADMIN — GUAIFENESIN 1200 MG: 600 TABLET, EXTENDED RELEASE ORAL at 09:43

## 2023-09-27 RX ADMIN — ARIPIPRAZOLE 10 MG: 10 TABLET ORAL at 09:43

## 2023-09-27 RX ADMIN — BUDESONIDE AND FORMOTEROL FUMARATE DIHYDRATE 2 PUFF: 160; 4.5 AEROSOL RESPIRATORY (INHALATION) at 06:31

## 2023-09-27 RX ADMIN — HYDROCHLOROTHIAZIDE 25 MG: 25 TABLET ORAL at 09:43

## 2023-09-27 RX ADMIN — IPRATROPIUM BROMIDE AND ALBUTEROL SULFATE 3 ML: .5; 3 SOLUTION RESPIRATORY (INHALATION) at 06:31

## 2023-09-27 RX ADMIN — CEFEPIME 2000 MG: 2 INJECTION, POWDER, FOR SOLUTION INTRAVENOUS at 05:20

## 2023-09-27 RX ADMIN — PREDNISONE 40 MG: 20 TABLET ORAL at 09:43

## 2023-09-27 RX ADMIN — POTASSIUM CHLORIDE 40 MEQ: 10 CAPSULE, COATED, EXTENDED RELEASE ORAL at 06:05

## 2023-09-27 RX ADMIN — LOSARTAN POTASSIUM 100 MG: 50 TABLET, FILM COATED ORAL at 07:41

## 2023-09-27 RX ADMIN — CETIRIZINE HYDROCHLORIDE 10 MG: 10 TABLET ORAL at 09:43

## 2023-09-27 RX ADMIN — FLUTICASONE PROPIONATE 2 SPRAY: 50 SPRAY, METERED NASAL at 09:46

## 2023-09-27 NOTE — PROGRESS NOTES
[facility] [place] PULMONARY AND CRITICAL CARE PROGRESS NOTE - [facility]    Patient: Heather Russo  1955   MR# [id number]   Acct# [id number]  09/27/23   07:30 CDT  Referring Provider: Federico Nicole MD    Chief Complaint: HTN, hypoxia     Interval history: Her pulmonary status has improved. She is currently on room air and does not qualify for home oxygen.  Her spouse is at bedside.  2-month office follow-up, chest x-ray and outpatient PFTs.  Oral prednisone taper at discharge. PO antibiotic at discharge. Pulmonary will sign off.   Meds:  amitriptyline, 25 mg, Oral, Nightly  ARIPiprazole, 10 mg, Oral, Daily  budesonide-formoterol, 2 puff, Inhalation, BID - RT  carvedilol, 25 mg, Oral, BID With Meals  cefepime, 2,000 mg, Intravenous, Q8H  cetirizine, 10 mg, Oral, Daily  DULoxetine, 60 mg, Oral, Daily  enoxaparin, 40 mg, Subcutaneous, Nightly  fluticasone, 2 spray, Each Nare, Daily  guaiFENesin, 1,200 mg, Oral, Q12H  hydroCHLOROthiazide, 25 mg, Oral, Daily  ipratropium-albuterol, 3 mL, Nebulization, 4x Daily - RT  losartan, 100 mg, Oral, Q24H  nicotine, 1 patch, Transdermal, Q24H  pantoprazole, 40 mg, Oral, BID  potassium chloride, 40 mEq, Oral, Once  predniSONE, 40 mg, Oral, Daily         Physical Exam:  SpO2 Percentage    09/27/23 0411 09/27/23 0631 09/27/23 0641   SpO2: 92% 94% 95%     Body mass index is 23.06 kg/m².   Temp:  [98.4 °F (36.9 °C)-98.5 °F (36.9 °C)] 98.4 °F (36.9 °C)  Heart Rate:  [70-87] 78  Resp:  [14-20] 14  BP: (154-185)/(79-99) 165/90No intake or output data in the 24 hours ending 09/27/23 0730    Physical Exam  Vitals and nursing note reviewed. Exam conducted with a chaperone present.   Constitutional:       Appearance: She is well-developed.      Interventions: Nasal cannula in place.   HENT:      Head: Normocephalic and atraumatic.   Eyes:      General: No scleral icterus.  Cardiovascular:      Rate and Rhythm: Normal rate and regular rhythm.   Pulmonary:      Effort: Pulmonary  effort is normal.   Abdominal:      General: There is no distension.      Palpations: Abdomen is soft.   Musculoskeletal:         General: Normal range of motion.      Cervical back: Normal range of motion and neck supple.   Skin:     General: Skin is warm and dry.   Neurological:      Mental Status: She is alert and oriented to person, place, and time.   Psychiatric:         Mood and Affect: Mood normal.         Behavior: Behavior normal.     Electronically signed by YOLY Luna, 9/27/2023, 07:30 CDT      Physician substantive portion: medical decision making  Result Review  Laboratory Data:  Results from last 7 days   Lab Units 09/27/23  0433 09/26/23  0227 09/25/23  0705   WBC 10*3/mm3 5.71 6.26 5.92   HEMOGLOBIN g/dL 13.5 12.8 11.8*   PLATELETS 10*3/mm3 209 178 172     Results from last 7 days   Lab Units 09/27/23  0433 09/26/23  0407 09/25/23  0705 09/24/23  1651 09/24/23  1121 09/24/23  0814   SODIUM mmol/L 140 143 142  --   --  142   SODIUM, ARTERIAL   --   --   --   --    < >  --    POTASSIUM mmol/L 3.4* 3.3* 3.3*  --   --  2.9*   CO2 mmol/L 27.0 28.0 25.0  --   --  24.0   BUN mg/dL 13 8 8  --   --  9   CREATININE mg/dL 0.59 0.55* 0.44*  --   --  0.57   INR   --   --   --   --   --  0.97   LACTATE mmol/L  --   --   --  0.5  --   --     < > = values in this interval not displayed.     Results from last 7 days   Lab Units 09/27/23  0400 09/24/23  1121   PH, ARTERIAL pH units 7.483* 7.389   PCO2, ARTERIAL mm Hg 37.8 44.7   PO2 ART mm Hg 66.2* 58.2*   FIO2 % 21  --      Microbiology Results (last 10 days)       Procedure Component Value - Date/Time    Blood Culture With ENRIQUE - Blood, Hand, Left [[id number]]  (Normal) Collected: 09/25/23 1427    Lab Status: Preliminary result Specimen: Blood from Hand, Left Updated: 09/26/23 1445     Blood Culture No growth at 24 hours    S. Pneumo Ag Urine or CSF - Urine, Urine, Clean Catch [[id number]]  (Normal) Collected: 09/25/23 1134    Lab Status: Final result  Specimen: Urine, Clean Catch Updated: 09/25/23 1233     Strep Pneumo Ag Negative    Legionella Antigen, Urine - Urine, Urine, Clean Catch [[id number]]  (Normal) Collected: 09/25/23 1134    Lab Status: Final result Specimen: Urine, Clean Catch Updated: 09/25/23 1233     LEGIONELLA ANTIGEN, URINE Negative    Respiratory Panel PCR w/COVID-19(SARS-CoV-2) JONATHAN/LU/MAGI/PAD/COR/MAD/GONZALO In-House, NP Swab in UTM/VTM, 3-4 HR TAT - Swab, Nasopharynx [[id number]]  (Normal) Collected: 09/25/23 0603    Lab Status: Final result Specimen: Swab from Nasopharynx Updated: 09/25/23 0712     ADENOVIRUS, PCR Not Detected     Coronavirus 229E Not Detected     Coronavirus HKU1 Not Detected     Coronavirus NL63 Not Detected     Coronavirus OC43 Not Detected     COVID19 Not Detected     Human Metapneumovirus Not Detected     Human Rhinovirus/Enterovirus Not Detected     Influenza A PCR Not Detected     Influenza B PCR Not Detected     Parainfluenza Virus 1 Not Detected     Parainfluenza Virus 2 Not Detected     Parainfluenza Virus 3 Not Detected     Parainfluenza Virus 4 Not Detected     RSV, PCR Not Detected     Bordetella pertussis pcr Not Detected     Bordetella parapertussis PCR Not Detected     Chlamydophila pneumoniae PCR Not Detected     Mycoplasma pneumo by PCR Not Detected    Narrative:      In the setting of a positive respiratory panel with a viral infection PLUS a negative procalcitonin without other underlying concern for bacterial infection, consider observing off antibiotics or discontinuation of antibiotics and continue supportive care. If the respiratory panel is positive for atypical bacterial infection (Bordetella pertussis, Chlamydophila pneumoniae, or Mycoplasma pneumoniae), consider antibiotic de-escalation to target atypical bacterial infection.    Blood Culture - Blood, Arm, Left [[id number]]  (Abnormal) Collected: 09/24/23 1651    Lab Status: Final result Specimen: Blood from Arm, Left Updated: 09/26/23 0554     Blood  Culture Staphylococcus, coagulase negative     Isolated from Anaerobic Bottle     Gram Stain Anaerobic Bottle Gram positive cocci    Narrative:      Probable contaminant requires clinical correlation, susceptibility not performed unless requested by physician.      Blood Culture - Blood, Arm, Left [[id number]]  (Normal) Collected: 09/24/23 1651    Lab Status: Preliminary result Specimen: Blood from Arm, Left Updated: 09/26/23 1715     Blood Culture No growth at 2 days    Blood Culture ID, PCR - Blood, Arm, Left [[id number]]  (Abnormal) Collected: 09/24/23 1651    Lab Status: Final result Specimen: Blood from Arm, Left Updated: 09/25/23 1221     BCID, PCR Staph spp, not aureus or lugdunensis. Identification by BCID2 PCR.     BOTTLE TYPE Anaerobic Bottle    COVID-19 and FLU A/B PCR - Swab, Nasopharynx [[id number]]  (Normal) Collected: 09/24/23 1121    Lab Status: Final result Specimen: Swab from Nasopharynx Updated: 09/24/23 1151     COVID19 Not Detected     Influenza A PCR Not Detected     Influenza B PCR Not Detected    Narrative:      Fact sheet for providers: https://www.fda.gov/media/722817/download    Fact sheet for patients: https://www.fda.gov/media/922973/download    Test performed by PCR.           Recent films:  XR Chest 1 View    Result Date: 9/27/2023  XR CHEST 1 VW- 9/27/2023 3:40 AM CDT  HISTORY: pneumonia; J18.9-Pneumonia, unspecified organism; R09.02-Hypoxemia   COMPARISON: 9/24/2023  FINDINGS: Upright frontal radiograph of the chest was obtained  Patchy airspace disease in the left base with blunting of the left costophrenic angle. No consolidation is seen. Lungs are well-expanded. No pleural effusion or pneumothorax. The cardiomediastinal silhouette and pulmonary vascularity are within normal limits. The osseous structures and surrounding soft tissues demonstrate no acute abnormality.      Impression: 1.  Patchy airspace disease identified in the left base, likely atelectasis or developing pneumonia.  Lungs are otherwise clear. Lungs are well expanded.  This report was signed and finalized on 9/27/2023 7:20 AM CDT by Dr Marco Griggs.      Personal review of imaging : CXR shows patient was noted to have hyperinflated lung fields and patchy left lung basilar infiltrate which is actually improving.  No other new findings.      Pulmonary Assessment:  Acute on chronic hypoxic respiratory failure  Chronic obstructive pulmonary disease with acute exacerbation with ongoing tobacco use  Left lower lobe pneumonia  Needed supplemental oxygen  Bacteremia with positive blood culture  History of small cell B-cell lymphoma treated  Anxiety and depression  Hypertension  Hyperlipidemia  Ulcerative colitis  Allergic rhinitis  History of seizure disorder  Gastroesophageal reflux disease  Arthritis    Recommend/plan:   Patient was seen in the follow-up visit in pulmonary rounds in medical floor today.  She was discharged after my visit.  She did not qualify for home oxygen.  Her pneumonia shows improvement after she received IV antibiotics for few days.  She is getting discharged home on Symbicort and albuterol HFA and oral Omnicef  She is also advised to have steroid taper and quit smoking.  Discharge planning reviewed.  Patient stable for discharge.  Agree with discharge planning.  She is scheduled to follow-up with me in the pulmonary clinic in 2 months time  She will have a chest x-ray and a pulmonary function test before the clinic visit.  We appreciate the consult and like to thank the hospitalist team for the referral.      This visit was performed by both a physician and an Advanced Practice RN.  I performed all aspects of the medical decision making as documented.    Electronically signed by     Adair Muir MD,  Pulmonologist/Intensivist   9/27/2023, 10:56 CDT

## 2023-09-27 NOTE — PROGRESS NOTES
Exercise Oximetry    Patient Name:Heather Russo   MRN: [id number]   Date: 09/27/23             ROOM AIR BASELINE   SpO2% 96   Heart Rate 86   Blood Pressure      EXERCISE ON ROOM AIR SpO2% EXERCISE ON O2 @ LPM SpO2%   1 MINUTE   94 1 MINUTE    2 MINUTES   93 2 MINUTES    3 MINUTES   93 3 MINUTES    4 MINUTES  4 MINUTES    5 MINUTES  5 MINUTES    6 MINUTES  6 MINUTES               Distance Walked    345 feet Distance Walked   Dyspnea (Roxanna Scale)   Dyspnea (Roxanna Scale)   Fatigue (Roxanna Scale)   Fatigue (Roxanna Scale)   SpO2% Post Exercise    95 SpO2% Post Exercise   HR Post Exercise   HR Post Exercise   Time to Recovery   Less than one minute Time to Recovery     Comments: Pt placed on room air and walked down the phan.  Sat did not drop below 93%  on room air. JONAS Seay notified.

## 2023-09-27 NOTE — DISCHARGE SUMMARY
[facility]  DISCHARGE SUMMARY     Date of Admission: 9/24/2023  Date of Discharge:  9/27/2023  Primary Care Physician: Grzegorz Michel Jr., MD    Presenting Problem/History of Present Illness:  Shortness of breath    Final Discharge Diagnoses:  Active Hospital Problems    Diagnosis     **Left lower lobe pneumonia, due to unspecified organism     Acute respiratory failure with hypoxia     Primary hypertension     COPD with acute exacerbation     Hypokalemia     COPD exacerbation     Tobacco use      Consults: Dr. Muir, pulmonology    Procedures Performed: None    Pertinent Test Results:     Date: 09/27/23                                                                                                     ROOM AIR BASELINE   SpO2% 96   Heart Rate 86   Blood Pressure       EXERCISE ON ROOM AIR SpO2% EXERCISE ON O2 @ LPM SpO2%   1 MINUTE   94 1 MINUTE     2 MINUTES   93 2 MINUTES     3 MINUTES   93 3 MINUTES     4 MINUTES   4 MINUTES     5 MINUTES   5 MINUTES     6 MINUTES   6 MINUTES                                                                                                                   Distance Walked    345 feet Distance Walked   Dyspnea (Roxanna Scale)   Dyspnea (Roxanna Scale)   Fatigue (Roxanna Scale)   Fatigue (Roxanna Scale)   SpO2% Post Exercise    95 SpO2% Post Exercise   HR Post Exercise   HR Post Exercise   Time to Recovery   Less than one minute Time to Recovery      Comments: Pt placed on room air and walked down the phan.  Sat did not drop below 93%  on room air. JONAS Seay notified.                   Imaging Results (All)       Procedure Component Value Units Date/Time    XR Chest 1 View [[id number]] Collected: 09/27/23 0717     Updated: 09/27/23 0723    Narrative:      XR CHEST 1 VW- 9/27/2023 3:40 AM CDT     HISTORY: pneumonia; J18.9-Pneumonia, unspecified organism;  R09.02-Hypoxemia       COMPARISON: 9/24/2023     FINDINGS:  Upright frontal  radiograph of the chest was obtained     Patchy airspace disease in the left base with blunting of the left  costophrenic angle. No consolidation is seen. Lungs are well-expanded.  No pleural effusion or pneumothorax. The cardiomediastinal silhouette  and pulmonary vascularity are within normal limits. The osseous  structures and surrounding soft tissues demonstrate no acute  abnormality.       Impression:      1.  Patchy airspace disease identified in the left base, likely  atelectasis or developing pneumonia. Lungs are otherwise clear. Lungs  are well expanded.     This report was signed and finalized on 9/27/2023 7:20 AM CDT by Dr Marco Griggs.       CT Angiogram Chest [[id number]] Collected: 09/24/23 1436     Updated: 09/24/23 1443    Narrative:      EXAMINATION: CT ANGIOGRAM CHEST-      9/24/2023 2:05 PM CDT     HISTORY: Chest pain and hypoxia.     In order to have a CT radiation dose as low as reasonably achievable  Automated Exposure Control was utilized for adjustment of the mA and/or  KV according to patient size.     DLP in mGycm= 235.        CT Angiogram Chest with IV contrast.  CT angiography protocol.  CT imaging with bolus IV contrast injection.  Under concurrent supervision axial, sagittal, coronal,  three-dimensional, and MIP data sets were constructed on an independent  work station.     Heart size is within normal limits.  A moderate size hiatal hernia is present.  Coronary artery calcification is present.  Normal size thoracic aorta.  No mediastinal mass.     Symmetric and normally opacified pulmonary arteries.  No pulmonary embolism.     Hyperexpanded lungs.  Upper lobe emphysema.     Dependent infiltrate within both lower lobes.  The infiltrate is greater on the LEFT side and is concerning for  developing lower lobe pneumonia.     There is no pneumothorax or heart failure.       Impression:      1. No pulmonary embolism.  2. Chronic lung changes.  3. Posterior LEFT lower lobe infiltrate  suspicious for developing  pneumonia.   This report was signed and finalized on 9/24/2023 2:40 PM CDT by Dr. Chase Rios MD.       XR Chest 1 View [[id number]] Collected: 09/24/23 0841     Updated: 09/24/23 0846    Narrative:      EXAMINATION: XR CHEST 1 VW-     9/24/2023 8:33 AM CDT     HISTORY: chest pain     FINDINGS:  1 view chest x-ray.     Comparison is made with August 10, 2020.        Heart size is normal.  The mediastinum is within normal limits.     The lungs are normally expanded with no pneumonia or pneumothorax.  Mild chronic appearing interstitial disease with a few calcified  granulomas.  No congestive failure changes.       Impression:      1. No acute disease.   This report was signed and finalized on 9/24/2023 8:42 AM CDT by Dr. Chase Rios MD.             LAB RESULTS:      Lab 09/27/23  0433 09/26/23  0227 09/25/23  0705 09/24/23  1651 09/24/23  0814   WBC 5.71 6.26 5.92  --  8.90   HEMOGLOBIN 13.5 12.8 11.8*  --  13.7   HEMATOCRIT 43.0 42.8 37.8  --  43.8   PLATELETS 209 178 172  --  192   NEUTROS ABS  --   --   --   --  7.56*   IMMATURE GRANS (ABS)  --   --   --   --  0.05   LYMPHS ABS  --   --   --   --  0.60*   MONOS ABS  --   --   --   --  0.58   EOS ABS  --   --   --   --  0.06   MCV 93.1 98.2* 94.3  --  91.3   LACTATE  --   --   --  0.5  --    PROTIME  --   --   --   --  13.0   APTT  --   --   --   --  26.6         Lab 09/27/23  0433 09/26/23  0407 09/25/23  0705 09/24/23  1121 09/24/23  1044 09/24/23  0814   SODIUM 140 143 142  --   --  142   SODIUM, ARTERIAL  --   --   --  145  --   --    POTASSIUM 3.4* 3.3* 3.3*  --   --  2.9*   CHLORIDE 101 105 105  --   --  103   CO2 27.0 28.0 25.0  --   --  24.0   ANION GAP 12.0 10.0 12.0  --   --  15.0   BUN 13 8 8  --   --  9   CREATININE 0.59 0.55* 0.44*  --   --  0.57   EGFR 98.9 100.6 106.2  --   --  99.7   GLUCOSE 107* 116* 96  --   --  139*   CALCIUM 10.5 9.3 9.4  --   --  9.9   MAGNESIUM  --   --   --   --  1.8  --          Lab  09/24/23  0814   TOTAL PROTEIN 7.3   ALBUMIN 4.5   GLOBULIN 2.8   ALT (SGPT) 6   AST (SGOT) 8   BILIRUBIN <0.2   ALK PHOS 77         Lab 09/24/23  1044 09/24/23  0814   HSTROP T 9 11*   PROTIME  --  13.0   INR  --  0.97                 Lab 09/27/23  0400 09/24/23  1121   PH, ARTERIAL 7.483* 7.389   PCO2, ARTERIAL 37.8 44.7   PO2 ART 66.2* 58.2*   O2 SATURATION ART 93.9* 91.5*   FIO2 21  --    HCO3 ART 28.4* 27.0*   BASE EXCESS ART 4.7* 1.5   CARBOXYHEMOGLOBIN  --  8.5*     Brief Urine Lab Results  (Last result in the past 365 days)        Color   Clarity   Blood   Leuk Est   Nitrite   Protein   CREAT   Urine HCG        01/12/23 1524 Yellow     Small   Large   Negative   Negative                 Microbiology Results (last 10 days)       Procedure Component Value - Date/Time    Blood Culture With ENRIQUE - Blood, Hand, Left [[id number]]  (Normal) Collected: 09/25/23 1427    Lab Status: Preliminary result Specimen: Blood from Hand, Left Updated: 09/26/23 1445     Blood Culture No growth at 24 hours    S. Pneumo Ag Urine or CSF - Urine, Urine, Clean Catch [[id number]]  (Normal) Collected: 09/25/23 1134    Lab Status: Final result Specimen: Urine, Clean Catch Updated: 09/25/23 1233     Strep Pneumo Ag Negative    Legionella Antigen, Urine - Urine, Urine, Clean Catch [[id number]]  (Normal) Collected: 09/25/23 1134    Lab Status: Final result Specimen: Urine, Clean Catch Updated: 09/25/23 1233     LEGIONELLA ANTIGEN, URINE Negative    Respiratory Panel PCR w/COVID-19(SARS-CoV-2) JONATHAN/LU/MAGI/PAD/COR/MAD/GONZALO In-House, NP Swab in UTM/VTM, 3-4 HR TAT - Swab, Nasopharynx [[id number]]  (Normal) Collected: 09/25/23 0603    Lab Status: Final result Specimen: Swab from Nasopharynx Updated: 09/25/23 0712     ADENOVIRUS, PCR Not Detected     Coronavirus 229E Not Detected     Coronavirus HKU1 Not Detected     Coronavirus NL63 Not Detected     Coronavirus OC43 Not Detected     COVID19 Not Detected     Human Metapneumovirus Not Detected      Human Rhinovirus/Enterovirus Not Detected     Influenza A PCR Not Detected     Influenza B PCR Not Detected     Parainfluenza Virus 1 Not Detected     Parainfluenza Virus 2 Not Detected     Parainfluenza Virus 3 Not Detected     Parainfluenza Virus 4 Not Detected     RSV, PCR Not Detected     Bordetella pertussis pcr Not Detected     Bordetella parapertussis PCR Not Detected     Chlamydophila pneumoniae PCR Not Detected     Mycoplasma pneumo by PCR Not Detected    Narrative:      In the setting of a positive respiratory panel with a viral infection PLUS a negative procalcitonin without other underlying concern for bacterial infection, consider observing off antibiotics or discontinuation of antibiotics and continue supportive care. If the respiratory panel is positive for atypical bacterial infection (Bordetella pertussis, Chlamydophila pneumoniae, or Mycoplasma pneumoniae), consider antibiotic de-escalation to target atypical bacterial infection.    Blood Culture - Blood, Arm, Left [[id number]]  (Abnormal) Collected: 09/24/23 1651    Lab Status: Final result Specimen: Blood from Arm, Left Updated: 09/26/23 0554     Blood Culture Staphylococcus, coagulase negative     Isolated from Anaerobic Bottle     Gram Stain Anaerobic Bottle Gram positive cocci    Narrative:      Probable contaminant requires clinical correlation, susceptibility not performed unless requested by physician.      Blood Culture - Blood, Arm, Left [[id number]]  (Normal) Collected: 09/24/23 1651    Lab Status: Preliminary result Specimen: Blood from Arm, Left Updated: 09/26/23 1715     Blood Culture No growth at 2 days    Blood Culture ID, PCR - Blood, Arm, Left [[id number]]  (Abnormal) Collected: 09/24/23 1651    Lab Status: Final result Specimen: Blood from Arm, Left Updated: 09/25/23 1221     BCID, PCR Staph spp, not aureus or lugdunensis. Identification by BCID2 PCR.     BOTTLE TYPE Anaerobic Bottle    COVID-19 and FLU A/B PCR - Swab, Nasopharynx  [[id number]]  (Normal) Collected: 09/24/23 1121    Lab Status: Final result Specimen: Swab from Nasopharynx Updated: 09/24/23 1151     COVID19 Not Detected     Influenza A PCR Not Detected     Influenza B PCR Not Detected    Narrative:      Fact sheet for providers: https://www.fda.gov/media/589988/download    Fact sheet for patients: https://www.fda.gov/media/996169/download    Test performed by PCR.          Hospital Course: Heather Russo is a 68-year-old female who presented to [facility] ER 9/24 with complaints of hypertension worsening over the last 2 weeks, which her doctor has been titrating and changing medications.  Patient states that she did have nausea and vomiting related to hypertensive episodes.  On admission to ED, blood pressure was 174/85.  She had mild chest wall discomfort with substernal chest pressure at times during hypertensive episodes.  No chest pain on admission to the ED, initial troponin was mildly elevated, secondary troponin was negative with a -2 delta.  EKG normal sinus rhythm without ST elevation.  Associated complaints of headaches and photophobia in conjunction with hypertensive episodes.  Additionally, patient complained of shortness of breath with associated productive cough. She was hypoxic at 87% on room air.  ABG showed a PO2 of 58, CO2 58.2, with a compensated pH of 7.389. CTA of the chest showed a posterior left lobe infiltrate suspicious for developing pneumonia.  No fevers, chills, and sputum has been dry and nonproductive.  Patient does not wear oxygen at home.  Patient states she has never been formally diagnosed with COPD, but she was told on last admission that she should follow-up with pulmonary. She is a current 2 pack/day smoker. ED course Benadryl 25 mg IV, Compazine 5 mg, clonidine 0.1 p.o., potassium 50 mEq even for potassium of 2.9, Toradol 15 mg IV, Zofran 4 mg, Pepcid 20 mg, Solu-Medrol 125, ceftriaxone 1 g, Zithromax 500 mg.     Patient was admitted to the  medical floor with left lower lobe pneumonia requiring oxygen, and hypertension.  Patient reported shortness of breath, productive cough and saturation noted to be 87% on room air.  CTA reported posterior left lobe infiltrate suspicious for developing pneumonia.  Patient denied fever or chills but did report nonproductive cough.  Normally, she does not wear oxygen at home.  Strep pneumonia negative, Legionella negative, respiratory PCR panel negative.  Patient was started on Rocephin and azithromycin on admission.  Blood cultures returned gram-positive and Rocephin changed to cefepime per pulmonary medicine.  Azithromycin continued and patient completed azithromycin during hospitalization.  She remained on cefepime and was transitioned to oral Omnicef at discharge to complete 7-day antibiotic treatment.  Pulmonary medicine consulted and she was seen by Dr. Muir.  Patient will follow-up with pulmonary medicine 12/4/2023 with follow-up PA and lateral chest x-ray and pulmonary function test.  Blood culture was staph not aureus or lugdunensis and suspected to be contaminant.  Blood culture with ENRIQUE remains no growth at 24 hours at discharge.    She presented to the emergency room with worsening blood pressure elevation over the last 2 weeks.  Patient indicated primary care provider has been titrating and changing medications.  She reported nausea and vomiting related to elevated blood pressure.  Blood pressure 174/85 in ER.  Coreg restarted.  Amlodipine discontinued and losartan initiated.  Losartan increased to 100 mg orally daily.  HCTZ continued.  Coreg increased to 25 mg orally daily.  Blood pressure improved 141/85 down to 117/80 at discharge.    Acute hypoxic respiratory failure with saturation 85% on room air in ED.  ABGs PO2 58, PCO2 58.  Pulmonary medicine consulted and she was seen by Dr. Muir who recommended continuing steroids, Mucinex, Symbicort, DuoNebs.  Patient remained on supplemental oxygen.   "Incentive spirometry continued.  Flonase and Claritin added on 9/25.  Patient completed azithromycin and Rocephin changed to cefepime on 9/25 per pulmonary medicine.  Cefepime transitioned to oral Omnicef at discharge.  Patient will complete tapering dose of prednisone at discharge.  She will follow-up with pulmonary medicine after discharge.  Patient was weaned to room air on 9/26/2023.  Saturation maintained 94%.  Walking oximetry on room air 9/27 saturation 96% prior to ambulation and maintained greater than 93% as patient walked 345 feet.  No oxygen needed at discharge as saturation did not drop below 93% on room air with activity.    COPD with acute exacerbation.  Patient denies formal PFTs.  Again, pulmonary medicine consulted and she was followed by Dr. Uriel Fowler.  Solu-Medrol changed to tapering dose of prednisone.  Patient will follow-up with Dr. Muir on 12/4/2023 with PA and lateral chest x-ray and pulmonary function test.  Symbicort continued at discharge.    Potassium 3.3 on 9/27 and replaced.  Potassium 3.4 on date of discharge and she received 40 mEq x 2 doses prior to discharge.    On 9/27/2023, she is stable for discharge.  Patient weaned to room air on 9/26 walking oximetry performed prior to discharge.  Patient will not require oxygen as saturation maintained greater than 93%.  She completed azithromycin during hospitalization.  Patient will complete Omnicef and tapering dose of prednisone at discharge.  Symbicort continued.  Follow-up with Dr. Grzegorz Michel on 10/4/2023.  Follow-up with Dr. Muir on 12/4/2023 with PA and lateral chest x-ray and PFTs.  Coreg increased to 25 mg orally daily, losartan added at 100 mg orally daily.  Amlodipine discontinued.    Physical Exam on Discharge:  /80   Pulse 91   Temp 98.3 °F (36.8 °C) (Oral)   Resp 16   Ht 160 cm (63\")   Wt 59.1 kg (130 lb 3 oz)   SpO2 94%   BMI 23.06 kg/m²   Physical Exam  Vitals and nursing note reviewed. "   Constitutional:       Comments: Sitting up in bed.  No oxygen use.   in room.   HENT:      Head: Normocephalic and atraumatic.      Nose: No congestion.      Mouth/Throat:      Pharynx: Oropharynx is clear. No oropharyngeal exudate or posterior oropharyngeal erythema.   Eyes:      Extraocular Movements: Extraocular movements intact.      Pupils: Pupils are equal, round, and reactive to light.   Cardiovascular:      Rate and Rhythm: Normal rate and regular rhythm.      Heart sounds: No murmur heard.     Comments: Normal sinus rhythm 70 on telemetry.  Pulmonary:      Breath sounds: No wheezing, rhonchi or rales.      Comments: No oxygen use.  No sputum production.  No wheezing heard today.  Abdominal:      Palpations: Abdomen is soft.      Tenderness: There is no abdominal tenderness.   Genitourinary:     Comments: Voiding.  Musculoskeletal:         General: No swelling or tenderness.      Cervical back: Normal range of motion and neck supple.   Skin:     General: Skin is warm and dry.   Neurological:      General: No focal deficit present.      Mental Status: She is alert and oriented to person, place, and time.   Psychiatric:         Mood and Affect: Mood normal.         Behavior: Behavior normal.         Thought Content: Thought content normal.         Judgment: Judgment normal.     Condition on Discharge: Stable for discharge home    Discharge Disposition:  Home or Self Care    Discharge Medications:     Discharge Medications        New Medications        Instructions Start Date   budesonide-formoterol 160-4.5 MCG/ACT inhaler  Commonly known as: SYMBICORT   2 puffs, Inhalation, 2 Times Daily - RT      cefdinir 300 MG capsule  Commonly known as: OMNICEF   300 mg, Oral, Every 12 Hours Scheduled      guaiFENesin 600 MG 12 hr tablet  Commonly known as: MUCINEX   1,200 mg, Oral, Every 12 Hours Scheduled      losartan 100 MG tablet  Commonly known as: COZAAR   100 mg, Oral, Every 24 Hours Scheduled   Start  Date: September 28, 2023     predniSONE 10 MG tablet  Commonly known as: DELTASONE   Take 2 tablets for 2 days beginning 9/28/2023 then 1 tablet for 3 days.             Changes to Medications        Instructions Start Date   carvedilol 25 MG tablet  Commonly known as: COREG  What changed:   medication strength  how much to take   25 mg, Oral, 2 Times Daily With Meals             Continue These Medications        Instructions Start Date   amitriptyline 25 MG tablet  Commonly known as: ELAVIL   25 mg, Oral, Nightly      ARIPiprazole 15 MG tablet  Commonly known as: ABILIFY   15 mg, Oral, Daily      butalbital-acetaminophen-caffeine -40 MG per tablet  Commonly known as: FIORICET, ESGIC   1 tablet, Oral, Every 4 Hours PRN      cetirizine 10 MG tablet  Commonly known as: zyrTEC   10 mg, Oral, Daily      cloNIDine 0.1 MG tablet  Commonly known as: CATAPRES   0.1 mg, Oral, 2 Times Daily PRN      DULoxetine 60 MG capsule  Commonly known as: CYMBALTA   60 mg, Oral, Daily      hydroCHLOROthiazide 25 MG tablet  Commonly known as: HYDRODIURIL   25 mg, Oral, Daily      LORazepam 1 MG tablet  Commonly known as: ATIVAN   0.5-1 mg, Oral, 2 Times Daily PRN      ondansetron ODT 4 MG disintegrating tablet  Commonly known as: ZOFRAN-ODT   4 mg, Translingual, Every 8 Hours PRN      pantoprazole 40 MG EC tablet  Commonly known as: PROTONIX   40 mg, Oral, 2 Times Daily      rizatriptan MLT 10 MG disintegrating tablet  Commonly known as: MAXALT-MLT   10 mg, Translingual, Once As Needed, May repeat in 2 hours if needed             Stop These Medications      amLODIPine 5 MG tablet  Commonly known as: NORVASC            Discharge Diet:   Diet Instructions       Diet: Regular/House Diet; Regular Texture (IDDSI 7); Thin (IDDSI 0)      Discharge Diet: Regular/House Diet    Texture: Regular Texture (IDDSI 7)    Fluid Consistency: Thin (IDDSI 0)          Activity at Discharge:   Activity Instructions       Activity as Tolerated      Measure  Blood Pressure            Discharge instructions:  1.  For fever, chills, cough increased congestion seek medical attention.  2.  Complete Omnicef after discharge.  3.  Tapering dose of prednisone at discharge  4.  Discontinue amlodipine  5.  Increase coreg 25 mg orally twice daily  6.  Losartan 100 mg orally daily  7.  Follow-up with Dr. Muir 12/4/2023 with pulmonary functions and PA and lateral chest x-ray  8.  Follow-up with Dr. Grzegorz Michel 1 week, 10/4/23  9.  Continue Symbicort    Follow-up Appointments:   Dr. Grzegorz Michel 10/4/2023  Dr. Muir 12/4/2023 with PFTs and chest x-ray    Test Results Pending at Discharge: None    Electronically signed by YOLY Lees, 09/27/23, 10:40 CDT.    Time: 35 minutes.  Discussed with Lottie Hargrove APRN, pulmonary medicine, patient, and  present in room.    The above documentation resulted from a face-to-face encounter by me Jailyn FREDERICK, [facility].

## 2023-09-27 NOTE — PLAN OF CARE
Goal Outcome Evaluation:              Outcome Evaluation: VSS. S 66-98 per tele. BP ran a little high but came down during night. Pt recieve schedule cefepime. continue to monitor.

## 2023-09-28 NOTE — OUTREACH NOTE
Prep Survey      Flowsheet Row Responses   [facility] discharged from? [place]   Is LACE score < 7 ? No   Eligibility Readm Mgmt   Discharge diagnosis Left lower lobe pneumonia, COPD with acute exacerbation   Does the patient have one of the following disease processes/diagnoses(primary or secondary)? Pneumonia   Does the patient have Home health ordered? No   Is there a DME ordered? No   Prep survey completed? Yes            Mirta HOPKINS - Registered Nurse

## 2023-09-29 LAB — BACTERIA SPEC AEROBE CULT: NORMAL

## 2023-09-30 LAB — BACTERIA SPEC AEROBE CULT: NORMAL

## 2023-10-02 ENCOUNTER — READMISSION MANAGEMENT (OUTPATIENT)
Dept: CALL CENTER | Facility: HOSPITAL | Age: 68
End: 2023-10-02
Payer: MEDICARE

## 2023-10-02 NOTE — OUTREACH NOTE
COPD/PN Week 1 Survey      Flowsheet Row Responses   [facility] [place] patient discharged from? [place]   Does the patient have one of the following disease processes/diagnoses(primary or secondary)? Pneumonia   Week 1 attempt successful? Yes   Call start time 1103   Call end time 1105   Discharge diagnosis Left lower lobe pneumonia, COPD with acute exacerbation   Meds reviewed with patient/caregiver? Yes   Is the patient having any side effects they believe may be caused by any medication additions or changes? No   Does the patient have all medications ordered at discharge? Yes   Is the patient taking all medications as directed (includes completed medication regime)? Yes   Does the patient have a primary care provider?  Yes   Does the patient have an appointment with their PCP or specialist within 7 days of discharge? Yes   Has the patient kept scheduled appointments due by today? N/A   Comments wednesday   Pulse Ox monitoring None   Did the patient receive a copy of their discharge instructions? Yes   Nursing interventions Reviewed instructions with patient   What is the patient's perception of their health status since discharge? Improving   Nursing Interventions Nurse provided patient education   Are the patient's immunizations up to date?  Yes   Nursing interventions Educated on importance of maintaining up to date immunizations as advised by provider   If the patient is a current smoker, are they able to teach back resources for cessation? [id number]   Is the patient/caregiver able to teach back the hierarchy of who to call/visit for symptoms/problems? PCP, Specialist, Home health nurse, Urgent Care, ED, 911 No   Is the patient/caregiver able to teach back signs and symptoms of worsening condition: Fever/chills, Shortness of breath, Chest pain   Is the patient/caregiver able to teach back importance of completing antibiotic course of treatment? Yes   Week 1 call completed? Yes   Wrap up additional comments  pt doimng very well, no more calls needed.   Call end time 1105            ELSY PERKINS - Registered Nurse

## 2023-10-11 ENCOUNTER — READMISSION MANAGEMENT (OUTPATIENT)
Dept: CALL CENTER | Facility: HOSPITAL | Age: 68
End: 2023-10-11
Payer: MEDICARE

## 2023-10-11 NOTE — OUTREACH NOTE
COPD/PN Week 2 Survey      Flowsheet Row Responses   [facility] patient discharged from? [place]   Does the patient have one of the following disease processes/diagnoses(primary or secondary)? Pneumonia   Week 2 attempt successful? Yes   Call start time 1704   Call end time 1705   Discharge diagnosis Left lower lobe pneumonia, COPD with acute exacerbation   Is the patient taking all medications as directed (includes completed medication regime)? Yes   Does the patient have a primary care provider?  Yes   Comments regarding PCP has seen PCP for a f/u appt   Has the patient kept scheduled appointments due by today? Yes   Has home health visited the patient within 72 hours of discharge? N/A   Psychosocial issues? No   What is the patient's perception of their health status since discharge? Improving   Nursing Interventions Nurse provided patient education   Is the patient/caregiver able to teach back the hierarchy of who to call/visit for symptoms/problems? PCP, Specialist, Home health nurse, Urgent Care, ED, 911 Yes   Patient reports what zone on this call? Green Zone   Green Zone Reports doing well   Is the patient/caregiver able to teach back signs and symptoms of worsening condition: Fever/chills, Shortness of breath, Chest pain   Week 2 call completed? Yes   Graduated Yes   Is the patient interested in additional calls from an ambulatory ? No   Would this patient benefit from a Referral to Amb Social Work? No   Wrap up additional comments Doing well, has seen PCP for a f/u appt, no further calls needed.   Call end time 1705            Rubi MAYO - Registered Nurse

## 2024-01-10 ENCOUNTER — TELEPHONE (OUTPATIENT)
Dept: PULMONOLOGY | Facility: CLINIC | Age: 69
End: 2024-01-10
Payer: MEDICARE

## 2024-01-29 ENCOUNTER — OFFICE VISIT (OUTPATIENT)
Dept: GASTROENTEROLOGY | Facility: CLINIC | Age: 69
End: 2024-01-29
Payer: MEDICARE

## 2024-01-29 VITALS
SYSTOLIC BLOOD PRESSURE: 140 MMHG | TEMPERATURE: 97.3 F | BODY MASS INDEX: 23.07 KG/M2 | OXYGEN SATURATION: 95 % | HEART RATE: 71 BPM | DIASTOLIC BLOOD PRESSURE: 80 MMHG | HEIGHT: 63 IN

## 2024-01-29 DIAGNOSIS — Z79.1 NSAID LONG-TERM USE: ICD-10-CM

## 2024-01-29 DIAGNOSIS — K21.9 GASTROESOPHAGEAL REFLUX DISEASE, UNSPECIFIED WHETHER ESOPHAGITIS PRESENT: ICD-10-CM

## 2024-01-29 DIAGNOSIS — Z71.6 TOBACCO ABUSE COUNSELING: ICD-10-CM

## 2024-01-29 DIAGNOSIS — I10 HTN (HYPERTENSION), BENIGN: ICD-10-CM

## 2024-01-29 DIAGNOSIS — R11.2 NAUSEA AND VOMITING, UNSPECIFIED VOMITING TYPE: Primary | ICD-10-CM

## 2024-01-29 PROCEDURE — 3079F DIAST BP 80-89 MM HG: CPT | Performed by: CLINICAL NURSE SPECIALIST

## 2024-01-29 PROCEDURE — 1160F RVW MEDS BY RX/DR IN RCRD: CPT | Performed by: CLINICAL NURSE SPECIALIST

## 2024-01-29 PROCEDURE — 3077F SYST BP >= 140 MM HG: CPT | Performed by: CLINICAL NURSE SPECIALIST

## 2024-01-29 PROCEDURE — 99214 OFFICE O/P EST MOD 30 MIN: CPT | Performed by: CLINICAL NURSE SPECIALIST

## 2024-01-29 PROCEDURE — 1159F MED LIST DOCD IN RCRD: CPT | Performed by: CLINICAL NURSE SPECIALIST

## 2024-01-29 NOTE — H&P (VIEW-ONLY)
Heather Russo  1955 1/29/2024  Chief Complaint   Patient presents with    GI Problem     Nausea/vomiting     Subjective   HPI  Heather Russo is a 68 y.o. female who presents with a complaint of nausea with vomiting that began 3 months ago. She vomits approx couple times per week. It has somewhat improved over the past few weeks. Phenergan has helped when she had it. No new medications. No reflux or indigestion this is stable. She is on Protonix daily for this. No melena. No BRBPR. No fever chills or sweats. No wt loss. She is eating and has good appetite. Last endoscopy was in 2021 large hiatal hernia noted. She was return for repeat colonoscopy but did not come back for this.   She does take Excedrin Migraine often and Ibuprofen almost daily.      Past Medical History:   Diagnosis Date    Anemia     Anxiety     Arthritis     Cancer of skin     BCC OF NOSE    Colitis     COPD (chronic obstructive pulmonary disease)     Depression     Fibromyalgia     Gastric ulcer     GERD (gastroesophageal reflux disease)     History of transfusion     Hx of colonic polyps     Hypercholesteremia     Hypertension     Seizure     Small cell B-cell lymphoma     Ulcerative colitis      Past Surgical History:   Procedure Laterality Date    BREAST SURGERY      CHOLECYSTECTOMY      COLONOSCOPY  04/12/2017    Hemorrhoids and a few diverticula repeat exam in 3 years Dr. Cordero    COLONOSCOPY N/A 5/2/2019    Collagenous colitis, Diverticulosis repeat exam in 5 years    COLONOSCOPY N/A 5/21/2021    Fair prep repeat exam in 1 year    COLONOSCOPY W/ POLYPECTOMY  07/28/2016    Tubular adenoma proximal to the anus, 30 MM polyp in the cecum not resected, Diverticulosis ref to Dr. Cordero    ENDOSCOPY  06/15/2016    Small hh, Negative for celiac disease    ENDOSCOPY N/A 6/13/2019    HH otherwise normal exam    ENDOSCOPY N/A 5/21/2021    Large HH    RADICAL HYSTERECTOMY      TENNIS ELBOW RELEASE Left 10/20/2020    Procedure: LEFT CUBITAL  TUNNEL RELEASE;  Surgeon: Raymundo Kiran MD;  Location: [facility] OR;  Service: Orthopedics;  Laterality: Left;    URETEROSCOPY Right 9/13/2018    Procedure: CYSTOSCOPY RIGHT RETROGRADE PYELOGRAM RIGHT URETEROSCOPY WITH BIOPSY OF RIGHT RENAL PELVIS LASER LITHOTRIPSY AND RIGHT URETERAL STENT INSERTION;  Surgeon: Yifan Greco MD;  Location: [facility] OR;  Service: Urology    URETEROSCOPY Right 8/9/2019    Procedure: RIGHT FLEXIBLE URETEROSCOPY WITH RENAL PELVIC BIOPSY AND RIGHT URETERAL STENT;  Surgeon: Yifan Greco MD;  Location: [facility] OR;  Service: Urology       Outpatient Medications Marked as Taking for the 1/29/24 encounter (Office Visit) with Becca Blackwood APRN   Medication Sig Dispense Refill    albuterol sulfate  (90 Base) MCG/ACT inhaler Inhale 2 puffs Every 4 (Four) Hours As Needed for Wheezing.      amitriptyline (ELAVIL) 25 MG tablet Take 1 tablet by mouth Every Night.      amLODIPine (NORVASC) 5 MG tablet Take 1 tablet by mouth Daily.      ARIPiprazole (ABILIFY) 15 MG tablet Take 1 tablet by mouth Daily.      budesonide-formoterol (SYMBICORT) 160-4.5 MCG/ACT inhaler Inhale 2 puffs 2 (Two) Times a Day.  0    butalbital-acetaminophen-caffeine (FIORICET, ESGIC) -40 MG per tablet Take 1 tablet by mouth Every 4 (Four) Hours As Needed for Headache.      carvedilol (COREG) 25 MG tablet Take 1 tablet by mouth 2 (Two) Times a Day With Meals. 60 tablet 0    cetirizine (zyrTEC) 10 MG tablet Take 1 tablet by mouth Daily.      cloNIDine (CATAPRES) 0.1 MG tablet Take 1 tablet by mouth 2 (Two) Times a Day As Needed for High Blood Pressure.      diphenoxylate-atropine (LOMOTIL) 2.5-0.025 MG per tablet Take 1 tablet by mouth 4 (Four) Times a Day As Needed for Diarrhea.      DULoxetine (CYMBALTA) 60 MG capsule Take 1 capsule by mouth Daily.      guaiFENesin (MUCINEX) 600 MG 12 hr tablet Take 2 tablets by mouth Every 12 (Twelve) Hours. 20 tablet 0    hydroCHLOROthiazide  (HYDRODIURIL) 25 MG tablet Take 1 tablet by mouth Daily.      LORazepam (ATIVAN) 1 MG tablet Take 0.5-1 tablets by mouth 2 (Two) Times a Day As Needed for Anxiety (severe anxiety).      ondansetron ODT (ZOFRAN-ODT) 4 MG disintegrating tablet Place 1 tablet on the tongue Every 8 (Eight) Hours As Needed for Nausea or Vomiting.      pantoprazole (PROTONIX) 40 MG EC tablet Take 1 tablet by mouth 2 (Two) Times a Day.      promethazine (PHENERGAN) 25 MG tablet Take 1 tablet by mouth Every 6 (Six) Hours As Needed for Nausea or Vomiting.      rizatriptan MLT (MAXALT-MLT) 10 MG disintegrating tablet Place 1 tablet on the tongue 1 (One) Time As Needed for Migraine. May repeat in 2 hours if needed      valACYclovir (VALTREX) 500 MG tablet Take 1 tablet by mouth Daily.       Allergies   Allergen Reactions    Lyrica [Pregabalin] Swelling     LIPS AND FACE    Morphine Itching    Codeine Itching    Penicillins Unknown - Low Severity     CHILDHOOD ALLERGY       Social History     Socioeconomic History    Marital status:    Tobacco Use    Smoking status: Every Day     Packs/day: 1     Types: Cigarettes    Smokeless tobacco: Never   Vaping Use    Vaping Use: Never used   Substance and Sexual Activity    Alcohol use: No    Drug use: No    Sexual activity: Defer     Family History   Problem Relation Age of Onset    Diabetes Sister     Cancer Brother     Colon cancer Neg Hx     Colon polyps Neg Hx      Health Maintenance   Topic Date Due    MAMMOGRAM  Never done    DXA SCAN  Never done    TDAP/TD VACCINES (1 - Tdap) Never done    ZOSTER VACCINE (1 of 2) Never done    ANNUAL WELLNESS VISIT  Never done    PAP SMEAR  Never done    LIPID PANEL  08/09/2020    COVID-19 Vaccine (4 - 2023-24 season) 09/01/2023    COLORECTAL CANCER SCREENING  05/21/2026    HEPATITIS C SCREENING  Completed    INFLUENZA VACCINE  Completed    Pneumococcal Vaccine 65+  Completed     Review of Systems   Constitutional:  Negative for activity change, appetite  "change, chills, diaphoresis, fatigue, fever and unexpected weight change.   HENT:  Negative for ear pain, hearing loss, mouth sores, sore throat, trouble swallowing and voice change.    Eyes: Negative.    Respiratory:  Negative for cough, choking, shortness of breath and wheezing.    Cardiovascular:  Negative for chest pain and palpitations.   Gastrointestinal:  Positive for nausea and vomiting. Negative for abdominal pain, blood in stool, constipation and diarrhea.   Endocrine: Negative for cold intolerance and heat intolerance.   Genitourinary:  Negative for decreased urine volume, dysuria, frequency, hematuria and urgency.   Musculoskeletal:  Negative for back pain, gait problem and myalgias.   Skin:  Negative for color change, pallor and rash.   Allergic/Immunologic: Negative for food allergies and immunocompromised state.   Neurological:  Negative for dizziness, tremors, seizures, syncope, weakness, light-headedness, numbness and headaches.   Hematological:  Negative for adenopathy. Does not bruise/bleed easily.   Psychiatric/Behavioral:  Negative for agitation and confusion. The patient is not nervous/anxious.    All other systems reviewed and are negative.    Objective   Vitals:    01/29/24 1254   BP: 140/80   BP Location: Left arm   Pulse: 71   Temp: 97.3 °F (36.3 °C)   TempSrc: Temporal   SpO2: 95%   Height: 160 cm (62.99\")     Body mass index is 23.07 kg/m².  Physical Exam  Constitutional:       Appearance: She is well-developed.   HENT:      Head: Normocephalic and atraumatic.   Eyes:      Pupils: Pupils are equal, round, and reactive to light.   Neck:      Trachea: No tracheal deviation.   Cardiovascular:      Rate and Rhythm: Normal rate and regular rhythm.      Heart sounds: Normal heart sounds. No murmur heard.     No friction rub. No gallop.   Pulmonary:      Effort: Pulmonary effort is normal. No respiratory distress.      Breath sounds: Normal breath sounds. No wheezing or rales.   Chest:      " Chest wall: No tenderness.   Abdominal:      General: Bowel sounds are normal. There is no distension.      Palpations: Abdomen is soft. Abdomen is not rigid.      Tenderness: There is no abdominal tenderness. There is no guarding or rebound.   Musculoskeletal:         General: No tenderness or deformity. Normal range of motion.      Cervical back: Normal range of motion and neck supple.   Skin:     General: Skin is warm and dry.      Coloration: Skin is not pale.      Findings: No rash.   Neurological:      Mental Status: She is alert and oriented to person, place, and time.      Deep Tendon Reflexes: Reflexes are normal and symmetric.   Psychiatric:         Behavior: Behavior normal.         Thought Content: Thought content normal.         Judgment: Judgment normal.       Assessment & Plan   Diagnoses and all orders for this visit:    1. Nausea and vomiting, unspecified vomiting type (Primary)  -     Case Request; Standing  -     Case Request    2. NSAID long-term use    3. Gastroesophageal reflux disease, unspecified whether esophagitis present    4. HTN (hypertension), benign    5. Tobacco abuse counseling    Other orders  -     Implement Anesthesia Orders Day of Procedure; Standing  -     Follow Anesthesia Guidelines / Protocol; Future  -     Obtain Informed Consent; Future  -     Obtain Informed Consent; Standing    Continue PPI therapy, NO NSAIDS, will get endo to further evaluate hx of gastric ulcer.   I discussed non pharmaceutical treatment of gerd.  This includes gradually losing weight to achieve ideal body wt., elevation of the head of bed by 4-6 inches, nothing to eat or drink 3 hours prior to lying down, avoiding tight clothing, stress reduction, tobacco cessation, reduction of alcohol intake, and dietary restrictions (avoiding caffeine, coffee, fatty foods, mints, chocolate, spicy foods and tomato based sauces as much as possible).    She does not desire another colonoscopy she is aware it was  recommended and declines.    ESOPHAGOGASTRODUODENOSCOPY WITH ANESTHESIA (N/A)  Part of this note may be an electronic transcription/translation of spoken language to printed text using the Dragon Dictation System.  Body mass index is 23.07 kg/m².  Return in about 2 months (around 3/29/2024).    BMI is within normal parameters. No other follow-up for BMI required.      All risks, benefits, alternatives, and indications of colonoscopy and/or Endoscopy procedure have been discussed with the patient. Risks to include perforation of the colon requiring possible surgery or colostomy, risk of bleeding from biopsies or removal of colon tissue, possibility of missing a colon polyp or cancer, or adverse drug reaction.  Benefits to include the diagnosis and management of disease of the colon and rectum. Alternatives to include barium enema, radiographic evaluation, lab testing or no intervention. Pt verbalizes understanding and agrees.     Becca Florencia Blackwood, APRN  1/29/2024  13:32 CST          If you smoke or use tobacco, 4 minutes reading provided  Steps to Quit Smoking  Smoking tobacco can be harmful to your health and can affect almost every organ in your body. Smoking puts you, and those around you, at risk for developing many serious chronic diseases. Quitting smoking is difficult, but it is one of the best things that you can do for your health. It is never too late to quit.  What are the benefits of quitting smoking?  When you quit smoking, you lower your risk of developing serious diseases and conditions, such as:  Lung cancer or lung disease, such as COPD.  Heart disease.  Stroke.  Heart attack.  Infertility.  Osteoporosis and bone fractures.  Additionally, symptoms such as coughing, wheezing, and shortness of breath may get better when you quit. You may also find that you get sick less often because your body is stronger at fighting off colds and infections. If you are pregnant, quitting smoking can help to reduce  your chances of having a baby of low birth weight.  How do I get ready to quit?  When you decide to quit smoking, create a plan to make sure that you are successful. Before you quit:  Pick a date to quit. Set a date within the next two weeks to give you time to prepare.  Write down the reasons why you are quitting. Keep this list in places where you will see it often, such as on your bathroom mirror or in your car or wallet.  Identify the people, places, things, and activities that make you want to smoke (triggers) and avoid them. Make sure to take these actions:  Throw away all cigarettes at home, at work, and in your car.  Throw away smoking accessories, such as ashtrays and lighters.  Clean your car and make sure to empty the ashtray.  Clean your home, including curtains and carpets.  Tell your family, friends, and coworkers that you are quitting. Support from your loved ones can make quitting easier.  Talk with your health care provider about your options for quitting smoking.  Find out what treatment options are covered by your health insurance.  What strategies can I use to quit smoking?  Talk with your healthcare provider about different strategies to quit smoking. Some strategies include:  Quitting smoking altogether instead of gradually lessening how much you smoke over a period of time. Research shows that quitting “cold turkey” is more successful than gradually quitting.  Attending in-person counseling to help you build problem-solving skills. You are more likely to have success in quitting if you attend several counseling sessions. Even short sessions of 10 minutes can be effective.  Finding resources and support systems that can help you to quit smoking and remain smoke-free after you quit. These resources are most helpful when you use them often. They can include:  Online chats with a counselor.  Telephone quitlines.  Printed self-help materials.  Support groups or group counseling.  Text messaging  programs.  Mobile phone applications.  Taking medicines to help you quit smoking. (If you are pregnant or breastfeeding, talk with your health care provider first.) Some medicines contain nicotine and some do not. Both types of medicines help with cravings, but the medicines that include nicotine help to relieve withdrawal symptoms. Your health care provider may recommend:  Nicotine patches, gum, or lozenges.  Nicotine inhalers or sprays.  Non-nicotine medicine that is taken by mouth.  Talk with your health care provider about combining strategies, such as taking medicines while you are also receiving in-person counseling. Using these two strategies together makes you more likely to succeed in quitting than if you used either strategy on its own.  If you are pregnant or breastfeeding, talk with your health care provider about finding counseling or other support strategies to quit smoking. Do not take medicine to help you quit smoking unless told to do so by your health care provider.  What things can I do to make it easier to quit?  Quitting smoking might feel overwhelming at first, but there is a lot that you can do to make it easier. Take these important actions:  Reach out to your family and friends and ask that they support and encourage you during this time. Call telephone quitlines, reach out to support groups, or work with a counselor for support.  Ask people who smoke to avoid smoking around you.  Avoid places that trigger you to smoke, such as bars, parties, or smoke-break areas at work.  Spend time around people who do not smoke.  Lessen stress in your life, because stress can be a smoking trigger for some people. To lessen stress, try:  Exercising regularly.  Deep-breathing exercises.  Yoga.  Meditating.  Performing a body scan. This involves closing your eyes, scanning your body from head to toe, and noticing which parts of your body are particularly tense. Purposefully relax the muscles in those  areas.  Download or purchase mobile phone or tablet apps (applications) that can help you stick to your quit plan by providing reminders, tips, and encouragement. There are many free apps, such as QuitGuide from the CDC (Centers for Disease Control and Prevention). You can find other support for quitting smoking (smoking cessation) through smokefree.gov and other websites.  How will I feel when I quit smoking?  Within the first 24 hours of quitting smoking, you may start to feel some withdrawal symptoms. These symptoms are usually most noticeable 2-3 days after quitting, but they usually do not last beyond 2-3 weeks. Changes or symptoms that you might experience include:  Mood swings.  Restlessness, anxiety, or irritation.  Difficulty concentrating.  Dizziness.  Strong cravings for sugary foods in addition to nicotine.  Mild weight gain.  Constipation.  Nausea.  Coughing or a sore throat.  Changes in how your medicines work in your body.  A depressed mood.  Difficulty sleeping (insomnia).  After the first 2-3 weeks of quitting, you may start to notice more positive results, such as:  Improved sense of smell and taste.  Decreased coughing and sore throat.  Slower heart rate.  Lower blood pressure.  Clearer skin.  The ability to breathe more easily.  Fewer sick days.  Quitting smoking is very challenging for most people. Do not get discouraged if you are not successful the first time. Some people need to make many attempts to quit before they achieve long-term success. Do your best to stick to your quit plan, and talk with your health care provider if you have any questions or concerns.  This information is not intended to replace advice given to you by your health care provider. Make sure you discuss any questions you have with your health care provider.  Document Released: 12/12/2002 Document Revised: 08/15/2017 Document Reviewed: 05/03/2016  [employer] Interactive Patient Education © 2017 [employer]

## 2024-01-29 NOTE — PROGRESS NOTES
Heather Russo  1955 1/29/2024  Chief Complaint   Patient presents with    GI Problem     Nausea/vomiting     Subjective   HPI  Heather uRsso is a 68 y.o. female who presents with a complaint of nausea with vomiting that began 3 months ago. She vomits approx couple times per week. It has somewhat improved over the past few weeks. Phenergan has helped when she had it. No new medications. No reflux or indigestion this is stable. She is on Protonix daily for this. No melena. No BRBPR. No fever chills or sweats. No wt loss. She is eating and has good appetite. Last endoscopy was in 2021 large hiatal hernia noted. She was return for repeat colonoscopy but did not come back for this.   She does take Excedrin Migraine often and Ibuprofen almost daily.      Past Medical History:   Diagnosis Date    Anemia     Anxiety     Arthritis     Cancer of skin     BCC OF NOSE    Colitis     COPD (chronic obstructive pulmonary disease)     Depression     Fibromyalgia     Gastric ulcer     GERD (gastroesophageal reflux disease)     History of transfusion     Hx of colonic polyps     Hypercholesteremia     Hypertension     Seizure     Small cell B-cell lymphoma     Ulcerative colitis      Past Surgical History:   Procedure Laterality Date    BREAST SURGERY      CHOLECYSTECTOMY      COLONOSCOPY  04/12/2017    Hemorrhoids and a few diverticula repeat exam in 3 years Dr. Cordero    COLONOSCOPY N/A 5/2/2019    Collagenous colitis, Diverticulosis repeat exam in 5 years    COLONOSCOPY N/A 5/21/2021    Fair prep repeat exam in 1 year    COLONOSCOPY W/ POLYPECTOMY  07/28/2016    Tubular adenoma proximal to the anus, 30 MM polyp in the cecum not resected, Diverticulosis ref to Dr. Cordero    ENDOSCOPY  06/15/2016    Small hh, Negative for celiac disease    ENDOSCOPY N/A 6/13/2019    HH otherwise normal exam    ENDOSCOPY N/A 5/21/2021    Large HH    RADICAL HYSTERECTOMY      TENNIS ELBOW RELEASE Left 10/20/2020    Procedure: LEFT CUBITAL  TUNNEL RELEASE;  Surgeon: Raymundo Kiran MD;  Location: [facility] OR;  Service: Orthopedics;  Laterality: Left;    URETEROSCOPY Right 9/13/2018    Procedure: CYSTOSCOPY RIGHT RETROGRADE PYELOGRAM RIGHT URETEROSCOPY WITH BIOPSY OF RIGHT RENAL PELVIS LASER LITHOTRIPSY AND RIGHT URETERAL STENT INSERTION;  Surgeon: Yifan Greco MD;  Location: [facility] OR;  Service: Urology    URETEROSCOPY Right 8/9/2019    Procedure: RIGHT FLEXIBLE URETEROSCOPY WITH RENAL PELVIC BIOPSY AND RIGHT URETERAL STENT;  Surgeon: Yifan Greco MD;  Location: [facility] OR;  Service: Urology       Outpatient Medications Marked as Taking for the 1/29/24 encounter (Office Visit) with Becca Blackwood APRN   Medication Sig Dispense Refill    albuterol sulfate  (90 Base) MCG/ACT inhaler Inhale 2 puffs Every 4 (Four) Hours As Needed for Wheezing.      amitriptyline (ELAVIL) 25 MG tablet Take 1 tablet by mouth Every Night.      amLODIPine (NORVASC) 5 MG tablet Take 1 tablet by mouth Daily.      ARIPiprazole (ABILIFY) 15 MG tablet Take 1 tablet by mouth Daily.      budesonide-formoterol (SYMBICORT) 160-4.5 MCG/ACT inhaler Inhale 2 puffs 2 (Two) Times a Day.  0    butalbital-acetaminophen-caffeine (FIORICET, ESGIC) -40 MG per tablet Take 1 tablet by mouth Every 4 (Four) Hours As Needed for Headache.      carvedilol (COREG) 25 MG tablet Take 1 tablet by mouth 2 (Two) Times a Day With Meals. 60 tablet 0    cetirizine (zyrTEC) 10 MG tablet Take 1 tablet by mouth Daily.      cloNIDine (CATAPRES) 0.1 MG tablet Take 1 tablet by mouth 2 (Two) Times a Day As Needed for High Blood Pressure.      diphenoxylate-atropine (LOMOTIL) 2.5-0.025 MG per tablet Take 1 tablet by mouth 4 (Four) Times a Day As Needed for Diarrhea.      DULoxetine (CYMBALTA) 60 MG capsule Take 1 capsule by mouth Daily.      guaiFENesin (MUCINEX) 600 MG 12 hr tablet Take 2 tablets by mouth Every 12 (Twelve) Hours. 20 tablet 0    hydroCHLOROthiazide  (HYDRODIURIL) 25 MG tablet Take 1 tablet by mouth Daily.      LORazepam (ATIVAN) 1 MG tablet Take 0.5-1 tablets by mouth 2 (Two) Times a Day As Needed for Anxiety (severe anxiety).      ondansetron ODT (ZOFRAN-ODT) 4 MG disintegrating tablet Place 1 tablet on the tongue Every 8 (Eight) Hours As Needed for Nausea or Vomiting.      pantoprazole (PROTONIX) 40 MG EC tablet Take 1 tablet by mouth 2 (Two) Times a Day.      promethazine (PHENERGAN) 25 MG tablet Take 1 tablet by mouth Every 6 (Six) Hours As Needed for Nausea or Vomiting.      rizatriptan MLT (MAXALT-MLT) 10 MG disintegrating tablet Place 1 tablet on the tongue 1 (One) Time As Needed for Migraine. May repeat in 2 hours if needed      valACYclovir (VALTREX) 500 MG tablet Take 1 tablet by mouth Daily.       Allergies   Allergen Reactions    Lyrica [Pregabalin] Swelling     LIPS AND FACE    Morphine Itching    Codeine Itching    Penicillins Unknown - Low Severity     CHILDHOOD ALLERGY       Social History     Socioeconomic History    Marital status:    Tobacco Use    Smoking status: Every Day     Packs/day: 1     Types: Cigarettes    Smokeless tobacco: Never   Vaping Use    Vaping Use: Never used   Substance and Sexual Activity    Alcohol use: No    Drug use: No    Sexual activity: Defer     Family History   Problem Relation Age of Onset    Diabetes Sister     Cancer Brother     Colon cancer Neg Hx     Colon polyps Neg Hx      Health Maintenance   Topic Date Due    MAMMOGRAM  Never done    DXA SCAN  Never done    TDAP/TD VACCINES (1 - Tdap) Never done    ZOSTER VACCINE (1 of 2) Never done    ANNUAL WELLNESS VISIT  Never done    PAP SMEAR  Never done    LIPID PANEL  08/09/2020    COVID-19 Vaccine (4 - 2023-24 season) 09/01/2023    COLORECTAL CANCER SCREENING  05/21/2026    HEPATITIS C SCREENING  Completed    INFLUENZA VACCINE  Completed    Pneumococcal Vaccine 65+  Completed     Review of Systems   Constitutional:  Negative for activity change, appetite  "change, chills, diaphoresis, fatigue, fever and unexpected weight change.   HENT:  Negative for ear pain, hearing loss, mouth sores, sore throat, trouble swallowing and voice change.    Eyes: Negative.    Respiratory:  Negative for cough, choking, shortness of breath and wheezing.    Cardiovascular:  Negative for chest pain and palpitations.   Gastrointestinal:  Positive for nausea and vomiting. Negative for abdominal pain, blood in stool, constipation and diarrhea.   Endocrine: Negative for cold intolerance and heat intolerance.   Genitourinary:  Negative for decreased urine volume, dysuria, frequency, hematuria and urgency.   Musculoskeletal:  Negative for back pain, gait problem and myalgias.   Skin:  Negative for color change, pallor and rash.   Allergic/Immunologic: Negative for food allergies and immunocompromised state.   Neurological:  Negative for dizziness, tremors, seizures, syncope, weakness, light-headedness, numbness and headaches.   Hematological:  Negative for adenopathy. Does not bruise/bleed easily.   Psychiatric/Behavioral:  Negative for agitation and confusion. The patient is not nervous/anxious.    All other systems reviewed and are negative.    Objective   Vitals:    01/29/24 1254   BP: 140/80   BP Location: Left arm   Pulse: 71   Temp: 97.3 °F (36.3 °C)   TempSrc: Temporal   SpO2: 95%   Height: 160 cm (62.99\")     Body mass index is 23.07 kg/m².  Physical Exam  Constitutional:       Appearance: She is well-developed.   HENT:      Head: Normocephalic and atraumatic.   Eyes:      Pupils: Pupils are equal, round, and reactive to light.   Neck:      Trachea: No tracheal deviation.   Cardiovascular:      Rate and Rhythm: Normal rate and regular rhythm.      Heart sounds: Normal heart sounds. No murmur heard.     No friction rub. No gallop.   Pulmonary:      Effort: Pulmonary effort is normal. No respiratory distress.      Breath sounds: Normal breath sounds. No wheezing or rales.   Chest:      " Chest wall: No tenderness.   Abdominal:      General: Bowel sounds are normal. There is no distension.      Palpations: Abdomen is soft. Abdomen is not rigid.      Tenderness: There is no abdominal tenderness. There is no guarding or rebound.   Musculoskeletal:         General: No tenderness or deformity. Normal range of motion.      Cervical back: Normal range of motion and neck supple.   Skin:     General: Skin is warm and dry.      Coloration: Skin is not pale.      Findings: No rash.   Neurological:      Mental Status: She is alert and oriented to person, place, and time.      Deep Tendon Reflexes: Reflexes are normal and symmetric.   Psychiatric:         Behavior: Behavior normal.         Thought Content: Thought content normal.         Judgment: Judgment normal.       Assessment & Plan   Diagnoses and all orders for this visit:    1. Nausea and vomiting, unspecified vomiting type (Primary)  -     Case Request; Standing  -     Case Request    2. NSAID long-term use    3. Gastroesophageal reflux disease, unspecified whether esophagitis present    4. HTN (hypertension), benign    5. Tobacco abuse counseling    Other orders  -     Implement Anesthesia Orders Day of Procedure; Standing  -     Follow Anesthesia Guidelines / Protocol; Future  -     Obtain Informed Consent; Future  -     Obtain Informed Consent; Standing    Continue PPI therapy, NO NSAIDS, will get endo to further evaluate hx of gastric ulcer.   I discussed non pharmaceutical treatment of gerd.  This includes gradually losing weight to achieve ideal body wt., elevation of the head of bed by 4-6 inches, nothing to eat or drink 3 hours prior to lying down, avoiding tight clothing, stress reduction, tobacco cessation, reduction of alcohol intake, and dietary restrictions (avoiding caffeine, coffee, fatty foods, mints, chocolate, spicy foods and tomato based sauces as much as possible).    She does not desire another colonoscopy she is aware it was  recommended and declines.    ESOPHAGOGASTRODUODENOSCOPY WITH ANESTHESIA (N/A)  Part of this note may be an electronic transcription/translation of spoken language to printed text using the Dragon Dictation System.  Body mass index is 23.07 kg/m².  Return in about 2 months (around 3/29/2024).    BMI is within normal parameters. No other follow-up for BMI required.      All risks, benefits, alternatives, and indications of colonoscopy and/or Endoscopy procedure have been discussed with the patient. Risks to include perforation of the colon requiring possible surgery or colostomy, risk of bleeding from biopsies or removal of colon tissue, possibility of missing a colon polyp or cancer, or adverse drug reaction.  Benefits to include the diagnosis and management of disease of the colon and rectum. Alternatives to include barium enema, radiographic evaluation, lab testing or no intervention. Pt verbalizes understanding and agrees.     Becca Florencia Blackwood, APRN  1/29/2024  13:32 CST          If you smoke or use tobacco, 4 minutes reading provided  Steps to Quit Smoking  Smoking tobacco can be harmful to your health and can affect almost every organ in your body. Smoking puts you, and those around you, at risk for developing many serious chronic diseases. Quitting smoking is difficult, but it is one of the best things that you can do for your health. It is never too late to quit.  What are the benefits of quitting smoking?  When you quit smoking, you lower your risk of developing serious diseases and conditions, such as:  Lung cancer or lung disease, such as COPD.  Heart disease.  Stroke.  Heart attack.  Infertility.  Osteoporosis and bone fractures.  Additionally, symptoms such as coughing, wheezing, and shortness of breath may get better when you quit. You may also find that you get sick less often because your body is stronger at fighting off colds and infections. If you are pregnant, quitting smoking can help to reduce  your chances of having a baby of low birth weight.  How do I get ready to quit?  When you decide to quit smoking, create a plan to make sure that you are successful. Before you quit:  Pick a date to quit. Set a date within the next two weeks to give you time to prepare.  Write down the reasons why you are quitting. Keep this list in places where you will see it often, such as on your bathroom mirror or in your car or wallet.  Identify the people, places, things, and activities that make you want to smoke (triggers) and avoid them. Make sure to take these actions:  Throw away all cigarettes at home, at work, and in your car.  Throw away smoking accessories, such as ashtrays and lighters.  Clean your car and make sure to empty the ashtray.  Clean your home, including curtains and carpets.  Tell your family, friends, and coworkers that you are quitting. Support from your loved ones can make quitting easier.  Talk with your health care provider about your options for quitting smoking.  Find out what treatment options are covered by your health insurance.  What strategies can I use to quit smoking?  Talk with your healthcare provider about different strategies to quit smoking. Some strategies include:  Quitting smoking altogether instead of gradually lessening how much you smoke over a period of time. Research shows that quitting “cold turkey” is more successful than gradually quitting.  Attending in-person counseling to help you build problem-solving skills. You are more likely to have success in quitting if you attend several counseling sessions. Even short sessions of 10 minutes can be effective.  Finding resources and support systems that can help you to quit smoking and remain smoke-free after you quit. These resources are most helpful when you use them often. They can include:  Online chats with a counselor.  Telephone quitlines.  Printed self-help materials.  Support groups or group counseling.  Text messaging  programs.  Mobile phone applications.  Taking medicines to help you quit smoking. (If you are pregnant or breastfeeding, talk with your health care provider first.) Some medicines contain nicotine and some do not. Both types of medicines help with cravings, but the medicines that include nicotine help to relieve withdrawal symptoms. Your health care provider may recommend:  Nicotine patches, gum, or lozenges.  Nicotine inhalers or sprays.  Non-nicotine medicine that is taken by mouth.  Talk with your health care provider about combining strategies, such as taking medicines while you are also receiving in-person counseling. Using these two strategies together makes you more likely to succeed in quitting than if you used either strategy on its own.  If you are pregnant or breastfeeding, talk with your health care provider about finding counseling or other support strategies to quit smoking. Do not take medicine to help you quit smoking unless told to do so by your health care provider.  What things can I do to make it easier to quit?  Quitting smoking might feel overwhelming at first, but there is a lot that you can do to make it easier. Take these important actions:  Reach out to your family and friends and ask that they support and encourage you during this time. Call telephone quitlines, reach out to support groups, or work with a counselor for support.  Ask people who smoke to avoid smoking around you.  Avoid places that trigger you to smoke, such as bars, parties, or smoke-break areas at work.  Spend time around people who do not smoke.  Lessen stress in your life, because stress can be a smoking trigger for some people. To lessen stress, try:  Exercising regularly.  Deep-breathing exercises.  Yoga.  Meditating.  Performing a body scan. This involves closing your eyes, scanning your body from head to toe, and noticing which parts of your body are particularly tense. Purposefully relax the muscles in those  areas.  Download or purchase mobile phone or tablet apps (applications) that can help you stick to your quit plan by providing reminders, tips, and encouragement. There are many free apps, such as QuitGuide from the CDC (Centers for Disease Control and Prevention). You can find other support for quitting smoking (smoking cessation) through smokefree.gov and other websites.  How will I feel when I quit smoking?  Within the first 24 hours of quitting smoking, you may start to feel some withdrawal symptoms. These symptoms are usually most noticeable 2-3 days after quitting, but they usually do not last beyond 2-3 weeks. Changes or symptoms that you might experience include:  Mood swings.  Restlessness, anxiety, or irritation.  Difficulty concentrating.  Dizziness.  Strong cravings for sugary foods in addition to nicotine.  Mild weight gain.  Constipation.  Nausea.  Coughing or a sore throat.  Changes in how your medicines work in your body.  A depressed mood.  Difficulty sleeping (insomnia).  After the first 2-3 weeks of quitting, you may start to notice more positive results, such as:  Improved sense of smell and taste.  Decreased coughing and sore throat.  Slower heart rate.  Lower blood pressure.  Clearer skin.  The ability to breathe more easily.  Fewer sick days.  Quitting smoking is very challenging for most people. Do not get discouraged if you are not successful the first time. Some people need to make many attempts to quit before they achieve long-term success. Do your best to stick to your quit plan, and talk with your health care provider if you have any questions or concerns.  This information is not intended to replace advice given to you by your health care provider. Make sure you discuss any questions you have with your health care provider.  Document Released: 12/12/2002 Document Revised: 08/15/2017 Document Reviewed: 05/03/2016  [employer] Interactive Patient Education © 2017 [employer]

## 2024-02-01 ENCOUNTER — ANESTHESIA EVENT (OUTPATIENT)
Dept: GASTROENTEROLOGY | Facility: HOSPITAL | Age: 69
End: 2024-02-01
Payer: MEDICARE

## 2024-02-01 ENCOUNTER — ANESTHESIA (OUTPATIENT)
Dept: GASTROENTEROLOGY | Facility: HOSPITAL | Age: 69
End: 2024-02-01
Payer: MEDICARE

## 2024-02-01 ENCOUNTER — HOSPITAL ENCOUNTER (OUTPATIENT)
Facility: HOSPITAL | Age: 69
Setting detail: HOSPITAL OUTPATIENT SURGERY
Discharge: HOME | End: 2024-02-01
Attending: INTERNAL MEDICINE | Admitting: INTERNAL MEDICINE
Payer: MEDICARE

## 2024-02-01 VITALS
TEMPERATURE: 97.9 F | BODY MASS INDEX: 23.73 KG/M2 | HEART RATE: 76 BPM | OXYGEN SATURATION: 95 % | SYSTOLIC BLOOD PRESSURE: 137 MMHG | WEIGHT: 139 LBS | RESPIRATION RATE: 20 BRPM | HEIGHT: 64 IN | DIASTOLIC BLOOD PRESSURE: 82 MMHG

## 2024-02-01 DIAGNOSIS — R11.2 NAUSEA AND VOMITING, UNSPECIFIED VOMITING TYPE: ICD-10-CM

## 2024-02-01 PROCEDURE — 43235 EGD DIAGNOSTIC BRUSH WASH: CPT | Performed by: INTERNAL MEDICINE

## 2024-02-01 PROCEDURE — 25010000002 PROPOFOL 10 MG/ML EMULSION

## 2024-02-01 PROCEDURE — 87081 CULTURE SCREEN ONLY: CPT | Performed by: INTERNAL MEDICINE

## 2024-02-01 PROCEDURE — 25810000003 SODIUM CHLORIDE 0.9 % SOLUTION: Performed by: ANESTHESIOLOGY

## 2024-02-01 RX ORDER — SODIUM CHLORIDE 0.9 % (FLUSH) 0.9 %
10 SYRINGE (ML) INJECTION AS NEEDED
Status: DISCONTINUED | OUTPATIENT
Start: 2024-02-01 | End: 2024-02-01 | Stop reason: HOSPADM

## 2024-02-01 RX ORDER — PROPOFOL 10 MG/ML
VIAL (ML) INTRAVENOUS AS NEEDED
Status: DISCONTINUED | OUTPATIENT
Start: 2024-02-01 | End: 2024-02-01 | Stop reason: SURG

## 2024-02-01 RX ORDER — LIDOCAINE HYDROCHLORIDE 20 MG/ML
INJECTION, SOLUTION EPIDURAL; INFILTRATION; INTRACAUDAL; PERINEURAL AS NEEDED
Status: DISCONTINUED | OUTPATIENT
Start: 2024-02-01 | End: 2024-02-01 | Stop reason: SURG

## 2024-02-01 RX ORDER — LIDOCAINE HYDROCHLORIDE 10 MG/ML
0.5 INJECTION, SOLUTION EPIDURAL; INFILTRATION; INTRACAUDAL; PERINEURAL ONCE AS NEEDED
Status: CANCELLED | OUTPATIENT
Start: 2024-02-01

## 2024-02-01 RX ORDER — SODIUM CHLORIDE 9 MG/ML
500 INJECTION, SOLUTION INTRAVENOUS CONTINUOUS PRN
Status: DISCONTINUED | OUTPATIENT
Start: 2024-02-01 | End: 2024-02-01 | Stop reason: HOSPADM

## 2024-02-01 RX ADMIN — PROPOFOL INJECTABLE EMULSION 100 MG: 10 INJECTION, EMULSION INTRAVENOUS at 11:03

## 2024-02-01 RX ADMIN — GLYCOPYRROLATE 0.2 MG: 0.2 INJECTION INTRAMUSCULAR; INTRAVENOUS at 10:59

## 2024-02-01 RX ADMIN — LIDOCAINE HYDROCHLORIDE 80 MG: 20 INJECTION, SOLUTION EPIDURAL; INFILTRATION; INTRACAUDAL; PERINEURAL at 11:03

## 2024-02-01 RX ADMIN — SODIUM CHLORIDE 500 ML: 9 INJECTION, SOLUTION INTRAVENOUS at 09:58

## 2024-02-01 NOTE — ANESTHESIA PREPROCEDURE EVALUATION
Anesthesia Evaluation     no history of anesthetic complications:   NPO Solid Status: > 8 hours  NPO Liquid Status: > 8 hours           Airway   Mallampati: I  TM distance: >3 FB  Neck ROM: full  No difficulty expected  Dental      Pulmonary    (+) a smoker Current, COPD,  Cardiovascular   Exercise tolerance: good (4-7 METS)    (+) hypertension, hyperlipidemia      Neuro/Psych    ROS Comment: Pt denies h/o seizures  GI/Hepatic/Renal/Endo    (+) GERD, PUD  (-) liver disease, no renal disease, diabetes    ROS Comment: Ulcerative colitis    Musculoskeletal     Abdominal    Substance History      OB/GYN          Other   arthritis,   history of cancer (non hodgkin lymphoma) remission        Phys Exam Other: Left eyelid drop from shingles            Anesthesia Plan    ASA 2     MAC     intravenous induction     Anesthetic plan, risks, benefits, and alternatives have been provided, discussed and informed consent has been obtained with: patient.    CODE STATUS:

## 2024-02-01 NOTE — ANESTHESIA POSTPROCEDURE EVALUATION
Patient: Heather Russo    Procedure Summary       Date: 02/01/24 Room / Location: [facility] ENDOSCOPY 4 / [facility] ENDOS[facility]    Anesthesia Start: 1057 Anesthesia Stop: 1111    Procedure: ESOPHAGOGASTRODUODENOSCOPY WITH ANESTHESIA Diagnosis:       Nausea and vomiting, unspecified vomiting type      (Nausea and vomiting, unspecified vomiting type [R11.2])    Surgeons: Danis Garcia MD Provider: Bernabe Hunter CRNA    Anesthesia Type: MAC ASA Status: 2            Anesthesia Type: MAC    Vitals  Vitals Value Taken Time   BP     Temp     Pulse 69 02/01/24 1111   Resp     SpO2 95 % 02/01/24 1111   Vitals shown include unfiled device data.        Post Anesthesia Care and Evaluation    Patient location during evaluation: PHASE II  Patient participation: complete - patient participated  Level of consciousness: awake and alert  Pain score: 0  Pain management: adequate    Airway patency: patent  Anesthetic complications: No anesthetic complications  PONV Status: none  Cardiovascular status: acceptable and blood pressure returned to baseline  Respiratory status: acceptable  Hydration status: acceptable    Comments: Patient discharged from PACU based on Macey score >8  No anesthesia care post op

## 2024-02-02 LAB — UREASE TISS QL: NEGATIVE

## 2024-02-21 ENCOUNTER — HOSPITAL ENCOUNTER (EMERGENCY)
Facility: HOSPITAL | Age: 69
Discharge: HOME | End: 2024-02-21
Attending: STUDENT IN AN ORGANIZED HEALTH CARE EDUCATION/TRAINING PROGRAM | Admitting: STUDENT IN AN ORGANIZED HEALTH CARE EDUCATION/TRAINING PROGRAM
Payer: MEDICARE

## 2024-02-21 VITALS
HEART RATE: 63 BPM | DIASTOLIC BLOOD PRESSURE: 80 MMHG | SYSTOLIC BLOOD PRESSURE: 142 MMHG | BODY MASS INDEX: 25.52 KG/M2 | HEIGHT: 63 IN | WEIGHT: 144 LBS | TEMPERATURE: 98 F | RESPIRATION RATE: 16 BRPM | OXYGEN SATURATION: 95 %

## 2024-02-21 DIAGNOSIS — I10 HYPERTENSION, UNSPECIFIED TYPE: Primary | ICD-10-CM

## 2024-02-21 PROCEDURE — 99283 EMERGENCY DEPT VISIT LOW MDM: CPT

## 2024-02-21 RX ADMIN — ACETAMINOPHEN, ASPIRIN, CAFFEINE 2 TABLET: 250; 65; 250 TABLET, FILM COATED ORAL at 12:48

## 2024-02-21 NOTE — ED PROVIDER NOTES
Subjective   History of Present Illness  Patient presents due to elevated blood pressure.  She checked her blood pressure 3 times at home.  Her highest reading is 197/115.  She has a mild headache which she rates as mild and typical but also a 7 out of 10.  Feels like a generalized ache.  No visual disturbance.  No nausea or vomiting.  Denies other symptoms such as chest pain shortness of breath abdominal pain lightheadedness or passing out.  Takes clonidine and other blood pressure medicine; she took them before coming to the ER and her blood pressure is improving at this time.  She has a history of migraines and typical headaches and this feels like a very typical headache.    Review of Systems   Constitutional:  Negative for chills and fever.   Respiratory:  Negative for shortness of breath.    Cardiovascular:  Negative for chest pain.   Gastrointestinal:  Negative for abdominal pain and vomiting.   Genitourinary:  Negative for difficulty urinating and dysuria.   Neurological:  Negative for syncope and light-headedness.       Past Medical History:   Diagnosis Date    Anemia     Anxiety     Arthritis     Cancer of skin     BCC OF NOSE    Colitis     COPD (chronic obstructive pulmonary disease)     Depression     Fibromyalgia     Gastric ulcer     GERD (gastroesophageal reflux disease)     History of transfusion     Hx of colonic polyps     Hypercholesteremia     Hypertension     Nausea and vomiting 1/29/2024    Seizure     Small cell B-cell lymphoma     Ulcerative colitis        Allergies   Allergen Reactions    Lyrica [Pregabalin] Swelling     LIPS AND FACE    Morphine Itching    Codeine Itching    Penicillins Unknown - Low Severity     CHILDHOOD ALLERGY         Past Surgical History:   Procedure Laterality Date    BREAST SURGERY      CHOLECYSTECTOMY      COLONOSCOPY  04/12/2017    Hemorrhoids and a few diverticula repeat exam in 3 years Dr. Cordero    COLONOSCOPY N/A 5/2/2019    Collagenous colitis, Diverticulosis  repeat exam in 5 years    COLONOSCOPY N/A 5/21/2021    Fair prep repeat exam in 1 year    COLONOSCOPY W/ POLYPECTOMY  07/28/2016    Tubular adenoma proximal to the anus, 30 MM polyp in the cecum not resected, Diverticulosis ref to Dr. Cordero    ENDOSCOPY  06/15/2016    Small hh, Negative for celiac disease    ENDOSCOPY N/A 6/13/2019    HH otherwise normal exam    ENDOSCOPY N/A 5/21/2021    Large HH    ENDOSCOPY N/A 2/1/2024    Procedure: ESOPHAGOGASTRODUODENOSCOPY WITH ANESTHESIA;  Surgeon: Danis Garcia MD;  Location:  [facility];  Service: Gastroenterology;  Laterality: N/A;  pre op nausea and vomiting  post op  pcp Grzegorz Michel    RADICAL HYSTERECTOMY      TENNIS ELBOW RELEASE Left 10/20/2020    Procedure: LEFT CUBITAL TUNNEL RELEASE;  Surgeon: Raymundo Kiran MD;  Location:  [facility];  Service: Orthopedics;  Laterality: Left;    URETEROSCOPY Right 9/13/2018    Procedure: CYSTOSCOPY RIGHT RETROGRADE PYELOGRAM RIGHT URETEROSCOPY WITH BIOPSY OF RIGHT RENAL PELVIS LASER LITHOTRIPSY AND RIGHT URETERAL STENT INSERTION;  Surgeon: Yifan Greco MD;  Location:  [facility];  Service: Urology    URETEROSCOPY Right 8/9/2019    Procedure: RIGHT FLEXIBLE URETEROSCOPY WITH RENAL PELVIC BIOPSY AND RIGHT URETERAL STENT;  Surgeon: Yifan Greco MD;  Location:  [facility];  Service: Urology       Family History   Problem Relation Age of Onset    Diabetes Sister     Cancer Brother     Colon cancer Neg Hx     Colon polyps Neg Hx        Social History     Socioeconomic History    Marital status:    Tobacco Use    Smoking status: Every Day     Packs/day: 2.00     Years: 30.00     Additional pack years: 0.00     Total pack years: 60.00     Types: Cigarettes    Smokeless tobacco: Never   Vaping Use    Vaping Use: Never used   Substance and Sexual Activity    Alcohol use: No    Drug use: No    Sexual activity: Defer           Objective   Physical Exam  Vitals reviewed.   Constitutional:       General: She  is not in acute distress.  HENT:      Head: Normocephalic and atraumatic.   Eyes:      Extraocular Movements: Extraocular movements intact.      Conjunctiva/sclera: Conjunctivae normal.   Cardiovascular:      Pulses: Normal pulses.      Heart sounds: Normal heart sounds.   Pulmonary:      Effort: Pulmonary effort is normal. No respiratory distress.      Breath sounds: Normal breath sounds. No wheezing.   Abdominal:      General: Abdomen is flat. There is no distension.      Tenderness: There is no abdominal tenderness. There is no guarding.   Musculoskeletal:      Cervical back: Normal range of motion and neck supple.   Skin:     General: Skin is warm and dry.   Neurological:      General: No focal deficit present.      Mental Status: She is alert. Mental status is at baseline.      Comments: Right upper extremity: 5/5 strength with handgrip and flexion/extension of shoulders, elbows.   Light touch sensation intact and equal when compared to the left upper extremity.    Left upper extremity: 5/5 strength with handgrip and flexion/extension of shoulders, elbows.   Light touch sensation intact and equal when compared to the right upper extremity.    Right lower extremity: 5/5 strength with flexion/extension of hips, knees, and dorsi/plantarflexion of ankles. Able to wiggle toes.   Light touch sensation intact and equal when compared to the left lower extremity.    Left lower extremity: 5/5 strength with flexion/extension of hips, knees, and dorsi/plantarflexion of ankles. Able to wiggle toes.   Light touch sensation intact and equal when compared to the right lower extremity.    Gait normal without ataxia or limp.    Psychiatric:         Behavior: Behavior normal.         Thought Content: Thought content normal.         Procedures           ED Course                                             Medical Decision Making  Problems Addressed:  Hypertension, unspecified type: acute illness or injury    Risk  OTC  drugs.      Heather Russo is a 68 y.o. female with PMH above who presents to the Emergency Department with elevated BP.  She has a headache that is typical and description and she often has a headache whenever her blood pressure goes up.  I offered her labs for emergency screening like creatinine screening and fluids versus Excedrin migraine medicine which she usually takes; she feels that her symptoms are pretty typical of elevated blood pressure, would prefer to avoid excessive testing, and would like to try taking an Excedrin.  Given her reassuring neurologic exam and remainder of a reassuring H&P that does not really reveal evidence of hypertensive emergency as well as the nonsevere range blood pressure in the ER, this is reasonable.    ED Course:   -Headache resolved with medicine.  Asymptomatic, with mild hypertension on reassessment.  Does not want to proceed with any further testing. No further workup indicated at this time. Patient is stable and appropriate for discharge. Patient received strict return precautions per discharge instructions and was instructed to follow-up with their primary care provider regarding their ER visit.        Final diagnosis: symptomatic hypertension    All questions answered. Patient/family was understanding and in agreement with today's assessment and plan. The patient was monitored during their stay in the ED and dispositioned without acute event.    Electronically signed by:  Hansel Harris MD 2/21/2024 14:50 CST      Note: Dragon medical dictation software was used in the creation of this note.      Final diagnoses:   Hypertension, unspecified type       ED Disposition  ED Disposition       ED Disposition   Discharge    Condition   Stable    Comment   --               Grzegorz Michel Jr., MD  [address] [facility] [address]  [phone]               Medication List      No changes were made to your prescriptions during this visit.            Hansel Harris  MD Syed  02/21/24 1650

## 2024-03-25 ENCOUNTER — OFFICE VISIT (OUTPATIENT)
Dept: GASTROENTEROLOGY | Facility: CLINIC | Age: 69
End: 2024-03-25
Payer: MEDICARE

## 2024-03-25 VITALS
OXYGEN SATURATION: 99 % | SYSTOLIC BLOOD PRESSURE: 118 MMHG | TEMPERATURE: 97.3 F | HEART RATE: 77 BPM | BODY MASS INDEX: 24.31 KG/M2 | HEIGHT: 63 IN | DIASTOLIC BLOOD PRESSURE: 80 MMHG | WEIGHT: 137.2 LBS

## 2024-03-25 DIAGNOSIS — R11.2 NAUSEA AND VOMITING, UNSPECIFIED VOMITING TYPE: Primary | ICD-10-CM

## 2024-03-25 DIAGNOSIS — R93.5 ABNORMAL CT OF THE ABDOMEN: ICD-10-CM

## 2024-03-25 DIAGNOSIS — I10 HTN (HYPERTENSION), BENIGN: ICD-10-CM

## 2024-03-25 DIAGNOSIS — K59.01 SLOW TRANSIT CONSTIPATION: ICD-10-CM

## 2024-03-25 DIAGNOSIS — K21.9 GASTROESOPHAGEAL REFLUX DISEASE, UNSPECIFIED WHETHER ESOPHAGITIS PRESENT: ICD-10-CM

## 2024-03-25 DIAGNOSIS — Z71.6 TOBACCO ABUSE COUNSELING: ICD-10-CM

## 2024-03-25 PROCEDURE — 99214 OFFICE O/P EST MOD 30 MIN: CPT | Performed by: CLINICAL NURSE SPECIALIST

## 2024-03-25 PROCEDURE — 3074F SYST BP LT 130 MM HG: CPT | Performed by: CLINICAL NURSE SPECIALIST

## 2024-03-25 PROCEDURE — 1159F MED LIST DOCD IN RCRD: CPT | Performed by: CLINICAL NURSE SPECIALIST

## 2024-03-25 PROCEDURE — 1160F RVW MEDS BY RX/DR IN RCRD: CPT | Performed by: CLINICAL NURSE SPECIALIST

## 2024-03-25 PROCEDURE — 3079F DIAST BP 80-89 MM HG: CPT | Performed by: CLINICAL NURSE SPECIALIST

## 2024-03-25 NOTE — PROGRESS NOTES
Heather Russo  1955      3/25/2024  Chief Complaint   Patient presents with    GI Problem     2 mth fu-vomiting         HPI    Heather Russo is a  68 y.o. female here for a follow up visit for follow up regarding her nausea and vomiting. Her endoscopy was unremarkable that was performed by Dr Garcia on 2/1/24. Her PCP did put her on Amitryptyline, Lorazepam, Reglan 5 mg and Promethazine. She says she is doing better with this regimen. No dysphagia. No certain triggers. In regards to her bowels she says at times she goes a small amount and then may have a normal BM she was noted on scan to have constipation. No bleeding. She does not correlate her nausea with meals. There is no pain with eating. No rectal bleeding. She has had a colonoscopy however she does not desire another one, she says she cannot prep.     In review of her previous CT scans she had an angio last year that did show some vascular abnormalities.   IMPRESSION:  1. Questionable mild wall thickening of right colon versus artifact of  underdistention. Moderate to large stool in the colon. Moderate to large  hiatal hernia. Gastric wall thickening likely due to underdistention.  Mild diverticulosis of the colon. No extravasation of contrast into the  colon on the contrast-enhanced sequences.  2. New right lower lobe 1.2 cm and 0.7 cm nodules. An 8 mm subpleural  nodule versus scar or atelectasis also noted in the right lower lobe.  Neoplasm versus infection/inflammation. Short-term follow-up CT  recommended in 3 months by Fleischner Society guidelines. This was  marked in PACS for follow-up.  3. Fatty infiltration of the liver. Vague enhancement in the arterial  phase sequence in the right lobe likely related to transient hepatic  attenuation difference or a vascular cause. Prior cholecystectomy.  4. Mild thickening of the adrenal glands likely due to hyperplasia.  Bilateral nonobstructive renal stones. Right renal cyst.  5. Prior  hysterectomy.  6. Atheromatous disease of the aortoiliac vessels. A 3.8 x 3.1 cm  abdominal aortic aneurysm below the renal arteries. Fairly high-grade  stenosis at the origin of the inferior mesenteric artery. Minimal  stenosis at the origin of the celiac plexus. Significant stenosis at the  origins of both internal iliac arteries.  This report was finalized on 06/25/2023 08:02 by Dr. Luis Miguel Granado MD.  Past Medical History:   Diagnosis Date    Anemia     Anxiety     Arthritis     Cancer of skin     BCC OF NOSE    Colitis     COPD (chronic obstructive pulmonary disease)     Depression     Fibromyalgia     Gastric ulcer     GERD (gastroesophageal reflux disease)     History of transfusion     Hx of colonic polyps     Hypercholesteremia     Hypertension     Nausea and vomiting 01/29/2024    Seizure     Small cell B-cell lymphoma      Past Surgical History:   Procedure Laterality Date    BREAST SURGERY      CHOLECYSTECTOMY      COLONOSCOPY  04/12/2017    Hemorrhoids and a few diverticula repeat exam in 3 years Dr. Cordero    COLONOSCOPY N/A 05/02/2019    Collagenous colitis, Diverticulosis repeat exam in 5 years    COLONOSCOPY N/A 05/21/2021    Fair prep repeat exam in 1 year    COLONOSCOPY W/ POLYPECTOMY  07/28/2016    Tubular adenoma proximal to the anus, 30 MM polyp in the cecum not resected, Diverticulosis ref to Dr. Cordero    ENDOSCOPY  06/15/2016    Small hh, Negative for celiac disease    ENDOSCOPY N/A 06/13/2019    HH otherwise normal exam    ENDOSCOPY N/A 05/21/2021    Large HH    ENDOSCOPY N/A 02/01/2024    - Normal examined duodenum. - Hiatal hernia. - Z-line regular, 32 cm from the incisors. - No specimens collected.    RADICAL HYSTERECTOMY      TENNIS ELBOW RELEASE Left 10/20/2020    Procedure: LEFT CUBITAL TUNNEL RELEASE;  Surgeon: Raymundo Kiran MD;  Location: [facility];  Service: Orthopedics;  Laterality: Left;    URETEROSCOPY Right 09/13/2018    Procedure: CYSTOSCOPY RIGHT RETROGRADE PYELOGRAM  RIGHT URETEROSCOPY WITH BIOPSY OF RIGHT RENAL PELVIS LASER LITHOTRIPSY AND RIGHT URETERAL STENT INSERTION;  Surgeon: Yifan Greco MD;  Location:  [facility];  Service: Urology    URETEROSCOPY Right 08/09/2019    Procedure: RIGHT FLEXIBLE URETEROSCOPY WITH RENAL PELVIC BIOPSY AND RIGHT URETERAL STENT;  Surgeon: Yifan Greco MD;  Location:  [facility];  Service: Urology       Outpatient Medications Marked as Taking for the 3/25/24 encounter (Office Visit) with Becca Blackwood APRN   Medication Sig Dispense Refill    albuterol sulfate  (90 Base) MCG/ACT inhaler Inhale 2 puffs Every 4 (Four) Hours As Needed for Wheezing.      amitriptyline (ELAVIL) 25 MG tablet Take 1 tablet by mouth Every Night.      amLODIPine (NORVASC) 5 MG tablet Take 1 tablet by mouth Daily.      ARIPiprazole (ABILIFY) 15 MG tablet Take 1 tablet by mouth Daily.      budesonide-formoterol (SYMBICORT) 160-4.5 MCG/ACT inhaler Inhale 2 puffs 2 (Two) Times a Day.  0    butalbital-acetaminophen-caffeine (FIORICET, ESGIC) -40 MG per tablet Take 1 tablet by mouth Every 4 (Four) Hours As Needed for Headache.      carvedilol (COREG) 25 MG tablet Take 1 tablet by mouth 2 (Two) Times a Day With Meals. 60 tablet 0    cetirizine (zyrTEC) 10 MG tablet Take 1 tablet by mouth Daily.      cloNIDine (CATAPRES) 0.1 MG tablet Take 1 tablet by mouth 2 (Two) Times a Day As Needed for High Blood Pressure.      diphenoxylate-atropine (LOMOTIL) 2.5-0.025 MG per tablet Take 1 tablet by mouth 4 (Four) Times a Day As Needed for Diarrhea.      DULoxetine (CYMBALTA) 60 MG capsule Take 1 capsule by mouth Daily.      guaiFENesin (MUCINEX) 600 MG 12 hr tablet Take 2 tablets by mouth Every 12 (Twelve) Hours. 20 tablet 0    hydroCHLOROthiazide (HYDRODIURIL) 25 MG tablet Take 1 tablet by mouth Daily.      LORazepam (ATIVAN) 1 MG tablet Take 0.5-1 tablets by mouth 2 (Two) Times a Day As Needed for Anxiety (severe anxiety).      ondansetron ODT  (ZOFRAN-ODT) 4 MG disintegrating tablet Place 1 tablet on the tongue Every 8 (Eight) Hours As Needed for Nausea or Vomiting.      pantoprazole (PROTONIX) 40 MG EC tablet Take 1 tablet by mouth 2 (Two) Times a Day.      promethazine (PHENERGAN) 25 MG tablet Take 1 tablet by mouth Every 6 (Six) Hours As Needed for Nausea or Vomiting.      rizatriptan MLT (MAXALT-MLT) 10 MG disintegrating tablet Place 1 tablet on the tongue 1 (One) Time As Needed for Migraine. May repeat in 2 hours if needed      valACYclovir (VALTREX) 500 MG tablet Take 1 tablet by mouth Daily.         Allergies   Allergen Reactions    Lyrica [Pregabalin] Swelling     LIPS AND FACE    Morphine Itching    Codeine Itching    Penicillins Unknown - Low Severity     CHILDHOOD ALLERGY         Social History     Socioeconomic History    Marital status:    Tobacco Use    Smoking status: Every Day     Current packs/day: 2.00     Average packs/day: 2.0 packs/day for 30.0 years (60.0 ttl pk-yrs)     Types: Cigarettes    Smokeless tobacco: Never   Vaping Use    Vaping status: Never Used   Substance and Sexual Activity    Alcohol use: No    Drug use: No    Sexual activity: Defer       Family History   Problem Relation Age of Onset    Diabetes Sister     Cancer Brother     Colon cancer Neg Hx     Colon polyps Neg Hx        Review of Systems   Constitutional:  Negative for activity change, appetite change, chills, diaphoresis, fatigue, fever and unexpected weight change.   HENT:  Negative for ear pain, hearing loss, mouth sores, sore throat, trouble swallowing and voice change.    Eyes: Negative.    Respiratory:  Negative for cough, choking, shortness of breath and wheezing.    Cardiovascular:  Negative for chest pain and palpitations.   Gastrointestinal:  Positive for constipation and nausea. Negative for abdominal pain, blood in stool, diarrhea and vomiting.   Endocrine: Negative for cold intolerance and heat intolerance.   Genitourinary:  Negative for  "decreased urine volume, dysuria, frequency, hematuria and urgency.   Musculoskeletal:  Negative for back pain, gait problem and myalgias.   Skin:  Negative for color change, pallor and rash.   Allergic/Immunologic: Negative for food allergies and immunocompromised state.   Neurological:  Negative for dizziness, tremors, seizures, syncope, weakness, light-headedness, numbness and headaches.   Hematological:  Negative for adenopathy. Does not bruise/bleed easily.   Psychiatric/Behavioral:  Negative for agitation and confusion. The patient is not nervous/anxious.    All other systems reviewed and are negative.      /80 (BP Location: Left arm)   Pulse 77   Temp 97.3 °F (36.3 °C) (Temporal)   Ht 160 cm (62.99\")   Wt 62.2 kg (137 lb 3.2 oz)   SpO2 99%   Breastfeeding No   BMI 24.31 kg/m²   Body mass index is 24.31 kg/m².    Physical Exam  Constitutional:       Appearance: She is well-developed.   HENT:      Head: Normocephalic and atraumatic.   Eyes:      Pupils: Pupils are equal, round, and reactive to light.   Neck:      Trachea: No tracheal deviation.   Cardiovascular:      Rate and Rhythm: Normal rate and regular rhythm.      Heart sounds: Normal heart sounds. No murmur heard.     No friction rub. No gallop.   Pulmonary:      Effort: Pulmonary effort is normal. No respiratory distress.      Breath sounds: Normal breath sounds. No wheezing or rales.   Chest:      Chest wall: No tenderness.   Abdominal:      General: Bowel sounds are normal. There is no distension.      Palpations: Abdomen is soft. Abdomen is not rigid.      Tenderness: There is no abdominal tenderness. There is no guarding or rebound.   Musculoskeletal:         General: No tenderness or deformity. Normal range of motion.      Cervical back: Normal range of motion and neck supple.   Skin:     General: Skin is warm and dry.      Coloration: Skin is not pale.      Findings: No rash.   Neurological:      Mental Status: She is alert and " oriented to person, place, and time.      Deep Tendon Reflexes: Reflexes are normal and symmetric.   Psychiatric:         Behavior: Behavior normal.         Thought Content: Thought content normal.         Judgment: Judgment normal.         ASSESSMENT AND PLAN    BMI is within normal parameters. No other follow-up for BMI required.    Diagnoses and all orders for this visit:    1. Nausea and vomiting, unspecified vomiting type (Primary)    2. Slow transit constipation  Comments:  small BMs so I suggested miralax and increase in water intake.    3. Tobacco abuse counseling    4. Gastroesophageal reflux disease, unspecified whether esophagitis present    5. HTN (hypertension), benign    6. Abnormal CT of the abdomen  Comments:  CTA from 2023 will have vascular evaluate as well.  Orders:  -     Ambulatory Referral to Vascular Surgery      Treat constipation with Miralax daily up to twice per day could contribute to nausea if constipated.   Will have her go see vascular for follow up regarding her CTA findings from 2023. Farily high grade stenosis at the origin of the inferior mestenteric artery. Minimal at the celiac. Aortic aneurysm noted.   Endoscopy was unremarkable. No findings to suggest a source for her nausea/vomiting. Could be metabolic, medication vs other.   She does not desire another colonoscopy at this time. She is aware of a  R/B/I/a and declines.     There are no Patient Instructions on file for this visit.  Becca Blackwood, APRN  13:45 CDT  3/25/2024    Obesity, Adult  Obesity is the condition of having too much total body fat. Being overweight or obese means that your weight is greater than what is considered healthy for your body size. Obesity is determined by a measurement called BMI. BMI is an estimate of body fat and is calculated from height and weight. For adults, a BMI of 30 or higher is considered obese.  Obesity can eventually lead to other health concerns and major illnesses,  including:  Stroke.  Coronary artery disease (CAD).  Type 2 diabetes.  Some types of cancer, including cancers of the colon, breast, uterus, and gallbladder.  Osteoarthritis.  High blood pressure (hypertension).  High cholesterol.  Sleep apnea.  Gallbladder stones.  Infertility problems.  What are the causes?  The main cause of obesity is taking in (consuming) more calories than your body uses for energy. Other factors that contribute to this condition may include:  Being born with genes that make you more likely to become obese.  Having a medical condition that causes obesity. These conditions include:  Hypothyroidism.  Polycystic ovarian syndrome (PCOS).  Binge-eating disorder.  Cushing syndrome.  Taking certain medicines, such as steroids, antidepressants, and seizure medicines.  Not being physically active (sedentary lifestyle).  Living where there are limited places to exercise safely or buy healthy foods.  Not getting enough sleep.  What increases the risk?  The following factors may increase your risk of this condition:  Having a family history of obesity.  Being a woman of -American descent.  Being a man of  descent.  What are the signs or symptoms?  Having excessive body fat is the main symptom of this condition.  How is this diagnosed?  This condition may be diagnosed based on:  Your symptoms.  Your medical history.  A physical exam. Your health care provider may measure:  Your BMI. If you are an adult with a BMI between 25 and less than 30, you are considered overweight. If you are an adult with a BMI of 30 or higher, you are considered obese.  The distances around your hips and your waist (circumferences). These may be compared to each other to help diagnose your condition.  Your skinfold thickness. Your health care provider may gently pinch a fold of your skin and measure it.  How is this treated?  Treatment for this condition often includes changing your lifestyle. Treatment may include  some or all of the following:  Dietary changes. Work with your health care provider and a dietitian to set a weight-loss goal that is healthy and reasonable for you. Dietary changes may include eating:  Smaller portions. A portion size is the amount of a particular food that is healthy for you to eat at one time. This varies from person to person.  Low-calorie or low-fat options.  More whole grains, fruits, and vegetables.  Regular physical activity. This may include aerobic activity (cardio) and strength training.  Medicine to help you lose weight. Your health care provider may prescribe medicine if you are unable to lose 1 pound a week after 6 weeks of eating more healthily and doing more physical activity.  Surgery. Surgical options may include gastric banding and gastric bypass. Surgery may be done if:  Other treatments have not helped to improve your condition.  You have a BMI of 40 or higher.  You have life-threatening health problems related to obesity.  Follow these instructions at home:     Eating and drinking     Follow recommendations from your health care provider about what you eat and drink. Your health care provider may advise you to:  Limit fast foods, sweets, and processed snack foods.  Choose low-fat options, such as low-fat milk instead of whole milk.  Eat 5 or more servings of fruits or vegetables every day.  Eat at home more often. This gives you more control over what you eat.  Choose healthy foods when you eat out.  Learn what a healthy portion size is.  Keep low-fat snacks on hand.  Avoid sugary drinks, such as soda, fruit juice, iced tea sweetened with sugar, and flavored milk.  Eat a healthy breakfast.  Drink enough water to keep your urine clear or pale yellow.  Do not go without eating for long periods of time (do not fast) or follow a fad diet. Fasting and fad diets can be unhealthy and even dangerous.  Physical Activity   Exercise regularly, as told by your health care provider. Ask  your health care provider what types of exercise are safe for you and how often you should exercise.  Warm up and stretch before being active.  Cool down and stretch after being active.  Rest between periods of activity.  Lifestyle   Limit the time that you spend in front of your TV, computer, or video game system.  Find ways to reward yourself that do not involve food.  Limit alcohol intake to no more than 1 drink a day for nonpregnant women and 2 drinks a day for men. One drink equals 12 oz of beer, 5 oz of wine, or 1½ oz of hard liquor.  General instructions   Keep a weight loss journal to keep track of the food you eat and how much you exercise you get.  Take over-the-counter and prescription medicines only as told by your health care provider.  Take vitamins and supplements only as told by your health care provider.  Consider joining a support group. Your health care provider may be able to recommend a support group.  Keep all follow-up visits as told by your health care provider. This is important.  Contact a health care provider if:  You are unable to meet your weight loss goal after 6 weeks of dietary and lifestyle changes.  This information is not intended to replace advice given to you by your health care provider. Make sure you discuss any questions you have with your health care provider.  Document Released: 01/25/2006 Document Revised: 05/22/2017 Document Reviewed: 10/05/2016  [employer] Interactive Patient Education © 2017 [employer]      IF YOU SMOKE OR USE TOBACCO PLEASE READ THE FOLLOWING:    Why is smoking bad for me?  Smoking increases the risk of heart disease, lung disease, vascular disease, stroke, and cancer.     If you smoke, STOP!    If you would like more information on quitting smoking, please visit the [employer] website: www.MyDatingTree/Climber.comate/healthier-together/smoke   This link will provide additional resources including the QUIT line and the [employer] support  groups.     For more information:    Quit Now [place]  1-800-QUIT-NOW  https://[place].quitlogix.org/en-[place]/

## 2024-04-15 ENCOUNTER — TELEPHONE (OUTPATIENT)
Dept: VASCULAR SURGERY | Facility: CLINIC | Age: 69
End: 2024-04-15
Payer: MEDICARE

## 2024-04-15 NOTE — TELEPHONE ENCOUNTER
Returned call to patient who stated that she thought about cancelling appointment due to vomiting past 2 days. Has been happening for last couple weeks lasting about 2 days at a time. Denies any fever or chills. Patient states that she decided to come in instead of cancelling hoping that she will get answers. Confirmed appointment with [facility] on 4/16/24.

## 2024-04-15 NOTE — TELEPHONE ENCOUNTER
Caller: Heather Russo    Relationship: Self    Best call back number: [phone]    What is the best time to reach you: ANY    Who are you requesting to speak with (clinical staff, provider,  specific staff member): CLINICAL    Do you know the name of the person who called: PT    What was the call regarding: PT IS WANTING A CALL BACK. WOULD NOT TELL HUB WHAT THE CALL IS REGARDING OTHER THAN HER APPT TOMORROW. HUB ASKED IF SHE NEEDED TO RESCHEDULE AND SHE JUST SAID SHE WANTS TO SPEAK TO SOMEONE IN THE OFFICE.    Is it okay if the provider responds through MyChart: NO

## 2024-04-16 ENCOUNTER — OFFICE VISIT (OUTPATIENT)
Dept: VASCULAR SURGERY | Facility: CLINIC | Age: 69
End: 2024-04-16
Payer: MEDICARE

## 2024-04-16 VITALS
HEIGHT: 63 IN | HEART RATE: 71 BPM | DIASTOLIC BLOOD PRESSURE: 88 MMHG | BODY MASS INDEX: 24.98 KG/M2 | SYSTOLIC BLOOD PRESSURE: 142 MMHG | OXYGEN SATURATION: 97 % | WEIGHT: 141 LBS

## 2024-04-16 DIAGNOSIS — I71.43 INFRARENAL ABDOMINAL AORTIC ANEURYSM (AAA) WITHOUT RUPTURE: Primary | ICD-10-CM

## 2024-04-16 DIAGNOSIS — I10 PRIMARY HYPERTENSION: ICD-10-CM

## 2024-04-16 DIAGNOSIS — Z72.0 TOBACCO USE: ICD-10-CM

## 2024-04-16 DIAGNOSIS — I73.9 PAD (PERIPHERAL ARTERY DISEASE): ICD-10-CM

## 2024-04-16 DIAGNOSIS — E78.2 MIXED HYPERLIPIDEMIA: ICD-10-CM

## 2024-04-16 DIAGNOSIS — I65.23 BILATERAL CAROTID ARTERY STENOSIS: ICD-10-CM

## 2024-04-16 RX ORDER — ATORVASTATIN CALCIUM 10 MG/1
10 TABLET, FILM COATED ORAL DAILY
Qty: 90 TABLET | Refills: 1 | Status: SHIPPED | OUTPATIENT
Start: 2024-04-16

## 2024-04-16 RX ORDER — ASPIRIN 81 MG/1
81 TABLET ORAL DAILY
Start: 2024-04-16

## 2024-04-16 NOTE — LETTER
April 16, 2024     YOLY Hannah  [address]  [address]  [place] [address]    Patient: Heather Russo   YOB: 1955   Date of Visit: 4/16/2024     Dear YOLY Hannah:       Thank you for referring Heather Russo to me for evaluation. Below are the relevant portions of my assessment and plan of care.    If you have questions, please do not hesitate to call me. I look forward to following Heather along with you.         Sincerely,        Tim Carrasquillo DO        CC: No Recipients    Tim Carrasquillo DO  04/16/24 1133  Sign when Signing Visit  04/16/2024      Becca Blackwood APRN  [address]  [facility] [address]  [place] [address]    Heather Russo  1955    Chief Complaint   Patient presents with   • aortic abdominal aneurysm     AAA was found on CTA, she was having Nausea frequently and was found then, no other symptoms       Dear Becca Blackwood, AP*    HPI  I had the pleasure of seeing your patient Heather Russo in the office today.  Thank you kindly for this consultation.  As you recall, Heather Russo is a 68 y.o.  female who you are currently following for nausea and vomiting.  She follows at [facility] for Non-Hodgkins lymphoma, which she reports has been clear for the past 3 years. She is a current daily smoker.  She does have some complaints of claudication to her lower extremities. She does not take antiplatelet medication or statin.  She did have noninvasive testing performed almost a year ago, which I did review in office.       Past Medical History:   Diagnosis Date   • Anemia    • Anxiety    • Arthritis    • Cancer of skin     BCC OF NOSE   • Colitis    • COPD (chronic obstructive pulmonary disease)    • Depression    • Fibromyalgia    • Gastric ulcer    • GERD (gastroesophageal reflux disease)    • History of transfusion    • Hx of colonic polyps    • Hypercholesteremia    • Hypertension    • Nausea and vomiting 01/29/2024   • Seizure    • Small  cell B-cell lymphoma        Past Surgical History:   Procedure Laterality Date   • BREAST SURGERY     • CHOLECYSTECTOMY     • COLONOSCOPY  04/12/2017    Hemorrhoids and a few diverticula repeat exam in 3 years Dr. Cordero   • COLONOSCOPY N/A 05/02/2019    Collagenous colitis, Diverticulosis repeat exam in 5 years   • COLONOSCOPY N/A 05/21/2021    Fair prep repeat exam in 1 year   • COLONOSCOPY W/ POLYPECTOMY  07/28/2016    Tubular adenoma proximal to the anus, 30 MM polyp in the cecum not resected, Diverticulosis ref to Dr. Cordero   • ENDOSCOPY  06/15/2016    Small hh, Negative for celiac disease   • ENDOSCOPY N/A 06/13/2019    HH otherwise normal exam   • ENDOSCOPY N/A 05/21/2021    Large HH   • ENDOSCOPY N/A 02/01/2024    - Normal examined duodenum. - Hiatal hernia. - Z-line regular, 32 cm from the incisors. - No specimens collected.   • RADICAL HYSTERECTOMY     • TENNIS ELBOW RELEASE Left 10/20/2020    Procedure: LEFT CUBITAL TUNNEL RELEASE;  Surgeon: Raymundo Kiran MD;  Location:  [facility] OR;  Service: Orthopedics;  Laterality: Left;   • URETEROSCOPY Right 09/13/2018    Procedure: CYSTOSCOPY RIGHT RETROGRADE PYELOGRAM RIGHT URETEROSCOPY WITH BIOPSY OF RIGHT RENAL PELVIS LASER LITHOTRIPSY AND RIGHT URETERAL STENT INSERTION;  Surgeon: Yifan Greco MD;  Location:  [facility];  Service: Urology   • URETEROSCOPY Right 08/09/2019    Procedure: RIGHT FLEXIBLE URETEROSCOPY WITH RENAL PELVIC BIOPSY AND RIGHT URETERAL STENT;  Surgeon: Yifan Greco MD;  Location:  [facility];  Service: Urology       Family History   Problem Relation Age of Onset   • Diabetes Sister    • Cancer Brother    • Colon cancer Neg Hx    • Colon polyps Neg Hx        Social History     Socioeconomic History   • Marital status:    Tobacco Use   • Smoking status: Every Day     Current packs/day: 2.00     Average packs/day: 2.0 packs/day for 30.0 years (60.0 ttl pk-yrs)     Types: Cigarettes   • Smokeless tobacco: Never   Vaping  Use   • Vaping status: Never Used   Substance and Sexual Activity   • Alcohol use: No   • Drug use: No   • Sexual activity: Defer       Allergies   Allergen Reactions   • Lyrica [Pregabalin] Swelling     LIPS AND FACE   • Morphine Itching   • Codeine Itching   • Penicillins Unknown - Low Severity     CHILDHOOD ALLERGY           Current Outpatient Medications:   •  albuterol sulfate  (90 Base) MCG/ACT inhaler, Inhale 2 puffs Every 4 (Four) Hours As Needed for Wheezing., Disp: , Rfl:   •  amitriptyline (ELAVIL) 25 MG tablet, Take 1 tablet by mouth Every Night., Disp: , Rfl:   •  ARIPiprazole (ABILIFY) 15 MG tablet, Take 1 tablet by mouth Daily., Disp: , Rfl:   •  budesonide-formoterol (SYMBICORT) 160-4.5 MCG/ACT inhaler, Inhale 2 puffs 2 (Two) Times a Day., Disp: , Rfl: 0  •  butalbital-acetaminophen-caffeine (FIORICET, ESGIC) -40 MG per tablet, Take 1 tablet by mouth Every 4 (Four) Hours As Needed for Headache., Disp: , Rfl:   •  carvedilol (COREG) 25 MG tablet, Take 1 tablet by mouth 2 (Two) Times a Day With Meals., Disp: 60 tablet, Rfl: 0  •  cetirizine (zyrTEC) 10 MG tablet, Take 1 tablet by mouth Daily., Disp: , Rfl:   •  cloNIDine (CATAPRES) 0.1 MG tablet, Take 1 tablet by mouth 2 (Two) Times a Day As Needed for High Blood Pressure., Disp: , Rfl:   •  DULoxetine (CYMBALTA) 60 MG capsule, Take 1 capsule by mouth Daily., Disp: , Rfl:   •  guaiFENesin (MUCINEX) 600 MG 12 hr tablet, Take 2 tablets by mouth Every 12 (Twelve) Hours., Disp: 20 tablet, Rfl: 0  •  hydroCHLOROthiazide (HYDRODIURIL) 25 MG tablet, Take 1 tablet by mouth Daily., Disp: , Rfl:   •  LORazepam (ATIVAN) 1 MG tablet, Take 0.5-1 tablets by mouth 2 (Two) Times a Day As Needed for Anxiety (severe anxiety)., Disp: , Rfl:   •  ondansetron ODT (ZOFRAN-ODT) 4 MG disintegrating tablet, Place 1 tablet on the tongue Every 8 (Eight) Hours As Needed for Nausea or Vomiting., Disp: , Rfl:   •  pantoprazole (PROTONIX) 40 MG EC tablet, Take 1 tablet  "by mouth 2 (Two) Times a Day., Disp: , Rfl:   •  promethazine (PHENERGAN) 25 MG tablet, Take 1 tablet by mouth Every 6 (Six) Hours As Needed for Nausea or Vomiting., Disp: , Rfl:   •  rizatriptan MLT (MAXALT-MLT) 10 MG disintegrating tablet, Place 1 tablet on the tongue 1 (One) Time As Needed for Migraine. May repeat in 2 hours if needed, Disp: , Rfl:   •  amLODIPine (NORVASC) 5 MG tablet, Take 1 tablet by mouth Daily. (Patient not taking: Reported on 4/16/2024), Disp: , Rfl:   •  aspirin 81 MG EC tablet, Take 1 tablet by mouth Daily., Disp: , Rfl:   •  atorvastatin (Lipitor) 10 MG tablet, Take 1 tablet by mouth Daily., Disp: 90 tablet, Rfl: 1  •  diphenoxylate-atropine (LOMOTIL) 2.5-0.025 MG per tablet, Take 1 tablet by mouth 4 (Four) Times a Day As Needed for Diarrhea. (Patient not taking: Reported on 4/16/2024), Disp: , Rfl:   •  valACYclovir (VALTREX) 500 MG tablet, Take 1 tablet by mouth Daily. (Patient not taking: Reported on 4/16/2024), Disp: , Rfl:     Review of Systems   Constitutional: Negative.    HENT: Negative.     Eyes: Negative.    Respiratory: Negative.     Cardiovascular: Negative.         Claudication to BLE   Gastrointestinal: Negative.    Endocrine: Negative.    Genitourinary: Negative.    Musculoskeletal: Negative.    Skin: Negative.    Allergic/Immunologic: Negative.    Neurological: Negative.    Hematological: Negative.    Psychiatric/Behavioral: Negative.     All other systems reviewed and are negative.    /88   Pulse 71   Ht 160 cm (63\")   Wt 64 kg (141 lb)   SpO2 97%   BMI 24.98 kg/m²     Physical Exam  Vitals and nursing note reviewed.   Constitutional:       Appearance: She is well-developed.   HENT:      Head: Normocephalic and atraumatic.   Eyes:      General: No scleral icterus.     Pupils: Pupils are equal, round, and reactive to light.   Neck:      Thyroid: No thyromegaly.      Vascular: No carotid bruit or JVD.   Cardiovascular:      Rate and Rhythm: Normal rate and " regular rhythm.      Pulses:           Carotid pulses are 2+ on the right side.     Heart sounds: Normal heart sounds.   Pulmonary:      Effort: Pulmonary effort is normal.      Breath sounds: Normal breath sounds.   Abdominal:      General: Bowel sounds are normal. There is no distension or abdominal bruit.      Palpations: Abdomen is soft. There is no mass.      Tenderness: There is no abdominal tenderness.   Musculoskeletal:         General: Normal range of motion.      Cervical back: Neck supple.   Lymphadenopathy:      Cervical: No cervical adenopathy.   Skin:     General: Skin is warm and dry.   Neurological:      Mental Status: She is alert and oriented to person, place, and time.      Cranial Nerves: No cranial nerve deficit.      Sensory: No sensory deficit.       Diagnostic Data:  Narrative & Impression   EXAMINATION:  CT ANGIOGRAM ABDOMEN PELVIS WITHOUT AND WITH CONTRAST-   6/25/2023 2:54 AM CDT     HISTORY: Abdominal pain. Blood in the stool. Lymphoma.     TECHNIQUE: Spiral CT angiography was performed of the abdomen and pelvis  without and with IV contrast. Arterial and late portal phase images were  obtained after contrast administration. Multiplanar and 3-D images were  reconstructed.     DLP: 1156 mGy-cm. Automated dosage reduction technique was utilized to  reduce patient dose.     COMPARISON: 04/12/2021.      LUNG BASES: There is dependent atelectasis. There is a new 1.2 cm right  lower lobe nodule image 12 series 2. There is an additional 7 mm nodule  in the right lower lobe image 19 series 2. There is an 8 mm subpleural  nodule versus scar or atelectasis in the right lower lobe posteriorly  image 22 series 2. There is dependent atelectasis in both lung bases.  There is cardiomegaly. There is a moderate to large hiatal hernia.     LIVER AND SPLEEN: There is fatty infiltration of the liver. There is  vague enhancement in the right lobe of the liver images 30 through 34  series 5 likely related to  transient hepatic attenuation difference or  vascular cause. There has been prior cholecystectomy. Heterogeneous  appearance of the spleen is consistent with the phase of injection.     PANCREAS: No pancreatic mass or inflammatory change.     KIDNEYS AND ADRENALS: Mild thickening of the adrenal glands likely due  to hyperplasia. There are bilateral nonobstructive renal stones with the  largest on the left measuring 1 cm. There is a 1.8 cm lower pole renal  cyst on the right. The ureters are nondilated. The urinary bladder is  unremarkable.     BOWEL: Moderate to large hiatal hernia. Gastric wall thickening likely  due to underdistention. Nondilated small bowel loops. Moderate to large  stool in the colon. Question of minimal wall thickening of the right  colon versus artifact of underdistention. There is mild diverticulosis  of the colon. There is no active extravasation of contrast material  within bowel.     OTHER: There are no pathologically enlarged lymph nodes. There has been  prior hysterectomy. There are degenerative changes of the spine and  hips. There is scoliosis of the visualized spine.     VASCULAR: There is atheromatous disease of the aortoiliac vessels. There  is minimal narrowing at the origin of the celiac plexus. There is fairly  high-grade narrowing at the origin of the LANDY. The SMA and bilateral  renal artery origins demonstrate no stenosis or plaque. There is a 3.8 x  3.1 cm abdominal aortic aneurysm below the renal arteries. There is  fairly high-grade stenosis at the origin of the left internal iliac  artery. There is also greater than 50% diameter narrowing of the  proximal right internal iliac artery.        IMPRESSION:  1. Questionable mild wall thickening of right colon versus artifact of  underdistention. Moderate to large stool in the colon. Moderate to large  hiatal hernia. Gastric wall thickening likely due to underdistention.  Mild diverticulosis of the colon. No extravasation of  contrast into the  colon on the contrast-enhanced sequences.  2. New right lower lobe 1.2 cm and 0.7 cm nodules. An 8 mm subpleural  nodule versus scar or atelectasis also noted in the right lower lobe.  Neoplasm versus infection/inflammation. Short-term follow-up CT  recommended in 3 months by Fleischner Society guidelines. This was  marked in PACS for follow-up.  3. Fatty infiltration of the liver. Vague enhancement in the arterial  phase sequence in the right lobe likely related to transient hepatic  attenuation difference or a vascular cause. Prior cholecystectomy.  4. Mild thickening of the adrenal glands likely due to hyperplasia.  Bilateral nonobstructive renal stones. Right renal cyst.  5. Prior hysterectomy.  6. Atheromatous disease of the aortoiliac vessels. A 3.8 x 3.1 cm  abdominal aortic aneurysm below the renal arteries. Fairly high-grade  stenosis at the origin of the inferior mesenteric artery. Minimal  stenosis at the origin of the celiac plexus. Significant stenosis at the  origins of both internal iliac arteries.  This report was finalized on 06/25/2023 08:02 by Dr. Luis Miguel Granado MD.       Patient Active Problem List   Diagnosis   • Laryngopharyngeal reflux   • Hoarseness   • Tobacco use   • Abnormal liver function tests   • Generalized abdominal pain   • Nausea   • Obesity, unspecified obesity severity, unspecified obesity type   • Tobacco abuse counseling   • Renal mass   • Diarrhea in adult patient   • Colitis   • Gastroesophageal reflux disease   • Abdominal pain, epigastric   • NSAID long-term use   • Primary hypertension   • Hematemesis   • Elevated liver enzymes   • Hyperlipidemia   • Pain and numbness of left upper extremity   • Cubital tunnel syndrome on left   • Heme positive stool   • Left lower lobe pneumonia, due to unspecified organism   • COPD with acute exacerbation   • Acute respiratory failure with hypoxia   • COPD exacerbation   • Hypokalemia   • Pneumonia, unspecified organism    • Nausea and vomiting        Diagnosis Plan   1. Infrarenal abdominal aortic aneurysm (AAA) without rupture  CT Angiogram Abdomen Pelvis      2. Bilateral carotid artery stenosis  US Carotid Bilateral      3. PAD (peripheral artery disease)  US Ankle / Brachial Indices Extremity Complete      4. Primary hypertension        5. Mixed hyperlipidemia        6. Tobacco use            Plan: After thoroughly evaluating Heather Russo, I believe the best course of action is to return with further imaging as her last CTA was a year ago.  I did review her testing showing AAA measuring 3.8 cm.  I will also order a carotid duplex and ABIs for screening.  She does have complaints of claudication as well and moderate stenosis noted to the left carotid on previous imaging.  I will have those last images sent over as well from [place].  We will see her back in 2 week to review her testing.  I will have her begin aspirin EC 81 mg daily in addition to Lipitor 10 mg daily.  I did discuss vascular risk factors as they pertain to the progression of vascular disease including controlling her hypertension and hyperlipidemia.  Her blood pressure is slightly elevated at 142/88.  Unfortunately, she is a current 2 pack per day smoker and does not have a desire to quit smoking at this time.  Smoking is the number 1 cause of aneurysm formation and rapid growth.  I recommend her children be screened at 55-60.  The patient is to continue taking their medications as previously discussed.   This was all discussed in full with complete understanding.  Thank you for allowing me to participate in the care of your patient.  Please do not hesitate to call with any questions or concerns.  We will keep you aware of any further encounters with Heather Russo.        Sincerely yours,         DO Anand Ellison David Earl Jr., MD

## 2024-04-16 NOTE — LETTER
April 16, 2024     Grzegorz Michel Jr., MD  [address] [facility] [place] [address]    Patient: Heather Russo   YOB: 1955   Date of Visit: 4/16/2024     Dear Grzegorz Mcihel Jr., MD:       Thank you for referring Heather Russo to me for evaluation. Below are the relevant portions of my assessment and plan of care.    If you have questions, please do not hesitate to call me. I look forward to following Heather along with you.         Sincerely,        Tim Carrasquillo,         CC: YOLY Hannah Griffin K, DO  04/16/24 1133  Sign when Signing Visit  04/16/2024      Becca Blackwood APRN  [address] [facility] [address]  [place] [address]    Heather Russo  1955    Chief Complaint   Patient presents with   • aortic abdominal aneurysm     AAA was found on CTA, she was having Nausea frequently and was found then, no other symptoms       Dear Becca Blackwood AP*    HPI  I had the pleasure of seeing your patient Heather Russo in the office today.  Thank you kindly for this consultation.  As you recall, Heather Russo is a 68 y.o.  female who you are currently following for nausea and vomiting.  She follows at [facility] for Non-Hodgkins lymphoma, which she reports has been clear for the past 3 years. She is a current daily smoker.  She does have some complaints of claudication to her lower extremities. She does not take antiplatelet medication or statin.  She did have noninvasive testing performed almost a year ago, which I did review in office.       Past Medical History:   Diagnosis Date   • Anemia    • Anxiety    • Arthritis    • Cancer of skin     BCC OF NOSE   • Colitis    • COPD (chronic obstructive pulmonary disease)    • Depression    • Fibromyalgia    • Gastric ulcer    • GERD (gastroesophageal reflux disease)    • History of transfusion    • Hx of colonic polyps    • Hypercholesteremia    • Hypertension    • Nausea and vomiting 01/29/2024   • Seizure    • Small  cell B-cell lymphoma        Past Surgical History:   Procedure Laterality Date   • BREAST SURGERY     • CHOLECYSTECTOMY     • COLONOSCOPY  04/12/2017    Hemorrhoids and a few diverticula repeat exam in 3 years Dr. Cordero   • COLONOSCOPY N/A 05/02/2019    Collagenous colitis, Diverticulosis repeat exam in 5 years   • COLONOSCOPY N/A 05/21/2021    Fair prep repeat exam in 1 year   • COLONOSCOPY W/ POLYPECTOMY  07/28/2016    Tubular adenoma proximal to the anus, 30 MM polyp in the cecum not resected, Diverticulosis ref to Dr. Cordero   • ENDOSCOPY  06/15/2016    Small hh, Negative for celiac disease   • ENDOSCOPY N/A 06/13/2019    HH otherwise normal exam   • ENDOSCOPY N/A 05/21/2021    Large HH   • ENDOSCOPY N/A 02/01/2024    - Normal examined duodenum. - Hiatal hernia. - Z-line regular, 32 cm from the incisors. - No specimens collected.   • RADICAL HYSTERECTOMY     • TENNIS ELBOW RELEASE Left 10/20/2020    Procedure: LEFT CUBITAL TUNNEL RELEASE;  Surgeon: Raymundo Kiran MD;  Location:  [facility] OR;  Service: Orthopedics;  Laterality: Left;   • URETEROSCOPY Right 09/13/2018    Procedure: CYSTOSCOPY RIGHT RETROGRADE PYELOGRAM RIGHT URETEROSCOPY WITH BIOPSY OF RIGHT RENAL PELVIS LASER LITHOTRIPSY AND RIGHT URETERAL STENT INSERTION;  Surgeon: Yifan Greco MD;  Location:  [facility] OR;  Service: Urology   • URETEROSCOPY Right 08/09/2019    Procedure: RIGHT FLEXIBLE URETEROSCOPY WITH RENAL PELVIC BIOPSY AND RIGHT URETERAL STENT;  Surgeon: Yifan Greco MD;  Location:  [facility];  Service: Urology       Family History   Problem Relation Age of Onset   • Diabetes Sister    • Cancer Brother    • Colon cancer Neg Hx    • Colon polyps Neg Hx        Social History     Socioeconomic History   • Marital status:    Tobacco Use   • Smoking status: Every Day     Current packs/day: 2.00     Average packs/day: 2.0 packs/day for 30.0 years (60.0 ttl pk-yrs)     Types: Cigarettes   • Smokeless tobacco: Never   Vaping  Use   • Vaping status: Never Used   Substance and Sexual Activity   • Alcohol use: No   • Drug use: No   • Sexual activity: Defer       Allergies   Allergen Reactions   • Lyrica [Pregabalin] Swelling     LIPS AND FACE   • Morphine Itching   • Codeine Itching   • Penicillins Unknown - Low Severity     CHILDHOOD ALLERGY           Current Outpatient Medications:   •  albuterol sulfate  (90 Base) MCG/ACT inhaler, Inhale 2 puffs Every 4 (Four) Hours As Needed for Wheezing., Disp: , Rfl:   •  amitriptyline (ELAVIL) 25 MG tablet, Take 1 tablet by mouth Every Night., Disp: , Rfl:   •  ARIPiprazole (ABILIFY) 15 MG tablet, Take 1 tablet by mouth Daily., Disp: , Rfl:   •  budesonide-formoterol (SYMBICORT) 160-4.5 MCG/ACT inhaler, Inhale 2 puffs 2 (Two) Times a Day., Disp: , Rfl: 0  •  butalbital-acetaminophen-caffeine (FIORICET, ESGIC) -40 MG per tablet, Take 1 tablet by mouth Every 4 (Four) Hours As Needed for Headache., Disp: , Rfl:   •  carvedilol (COREG) 25 MG tablet, Take 1 tablet by mouth 2 (Two) Times a Day With Meals., Disp: 60 tablet, Rfl: 0  •  cetirizine (zyrTEC) 10 MG tablet, Take 1 tablet by mouth Daily., Disp: , Rfl:   •  cloNIDine (CATAPRES) 0.1 MG tablet, Take 1 tablet by mouth 2 (Two) Times a Day As Needed for High Blood Pressure., Disp: , Rfl:   •  DULoxetine (CYMBALTA) 60 MG capsule, Take 1 capsule by mouth Daily., Disp: , Rfl:   •  guaiFENesin (MUCINEX) 600 MG 12 hr tablet, Take 2 tablets by mouth Every 12 (Twelve) Hours., Disp: 20 tablet, Rfl: 0  •  hydroCHLOROthiazide (HYDRODIURIL) 25 MG tablet, Take 1 tablet by mouth Daily., Disp: , Rfl:   •  LORazepam (ATIVAN) 1 MG tablet, Take 0.5-1 tablets by mouth 2 (Two) Times a Day As Needed for Anxiety (severe anxiety)., Disp: , Rfl:   •  ondansetron ODT (ZOFRAN-ODT) 4 MG disintegrating tablet, Place 1 tablet on the tongue Every 8 (Eight) Hours As Needed for Nausea or Vomiting., Disp: , Rfl:   •  pantoprazole (PROTONIX) 40 MG EC tablet, Take 1 tablet  "by mouth 2 (Two) Times a Day., Disp: , Rfl:   •  promethazine (PHENERGAN) 25 MG tablet, Take 1 tablet by mouth Every 6 (Six) Hours As Needed for Nausea or Vomiting., Disp: , Rfl:   •  rizatriptan MLT (MAXALT-MLT) 10 MG disintegrating tablet, Place 1 tablet on the tongue 1 (One) Time As Needed for Migraine. May repeat in 2 hours if needed, Disp: , Rfl:   •  amLODIPine (NORVASC) 5 MG tablet, Take 1 tablet by mouth Daily. (Patient not taking: Reported on 4/16/2024), Disp: , Rfl:   •  aspirin 81 MG EC tablet, Take 1 tablet by mouth Daily., Disp: , Rfl:   •  atorvastatin (Lipitor) 10 MG tablet, Take 1 tablet by mouth Daily., Disp: 90 tablet, Rfl: 1  •  diphenoxylate-atropine (LOMOTIL) 2.5-0.025 MG per tablet, Take 1 tablet by mouth 4 (Four) Times a Day As Needed for Diarrhea. (Patient not taking: Reported on 4/16/2024), Disp: , Rfl:   •  valACYclovir (VALTREX) 500 MG tablet, Take 1 tablet by mouth Daily. (Patient not taking: Reported on 4/16/2024), Disp: , Rfl:     Review of Systems   Constitutional: Negative.    HENT: Negative.     Eyes: Negative.    Respiratory: Negative.     Cardiovascular: Negative.         Claudication to BLE   Gastrointestinal: Negative.    Endocrine: Negative.    Genitourinary: Negative.    Musculoskeletal: Negative.    Skin: Negative.    Allergic/Immunologic: Negative.    Neurological: Negative.    Hematological: Negative.    Psychiatric/Behavioral: Negative.     All other systems reviewed and are negative.    /88   Pulse 71   Ht 160 cm (63\")   Wt 64 kg (141 lb)   SpO2 97%   BMI 24.98 kg/m²     Physical Exam  Vitals and nursing note reviewed.   Constitutional:       Appearance: She is well-developed.   HENT:      Head: Normocephalic and atraumatic.   Eyes:      General: No scleral icterus.     Pupils: Pupils are equal, round, and reactive to light.   Neck:      Thyroid: No thyromegaly.      Vascular: No carotid bruit or JVD.   Cardiovascular:      Rate and Rhythm: Normal rate and " regular rhythm.      Pulses:           Carotid pulses are 2+ on the right side.     Heart sounds: Normal heart sounds.   Pulmonary:      Effort: Pulmonary effort is normal.      Breath sounds: Normal breath sounds.   Abdominal:      General: Bowel sounds are normal. There is no distension or abdominal bruit.      Palpations: Abdomen is soft. There is no mass.      Tenderness: There is no abdominal tenderness.   Musculoskeletal:         General: Normal range of motion.      Cervical back: Neck supple.   Lymphadenopathy:      Cervical: No cervical adenopathy.   Skin:     General: Skin is warm and dry.   Neurological:      Mental Status: She is alert and oriented to person, place, and time.      Cranial Nerves: No cranial nerve deficit.      Sensory: No sensory deficit.       Diagnostic Data:  Narrative & Impression   EXAMINATION:  CT ANGIOGRAM ABDOMEN PELVIS WITHOUT AND WITH CONTRAST-   6/25/2023 2:54 AM CDT     HISTORY: Abdominal pain. Blood in the stool. Lymphoma.     TECHNIQUE: Spiral CT angiography was performed of the abdomen and pelvis  without and with IV contrast. Arterial and late portal phase images were  obtained after contrast administration. Multiplanar and 3-D images were  reconstructed.     DLP: 1156 mGy-cm. Automated dosage reduction technique was utilized to  reduce patient dose.     COMPARISON: 04/12/2021.      LUNG BASES: There is dependent atelectasis. There is a new 1.2 cm right  lower lobe nodule image 12 series 2. There is an additional 7 mm nodule  in the right lower lobe image 19 series 2. There is an 8 mm subpleural  nodule versus scar or atelectasis in the right lower lobe posteriorly  image 22 series 2. There is dependent atelectasis in both lung bases.  There is cardiomegaly. There is a moderate to large hiatal hernia.     LIVER AND SPLEEN: There is fatty infiltration of the liver. There is  vague enhancement in the right lobe of the liver images 30 through 34  series 5 likely related to  transient hepatic attenuation difference or  vascular cause. There has been prior cholecystectomy. Heterogeneous  appearance of the spleen is consistent with the phase of injection.     PANCREAS: No pancreatic mass or inflammatory change.     KIDNEYS AND ADRENALS: Mild thickening of the adrenal glands likely due  to hyperplasia. There are bilateral nonobstructive renal stones with the  largest on the left measuring 1 cm. There is a 1.8 cm lower pole renal  cyst on the right. The ureters are nondilated. The urinary bladder is  unremarkable.     BOWEL: Moderate to large hiatal hernia. Gastric wall thickening likely  due to underdistention. Nondilated small bowel loops. Moderate to large  stool in the colon. Question of minimal wall thickening of the right  colon versus artifact of underdistention. There is mild diverticulosis  of the colon. There is no active extravasation of contrast material  within bowel.     OTHER: There are no pathologically enlarged lymph nodes. There has been  prior hysterectomy. There are degenerative changes of the spine and  hips. There is scoliosis of the visualized spine.     VASCULAR: There is atheromatous disease of the aortoiliac vessels. There  is minimal narrowing at the origin of the celiac plexus. There is fairly  high-grade narrowing at the origin of the LANDY. The SMA and bilateral  renal artery origins demonstrate no stenosis or plaque. There is a 3.8 x  3.1 cm abdominal aortic aneurysm below the renal arteries. There is  fairly high-grade stenosis at the origin of the left internal iliac  artery. There is also greater than 50% diameter narrowing of the  proximal right internal iliac artery.        IMPRESSION:  1. Questionable mild wall thickening of right colon versus artifact of  underdistention. Moderate to large stool in the colon. Moderate to large  hiatal hernia. Gastric wall thickening likely due to underdistention.  Mild diverticulosis of the colon. No extravasation of  contrast into the  colon on the contrast-enhanced sequences.  2. New right lower lobe 1.2 cm and 0.7 cm nodules. An 8 mm subpleural  nodule versus scar or atelectasis also noted in the right lower lobe.  Neoplasm versus infection/inflammation. Short-term follow-up CT  recommended in 3 months by Fleischner Society guidelines. This was  marked in PACS for follow-up.  3. Fatty infiltration of the liver. Vague enhancement in the arterial  phase sequence in the right lobe likely related to transient hepatic  attenuation difference or a vascular cause. Prior cholecystectomy.  4. Mild thickening of the adrenal glands likely due to hyperplasia.  Bilateral nonobstructive renal stones. Right renal cyst.  5. Prior hysterectomy.  6. Atheromatous disease of the aortoiliac vessels. A 3.8 x 3.1 cm  abdominal aortic aneurysm below the renal arteries. Fairly high-grade  stenosis at the origin of the inferior mesenteric artery. Minimal  stenosis at the origin of the celiac plexus. Significant stenosis at the  origins of both internal iliac arteries.  This report was finalized on 06/25/2023 08:02 by Dr. Luis Miguel Granado MD.       Patient Active Problem List   Diagnosis   • Laryngopharyngeal reflux   • Hoarseness   • Tobacco use   • Abnormal liver function tests   • Generalized abdominal pain   • Nausea   • Obesity, unspecified obesity severity, unspecified obesity type   • Tobacco abuse counseling   • Renal mass   • Diarrhea in adult patient   • Colitis   • Gastroesophageal reflux disease   • Abdominal pain, epigastric   • NSAID long-term use   • Primary hypertension   • Hematemesis   • Elevated liver enzymes   • Hyperlipidemia   • Pain and numbness of left upper extremity   • Cubital tunnel syndrome on left   • Heme positive stool   • Left lower lobe pneumonia, due to unspecified organism   • COPD with acute exacerbation   • Acute respiratory failure with hypoxia   • COPD exacerbation   • Hypokalemia   • Pneumonia, unspecified organism    • Nausea and vomiting        Diagnosis Plan   1. Infrarenal abdominal aortic aneurysm (AAA) without rupture  CT Angiogram Abdomen Pelvis      2. Bilateral carotid artery stenosis  US Carotid Bilateral      3. PAD (peripheral artery disease)  US Ankle / Brachial Indices Extremity Complete      4. Primary hypertension        5. Mixed hyperlipidemia        6. Tobacco use            Plan: After thoroughly evaluating Heather Russo, I believe the best course of action is to return with further imaging as her last CTA was a year ago.  I did review her testing showing AAA measuring 3.8 cm.  I will also order a carotid duplex and ABIs for screening.  She does have complaints of claudication as well and moderate stenosis noted to the left carotid on previous imaging.  I will have those last images sent over as well from [facility].  We will see her back in 2 week to review her testing.  I will have her begin aspirin EC 81 mg daily in addition to Lipitor 10 mg daily.  I did discuss vascular risk factors as they pertain to the progression of vascular disease including controlling her hypertension and hyperlipidemia.  Her blood pressure is slightly elevated at 142/88.  Unfortunately, she is a current 2 pack per day smoker and does not have a desire to quit smoking at this time.  Smoking is the number 1 cause of aneurysm formation and rapid growth.  I recommend her children be screened at 55-60.  The patient is to continue taking their medications as previously discussed.   This was all discussed in full with complete understanding.  Thank you for allowing me to participate in the care of your patient.  Please do not hesitate to call with any questions or concerns.  We will keep you aware of any further encounters with Heather Russo.        Sincerely yours,         DO Anand Ellison David Earl Jr., MD

## 2024-04-16 NOTE — PROGRESS NOTES
04/16/2024      Becca Blackwood, APRN  [address] [facility]  [address]  [place] [address]    Heather Russo  1955    Chief Complaint   Patient presents with    aortic abdominal aneurysm     AAA was found on CTA, she was having Nausea frequently and was found then, no other symptoms       Dear Becca Blackwood, AP*    HPI  I had the pleasure of seeing your patient Heather Russo in the office today.  Thank you kindly for this consultation.  As you recall, Heather Russo is a 68 y.o.  female who you are currently following for nausea and vomiting.  She follows at [facility] for Non-Hodgkins lymphoma, which she reports has been clear for the past 3 years. She is a current daily smoker.  She does have some complaints of claudication to her lower extremities. She does not take antiplatelet medication or statin.  She did have noninvasive testing performed almost a year ago, which I did review in office.       Past Medical History:   Diagnosis Date    Anemia     Anxiety     Arthritis     Cancer of skin     BCC OF NOSE    Colitis     COPD (chronic obstructive pulmonary disease)     Depression     Fibromyalgia     Gastric ulcer     GERD (gastroesophageal reflux disease)     History of transfusion     Hx of colonic polyps     Hypercholesteremia     Hypertension     Nausea and vomiting 01/29/2024    Seizure     Small cell B-cell lymphoma        Past Surgical History:   Procedure Laterality Date    BREAST SURGERY      CHOLECYSTECTOMY      COLONOSCOPY  04/12/2017    Hemorrhoids and a few diverticula repeat exam in 3 years Dr. Cordero    COLONOSCOPY N/A 05/02/2019    Collagenous colitis, Diverticulosis repeat exam in 5 years    COLONOSCOPY N/A 05/21/2021    Fair prep repeat exam in 1 year    COLONOSCOPY W/ POLYPECTOMY  07/28/2016    Tubular adenoma proximal to the anus, 30 MM polyp in the cecum not resected, Diverticulosis ref to Dr. Cordero    ENDOSCOPY  06/15/2016    Small hh, Negative for celiac disease    ENDOSCOPY N/A  06/13/2019    HH otherwise normal exam    ENDOSCOPY N/A 05/21/2021    Large HH    ENDOSCOPY N/A 02/01/2024    - Normal examined duodenum. - Hiatal hernia. - Z-line regular, 32 cm from the incisors. - No specimens collected.    RADICAL HYSTERECTOMY      TENNIS ELBOW RELEASE Left 10/20/2020    Procedure: LEFT CUBITAL TUNNEL RELEASE;  Surgeon: Raymundo Kiran MD;  Location:  [facility];  Service: Orthopedics;  Laterality: Left;    URETEROSCOPY Right 09/13/2018    Procedure: CYSTOSCOPY RIGHT RETROGRADE PYELOGRAM RIGHT URETEROSCOPY WITH BIOPSY OF RIGHT RENAL PELVIS LASER LITHOTRIPSY AND RIGHT URETERAL STENT INSERTION;  Surgeon: Yifan Greco MD;  Location:  [facility];  Service: Urology    URETEROSCOPY Right 08/09/2019    Procedure: RIGHT FLEXIBLE URETEROSCOPY WITH RENAL PELVIC BIOPSY AND RIGHT URETERAL STENT;  Surgeon: Yifan Greco MD;  Location:  [facility];  Service: Urology       Family History   Problem Relation Age of Onset    Diabetes Sister     Cancer Brother     Colon cancer Neg Hx     Colon polyps Neg Hx        Social History     Socioeconomic History    Marital status:    Tobacco Use    Smoking status: Every Day     Current packs/day: 2.00     Average packs/day: 2.0 packs/day for 30.0 years (60.0 ttl pk-yrs)     Types: Cigarettes    Smokeless tobacco: Never   Vaping Use    Vaping status: Never Used   Substance and Sexual Activity    Alcohol use: No    Drug use: No    Sexual activity: Defer       Allergies   Allergen Reactions    Lyrica [Pregabalin] Swelling     LIPS AND FACE    Morphine Itching    Codeine Itching    Penicillins Unknown - Low Severity     CHILDHOOD ALLERGY           Current Outpatient Medications:     albuterol sulfate  (90 Base) MCG/ACT inhaler, Inhale 2 puffs Every 4 (Four) Hours As Needed for Wheezing., Disp: , Rfl:     amitriptyline (ELAVIL) 25 MG tablet, Take 1 tablet by mouth Every Night., Disp: , Rfl:     ARIPiprazole (ABILIFY) 15 MG tablet, Take 1  tablet by mouth Daily., Disp: , Rfl:     budesonide-formoterol (SYMBICORT) 160-4.5 MCG/ACT inhaler, Inhale 2 puffs 2 (Two) Times a Day., Disp: , Rfl: 0    butalbital-acetaminophen-caffeine (FIORICET, ESGIC) -40 MG per tablet, Take 1 tablet by mouth Every 4 (Four) Hours As Needed for Headache., Disp: , Rfl:     carvedilol (COREG) 25 MG tablet, Take 1 tablet by mouth 2 (Two) Times a Day With Meals., Disp: 60 tablet, Rfl: 0    cetirizine (zyrTEC) 10 MG tablet, Take 1 tablet by mouth Daily., Disp: , Rfl:     cloNIDine (CATAPRES) 0.1 MG tablet, Take 1 tablet by mouth 2 (Two) Times a Day As Needed for High Blood Pressure., Disp: , Rfl:     DULoxetine (CYMBALTA) 60 MG capsule, Take 1 capsule by mouth Daily., Disp: , Rfl:     guaiFENesin (MUCINEX) 600 MG 12 hr tablet, Take 2 tablets by mouth Every 12 (Twelve) Hours., Disp: 20 tablet, Rfl: 0    hydroCHLOROthiazide (HYDRODIURIL) 25 MG tablet, Take 1 tablet by mouth Daily., Disp: , Rfl:     LORazepam (ATIVAN) 1 MG tablet, Take 0.5-1 tablets by mouth 2 (Two) Times a Day As Needed for Anxiety (severe anxiety)., Disp: , Rfl:     ondansetron ODT (ZOFRAN-ODT) 4 MG disintegrating tablet, Place 1 tablet on the tongue Every 8 (Eight) Hours As Needed for Nausea or Vomiting., Disp: , Rfl:     pantoprazole (PROTONIX) 40 MG EC tablet, Take 1 tablet by mouth 2 (Two) Times a Day., Disp: , Rfl:     promethazine (PHENERGAN) 25 MG tablet, Take 1 tablet by mouth Every 6 (Six) Hours As Needed for Nausea or Vomiting., Disp: , Rfl:     rizatriptan MLT (MAXALT-MLT) 10 MG disintegrating tablet, Place 1 tablet on the tongue 1 (One) Time As Needed for Migraine. May repeat in 2 hours if needed, Disp: , Rfl:     amLODIPine (NORVASC) 5 MG tablet, Take 1 tablet by mouth Daily. (Patient not taking: Reported on 4/16/2024), Disp: , Rfl:     aspirin 81 MG EC tablet, Take 1 tablet by mouth Daily., Disp: , Rfl:     atorvastatin (Lipitor) 10 MG tablet, Take 1 tablet by mouth Daily., Disp: 90 tablet, Rfl:  "1    diphenoxylate-atropine (LOMOTIL) 2.5-0.025 MG per tablet, Take 1 tablet by mouth 4 (Four) Times a Day As Needed for Diarrhea. (Patient not taking: Reported on 4/16/2024), Disp: , Rfl:     valACYclovir (VALTREX) 500 MG tablet, Take 1 tablet by mouth Daily. (Patient not taking: Reported on 4/16/2024), Disp: , Rfl:     Review of Systems   Constitutional: Negative.    HENT: Negative.     Eyes: Negative.    Respiratory: Negative.     Cardiovascular: Negative.         Claudication to BLE   Gastrointestinal: Negative.    Endocrine: Negative.    Genitourinary: Negative.    Musculoskeletal: Negative.    Skin: Negative.    Allergic/Immunologic: Negative.    Neurological: Negative.    Hematological: Negative.    Psychiatric/Behavioral: Negative.     All other systems reviewed and are negative.    /88   Pulse 71   Ht 160 cm (63\")   Wt 64 kg (141 lb)   SpO2 97%   BMI 24.98 kg/m²     Physical Exam  Vitals and nursing note reviewed.   Constitutional:       Appearance: She is well-developed.   HENT:      Head: Normocephalic and atraumatic.   Eyes:      General: No scleral icterus.     Pupils: Pupils are equal, round, and reactive to light.   Neck:      Thyroid: No thyromegaly.      Vascular: No carotid bruit or JVD.   Cardiovascular:      Rate and Rhythm: Normal rate and regular rhythm.      Pulses:           Carotid pulses are 2+ on the right side.     Heart sounds: Normal heart sounds.   Pulmonary:      Effort: Pulmonary effort is normal.      Breath sounds: Normal breath sounds.   Abdominal:      General: Bowel sounds are normal. There is no distension or abdominal bruit.      Palpations: Abdomen is soft. There is no mass.      Tenderness: There is no abdominal tenderness.   Musculoskeletal:         General: Normal range of motion.      Cervical back: Neck supple.   Lymphadenopathy:      Cervical: No cervical adenopathy.   Skin:     General: Skin is warm and dry.   Neurological:      Mental Status: She is alert " and oriented to person, place, and time.      Cranial Nerves: No cranial nerve deficit.      Sensory: No sensory deficit.       Diagnostic Data:  Narrative & Impression   EXAMINATION:  CT ANGIOGRAM ABDOMEN PELVIS WITHOUT AND WITH CONTRAST-   6/25/2023 2:54 AM CDT     HISTORY: Abdominal pain. Blood in the stool. Lymphoma.     TECHNIQUE: Spiral CT angiography was performed of the abdomen and pelvis  without and with IV contrast. Arterial and late portal phase images were  obtained after contrast administration. Multiplanar and 3-D images were  reconstructed.     DLP: 1156 mGy-cm. Automated dosage reduction technique was utilized to  reduce patient dose.     COMPARISON: 04/12/2021.      LUNG BASES: There is dependent atelectasis. There is a new 1.2 cm right  lower lobe nodule image 12 series 2. There is an additional 7 mm nodule  in the right lower lobe image 19 series 2. There is an 8 mm subpleural  nodule versus scar or atelectasis in the right lower lobe posteriorly  image 22 series 2. There is dependent atelectasis in both lung bases.  There is cardiomegaly. There is a moderate to large hiatal hernia.     LIVER AND SPLEEN: There is fatty infiltration of the liver. There is  vague enhancement in the right lobe of the liver images 30 through 34  series 5 likely related to transient hepatic attenuation difference or  vascular cause. There has been prior cholecystectomy. Heterogeneous  appearance of the spleen is consistent with the phase of injection.     PANCREAS: No pancreatic mass or inflammatory change.     KIDNEYS AND ADRENALS: Mild thickening of the adrenal glands likely due  to hyperplasia. There are bilateral nonobstructive renal stones with the  largest on the left measuring 1 cm. There is a 1.8 cm lower pole renal  cyst on the right. The ureters are nondilated. The urinary bladder is  unremarkable.     BOWEL: Moderate to large hiatal hernia. Gastric wall thickening likely  due to underdistention. Nondilated  small bowel loops. Moderate to large  stool in the colon. Question of minimal wall thickening of the right  colon versus artifact of underdistention. There is mild diverticulosis  of the colon. There is no active extravasation of contrast material  within bowel.     OTHER: There are no pathologically enlarged lymph nodes. There has been  prior hysterectomy. There are degenerative changes of the spine and  hips. There is scoliosis of the visualized spine.     VASCULAR: There is atheromatous disease of the aortoiliac vessels. There  is minimal narrowing at the origin of the celiac plexus. There is fairly  high-grade narrowing at the origin of the LANDY. The SMA and bilateral  renal artery origins demonstrate no stenosis or plaque. There is a 3.8 x  3.1 cm abdominal aortic aneurysm below the renal arteries. There is  fairly high-grade stenosis at the origin of the left internal iliac  artery. There is also greater than 50% diameter narrowing of the  proximal right internal iliac artery.        IMPRESSION:  1. Questionable mild wall thickening of right colon versus artifact of  underdistention. Moderate to large stool in the colon. Moderate to large  hiatal hernia. Gastric wall thickening likely due to underdistention.  Mild diverticulosis of the colon. No extravasation of contrast into the  colon on the contrast-enhanced sequences.  2. New right lower lobe 1.2 cm and 0.7 cm nodules. An 8 mm subpleural  nodule versus scar or atelectasis also noted in the right lower lobe.  Neoplasm versus infection/inflammation. Short-term follow-up CT  recommended in 3 months by Fleischner Society guidelines. This was  marked in PACS for follow-up.  3. Fatty infiltration of the liver. Vague enhancement in the arterial  phase sequence in the right lobe likely related to transient hepatic  attenuation difference or a vascular cause. Prior cholecystectomy.  4. Mild thickening of the adrenal glands likely due to hyperplasia.  Bilateral  nonobstructive renal stones. Right renal cyst.  5. Prior hysterectomy.  6. Atheromatous disease of the aortoiliac vessels. A 3.8 x 3.1 cm  abdominal aortic aneurysm below the renal arteries. Fairly high-grade  stenosis at the origin of the inferior mesenteric artery. Minimal  stenosis at the origin of the celiac plexus. Significant stenosis at the  origins of both internal iliac arteries.  This report was finalized on 06/25/2023 08:02 by Dr. Luis Miguel Granado MD.       Patient Active Problem List   Diagnosis    Laryngopharyngeal reflux    Hoarseness    Tobacco use    Abnormal liver function tests    Generalized abdominal pain    Nausea    Obesity, unspecified obesity severity, unspecified obesity type    Tobacco abuse counseling    Renal mass    Diarrhea in adult patient    Colitis    Gastroesophageal reflux disease    Abdominal pain, epigastric    NSAID long-term use    Primary hypertension    Hematemesis    Elevated liver enzymes    Hyperlipidemia    Pain and numbness of left upper extremity    Cubital tunnel syndrome on left    Heme positive stool    Left lower lobe pneumonia, due to unspecified organism    COPD with acute exacerbation    Acute respiratory failure with hypoxia    COPD exacerbation    Hypokalemia    Pneumonia, unspecified organism    Nausea and vomiting        Diagnosis Plan   1. Infrarenal abdominal aortic aneurysm (AAA) without rupture  CT Angiogram Abdomen Pelvis      2. Bilateral carotid artery stenosis  US Carotid Bilateral      3. PAD (peripheral artery disease)  US Ankle / Brachial Indices Extremity Complete      4. Primary hypertension        5. Mixed hyperlipidemia        6. Tobacco use            Plan: After thoroughly evaluating Heather Russo, I believe the best course of action is to return with further imaging as her last CTA was a year ago.  I did review her testing showing AAA measuring 3.8 cm.  I will also order a carotid duplex and ABIs for screening.  She does have complaints of  claudication as well and moderate stenosis noted to the left carotid on previous imaging.  I will have those last images sent over as well from [facility].  We will see her back in 2 week to review her testing.  I will have her begin aspirin EC 81 mg daily in addition to Lipitor 10 mg daily.  I did discuss vascular risk factors as they pertain to the progression of vascular disease including controlling her hypertension and hyperlipidemia.  Her blood pressure is slightly elevated at 142/88.  Unfortunately, she is a current 2 pack per day smoker and does not have a desire to quit smoking at this time.  Smoking is the number 1 cause of aneurysm formation and rapid growth.  I recommend her children be screened at 55-60.  The patient is to continue taking their medications as previously discussed.   This was all discussed in full with complete understanding.  Thank you for allowing me to participate in the care of your patient.  Please do not hesitate to call with any questions or concerns.  We will keep you aware of any further encounters with Heather Russo.        Sincerely yours,         DO Anand Ellison, Grzegorz Mcdermott Jr., MD

## 2024-04-29 ENCOUNTER — TELEPHONE (OUTPATIENT)
Dept: VASCULAR SURGERY | Facility: CLINIC | Age: 69
End: 2024-04-29
Payer: MEDICARE

## 2024-04-30 ENCOUNTER — OFFICE VISIT (OUTPATIENT)
Dept: VASCULAR SURGERY | Facility: CLINIC | Age: 69
End: 2024-04-30
Payer: MEDICARE

## 2024-04-30 ENCOUNTER — HOSPITAL ENCOUNTER (OUTPATIENT)
Dept: CT IMAGING | Facility: HOSPITAL | Age: 69
Discharge: HOME | End: 2024-04-30
Admitting: SURGERY
Payer: MEDICARE

## 2024-04-30 VITALS
HEART RATE: 71 BPM | WEIGHT: 140 LBS | BODY MASS INDEX: 24.8 KG/M2 | HEIGHT: 63 IN | SYSTOLIC BLOOD PRESSURE: 126 MMHG | OXYGEN SATURATION: 98 % | DIASTOLIC BLOOD PRESSURE: 74 MMHG

## 2024-04-30 DIAGNOSIS — I73.9 PAD (PERIPHERAL ARTERY DISEASE): ICD-10-CM

## 2024-04-30 DIAGNOSIS — I71.43 INFRARENAL ABDOMINAL AORTIC ANEURYSM (AAA) WITHOUT RUPTURE: Primary | ICD-10-CM

## 2024-04-30 DIAGNOSIS — I10 PRIMARY HYPERTENSION: ICD-10-CM

## 2024-04-30 DIAGNOSIS — Z72.0 TOBACCO USE: ICD-10-CM

## 2024-04-30 DIAGNOSIS — E78.2 MIXED HYPERLIPIDEMIA: ICD-10-CM

## 2024-04-30 DIAGNOSIS — I71.43 INFRARENAL ABDOMINAL AORTIC ANEURYSM (AAA) WITHOUT RUPTURE: ICD-10-CM

## 2024-04-30 DIAGNOSIS — I65.23 BILATERAL CAROTID ARTERY STENOSIS: ICD-10-CM

## 2024-04-30 LAB — CREAT BLDA-MCNC: 0.8 MG/DL (ref 0.6–1.3)

## 2024-04-30 PROCEDURE — 1160F RVW MEDS BY RX/DR IN RCRD: CPT | Performed by: SURGERY

## 2024-04-30 PROCEDURE — 25510000001 IOPAMIDOL PER 1 ML: Performed by: SURGERY

## 2024-04-30 PROCEDURE — 3078F DIAST BP <80 MM HG: CPT | Performed by: SURGERY

## 2024-04-30 PROCEDURE — 74174 CTA ABD&PLVS W/CONTRAST: CPT

## 2024-04-30 PROCEDURE — 99214 OFFICE O/P EST MOD 30 MIN: CPT | Performed by: SURGERY

## 2024-04-30 PROCEDURE — 1159F MED LIST DOCD IN RCRD: CPT | Performed by: SURGERY

## 2024-04-30 PROCEDURE — 82565 ASSAY OF CREATININE: CPT

## 2024-04-30 PROCEDURE — 3074F SYST BP LT 130 MM HG: CPT | Performed by: SURGERY

## 2024-04-30 RX ADMIN — IOPAMIDOL 100 ML: 755 INJECTION, SOLUTION INTRAVENOUS at 12:50

## 2024-04-30 NOTE — LETTER
April 30, 2024     Grzegorz Michel Jr., MD  [address] [facility] [place] [address]    Patient: Heather Russo   YOB: 1955   Date of Visit: 4/30/2024     Dear Grzegorz Michel Jr., MD:       Thank you for referring Heather Russo to me for evaluation. Below are the relevant portions of my assessment and plan of care.    If you have questions, please do not hesitate to call me. I look forward to following Heather along with you.         Sincerely,        Tim Carrasquillo DO        CC: No Recipients    Tim Carrasquillo DO  04/30/24 1745  Sign when Signing Visit  4/30/2024       Becca Blackwood, APRN  [address] [facility] [address]  [place] [address]    Heather Russo  1955    Chief Complaint   Patient presents with   • Follow-up     2 week follow up w/ testing. Most recent study from [facility] done 7/2022. Patient complains of stomach pain and nausea since last visit.        Dear Becca Blackwood, AP*    HPI  I had the pleasure of seeing your patient Heather Russo in the office today.   As you recall, Heather Russo is a 68 y.o.  female who you are currently following for nausea and vomiting.  She follows at [facility] for Non-Hodgkins lymphoma, which she reports has been clear for the past 3 years. She is a current daily smoker.  She does have some complaints of claudication to her lower extremities. She does not take antiplatelet medication or statin.  She did have noninvasive testing performed today, which I did personally review.        Review of Systems   HENT: Negative.     Eyes: Negative.    Respiratory: Negative.     Cardiovascular: Negative.         Claudication to BLE   Gastrointestinal: Negative.    Endocrine: Negative.    Genitourinary: Negative.    Musculoskeletal: Negative.    Skin: Negative.    Allergic/Immunologic: Negative.    Neurological: Negative.    Hematological: Negative.    Psychiatric/Behavioral: Negative.     All other systems reviewed and are negative.    /74   " Pulse 71   Ht 160 cm (63\")   Wt 63.5 kg (140 lb)   SpO2 98%   BMI 24.80 kg/m²     Physical Exam  Vitals and nursing note reviewed.   Constitutional:       Appearance: Normal appearance. She is well-developed and normal weight.   HENT:      Head: Normocephalic and atraumatic.   Eyes:      General: No scleral icterus.     Pupils: Pupils are equal, round, and reactive to light.   Neck:      Thyroid: No thyromegaly.      Vascular: No carotid bruit or JVD.   Cardiovascular:      Rate and Rhythm: Normal rate and regular rhythm.      Pulses:           Carotid pulses are 2+ on the right side.     Heart sounds: Normal heart sounds.   Pulmonary:      Effort: Pulmonary effort is normal.      Breath sounds: Normal breath sounds.   Abdominal:      General: Bowel sounds are normal. There is no distension or abdominal bruit.      Palpations: Abdomen is soft. There is no mass.      Tenderness: There is no abdominal tenderness.   Musculoskeletal:         General: Normal range of motion.      Cervical back: Neck supple.   Lymphadenopathy:      Cervical: No cervical adenopathy.   Skin:     General: Skin is warm and dry.   Neurological:      Mental Status: She is alert and oriented to person, place, and time.      Cranial Nerves: No cranial nerve deficit.      Sensory: No sensory deficit.   Psychiatric:         Mood and Affect: Mood normal.         Behavior: Behavior normal.         Thought Content: Thought content normal.         Judgment: Judgment normal.       Diagnostic Data:  CT Angiogram Abdomen Pelvis    Result Date: 4/30/2024  Narrative: EXAMINATION: CT ANGIOGRAM ABDOMEN PELVIS-   4/30/2024 11:48 AM  HISTORY: Aortic aneurysm (AAA), surveillance; I71.43-Infrarenal abdominal aortic aneurysm, without rupture  In order to have a CT radiation dose as low as reasonably achievable Automated Exposure Control was utilized for adjustment of the mA and/or KV according to patient size.  CT Dose DLP = 320.21 mGy.cm. (If there are " multiple studies performed at the same time this represents the total dose).  CT angiogram abdomen/pelvis with IV contrast. CT angiography protocol. CT imaging with bolus IV contrast injection. Under concurrent supervision axial, sagittal, coronal, and MIP data sets were constructed on an independent work station.  Comparison: 6/25/2023.  Tortuous abdominal aorta. Infrarenal aneurysm measuring 30 x 33 mm compared with 29 x 29 mm (based on direct comparison of images and differences in measuring technique). The aneurysm measured approximately 27 mm in 2021 and approximately 21 mm in 2019.  Normal heart size. Large hiatal hernia. Chronic change at the lung bases. Cholecystectomy clips. Normal appearance of the liver, pancreas, spleen, adrenal glands, and kidneys. Bilateral nonobstructing renal stones. Unchanged 17-18 mm cyst within the lower RIGHT kidney. Lumbar scoliosis. No bowel dilation and no free fluid. Scattered sigmoid diverticula. No diverticulitis.      Impression: 1. Tortuous thoracic aorta with infrarenal aneurysm showing slight increased size compared with last year. 2. AAA = 30 x 33 mm compared with 29 x 29 mm.  This report was signed and finalized on 4/30/2024 1:10 PM by Dr. Chase Rios MD.      Patient Active Problem List   Diagnosis   • Laryngopharyngeal reflux   • Hoarseness   • Tobacco use   • Abnormal liver function tests   • Generalized abdominal pain   • Nausea   • Obesity, unspecified obesity severity, unspecified obesity type   • Tobacco abuse counseling   • Renal mass   • Diarrhea in adult patient   • Colitis   • Gastroesophageal reflux disease   • Abdominal pain, epigastric   • NSAID long-term use   • Primary hypertension   • Hematemesis   • Elevated liver enzymes   • Hyperlipidemia   • Pain and numbness of left upper extremity   • Cubital tunnel syndrome on left   • Heme positive stool   • Left lower lobe pneumonia, due to unspecified organism   • COPD with acute exacerbation   • Acute  respiratory failure with hypoxia   • COPD exacerbation   • Hypokalemia   • Pneumonia, unspecified organism   • Nausea and vomiting        Diagnosis Plan   1. Infrarenal abdominal aortic aneurysm (AAA) without rupture        2. Bilateral carotid artery stenosis  US Carotid Bilateral      3. PAD (peripheral artery disease)  US Ankle / Brachial Indices Extremity Complete      4. Primary hypertension        5. Mixed hyperlipidemia        6. Tobacco use              Plan: After thoroughly evaluating Heather Russo, I believe the best course of action is to remain conservative from vascular surgery standpoint.  I did review her testing which is stable and aorta measuring about 3.3 cm.  We can repeat testing including an ultrasound of the aorta in 1 year.  I will see her back in 6 months with a carotid duplex and ABIs for screening.   I did discuss vascular risk factors as they pertain to the progression of vascular disease including controlling her hypertension and hyperlipidemia.  Her blood pressure is slightly elevated at 142/88.  Unfortunately, she is a current 2 pack per day smoker and does not have a desire to quit smoking at this time.  Smoking is the number 1 cause of aneurysm formation and rapid growth.  I recommend her children be screened at 55-60.  The patient is to continue taking their medications as previously discussed.   This was all discussed in full with complete understanding.  Thank you for allowing me to participate in the care of your patient.  Please do not hesitate to call with any questions or concerns.  We will keep you aware of any further encounters with Heather Russo.        Sincerely yours,         DO Anand Ellison, Grzegorz Mcdermott Jr., MD

## 2024-04-30 NOTE — PROGRESS NOTES
"4/30/2024       Becca Blackwood, APRN  [address] [facility] [address]  [place] [address]    Heather Russo  1955    Chief Complaint   Patient presents with    Follow-up     2 week follow up w/ testing. Most recent study from [facility] done 7/2022. Patient complains of stomach pain and nausea since last visit.        Dear Becca Blackwood, AP*    HPI  I had the pleasure of seeing your patient Heather Russo in the office today.   As you recall, Heather Russo is a 68 y.o.  female who you are currently following for nausea and vomiting.  She follows at [facility] for Non-Hodgkins lymphoma, which she reports has been clear for the past 3 years. She is a current daily smoker.  She does have some complaints of claudication to her lower extremities. She does not take antiplatelet medication or statin.  She did have noninvasive testing performed today, which I did personally review.        Review of Systems   HENT: Negative.     Eyes: Negative.    Respiratory: Negative.     Cardiovascular: Negative.         Claudication to BLE   Gastrointestinal: Negative.    Endocrine: Negative.    Genitourinary: Negative.    Musculoskeletal: Negative.    Skin: Negative.    Allergic/Immunologic: Negative.    Neurological: Negative.    Hematological: Negative.    Psychiatric/Behavioral: Negative.     All other systems reviewed and are negative.    /74   Pulse 71   Ht 160 cm (63\")   Wt 63.5 kg (140 lb)   SpO2 98%   BMI 24.80 kg/m²     Physical Exam  Vitals and nursing note reviewed.   Constitutional:       Appearance: Normal appearance. She is well-developed and normal weight.   HENT:      Head: Normocephalic and atraumatic.   Eyes:      General: No scleral icterus.     Pupils: Pupils are equal, round, and reactive to light.   Neck:      Thyroid: No thyromegaly.      Vascular: No carotid bruit or JVD.   Cardiovascular:      Rate and Rhythm: Normal rate and regular rhythm.      Pulses:           Carotid pulses are 2+ " on the right side.     Heart sounds: Normal heart sounds.   Pulmonary:      Effort: Pulmonary effort is normal.      Breath sounds: Normal breath sounds.   Abdominal:      General: Bowel sounds are normal. There is no distension or abdominal bruit.      Palpations: Abdomen is soft. There is no mass.      Tenderness: There is no abdominal tenderness.   Musculoskeletal:         General: Normal range of motion.      Cervical back: Neck supple.   Lymphadenopathy:      Cervical: No cervical adenopathy.   Skin:     General: Skin is warm and dry.   Neurological:      Mental Status: She is alert and oriented to person, place, and time.      Cranial Nerves: No cranial nerve deficit.      Sensory: No sensory deficit.   Psychiatric:         Mood and Affect: Mood normal.         Behavior: Behavior normal.         Thought Content: Thought content normal.         Judgment: Judgment normal.       Diagnostic Data:  CT Angiogram Abdomen Pelvis    Result Date: 4/30/2024  Narrative: EXAMINATION: CT ANGIOGRAM ABDOMEN PELVIS-   4/30/2024 11:48 AM  HISTORY: Aortic aneurysm (AAA), surveillance; I71.43-Infrarenal abdominal aortic aneurysm, without rupture  In order to have a CT radiation dose as low as reasonably achievable Automated Exposure Control was utilized for adjustment of the mA and/or KV according to patient size.  CT Dose DLP = 320.21 mGy.cm. (If there are multiple studies performed at the same time this represents the total dose).  CT angiogram abdomen/pelvis with IV contrast. CT angiography protocol. CT imaging with bolus IV contrast injection. Under concurrent supervision axial, sagittal, coronal, and MIP data sets were constructed on an independent work station.  Comparison: 6/25/2023.  Tortuous abdominal aorta. Infrarenal aneurysm measuring 30 x 33 mm compared with 29 x 29 mm (based on direct comparison of images and differences in measuring technique). The aneurysm measured approximately 27 mm in 2021 and approximately 21  mm in 2019.  Normal heart size. Large hiatal hernia. Chronic change at the lung bases. Cholecystectomy clips. Normal appearance of the liver, pancreas, spleen, adrenal glands, and kidneys. Bilateral nonobstructing renal stones. Unchanged 17-18 mm cyst within the lower RIGHT kidney. Lumbar scoliosis. No bowel dilation and no free fluid. Scattered sigmoid diverticula. No diverticulitis.      Impression: 1. Tortuous thoracic aorta with infrarenal aneurysm showing slight increased size compared with last year. 2. AAA = 30 x 33 mm compared with 29 x 29 mm.  This report was signed and finalized on 4/30/2024 1:10 PM by Dr. Chase Rios MD.      Patient Active Problem List   Diagnosis    Laryngopharyngeal reflux    Hoarseness    Tobacco use    Abnormal liver function tests    Generalized abdominal pain    Nausea    Obesity, unspecified obesity severity, unspecified obesity type    Tobacco abuse counseling    Renal mass    Diarrhea in adult patient    Colitis    Gastroesophageal reflux disease    Abdominal pain, epigastric    NSAID long-term use    Primary hypertension    Hematemesis    Elevated liver enzymes    Hyperlipidemia    Pain and numbness of left upper extremity    Cubital tunnel syndrome on left    Heme positive stool    Left lower lobe pneumonia, due to unspecified organism    COPD with acute exacerbation    Acute respiratory failure with hypoxia    COPD exacerbation    Hypokalemia    Pneumonia, unspecified organism    Nausea and vomiting        Diagnosis Plan   1. Infrarenal abdominal aortic aneurysm (AAA) without rupture        2. Bilateral carotid artery stenosis  US Carotid Bilateral      3. PAD (peripheral artery disease)  US Ankle / Brachial Indices Extremity Complete      4. Primary hypertension        5. Mixed hyperlipidemia        6. Tobacco use              Plan: After thoroughly evaluating Heather Russo, I believe the best course of action is to remain conservative from vascular surgery standpoint.   I did review her testing which is stable and aorta measuring about 3.3 cm.  We can repeat testing including an ultrasound of the aorta in 1 year.  I will see her back in 6 months with a carotid duplex and ABIs for screening.   I did discuss vascular risk factors as they pertain to the progression of vascular disease including controlling her hypertension and hyperlipidemia.  Her blood pressure is slightly elevated at 142/88.  Unfortunately, she is a current 2 pack per day smoker and does not have a desire to quit smoking at this time.  Smoking is the number 1 cause of aneurysm formation and rapid growth.  I recommend her children be screened at 55-60.  The patient is to continue taking their medications as previously discussed.   This was all discussed in full with complete understanding.  Thank you for allowing me to participate in the care of your patient.  Please do not hesitate to call with any questions or concerns.  We will keep you aware of any further encounters with Heather Russo.        Sincerely yours,         DO Anand Ellison, Grzegorz Mcdermott Jr., MD

## 2024-06-12 ENCOUNTER — TRANSCRIBE ORDERS (OUTPATIENT)
Dept: ADMINISTRATIVE | Facility: HOSPITAL | Age: 69
End: 2024-06-12
Payer: MEDICARE

## 2024-06-12 DIAGNOSIS — Z12.31 ENCOUNTER FOR SCREENING MAMMOGRAM FOR MALIGNANT NEOPLASM OF BREAST: Primary | ICD-10-CM

## 2024-10-04 RX ORDER — ATORVASTATIN CALCIUM 10 MG/1
10 TABLET, FILM COATED ORAL DAILY
Qty: 90 TABLET | Refills: 1 | OUTPATIENT
Start: 2024-10-04

## 2024-10-24 ENCOUNTER — TRANSCRIBE ORDERS (OUTPATIENT)
Dept: ADMINISTRATIVE | Facility: HOSPITAL | Age: 69
End: 2024-10-24
Payer: MEDICARE

## 2024-10-24 DIAGNOSIS — R91.1 SOLITARY PULMONARY NODULE: Primary | ICD-10-CM

## 2024-10-28 ENCOUNTER — TELEPHONE (OUTPATIENT)
Dept: VASCULAR SURGERY | Facility: CLINIC | Age: 69
End: 2024-10-28
Payer: MEDICARE

## 2024-12-23 ENCOUNTER — TRANSCRIBE ORDERS (OUTPATIENT)
Dept: ADMINISTRATIVE | Facility: HOSPITAL | Age: 69
End: 2024-12-23
Payer: MEDICARE

## 2024-12-23 DIAGNOSIS — G44.89 OTHER HEADACHE SYNDROME: Primary | ICD-10-CM

## 2025-01-20 ENCOUNTER — HOSPITAL ENCOUNTER (OUTPATIENT)
Dept: MRI IMAGING | Facility: HOSPITAL | Age: 70
Discharge: HOME | End: 2025-01-20
Admitting: FAMILY MEDICINE
Payer: MEDICARE

## 2025-01-20 DIAGNOSIS — G44.89 OTHER HEADACHE SYNDROME: ICD-10-CM

## 2025-01-20 PROCEDURE — A9579 GAD-BASE MR CONTRAST NOS,1ML: HCPCS | Performed by: FAMILY MEDICINE

## 2025-01-20 PROCEDURE — 70553 MRI BRAIN STEM W/O & W/DYE: CPT

## 2025-01-20 PROCEDURE — 25510000001 GADOPICLENOL 0.5 MMOL/ML SOLUTION: Performed by: FAMILY MEDICINE

## 2025-01-20 RX ADMIN — GADOPICLENOL 6 ML: 485.1 INJECTION INTRAVENOUS at 11:10

## 2025-02-01 ENCOUNTER — APPOINTMENT (OUTPATIENT)
Dept: GENERAL RADIOLOGY | Facility: HOSPITAL | Age: 70
DRG: 321 | End: 2025-02-01
Payer: MEDICARE

## 2025-02-01 ENCOUNTER — APPOINTMENT (OUTPATIENT)
Dept: CT IMAGING | Facility: HOSPITAL | Age: 70
DRG: 321 | End: 2025-02-01
Payer: MEDICARE

## 2025-02-01 ENCOUNTER — HOSPITAL ENCOUNTER (INPATIENT)
Facility: HOSPITAL | Age: 70
LOS: 1 days | Discharge: HOME | DRG: 321 | End: 2025-02-03
Attending: EMERGENCY MEDICINE | Admitting: STUDENT IN AN ORGANIZED HEALTH CARE EDUCATION/TRAINING PROGRAM
Payer: MEDICARE

## 2025-02-01 DIAGNOSIS — I21.4 NSTEMI (NON-ST ELEVATED MYOCARDIAL INFARCTION): Primary | ICD-10-CM

## 2025-02-01 DIAGNOSIS — I21.4 NSTEMI, INITIAL EPISODE OF CARE: ICD-10-CM

## 2025-02-01 PROBLEM — J44.9 COPD (CHRONIC OBSTRUCTIVE PULMONARY DISEASE): Status: ACTIVE | Noted: 2023-09-25

## 2025-02-01 LAB
ALBUMIN SERPL-MCNC: 4.4 G/DL (ref 3.5–5.2)
ALBUMIN/GLOB SERPL: 1.6 G/DL
ALP SERPL-CCNC: 63 U/L (ref 39–117)
ALT SERPL W P-5'-P-CCNC: 5 U/L (ref 1–33)
ANION GAP SERPL CALCULATED.3IONS-SCNC: 14 MMOL/L (ref 5–15)
AST SERPL-CCNC: 10 U/L (ref 1–32)
BACTERIA UR QL AUTO: ABNORMAL /HPF
BASOPHILS # BLD AUTO: 0.02 10*3/MM3 (ref 0–0.2)
BASOPHILS NFR BLD AUTO: 0.3 % (ref 0–1.5)
BILIRUB SERPL-MCNC: <0.2 MG/DL (ref 0–1.2)
BILIRUB UR QL STRIP: NEGATIVE
BUN SERPL-MCNC: 24 MG/DL (ref 8–23)
BUN/CREAT SERPL: 27.6 (ref 7–25)
CALCIUM SPEC-SCNC: 9.6 MG/DL (ref 8.6–10.5)
CHLORIDE SERPL-SCNC: 108 MMOL/L (ref 98–107)
CLARITY UR: CLEAR
CO2 SERPL-SCNC: 15 MMOL/L (ref 22–29)
COLOR UR: YELLOW
CREAT SERPL-MCNC: 0.87 MG/DL (ref 0.57–1)
D DIMER PPP FEU-MCNC: 0.98 MCGFEU/ML (ref 0–0.68)
DEPRECATED RDW RBC AUTO: 58 FL (ref 37–54)
EGFRCR SERPLBLD CKD-EPI 2021: 72.7 ML/MIN/1.73
EOSINOPHIL # BLD AUTO: 0 10*3/MM3 (ref 0–0.4)
EOSINOPHIL NFR BLD AUTO: 0 % (ref 0.3–6.2)
ERYTHROCYTE [DISTWIDTH] IN BLOOD BY AUTOMATED COUNT: 15.9 % (ref 12.3–15.4)
FLUAV RNA RESP QL NAA+PROBE: NOT DETECTED
FLUBV RNA RESP QL NAA+PROBE: NOT DETECTED
GEN 5 1HR TROPONIN T REFLEX: 237 NG/L
GLOBULIN UR ELPH-MCNC: 2.8 GM/DL
GLUCOSE SERPL-MCNC: 129 MG/DL (ref 65–99)
GLUCOSE UR STRIP-MCNC: NEGATIVE MG/DL
HCT VFR BLD AUTO: 36.4 % (ref 34–46.6)
HGB BLD-MCNC: 11.6 G/DL (ref 12–15.9)
HGB UR QL STRIP.AUTO: NEGATIVE
HOLD SPECIMEN: NORMAL
HYALINE CASTS UR QL AUTO: ABNORMAL /LPF
IMM GRANULOCYTES # BLD AUTO: 0.04 10*3/MM3 (ref 0–0.05)
IMM GRANULOCYTES NFR BLD AUTO: 0.6 % (ref 0–0.5)
KETONES UR QL STRIP: NEGATIVE
LEUKOCYTE ESTERASE UR QL STRIP.AUTO: ABNORMAL
LIPASE SERPL-CCNC: 25 U/L (ref 13–60)
LYMPHOCYTES # BLD AUTO: 0.29 10*3/MM3 (ref 0.7–3.1)
LYMPHOCYTES NFR BLD AUTO: 4.4 % (ref 19.6–45.3)
MAGNESIUM SERPL-MCNC: 1.8 MG/DL (ref 1.6–2.4)
MCH RBC QN AUTO: 31.7 PG (ref 26.6–33)
MCHC RBC AUTO-ENTMCNC: 31.9 G/DL (ref 31.5–35.7)
MCV RBC AUTO: 99.5 FL (ref 79–97)
MONOCYTES # BLD AUTO: 0.11 10*3/MM3 (ref 0.1–0.9)
MONOCYTES NFR BLD AUTO: 1.7 % (ref 5–12)
NEUTROPHILS NFR BLD AUTO: 6.06 10*3/MM3 (ref 1.7–7)
NEUTROPHILS NFR BLD AUTO: 93 % (ref 42.7–76)
NITRITE UR QL STRIP: NEGATIVE
NRBC BLD AUTO-RTO: 0 /100 WBC (ref 0–0.2)
PH UR STRIP.AUTO: 5.5 [PH] (ref 5–8)
PLATELET # BLD AUTO: 223 10*3/MM3 (ref 140–450)
PMV BLD AUTO: 8.8 FL (ref 6–12)
POTASSIUM SERPL-SCNC: 4.8 MMOL/L (ref 3.5–5.2)
PROT SERPL-MCNC: 7.2 G/DL (ref 6–8.5)
PROT UR QL STRIP: NEGATIVE
RBC # BLD AUTO: 3.66 10*6/MM3 (ref 3.77–5.28)
RBC # UR STRIP: ABNORMAL /HPF
REF LAB TEST METHOD: ABNORMAL
SARS-COV-2 RNA RESP QL NAA+PROBE: NOT DETECTED
SODIUM SERPL-SCNC: 137 MMOL/L (ref 136–145)
SP GR UR STRIP: 1.02 (ref 1–1.03)
SQUAMOUS #/AREA URNS HPF: ABNORMAL /HPF
TROPONIN T % DELTA: 13
TROPONIN T NUMERIC DELTA: 28 NG/L
TROPONIN T SERPL HS-MCNC: 209 NG/L
UROBILINOGEN UR QL STRIP: ABNORMAL
WBC # UR STRIP: ABNORMAL /HPF
WBC NRBC COR # BLD AUTO: 6.52 10*3/MM3 (ref 3.4–10.8)
WHOLE BLOOD HOLD COAG: NORMAL
WHOLE BLOOD HOLD SPECIMEN: NORMAL

## 2025-02-01 PROCEDURE — 84484 ASSAY OF TROPONIN QUANT: CPT | Performed by: EMERGENCY MEDICINE

## 2025-02-01 PROCEDURE — 93005 ELECTROCARDIOGRAM TRACING: CPT | Performed by: EMERGENCY MEDICINE

## 2025-02-01 PROCEDURE — 87636 SARSCOV2 & INF A&B AMP PRB: CPT | Performed by: EMERGENCY MEDICINE

## 2025-02-01 PROCEDURE — 81001 URINALYSIS AUTO W/SCOPE: CPT | Performed by: EMERGENCY MEDICINE

## 2025-02-01 PROCEDURE — G0378 HOSPITAL OBSERVATION PER HR: HCPCS

## 2025-02-01 PROCEDURE — 80053 COMPREHEN METABOLIC PANEL: CPT | Performed by: EMERGENCY MEDICINE

## 2025-02-01 PROCEDURE — 25810000003 SODIUM CHLORIDE 0.9 % SOLUTION: Performed by: EMERGENCY MEDICINE

## 2025-02-01 PROCEDURE — 25010000002 ENOXAPARIN PER 10 MG: Performed by: EMERGENCY MEDICINE

## 2025-02-01 PROCEDURE — 85025 COMPLETE CBC W/AUTO DIFF WBC: CPT | Performed by: EMERGENCY MEDICINE

## 2025-02-01 PROCEDURE — 83690 ASSAY OF LIPASE: CPT | Performed by: EMERGENCY MEDICINE

## 2025-02-01 PROCEDURE — 99285 EMERGENCY DEPT VISIT HI MDM: CPT

## 2025-02-01 PROCEDURE — 93005 ELECTROCARDIOGRAM TRACING: CPT

## 2025-02-01 PROCEDURE — 71045 X-RAY EXAM CHEST 1 VIEW: CPT

## 2025-02-01 PROCEDURE — 83735 ASSAY OF MAGNESIUM: CPT | Performed by: EMERGENCY MEDICINE

## 2025-02-01 PROCEDURE — 85379 FIBRIN DEGRADATION QUANT: CPT | Performed by: EMERGENCY MEDICINE

## 2025-02-01 PROCEDURE — 36415 COLL VENOUS BLD VENIPUNCTURE: CPT

## 2025-02-01 PROCEDURE — 71275 CT ANGIOGRAPHY CHEST: CPT

## 2025-02-01 PROCEDURE — 25510000001 IOPAMIDOL PER 1 ML: Performed by: EMERGENCY MEDICINE

## 2025-02-01 RX ORDER — ENOXAPARIN SODIUM 100 MG/ML
1 INJECTION SUBCUTANEOUS ONCE
Status: COMPLETED | OUTPATIENT
Start: 2025-02-01 | End: 2025-02-01

## 2025-02-01 RX ORDER — PROMETHAZINE HYDROCHLORIDE 25 MG/1
25 TABLET ORAL EVERY 6 HOURS PRN
Status: DISCONTINUED | OUTPATIENT
Start: 2025-02-01 | End: 2025-02-03 | Stop reason: HOSPADM

## 2025-02-01 RX ORDER — POLYETHYLENE GLYCOL 3350 17 G/17G
17 POWDER, FOR SOLUTION ORAL DAILY PRN
Status: DISCONTINUED | OUTPATIENT
Start: 2025-02-01 | End: 2025-02-03 | Stop reason: HOSPADM

## 2025-02-01 RX ORDER — SODIUM CHLORIDE 0.9 % (FLUSH) 0.9 %
10 SYRINGE (ML) INJECTION AS NEEDED
Status: DISCONTINUED | OUTPATIENT
Start: 2025-02-01 | End: 2025-02-03 | Stop reason: HOSPADM

## 2025-02-01 RX ORDER — ACETAMINOPHEN 650 MG/1
650 SUPPOSITORY RECTAL EVERY 4 HOURS PRN
Status: DISCONTINUED | OUTPATIENT
Start: 2025-02-01 | End: 2025-02-03 | Stop reason: HOSPADM

## 2025-02-01 RX ORDER — DULOXETIN HYDROCHLORIDE 30 MG/1
60 CAPSULE, DELAYED RELEASE ORAL DAILY
Status: DISCONTINUED | OUTPATIENT
Start: 2025-02-02 | End: 2025-02-03 | Stop reason: HOSPADM

## 2025-02-01 RX ORDER — DIPHENOXYLATE HYDROCHLORIDE AND ATROPINE SULFATE 2.5; .025 MG/1; MG/1
1 TABLET ORAL 4 TIMES DAILY PRN
Status: DISCONTINUED | OUTPATIENT
Start: 2025-02-01 | End: 2025-02-03 | Stop reason: HOSPADM

## 2025-02-01 RX ORDER — SODIUM CHLORIDE 9 MG/ML
40 INJECTION, SOLUTION INTRAVENOUS AS NEEDED
Status: DISCONTINUED | OUTPATIENT
Start: 2025-02-01 | End: 2025-02-03 | Stop reason: HOSPADM

## 2025-02-01 RX ORDER — METOPROLOL SUCCINATE 100 MG/1
100 TABLET, EXTENDED RELEASE ORAL DAILY
Status: ON HOLD | COMMUNITY
End: 2025-02-02

## 2025-02-01 RX ORDER — ENOXAPARIN SODIUM 100 MG/ML
1 INJECTION SUBCUTANEOUS EVERY 12 HOURS
Status: DISCONTINUED | OUTPATIENT
Start: 2025-02-02 | End: 2025-02-03

## 2025-02-01 RX ORDER — SODIUM CHLORIDE 0.9 % (FLUSH) 0.9 %
10 SYRINGE (ML) INJECTION EVERY 12 HOURS SCHEDULED
Status: DISCONTINUED | OUTPATIENT
Start: 2025-02-01 | End: 2025-02-03 | Stop reason: HOSPADM

## 2025-02-01 RX ORDER — INDOMETHACIN 50 MG/1
50 CAPSULE ORAL 2 TIMES DAILY WITH MEALS
Status: ON HOLD | COMMUNITY
End: 2025-02-02

## 2025-02-01 RX ORDER — ASPIRIN 81 MG/1
324 TABLET, CHEWABLE ORAL ONCE
Status: COMPLETED | OUTPATIENT
Start: 2025-02-01 | End: 2025-02-01

## 2025-02-01 RX ORDER — NITROGLYCERIN 0.4 MG/1
0.4 TABLET SUBLINGUAL
Status: DISCONTINUED | OUTPATIENT
Start: 2025-02-01 | End: 2025-02-03 | Stop reason: HOSPADM

## 2025-02-01 RX ORDER — ERGOCALCIFEROL 1.25 MG/1
50000 CAPSULE, LIQUID FILLED ORAL WEEKLY
Status: ON HOLD | COMMUNITY
End: 2025-02-02

## 2025-02-01 RX ORDER — IOPAMIDOL 755 MG/ML
100 INJECTION, SOLUTION INTRAVASCULAR
Status: COMPLETED | OUTPATIENT
Start: 2025-02-01 | End: 2025-02-01

## 2025-02-01 RX ORDER — NALOXONE HCL 0.4 MG/ML
0.4 VIAL (ML) INJECTION
Status: DISCONTINUED | OUTPATIENT
Start: 2025-02-01 | End: 2025-02-03 | Stop reason: HOSPADM

## 2025-02-01 RX ORDER — METHOCARBAMOL 500 MG/1
500 TABLET, FILM COATED ORAL 4 TIMES DAILY PRN
Status: DISCONTINUED | OUTPATIENT
Start: 2025-02-01 | End: 2025-02-03 | Stop reason: HOSPADM

## 2025-02-01 RX ORDER — PANTOPRAZOLE SODIUM 40 MG/1
40 TABLET, DELAYED RELEASE ORAL 2 TIMES DAILY
Status: DISCONTINUED | OUTPATIENT
Start: 2025-02-01 | End: 2025-02-03 | Stop reason: HOSPADM

## 2025-02-01 RX ORDER — OLMESARTAN MEDOXOMIL 40 MG/1
40 TABLET ORAL DAILY
Status: ON HOLD | COMMUNITY
End: 2025-02-02

## 2025-02-01 RX ORDER — FLUCONAZOLE 100 MG/1
100 TABLET ORAL DAILY
Status: ON HOLD | COMMUNITY
End: 2025-02-02

## 2025-02-01 RX ORDER — ONDANSETRON 2 MG/ML
4 INJECTION INTRAMUSCULAR; INTRAVENOUS EVERY 6 HOURS PRN
Status: DISCONTINUED | OUTPATIENT
Start: 2025-02-01 | End: 2025-02-03 | Stop reason: HOSPADM

## 2025-02-01 RX ORDER — BISACODYL 5 MG/1
5 TABLET, DELAYED RELEASE ORAL DAILY PRN
Status: DISCONTINUED | OUTPATIENT
Start: 2025-02-01 | End: 2025-02-03 | Stop reason: HOSPADM

## 2025-02-01 RX ORDER — BUDESONIDE AND FORMOTEROL FUMARATE DIHYDRATE 160; 4.5 UG/1; UG/1
2 AEROSOL RESPIRATORY (INHALATION)
Status: DISCONTINUED | OUTPATIENT
Start: 2025-02-01 | End: 2025-02-03 | Stop reason: HOSPADM

## 2025-02-01 RX ORDER — LOSARTAN POTASSIUM 100 MG/1
100 TABLET ORAL DAILY
Status: ON HOLD | COMMUNITY
End: 2025-02-02

## 2025-02-01 RX ORDER — ACETAMINOPHEN 325 MG/1
650 TABLET ORAL EVERY 4 HOURS PRN
Status: DISCONTINUED | OUTPATIENT
Start: 2025-02-01 | End: 2025-02-03 | Stop reason: HOSPADM

## 2025-02-01 RX ORDER — AMOXICILLIN 250 MG
2 CAPSULE ORAL 2 TIMES DAILY
Status: DISCONTINUED | OUTPATIENT
Start: 2025-02-01 | End: 2025-02-03 | Stop reason: HOSPADM

## 2025-02-01 RX ORDER — METHOCARBAMOL 500 MG/1
500 TABLET, FILM COATED ORAL 3 TIMES DAILY PRN
COMMUNITY

## 2025-02-01 RX ORDER — LORAZEPAM 1 MG/1
1 TABLET ORAL 2 TIMES DAILY PRN
Status: DISCONTINUED | OUTPATIENT
Start: 2025-02-01 | End: 2025-02-03 | Stop reason: HOSPADM

## 2025-02-01 RX ORDER — CETIRIZINE HYDROCHLORIDE 10 MG/1
10 TABLET ORAL DAILY
Status: DISCONTINUED | OUTPATIENT
Start: 2025-02-02 | End: 2025-02-03 | Stop reason: HOSPADM

## 2025-02-01 RX ORDER — ASPIRIN 81 MG/1
81 TABLET ORAL DAILY
Status: DISCONTINUED | OUTPATIENT
Start: 2025-02-02 | End: 2025-02-03 | Stop reason: HOSPADM

## 2025-02-01 RX ORDER — CARVEDILOL 6.25 MG/1
12.5 TABLET ORAL 2 TIMES DAILY WITH MEALS
Status: DISCONTINUED | OUTPATIENT
Start: 2025-02-01 | End: 2025-02-03 | Stop reason: HOSPADM

## 2025-02-01 RX ORDER — ACETAMINOPHEN 160 MG/5ML
650 SOLUTION ORAL EVERY 4 HOURS PRN
Status: DISCONTINUED | OUTPATIENT
Start: 2025-02-01 | End: 2025-02-03 | Stop reason: HOSPADM

## 2025-02-01 RX ORDER — BISACODYL 10 MG
10 SUPPOSITORY, RECTAL RECTAL DAILY PRN
Status: DISCONTINUED | OUTPATIENT
Start: 2025-02-01 | End: 2025-02-03 | Stop reason: HOSPADM

## 2025-02-01 RX ORDER — ATORVASTATIN CALCIUM 40 MG/1
40 TABLET, FILM COATED ORAL DAILY
Status: DISCONTINUED | OUTPATIENT
Start: 2025-02-02 | End: 2025-02-03 | Stop reason: HOSPADM

## 2025-02-01 RX ORDER — MORPHINE SULFATE 2 MG/ML
1 INJECTION, SOLUTION INTRAMUSCULAR; INTRAVENOUS EVERY 4 HOURS PRN
Status: DISCONTINUED | OUTPATIENT
Start: 2025-02-01 | End: 2025-02-03 | Stop reason: HOSPADM

## 2025-02-01 RX ORDER — LOSARTAN POTASSIUM 50 MG/1
100 TABLET ORAL DAILY
Status: DISCONTINUED | OUTPATIENT
Start: 2025-02-02 | End: 2025-02-02

## 2025-02-01 RX ORDER — AMLODIPINE BESYLATE 5 MG/1
5 TABLET ORAL DAILY
Status: DISCONTINUED | OUTPATIENT
Start: 2025-02-02 | End: 2025-02-03 | Stop reason: HOSPADM

## 2025-02-01 RX ADMIN — IOPAMIDOL 100 ML: 755 INJECTION, SOLUTION INTRAVENOUS at 20:32

## 2025-02-01 RX ADMIN — ASPIRIN 324 MG: 81 TABLET, CHEWABLE ORAL at 21:40

## 2025-02-01 RX ADMIN — SODIUM CHLORIDE 1000 ML: 9 INJECTION, SOLUTION INTRAVENOUS at 19:35

## 2025-02-01 RX ADMIN — ENOXAPARIN SODIUM 60 MG: 100 INJECTION SUBCUTANEOUS at 21:41

## 2025-02-01 NOTE — Clinical Note
First balloon inflation max pressure = 6 shayna. First balloon inflation duration = 15 seconds. Second inflation of balloon - Max pressure = 16 shayna. 2nd Inflation of balloon - Duration = 25 seconds. Third inflation of balloon - Max pressure = 18 shayna. 3rd Inflation of balloon - Duration = 20 seconds.

## 2025-02-01 NOTE — Clinical Note
First balloon inflation max pressure = 12 shayna. First balloon inflation duration = 20 seconds. Second inflation of balloon - Max pressure = 16 shayna. 2nd Inflation of balloon - Duration = 20 seconds. Third inflation of balloon - Max pressure = 16 shayna. 3rd Inflation of balloon - Duration = 20 seconds.

## 2025-02-01 NOTE — Clinical Note
First balloon inflation max pressure = 8 shayna. First balloon inflation duration = 20 seconds. Second inflation of balloon - Max pressure = 12 shayna. 2nd Inflation of balloon - Duration = 20 seconds. Third inflation of balloon - Max pressure = 14 shayna. 3rd Inflation of balloon - Duration = 20 seconds. Fourth inflation of balloon - Max pressure = 16 shayna. 4th Inflation of balloon - Duration = 20 seconds.

## 2025-02-02 PROBLEM — I21.4 NSTEMI (NON-ST ELEVATED MYOCARDIAL INFARCTION): Status: RESOLVED | Noted: 2025-02-01 | Resolved: 2025-02-02

## 2025-02-02 PROBLEM — I21.4 NSTEMI, INITIAL EPISODE OF CARE: Status: ACTIVE | Noted: 2025-02-01

## 2025-02-02 LAB
ANION GAP SERPL CALCULATED.3IONS-SCNC: 10 MMOL/L (ref 5–15)
BUN SERPL-MCNC: 20 MG/DL (ref 8–23)
BUN/CREAT SERPL: 26.7 (ref 7–25)
CALCIUM SPEC-SCNC: 9.1 MG/DL (ref 8.6–10.5)
CHLORIDE SERPL-SCNC: 112 MMOL/L (ref 98–107)
CHOLEST SERPL-MCNC: 154 MG/DL (ref 0–200)
CO2 SERPL-SCNC: 19 MMOL/L (ref 22–29)
CREAT SERPL-MCNC: 0.75 MG/DL (ref 0.57–1)
DEPRECATED RDW RBC AUTO: 58.8 FL (ref 37–54)
EGFRCR SERPLBLD CKD-EPI 2021: 86.8 ML/MIN/1.73
ERYTHROCYTE [DISTWIDTH] IN BLOOD BY AUTOMATED COUNT: 15.9 % (ref 12.3–15.4)
GLUCOSE SERPL-MCNC: 138 MG/DL (ref 65–99)
HBA1C MFR BLD: 5.4 % (ref 4.8–5.6)
HCT VFR BLD AUTO: 33.8 % (ref 34–46.6)
HDLC SERPL-MCNC: 39 MG/DL (ref 40–60)
HGB BLD-MCNC: 10.5 G/DL (ref 12–15.9)
LDLC SERPL CALC-MCNC: 77 MG/DL (ref 0–100)
LDLC/HDLC SERPL: 1.78 {RATIO}
MCH RBC QN AUTO: 31.2 PG (ref 26.6–33)
MCHC RBC AUTO-ENTMCNC: 31.1 G/DL (ref 31.5–35.7)
MCV RBC AUTO: 100.3 FL (ref 79–97)
PLATELET # BLD AUTO: 214 10*3/MM3 (ref 140–450)
PMV BLD AUTO: 8.3 FL (ref 6–12)
POTASSIUM SERPL-SCNC: 4.4 MMOL/L (ref 3.5–5.2)
RBC # BLD AUTO: 3.37 10*6/MM3 (ref 3.77–5.28)
SODIUM SERPL-SCNC: 141 MMOL/L (ref 136–145)
TRIGL SERPL-MCNC: 228 MG/DL (ref 0–150)
TROPONIN T SERPL HS-MCNC: 388 NG/L
VLDLC SERPL-MCNC: 38 MG/DL (ref 5–40)
WBC NRBC COR # BLD AUTO: 4.77 10*3/MM3 (ref 3.4–10.8)

## 2025-02-02 PROCEDURE — C1725 CATH, TRANSLUMIN NON-LASER: HCPCS | Performed by: EMERGENCY MEDICINE

## 2025-02-02 PROCEDURE — 25510000001 IOPAMIDOL PER 1 ML: Performed by: EMERGENCY MEDICINE

## 2025-02-02 PROCEDURE — C1874 STENT, COATED/COV W/DEL SYS: HCPCS | Performed by: EMERGENCY MEDICINE

## 2025-02-02 PROCEDURE — 94760 N-INVAS EAR/PLS OXIMETRY 1: CPT

## 2025-02-02 PROCEDURE — 25010000002 ENOXAPARIN PER 10 MG: Performed by: FAMILY MEDICINE

## 2025-02-02 PROCEDURE — 94799 UNLISTED PULMONARY SVC/PX: CPT

## 2025-02-02 PROCEDURE — 25010000002 LIDOCAINE 2% SOLUTION: Performed by: EMERGENCY MEDICINE

## 2025-02-02 PROCEDURE — 25010000002 HEPARIN (PORCINE) 1000-0.9 UT/500ML-% SOLUTION: Performed by: EMERGENCY MEDICINE

## 2025-02-02 PROCEDURE — 99152 MOD SED SAME PHYS/QHP 5/>YRS: CPT | Performed by: EMERGENCY MEDICINE

## 2025-02-02 PROCEDURE — 25010000002 FENTANYL CITRATE (PF) 50 MCG/ML SOLUTION: Performed by: EMERGENCY MEDICINE

## 2025-02-02 PROCEDURE — 85027 COMPLETE CBC AUTOMATED: CPT | Performed by: FAMILY MEDICINE

## 2025-02-02 PROCEDURE — 25010000002 BIVALIRUDIN TRIFLUOROACETATE 250 MG RECONSTITUTED SOLUTION 1 EACH VIAL: Performed by: EMERGENCY MEDICINE

## 2025-02-02 PROCEDURE — 25010000002 HEPARIN (PORCINE) 2000-0.9 UNIT/L-% SOLUTION: Performed by: EMERGENCY MEDICINE

## 2025-02-02 PROCEDURE — 80048 BASIC METABOLIC PNL TOTAL CA: CPT | Performed by: FAMILY MEDICINE

## 2025-02-02 PROCEDURE — B2111ZZ FLUOROSCOPY OF MULTIPLE CORONARY ARTERIES USING LOW OSMOLAR CONTRAST: ICD-10-PCS | Performed by: EMERGENCY MEDICINE

## 2025-02-02 PROCEDURE — 99153 MOD SED SAME PHYS/QHP EA: CPT | Performed by: EMERGENCY MEDICINE

## 2025-02-02 PROCEDURE — 80061 LIPID PANEL: CPT | Performed by: FAMILY MEDICINE

## 2025-02-02 PROCEDURE — 84484 ASSAY OF TROPONIN QUANT: CPT | Performed by: FAMILY MEDICINE

## 2025-02-02 PROCEDURE — C1894 INTRO/SHEATH, NON-LASER: HCPCS | Performed by: EMERGENCY MEDICINE

## 2025-02-02 PROCEDURE — 25010000002 DIPHENHYDRAMINE PER 50 MG: Performed by: EMERGENCY MEDICINE

## 2025-02-02 PROCEDURE — 4A023N7 MEASUREMENT OF CARDIAC SAMPLING AND PRESSURE, LEFT HEART, PERCUTANEOUS APPROACH: ICD-10-PCS | Performed by: EMERGENCY MEDICINE

## 2025-02-02 PROCEDURE — C1760 CLOSURE DEV, VASC: HCPCS | Performed by: EMERGENCY MEDICINE

## 2025-02-02 PROCEDURE — B2151ZZ FLUOROSCOPY OF LEFT HEART USING LOW OSMOLAR CONTRAST: ICD-10-PCS | Performed by: EMERGENCY MEDICINE

## 2025-02-02 PROCEDURE — 99222 1ST HOSP IP/OBS MODERATE 55: CPT | Performed by: EMERGENCY MEDICINE

## 2025-02-02 PROCEDURE — C1887 CATHETER, GUIDING: HCPCS | Performed by: EMERGENCY MEDICINE

## 2025-02-02 PROCEDURE — C1769 GUIDE WIRE: HCPCS | Performed by: EMERGENCY MEDICINE

## 2025-02-02 PROCEDURE — 83036 HEMOGLOBIN GLYCOSYLATED A1C: CPT | Performed by: FAMILY MEDICINE

## 2025-02-02 PROCEDURE — 25010000002 MIDAZOLAM PER 1MG: Performed by: EMERGENCY MEDICINE

## 2025-02-02 PROCEDURE — 94640 AIRWAY INHALATION TREATMENT: CPT

## 2025-02-02 PROCEDURE — 027037Z DILATION OF CORONARY ARTERY, ONE ARTERY WITH FOUR OR MORE DRUG-ELUTING INTRALUMINAL DEVICES, PERCUTANEOUS APPROACH: ICD-10-PCS | Performed by: EMERGENCY MEDICINE

## 2025-02-02 PROCEDURE — 93005 ELECTROCARDIOGRAM TRACING: CPT | Performed by: FAMILY MEDICINE

## 2025-02-02 PROCEDURE — 93458 L HRT ARTERY/VENTRICLE ANGIO: CPT | Performed by: EMERGENCY MEDICINE

## 2025-02-02 PROCEDURE — C9600 PERC DRUG-EL COR STENT SING: HCPCS | Performed by: EMERGENCY MEDICINE

## 2025-02-02 DEVICE — XIENCE SKYPOINT™ EVEROLIMUS ELUTING CORONARY STENT SYSTEM 3.00 MM X 28 MM / RAPID-EXCHANGE
Type: IMPLANTABLE DEVICE | Site: CORONARY | Status: FUNCTIONAL
Brand: XIENCE SKYPOINT™

## 2025-02-02 DEVICE — XIENCE SKYPOINT™ EVEROLIMUS ELUTING CORONARY STENT SYSTEM 3.50 MM X 15 MM / RAPID-EXCHANGE
Type: IMPLANTABLE DEVICE | Site: CORONARY | Status: FUNCTIONAL
Brand: XIENCE SKYPOINT™

## 2025-02-02 DEVICE — XIENCE SKYPOINT™ EVEROLIMUS ELUTING CORONARY STENT SYSTEM 2.75 MM X 23 MM / RAPID-EXCHANGE
Type: IMPLANTABLE DEVICE | Site: CORONARY | Status: FUNCTIONAL
Brand: XIENCE SKYPOINT™

## 2025-02-02 DEVICE — XIENCE SKYPOINT™ EVEROLIMUS ELUTING CORONARY STENT SYSTEM 2.75 MM X 15 MM / RAPID-EXCHANGE
Type: IMPLANTABLE DEVICE | Site: CORONARY | Status: FUNCTIONAL
Brand: XIENCE SKYPOINT™

## 2025-02-02 RX ORDER — LOSARTAN POTASSIUM 50 MG/1
100 TABLET ORAL
Status: DISCONTINUED | OUTPATIENT
Start: 2025-02-02 | End: 2025-02-03 | Stop reason: HOSPADM

## 2025-02-02 RX ORDER — LIDOCAINE HYDROCHLORIDE 20 MG/ML
INJECTION, SOLUTION INFILTRATION; PERINEURAL
Status: DISCONTINUED | OUTPATIENT
Start: 2025-02-02 | End: 2025-02-02 | Stop reason: HOSPADM

## 2025-02-02 RX ORDER — POTASSIUM CHLORIDE 750 MG/1
10 TABLET, EXTENDED RELEASE ORAL DAILY
COMMUNITY
End: 2025-02-03 | Stop reason: HOSPADM

## 2025-02-02 RX ORDER — IOPAMIDOL 755 MG/ML
INJECTION, SOLUTION INTRAVASCULAR
Status: DISCONTINUED | OUTPATIENT
Start: 2025-02-02 | End: 2025-02-02 | Stop reason: HOSPADM

## 2025-02-02 RX ORDER — HEPARIN SODIUM 200 [USP'U]/100ML
INJECTION, SOLUTION INTRAVENOUS
Status: DISCONTINUED | OUTPATIENT
Start: 2025-02-02 | End: 2025-02-02 | Stop reason: HOSPADM

## 2025-02-02 RX ORDER — FENTANYL CITRATE 50 UG/ML
INJECTION, SOLUTION INTRAMUSCULAR; INTRAVENOUS
Status: DISCONTINUED | OUTPATIENT
Start: 2025-02-02 | End: 2025-02-02 | Stop reason: HOSPADM

## 2025-02-02 RX ORDER — ALBUTEROL SULFATE 0.83 MG/ML
2.5 SOLUTION RESPIRATORY (INHALATION) EVERY 6 HOURS PRN
Status: DISCONTINUED | OUTPATIENT
Start: 2025-02-02 | End: 2025-02-03 | Stop reason: HOSPADM

## 2025-02-02 RX ORDER — BUTALBITAL, ACETAMINOPHEN AND CAFFEINE 50; 325; 40 MG/1; MG/1; MG/1
1 TABLET ORAL EVERY 4 HOURS PRN
Status: DISCONTINUED | OUTPATIENT
Start: 2025-02-02 | End: 2025-02-03 | Stop reason: HOSPADM

## 2025-02-02 RX ORDER — DIPHENHYDRAMINE HYDROCHLORIDE 50 MG/ML
INJECTION INTRAMUSCULAR; INTRAVENOUS
Status: DISCONTINUED | OUTPATIENT
Start: 2025-02-02 | End: 2025-02-02 | Stop reason: HOSPADM

## 2025-02-02 RX ORDER — MIDAZOLAM HYDROCHLORIDE 2 MG/2ML
INJECTION, SOLUTION INTRAMUSCULAR; INTRAVENOUS
Status: DISCONTINUED | OUTPATIENT
Start: 2025-02-02 | End: 2025-02-02 | Stop reason: HOSPADM

## 2025-02-02 RX ORDER — INDOMETHACIN 50 MG/1
50 CAPSULE ORAL 2 TIMES DAILY WITH MEALS
Status: DISCONTINUED | OUTPATIENT
Start: 2025-02-02 | End: 2025-02-03 | Stop reason: HOSPADM

## 2025-02-02 RX ORDER — SUMATRIPTAN 50 MG/1
50 TABLET, FILM COATED ORAL ONCE AS NEEDED
Status: DISCONTINUED | OUTPATIENT
Start: 2025-02-02 | End: 2025-02-03 | Stop reason: HOSPADM

## 2025-02-02 RX ORDER — HYDROCHLOROTHIAZIDE 25 MG/1
25 TABLET ORAL DAILY
Status: DISCONTINUED | OUTPATIENT
Start: 2025-02-02 | End: 2025-02-03 | Stop reason: HOSPADM

## 2025-02-02 RX ORDER — CARVEDILOL 25 MG/1
25 TABLET ORAL 2 TIMES DAILY WITH MEALS
COMMUNITY
End: 2025-02-03 | Stop reason: HOSPADM

## 2025-02-02 RX ORDER — GUAIFENESIN 600 MG/1
1200 TABLET, EXTENDED RELEASE ORAL EVERY 12 HOURS SCHEDULED
Status: DISCONTINUED | OUTPATIENT
Start: 2025-02-02 | End: 2025-02-03 | Stop reason: HOSPADM

## 2025-02-02 RX ORDER — POTASSIUM CHLORIDE 750 MG/1
10 CAPSULE, EXTENDED RELEASE ORAL DAILY
Status: DISCONTINUED | OUTPATIENT
Start: 2025-02-02 | End: 2025-02-03 | Stop reason: HOSPADM

## 2025-02-02 RX ADMIN — LOSARTAN POTASSIUM 100 MG: 50 TABLET, FILM COATED ORAL at 16:46

## 2025-02-02 RX ADMIN — POTASSIUM CHLORIDE 10 MEQ: 750 CAPSULE, EXTENDED RELEASE ORAL at 16:46

## 2025-02-02 RX ADMIN — BUDESONIDE AND FORMOTEROL FUMARATE DIHYDRATE 2 PUFF: 160; 4.5 AEROSOL RESPIRATORY (INHALATION) at 20:57

## 2025-02-02 RX ADMIN — PANTOPRAZOLE SODIUM 40 MG: 40 TABLET, DELAYED RELEASE ORAL at 09:15

## 2025-02-02 RX ADMIN — GUAIFENESIN 1200 MG: 600 TABLET ORAL at 21:38

## 2025-02-02 RX ADMIN — BUDESONIDE AND FORMOTEROL FUMARATE DIHYDRATE 2 PUFF: 160; 4.5 AEROSOL RESPIRATORY (INHALATION) at 07:08

## 2025-02-02 RX ADMIN — ENOXAPARIN SODIUM 60 MG: 100 INJECTION SUBCUTANEOUS at 21:37

## 2025-02-02 RX ADMIN — HYDROCHLOROTHIAZIDE 25 MG: 25 TABLET ORAL at 16:46

## 2025-02-02 RX ADMIN — GUAIFENESIN 1200 MG: 600 TABLET ORAL at 16:45

## 2025-02-02 RX ADMIN — ASPIRIN 81 MG: 81 TABLET, COATED ORAL at 09:16

## 2025-02-02 RX ADMIN — CARVEDILOL 12.5 MG: 6.25 TABLET, FILM COATED ORAL at 09:15

## 2025-02-02 RX ADMIN — PANTOPRAZOLE SODIUM 40 MG: 40 TABLET, DELAYED RELEASE ORAL at 00:16

## 2025-02-02 RX ADMIN — INDOMETHACIN 50 MG: 50 CAPSULE ORAL at 18:03

## 2025-02-02 RX ADMIN — Medication 10 ML: at 09:19

## 2025-02-02 RX ADMIN — CARVEDILOL 12.5 MG: 6.25 TABLET, FILM COATED ORAL at 18:03

## 2025-02-02 RX ADMIN — AMITRIPTYLINE HYDROCHLORIDE 25 MG: 25 TABLET, FILM COATED ORAL at 21:37

## 2025-02-02 RX ADMIN — DULOXETINE HYDROCHLORIDE 60 MG: 30 CAPSULE, DELAYED RELEASE ORAL at 09:16

## 2025-02-02 RX ADMIN — ENOXAPARIN SODIUM 60 MG: 100 INJECTION SUBCUTANEOUS at 09:17

## 2025-02-02 RX ADMIN — PANTOPRAZOLE SODIUM 40 MG: 40 TABLET, DELAYED RELEASE ORAL at 21:37

## 2025-02-02 RX ADMIN — ATORVASTATIN CALCIUM 40 MG: 40 TABLET, FILM COATED ORAL at 09:16

## 2025-02-02 RX ADMIN — CETIRIZINE HYDROCHLORIDE 10 MG: 10 TABLET, FILM COATED ORAL at 09:17

## 2025-02-02 RX ADMIN — DOCUSATE SODIUM 50 MG AND SENNOSIDES 8.6 MG 2 TABLET: 8.6; 5 TABLET, FILM COATED ORAL at 00:16

## 2025-02-02 NOTE — PROGRESS NOTES
Patient Name: Heather Russo  Date of Admission: 2/1/2025  Today's Date: 02/02/25  Length of Stay: 0  Primary Care Physician: Grzegorz Michel Jr., MD    Subjective   Chief Complaint: Chest pain  HPI   Heather Russo is a 69-year-old female with a past medical history of anemia, anxiety, skin cancer, colitis, COPD on no home oxygen, depression, fibromyalgia, GERD, hypertension, hypercholesteremia, seizures, small cell B-cell lymphoma and a current 2 PPD smoker.  Presented to [facility] emergency department 2/2/2025 with complaints of chest pain on and off since having lunch, lasting a few minutes at a time located midsternal and radiating to both sides feeling like a dull pain associated with emesis once, no diaphoresis or palpitations.  Ischial workup revealed an EKG with no acute findings, however initial troponin of 209, subsequent to 37 and 388, with a 13 delta.  D-dimer 1.98, CTA negative for pulmonary embolism, cardiology was discussed per ED physician recommending admission and further evaluation in the AM.    Today:   Patient is resting in bed on 2 L of oxygen with her  and daughter at bedside.  Patient is alert and oriented and able to participate in assessment and history.  Patient states she is currently having no chest pain, no shortness of breath or palpitations.  Patient has had no overnight nausea , vomiting or diaphoresis.  Her and her family were very concerned when they reviewed her MyChart with all the abnormalities shown as patient was originally seen for a kidney stone when they discovered her lymphoma.  I assured them that the nonobstructing stone present in her left knee was not an acute finding.  Additionally went through each individual normal lab and explained.  Patient will be seen by cardiology later on this morning with a evaluation for probable coronary angiogram.  All patient and family's questions were answered to the best my ability and they are in agreement with current plan of  care.    Patient was evaluated by Dr. Brooks this morning with recommendations for nonstick angiography and possible intervention.      angiography results-Coronary artery disease including hemodynamically significant lesions in the proximal and mid RCA that underwent successful PCI x 4 overlapping stents and noncompliant PTCA with continuation IDALMIS-3 flow and 0% residual stenosis remaining  With recommendations for dual antiplatelet therapy with ASA and Brilinta for minimum of 1 year, high intensity statin with Lipitor, continue home Norvasc, Coreg and losartan, referral for cardiac rehab close follow-up with cardiology as outpatient.      Documented weights    02/01/25 1852 02/01/25 2208   Weight: 56.2 kg (124 lb) 57.3 kg (126 lb 6.4 oz)          Intake/Output Summary (Last 24 hours) at 2/2/2025 1230  Last data filed at 2/2/2025 0913  Gross per 24 hour   Intake 1000 ml   Output --   Net 1000 ml            Review of Systems   Constitutional:  Positive for fatigue.   Respiratory:  Negative for shortness of breath.    Cardiovascular:  Negative for chest pain and palpitations.   Gastrointestinal:  Negative for abdominal distention, abdominal pain, diarrhea, nausea and vomiting.   Psychiatric/Behavioral:  The patient is nervous/anxious.       All pertinent negatives and positives are as above. All other systems have been reviewed and are negative unless otherwise stated.     Objective    Temp:  [98.2 °F (36.8 °C)-98.6 °F (37 °C)] 98.4 °F (36.9 °C)  Heart Rate:  [72-80] 73  Resp:  [16-20] 18  BP: ()/(48-68) 105/62  Physical Exam  Vitals and nursing note reviewed.   Constitutional:       General: She is not in acute distress.     Appearance: She is cachectic. She is ill-appearing. She is not toxic-appearing.      Interventions: Nasal cannula in place.      Comments: 2 L   HENT:      Right Ear: Tympanic membrane normal.      Left Ear: Tympanic membrane normal.      Nose: Nose normal.   Cardiovascular:      Rate  and Rhythm: Normal rate and regular rhythm.      Comments: Overnight telemetry NSR 69-77 patient did have 1 pause of 2.31 seconds down to 27  Pulmonary:      Effort: No tachypnea, accessory muscle usage or respiratory distress.      Breath sounds: Examination of the right-middle field reveals rhonchi. Examination of the right-lower field reveals rhonchi. Examination of the left-lower field reveals rhonchi. Rhonchi present. No wheezing or rales.   Abdominal:      General: Abdomen is flat. Bowel sounds are normal. There is no distension.      Palpations: Abdomen is soft.      Tenderness: There is no abdominal tenderness.   Musculoskeletal:      Cervical back: Normal range of motion. No rigidity or tenderness.      Right lower leg: No edema.      Left lower leg: No edema.   Skin:     General: Skin is warm.      Capillary Refill: Capillary refill takes less than 2 seconds.      Coloration: Skin is sallow.   Neurological:      General: No focal deficit present.      Mental Status: She is oriented to person, place, and time.   Psychiatric:         Attention and Perception: She is inattentive.         Mood and Affect: Affect is flat.         Speech: Speech normal.         Behavior: Behavior normal.         Thought Content: Thought content normal.       Results Review:  I have reviewed the labs, radiology results, and diagnostic studies.    Laboratory Data:   Results from last 7 days   Lab Units 02/02/25  0348 02/01/25  1929   WBC 10*3/mm3 4.77 6.52   HEMOGLOBIN g/dL 10.5* 11.6*   HEMATOCRIT % 33.8* 36.4   PLATELETS 10*3/mm3 214 223        Results from last 7 days   Lab Units 02/02/25  0348 02/01/25  1929   SODIUM mmol/L 141 137   POTASSIUM mmol/L 4.4 4.8   CHLORIDE mmol/L 112* 108*   CO2 mmol/L 19.0* 15.0*   BUN mg/dL 20 24*   CREATININE mg/dL 0.75 0.87   CALCIUM mg/dL 9.1 9.6   BILIRUBIN mg/dL  --  <0.2   ALK PHOS U/L  --  63   ALT (SGPT) U/L  --  5   AST (SGOT) U/L  --  10   GLUCOSE mg/dL 138* 129*       Culture Data:      Microbiology Results (last 10 days)       Procedure Component Value - Date/Time    COVID PRE-OP / PRE-PROCEDURE SCREENING ORDER (NO ISOLATION) - Swab, Nasal Cavity [[id number]]  (Normal) Collected: 02/01/25 1934    Lab Status: Final result Specimen: Swab from Nasal Cavity Updated: 02/01/25 2050    Narrative:      The following orders were created for panel order COVID PRE-OP / PRE-PROCEDURE SCREENING ORDER (NO ISOLATION) - Swab, Nasal Cavity.  Procedure                               Abnormality         Status                     ---------                               -----------         ------                     COVID-19 and FLU A/B PCR...[[id number]]  Normal              Final result                 Please view results for these tests on the individual orders.    COVID-19 and FLU A/B PCR, 1 HR TAT - Swab, Nasopharynx [[id number]]  (Normal) Collected: 02/01/25 1934    Lab Status: Final result Specimen: Swab from Nasopharynx Updated: 02/01/25 2050     COVID19 Not Detected     Influenza A PCR Not Detected     Influenza B PCR Not Detected    Narrative:      Fact sheet for providers: https://www.fda.gov/media/630742/download    Fact sheet for patients: https://www.fda.gov/media/450698/download    Test performed by PCR.             Radiology Data:   Imaging Results (Last 7 Days)       Procedure Component Value Units Date/Time    CT Angiogram Chest [[id number]] Collected: 02/01/25 2033     Updated: 02/01/25 2044    Narrative:      EXAM/TECHNIQUE: CT chest angiography with 3D MIP images with IV contrast     INDICATION: chest pain, elevated d-dimer     COMPARISON: None available.     DLP: 90.81 mGy.cm. Automated exposure control was utilized to decrease  patient radiation dose.     FINDINGS:     No evidence of pulmonary embolus. Main pulmonary artery is nondilated.  Thoracic aorta is nonaneurysmal and contains atherosclerotic plaque. No  evidence of aortic dissection. Moderate coronary artery calcification.  No  pericardial effusion.     The central airways are clear. Mild atelectasis at both lung bases. No  consolidation or pleural effusion. Moderate centrilobular and paraseptal  emphysema. 3 mm lingular pulmonary nodule image 101, stable.     No enlarged thoracic lymph nodes. No large thyroid nodule. No acute  chest soft tissue abnormality. 10 mm left lower pole renal calculus.  Multiple 4 mm right lower pole renal calculi. Moderate size hiatal  hernia. Old right posterior eighth rib fracture. Mild scoliotic  curvature of the thoracolumbar spine. Degenerative change in the upper  lumbar spine. No acute osseous finding.       Impression:         1.  No evidence of pulmonary embolus. No evidence of aortic dissection.     2.  Mild atelectasis at both lung bases. No consolidation or groundglass  opacities. Moderate emphysema.     3.  Bilateral renal calculi measuring 4 mm on the right and 10 mm on the  left.     4.  Moderate size hiatal hernia.           This report was signed and finalized on 2/1/2025 8:41 PM by Dr. Conner Helton MD.       XR Chest 1 View [[id number]] Collected: 02/01/25 1940     Updated: 02/01/25 1944    Narrative:      EXAM/TECHNIQUE: XR CHEST 1 VW-     INDICATION: chest pain     COMPARISON: 9/27/2023     FINDINGS:     Cardiac silhouette is within normal limits.     No pleural effusion or pneumothorax. No consolidation.     No acute osseous finding.       Impression:         No acute findings.     This report was signed and finalized on 2/1/2025 7:41 PM by Dr. Conner Helton MD.                I have reviewed the patient's current medications.     amitriptyline, 25 mg, Oral, Nightly  amLODIPine, 5 mg, Oral, Daily  ARIPiprazole, 15 mg, Oral, Daily  aspirin, 81 mg, Oral, Daily  atorvastatin, 40 mg, Oral, Daily  budesonide-formoterol, 2 puff, Inhalation, BID - RT  carvedilol, 12.5 mg, Oral, BID With Meals  cetirizine, 10 mg, Oral, Daily  DULoxetine, 60 mg, Oral, Daily  enoxaparin, 1 mg/kg, Subcutaneous,  Q12H  guaiFENesin, 1,200 mg, Oral, Q12H  hydroCHLOROthiazide, 25 mg, Oral, Daily  indomethacin, 50 mg, Oral, BID With Meals  losartan, 100 mg, Oral, Q24H  pantoprazole, 40 mg, Oral, BID  potassium chloride, 10 mEq, Oral, Daily  senna-docusate sodium, 2 tablet, Oral, BID  sodium chloride, 10 mL, Intravenous, Q12H            Assessment/Plan   Assessment  Active Hospital Problems    Diagnosis     **NSTEMI, initial episode of care     COPD (chronic obstructive pulmonary disease)     Hyperlipidemia     Primary hypertension     Gastroesophageal reflux disease        Treatment Plan  1.  NSTEMI-Presented 2/2/2025 with complaints of chest pain, initial EKG with no acute findings, initial troponin 209, subsequent to 37 and 388 with a 13 delta.      Coronary angiography results-Coronary artery disease including hemodynamically significant lesions in the proximal and mid RCA that underwent successful PCI x 4 overlapping stents and noncompliant PTCA with continuation IDALMIS-3 flow and 0% residual stenosis remaining  With recommendations for dual antiplatelet therapy with ASA and Brilinta for minimum of 1 year, high intensity statin with Lipitor, continue home Norvasc, Coreg and losartan, referral for cardiac rehab close follow-up with cardiology as outpatient.    2.  COPD without acute exacerbation-continue Symbicort, albuterol as needed, supplemental oxygen and notify provider of any new or worsening symptoms.    3.  Hyperlipidemia for and lipid panel-total cholesterol 154, HDL 39, LDL 77, triglyceride 228 continue Lipitor.    4.  Hypertension-continue every 4 vital signs continue home Norvasc, Coreg and losartan.    5.  GERD-continue Protonix.    VTE prophylaxis with Lovenox  Labs in a.m.    Medical Decision Making  1 acute high complexity unchanged  4 chronic moderate complexity, unchanged    Number and Complexity of problems: 5  Differential Diagnosis: None    Conditions and Status        Condition is unchanged.     MDM  Data  External documents reviewed: Epic review of previous hospitalization and PCP office notes  Cardiac tracing (EKG, telemetry) interpretation: 2/2/2025 EKG per cardiology reviewed  Radiology interpretation: 2/1/2025 chest x-ray, CTA of the chest per radiology reviewed  Labs reviewed: 2/2/2025 troponin, BMP, A1c, lipid panel, CBC, urinalysis reviewed, repeat labs in a.m.  Any tests that were considered but not ordered: None     Decision rules/scores evaluated (example [id number], Wells, etc): IDALMIS score of 5     Discussed with: Dr. Nicole, patient her  Barber and her daughter Jayla     Care Planning  Shared decision making: Dr. Nicole, patient her  Barber and her daughter Jayla  Code status and discussions: Full code per patient  Surrogate Decision Maker her  Barber    Disposition  Social Determinants of Health that impact treatment or disposition: None determined at this time  I expect the patient to be discharged to home in 1-2 days.     Electronically signed by SYLVIA Guevara, 02/02/25, 12:30 CST.

## 2025-02-02 NOTE — ED NOTES
Nursing report ED to floor  Heather Russo  69 y.o.  female    HPI:   Chief Complaint   Patient presents with    Chest Pain       Admitting doctor:   Kp Velasquez MD    Consulting provider(s):  Consults       No orders found from 1/3/2025 to 2/2/2025.             Admitting diagnosis:   The encounter diagnosis was NSTEMI (non-ST elevated myocardial infarction).    Code status:   Current Code Status       Date Active Code Status Order ID Comments User Context       Prior            Allergies:   Lyrica [pregabalin], Morphine, Codeine, and Penicillins    Intake and Output    Intake/Output Summary (Last 24 hours) at 2/1/2025 2147  Last data filed at 2/1/2025 2139  Gross per 24 hour   Intake 1000 ml   Output --   Net 1000 ml       Weight:       02/01/25  1852   Weight: 56.2 kg (124 lb)       Most recent vitals:   Vitals:    02/01/25 1932 02/01/25 1947 02/01/25 2016 02/01/25 2129   BP: 91/66 104/66 101/64 111/67   BP Location: Left arm   Left arm   Patient Position: Sitting   Sitting   Pulse: 72 76 73 75   Resp: 19   19   Temp:       TempSrc:       SpO2: 93% 95% 94% 95%   Weight:       Height:         Oxygen Therapy: .    Active LDAs/IV Access:   Lines, Drains & Airways       Active LDAs       Name Placement date Placement time Site Days    Peripheral IV 02/01/25 1932 Anterior;Left Hand 02/01/25 1932  Hand  less than 1                    Labs (abnormal labs have a star):   Labs Reviewed   TROPONIN - Abnormal; Notable for the following components:       Result Value    HS Troponin T 209 (*)     All other components within normal limits    Narrative:     High Sensitive Troponin T Reference Range:  <14.0 ng/L- Negative Female for AMI  <22.0 ng/L- Negative Male for AMI  >=14 - Abnormal Female indicating possible myocardial injury.  >=22 - Abnormal Male indicating possible myocardial injury.   Clinicians would have to utilize clinical acumen, EKG, Troponin, and serial changes to determine if it is an Acute  Myocardial Infarction or myocardial injury due to an underlying chronic condition.        COMPREHENSIVE METABOLIC PANEL - Abnormal; Notable for the following components:    Glucose 129 (*)     BUN 24 (*)     Chloride 108 (*)     CO2 15.0 (*)     BUN/Creatinine Ratio 27.6 (*)     All other components within normal limits    Narrative:     GFR Categories in Chronic Kidney Disease (CKD)      GFR Category          GFR (mL/min/1.73)    Interpretation  G1                     90 or greater         Normal or high (1)  G2                      60-89                Mild decrease (1)  G3a                   45-59                Mild to moderate decrease  G3b                   30-44                Moderate to severe decrease  G4                    15-29                Severe decrease  G5                    14 or less           Kidney failure          (1)In the absence of evidence of kidney disease, neither GFR category G1 or G2 fulfill the criteria for CKD.    eGFR calculation 2021 CKD-EPI creatinine equation, which does not include race as a factor   URINALYSIS W/ MICROSCOPIC IF INDICATED (NO CULTURE) - Abnormal; Notable for the following components:    Leuk Esterase, UA Trace (*)     All other components within normal limits   D-DIMER, QUANTITATIVE - Abnormal; Notable for the following components:    D-Dimer, Quantitative 0.98 (*)     All other components within normal limits    Narrative:     According to the assay 's published package insert, a normal (<0.50 MCGFEU/mL) D-dimer result in conjunction with a non-high clinical probability assessment, excludes deep vein thrombosis (DVT) and pulmonary embolism (PE) with high sensitivity.    D-dimer values increase with age and this can make VTE exclusion of an older population difficult. To address this, the [employer], based on best available evidence and recent guidelines, recommends that clinicians use age-adjusted D-dimer thresholds in patients  "greater than 50 years of age with: a) a low probability of PE who do not meet all Pulmonary Embolism Rule Out Criteria, or b) in those with intermediate probability of PE.   The formula for an age-adjusted D-dimer cut-off is \"age/100\".  For example, a 60 year old patient would have an age-adjusted cut-off of 0.60 MCGFEU/mL and an 80 year old 0.80 MCGFEU/mL.   CBC WITH AUTO DIFFERENTIAL - Abnormal; Notable for the following components:    RBC 3.66 (*)     Hemoglobin 11.6 (*)     MCV 99.5 (*)     RDW 15.9 (*)     RDW-SD 58.0 (*)     Neutrophil % 93.0 (*)     Lymphocyte % 4.4 (*)     Monocyte % 1.7 (*)     Eosinophil % 0.0 (*)     Immature Grans % 0.6 (*)     Lymphocytes, Absolute 0.29 (*)     All other components within normal limits   HIGH SENSITIVITIY TROPONIN T 1HR - Abnormal; Notable for the following components:    HS Troponin T 237 (*)     All other components within normal limits    Narrative:     High Sensitive Troponin T Reference Range:  <14.0 ng/L- Negative Female for AMI  <22.0 ng/L- Negative Male for AMI  >=14 - Abnormal Female indicating possible myocardial injury.  >=22 - Abnormal Male indicating possible myocardial injury.   Clinicians would have to utilize clinical acumen, EKG, Troponin, and serial changes to determine if it is an Acute Myocardial Infarction or myocardial injury due to an underlying chronic condition.        URINALYSIS, MICROSCOPIC ONLY - Abnormal; Notable for the following components:    WBC, UA 6-10 (*)     All other components within normal limits   COVID-19 AND FLU A/B, NP SWAB IN TRANSPORT MEDIA 1 HR TAT - Normal    Narrative:     Fact sheet for providers: https://www.fda.gov/media/358906/download    Fact sheet for patients: https://www.fda.gov/media/764124/download    Test performed by PCR.   LIPASE - Normal   MAGNESIUM - Normal   COVID PRE-OP / PRE-PROCEDURE SCREENING ORDER (NO ISOLATION)    Narrative:     The following orders were created for panel order COVID PRE-OP / " PRE-PROCEDURE SCREENING ORDER (NO ISOLATION) - Swab, Nasal Cavity.  Procedure                               Abnormality         Status                     ---------                               -----------         ------                     COVID-19 and FLU A/B PCR...[[id number]]  Normal              Final result                 Please view results for these tests on the individual orders.   RAINBOW DRAW    Narrative:     The following orders were created for panel order [place] Draw.  Procedure                               Abnormality         Status                     ---------                               -----------         ------                     Green Top (Gel)[[id number]]                                  Final result               Lavender Top[[id number]]                                     Final result               Red Top[[id number]]                                          Final result               Shahid Top[[id number]]                                         Final result               Light Blue Top[[id number]]                                   Final result                 Please view results for these tests on the individual orders.   GREEN TOP   LAVENDER TOP   RED TOP   GRAY TOP   LIGHT BLUE TOP   CBC AND DIFFERENTIAL    Narrative:     The following orders were created for panel order CBC & Differential.  Procedure                               Abnormality         Status                     ---------                               -----------         ------                     CBC Auto Differential[[id number]]        Abnormal            Final result                 Please view results for these tests on the individual orders.       Meds given in ED:   Medications   sodium chloride 0.9 % flush 10 mL (has no administration in time range)   sodium chloride 0.9 % bolus 1,000 mL (0 mL Intravenous Stopped 2/1/25 2139)   iopamidol (ISOVUE-370) 76 % injection 100 mL (100 mL Intravenous Given 2/1/25 2032)    aspirin chewable tablet 324 mg (324 mg Oral Given 2/1/25 2140)   Enoxaparin Sodium (LOVENOX) syringe 60 mg (60 mg Subcutaneous Given 2/1/25 2141)           NIH Stroke Scale:       Isolation/Infection(s):  No active isolations   No active infections     COVID Testing  Collected .  Resulted .    Nursing report ED to floor:  Mental status: A & O x3  Ambulatory status: Assist x1  Precautions: fall    ED nurse phone extentsion- ..

## 2025-02-02 NOTE — ED PROVIDER NOTES
"Subjective   History of Present Illness  Pt presents to the  with report of having chest pain starting this afternoon - reports pain across her chest bilaterally.  Reports that she felt lightheaded.  Denies any SOB/nausea/vomiting.  Family states that her BP was low during this but they are not sure what it was exactly - \"less than 100\".  Pt denies any cough/congestion.  No new foods/meds.  No numb/tingling.  No vision/speech issues.  Family reports pt has had stomach issues for some time - no diagnosis to this point.  No abdominal pain att.  No chest pain att        Review of Systems   Constitutional:  Negative for chills and fever.   HENT:  Negative for congestion.    Respiratory:  Negative for cough and shortness of breath.    Cardiovascular:  Positive for chest pain. Negative for palpitations and leg swelling.   Gastrointestinal:  Negative for abdominal pain, nausea and vomiting.   Neurological:  Positive for light-headedness. Negative for syncope, weakness and numbness.        Near syncope   All other systems reviewed and are negative.      Past Medical History:   Diagnosis Date    Anemia     Anxiety     Arthritis     Cancer of skin     BCC OF NOSE    Colitis     COPD (chronic obstructive pulmonary disease)     Depression     Fibromyalgia     Gastric ulcer     GERD (gastroesophageal reflux disease)     History of transfusion     Hx of colonic polyps     Hypercholesteremia     Hypertension     Nausea and vomiting 01/29/2024    Seizure     Small cell B-cell lymphoma        Allergies   Allergen Reactions    Lyrica [Pregabalin] Swelling     LIPS AND FACE    Morphine Itching    Codeine Itching    Penicillins Unknown - Low Severity     CHILDHOOD ALLERGY         Past Surgical History:   Procedure Laterality Date    BREAST SURGERY      CHOLECYSTECTOMY      COLONOSCOPY  04/12/2017    Hemorrhoids and a few diverticula repeat exam in 3 years Dr. Cordero    COLONOSCOPY N/A 05/02/2019    Collagenous colitis, Diverticulosis " repeat exam in 5 years    COLONOSCOPY N/A 05/21/2021    Fair prep repeat exam in 1 year    COLONOSCOPY W/ POLYPECTOMY  07/28/2016    Tubular adenoma proximal to the anus, 30 MM polyp in the cecum not resected, Diverticulosis ref to Dr. Cordero    ENDOSCOPY  06/15/2016    Small hh, Negative for celiac disease    ENDOSCOPY N/A 06/13/2019    HH otherwise normal exam    ENDOSCOPY N/A 05/21/2021    Large HH    ENDOSCOPY N/A 02/01/2024    Dr. hylton- Normal examined duodenum. - Hiatal hernia. - Z-line regular, 32 cm from the incisors. - No specimens collected.    RADICAL HYSTERECTOMY      TENNIS ELBOW RELEASE Left 10/20/2020    Procedure: LEFT CUBITAL TUNNEL RELEASE;  Surgeon: Raymundo Kiran MD;  Location:  [facility];  Service: Orthopedics;  Laterality: Left;    URETEROSCOPY Right 09/13/2018    Procedure: CYSTOSCOPY RIGHT RETROGRADE PYELOGRAM RIGHT URETEROSCOPY WITH BIOPSY OF RIGHT RENAL PELVIS LASER LITHOTRIPSY AND RIGHT URETERAL STENT INSERTION;  Surgeon: Yifan Greco MD;  Location:  [facility];  Service: Urology    URETEROSCOPY Right 08/09/2019    Procedure: RIGHT FLEXIBLE URETEROSCOPY WITH RENAL PELVIC BIOPSY AND RIGHT URETERAL STENT;  Surgeon: Yifan Greco MD;  Location:  [facility];  Service: Urology       Family History   Problem Relation Age of Onset    Diabetes Sister     Cancer Brother     Colon cancer Neg Hx     Colon polyps Neg Hx        Social History     Socioeconomic History    Marital status:    Tobacco Use    Smoking status: Every Day     Current packs/day: 2.00     Average packs/day: 2.0 packs/day for 30.0 years (60.0 ttl pk-yrs)     Types: Cigarettes    Smokeless tobacco: Never   Vaping Use    Vaping status: Never Used   Substance and Sexual Activity    Alcohol use: No    Drug use: No    Sexual activity: Defer           Objective   Physical Exam  Vitals and nursing note reviewed.   Constitutional:       General: She is not in acute distress.  HENT:      Head: Normocephalic  and atraumatic.   Eyes:      Pupils: Pupils are equal, round, and reactive to light.   Cardiovascular:      Rate and Rhythm: Normal rate and regular rhythm.      Heart sounds: Normal heart sounds.   Pulmonary:      Effort: Pulmonary effort is normal.      Breath sounds: Normal breath sounds.   Abdominal:      General: Bowel sounds are normal.      Palpations: Abdomen is soft.   Musculoskeletal:      Right lower leg: No edema.      Left lower leg: No edema.   Skin:     General: Skin is warm and dry.      Capillary Refill: Capillary refill takes less than 2 seconds.   Neurological:      General: No focal deficit present.      Mental Status: She is alert and oriented to person, place, and time.         Procedures           ED Course      Labs Reviewed   TROPONIN - Abnormal; Notable for the following components:       Result Value    HS Troponin T 209 (*)     All other components within normal limits    Narrative:     High Sensitive Troponin T Reference Range:  <14.0 ng/L- Negative Female for AMI  <22.0 ng/L- Negative Male for AMI  >=14 - Abnormal Female indicating possible myocardial injury.  >=22 - Abnormal Male indicating possible myocardial injury.   Clinicians would have to utilize clinical acumen, EKG, Troponin, and serial changes to determine if it is an Acute Myocardial Infarction or myocardial injury due to an underlying chronic condition.        COMPREHENSIVE METABOLIC PANEL - Abnormal; Notable for the following components:    Glucose 129 (*)     BUN 24 (*)     Chloride 108 (*)     CO2 15.0 (*)     BUN/Creatinine Ratio 27.6 (*)     All other components within normal limits    Narrative:     GFR Categories in Chronic Kidney Disease (CKD)      GFR Category          GFR (mL/min/1.73)    Interpretation  G1                     90 or greater         Normal or high (1)  G2                      60-89                Mild decrease (1)  G3a                   45-59                Mild to moderate decrease  G3b              "      30-44                Moderate to severe decrease  G4                    15-29                Severe decrease  G5                    14 or less           Kidney failure          (1)In the absence of evidence of kidney disease, neither GFR category G1 or G2 fulfill the criteria for CKD.    eGFR calculation 2021 CKD-EPI creatinine equation, which does not include race as a factor   URINALYSIS W/ MICROSCOPIC IF INDICATED (NO CULTURE) - Abnormal; Notable for the following components:    Leuk Esterase, UA Trace (*)     All other components within normal limits   D-DIMER, QUANTITATIVE - Abnormal; Notable for the following components:    D-Dimer, Quantitative 0.98 (*)     All other components within normal limits    Narrative:     According to the assay 's published package insert, a normal (<0.50 MCGFEU/mL) D-dimer result in conjunction with a non-high clinical probability assessment, excludes deep vein thrombosis (DVT) and pulmonary embolism (PE) with high sensitivity.    D-dimer values increase with age and this can make VTE exclusion of an older population difficult. To address this, the [employer], based on best available evidence and recent guidelines, recommends that clinicians use age-adjusted D-dimer thresholds in patients greater than 50 years of age with: a) a low probability of PE who do not meet all Pulmonary Embolism Rule Out Criteria, or b) in those with intermediate probability of PE.   The formula for an age-adjusted D-dimer cut-off is \"age/100\".  For example, a 60 year old patient would have an age-adjusted cut-off of 0.60 MCGFEU/mL and an 80 year old 0.80 MCGFEU/mL.   CBC WITH AUTO DIFFERENTIAL - Abnormal; Notable for the following components:    RBC 3.66 (*)     Hemoglobin 11.6 (*)     MCV 99.5 (*)     RDW 15.9 (*)     RDW-SD 58.0 (*)     Neutrophil % 93.0 (*)     Lymphocyte % 4.4 (*)     Monocyte % 1.7 (*)     Eosinophil % 0.0 (*)     Immature Grans % 0.6 (*)     " Lymphocytes, Absolute 0.29 (*)     All other components within normal limits   HIGH SENSITIVITIY TROPONIN T 1HR - Abnormal; Notable for the following components:    HS Troponin T 237 (*)     All other components within normal limits    Narrative:     High Sensitive Troponin T Reference Range:  <14.0 ng/L- Negative Female for AMI  <22.0 ng/L- Negative Male for AMI  >=14 - Abnormal Female indicating possible myocardial injury.  >=22 - Abnormal Male indicating possible myocardial injury.   Clinicians would have to utilize clinical acumen, EKG, Troponin, and serial changes to determine if it is an Acute Myocardial Infarction or myocardial injury due to an underlying chronic condition.        URINALYSIS, MICROSCOPIC ONLY - Abnormal; Notable for the following components:    WBC, UA 6-10 (*)     All other components within normal limits   COVID-19 AND FLU A/B, NP SWAB IN TRANSPORT MEDIA 1 HR TAT - Normal    Narrative:     Fact sheet for providers: https://www.fda.gov/media/046422/download    Fact sheet for patients: https://www.fda.gov/media/671808/download    Test performed by PCR.   LIPASE - Normal   MAGNESIUM - Normal   COVID PRE-OP / PRE-PROCEDURE SCREENING ORDER (NO ISOLATION)    Narrative:     The following orders were created for panel order COVID PRE-OP / PRE-PROCEDURE SCREENING ORDER (NO ISOLATION) - Swab, Nasal Cavity.  Procedure                               Abnormality         Status                     ---------                               -----------         ------                     COVID-19 and FLU A/B PCR...[[id number]]  Normal              Final result                 Please view results for these tests on the individual orders.   RAINBOW DRAW    Narrative:     The following orders were created for panel order [place] Draw.  Procedure                               Abnormality         Status                     ---------                               -----------         ------                     Green  Top (Gel)[[id number]]                                  Final result               Lavender Top[[id number]]                                     Final result               Red Top[[id number]]                                          Final result               Shahid Top[[id number]]                                         Final result               Light Blue Top[[id number]]                                   Final result                 Please view results for these tests on the individual orders.   GREEN TOP   LAVENDER TOP   RED TOP   GRAY TOP   LIGHT BLUE TOP   CBC AND DIFFERENTIAL    Narrative:     The following orders were created for panel order CBC & Differential.  Procedure                               Abnormality         Status                     ---------                               -----------         ------                     CBC Auto Differential[[id number]]        Abnormal            Final result                 Please view results for these tests on the individual orders.     CT Angiogram Chest   Final Result       1.  No evidence of pulmonary embolus. No evidence of aortic dissection.       2.  Mild atelectasis at both lung bases. No consolidation or groundglass   opacities. Moderate emphysema.       3.  Bilateral renal calculi measuring 4 mm on the right and 10 mm on the   left.       4.  Moderate size hiatal hernia.               This report was signed and finalized on 2/1/2025 8:41 PM by Dr. Conner Helton MD.          XR Chest 1 View   Final Result       No acute findings.       This report was signed and finalized on 2/1/2025 7:41 PM by Dr. Conner Helton MD.                                                               Medical Decision Making  Pt stable in EC - NAD att.  Pain free.  + elevated troponin with mild increase on repeat.  No acute EKG changes.  No evid of SBI/sepsis.  No evid of TAD/PE.  Findings c/w NSTEMI.  Given ASA/lovenox.  D/w Dr. Brooks.  Have also d/w Dr. Velasquez for  admit/further mgmt.    Amount and/or Complexity of Data Reviewed  Labs: ordered.  Radiology: ordered.  ECG/medicine tests: ordered.    Risk  OTC drugs.  Prescription drug management.        Final diagnoses:   NSTEMI (non-ST elevated myocardial infarction)       ED Disposition  ED Disposition       ED Disposition   Decision to Admit    Condition   --    Comment   --               No follow-up provider specified.       Medication List      No changes were made to your prescriptions during this visit.            Wayne Richard, DO  02/01/25 1919       Wayne Richard, DO  02/01/25 2132

## 2025-02-02 NOTE — CONSULTS
[facility] HEART GROUP CONSULT NOTE    Patient Care Team:  Grzegorz Michel Jr., MD as PCP - General  Rommel Graves MD as Consulting Physician (Otolaryngology)  Danis Garcia MD as Consulting Physician (Gastroenterology)  Adair Muir MD as Consulting Physician (Pulmonary Disease)  No Known Provider  REASON FOR REFERRAL: chest pain    Chief complaint chest pain    Subjective     Patient is a 69 y.o. female with a past medical history of COPD, hypertension, hyperlipidemia who presented to the emergency department chief complaint of chest pain.  The pain started around noon yesterday and was located on the left side of the chest and radiated to the left side.  She did not describe it as a dull sensation with associated vomiting.  She denies any other symptoms or any cardiac history to her knowledge.  EKG showed normal sinus rhythm with nonspecific ST changes.  Initial troponin was 209 that trended up to 237 and eventually 388 this morning.  Patient is currently chest pain-free.  Patient's  states that she has been more fatigued over the past 6 months.    Cardiology consulted to assist with care      Review of Systems   Review of Systems   Constitutional:  Positive for fatigue.   HENT:  Negative for trouble swallowing.    Eyes:  Negative for pain.   Respiratory:  Positive for chest tightness. Negative for cough, shortness of breath and wheezing.    Cardiovascular:  Negative for chest pain, palpitations and leg swelling.   Gastrointestinal:  Positive for nausea and vomiting. Negative for abdominal pain.   Musculoskeletal:  Negative for arthralgias.   Skin:  Negative for color change.   Neurological:  Negative for dizziness and weakness.   Psychiatric/Behavioral:  Negative for confusion.        History  Past Medical History:   Diagnosis Date    Anemia     Anxiety     Arthritis     Cancer of skin     BCC OF NOSE    Colitis     Colon polyps     COPD (chronic obstructive pulmonary  disease)     Depression     Fibromyalgia     Gastric ulcer     GERD (gastroesophageal reflux disease)     History of transfusion     Hx of colonic polyps     Hypercholesteremia     Hypertension     Nausea and vomiting 01/29/2024    Seizure     Small cell B-cell lymphoma     Voice disorder      Past Surgical History:   Procedure Laterality Date    BREAST SURGERY      CHOLECYSTECTOMY      COLONOSCOPY  04/12/2017    Hemorrhoids and a few diverticula repeat exam in 3 years Dr. Cordero    COLONOSCOPY N/A 05/02/2019    Collagenous colitis, Diverticulosis repeat exam in 5 years    COLONOSCOPY N/A 05/21/2021    Fair prep repeat exam in 1 year    COLONOSCOPY W/ POLYPECTOMY  07/28/2016    Tubular adenoma proximal to the anus, 30 MM polyp in the cecum not resected, Diverticulosis ref to Dr. Cordero    ENDOSCOPY  06/15/2016    Small hh, Negative for celiac disease    ENDOSCOPY N/A 06/13/2019    HH otherwise normal exam    ENDOSCOPY N/A 05/21/2021    Large HH    ENDOSCOPY N/A 02/01/2024    Dr. hylton- Normal examined duodenum. - Hiatal hernia. - Z-line regular, 32 cm from the incisors. - No specimens collected.    RADICAL HYSTERECTOMY      TENNIS ELBOW RELEASE Left 10/20/2020    Procedure: LEFT CUBITAL TUNNEL RELEASE;  Surgeon: Raymundo Kiran MD;  Location:  [facility] OR;  Service: Orthopedics;  Laterality: Left;    URETEROSCOPY Right 09/13/2018    Procedure: CYSTOSCOPY RIGHT RETROGRADE PYELOGRAM RIGHT URETEROSCOPY WITH BIOPSY OF RIGHT RENAL PELVIS LASER LITHOTRIPSY AND RIGHT URETERAL STENT INSERTION;  Surgeon: Yifan Greco MD;  Location:  [facility] OR;  Service: Urology    URETEROSCOPY Right 08/09/2019    Procedure: RIGHT FLEXIBLE URETEROSCOPY WITH RENAL PELVIC BIOPSY AND RIGHT URETERAL STENT;  Surgeon: Yifan Greco MD;  Location:  [facility] OR;  Service: Urology     Family History   Problem Relation Age of Onset    Diabetes Sister     Cancer Brother     Colon cancer Neg Hx     Colon polyps Neg Hx      Social History      Tobacco Use    Smoking status: Every Day     Current packs/day: 2.00     Average packs/day: 2.0 packs/day for 30.0 years (60.0 ttl pk-yrs)     Types: Cigarettes    Smokeless tobacco: Never   Vaping Use    Vaping status: Never Used   Substance Use Topics    Alcohol use: No    Drug use: No     Medications Prior to Admission   Medication Sig Dispense Refill Last Dose/Taking    albuterol sulfate  (90 Base) MCG/ACT inhaler Inhale 2 puffs Every 4 (Four) Hours As Needed for Wheezing.   Taking As Needed    amitriptyline (ELAVIL) 25 MG tablet Take 1 tablet by mouth Every Night.   Taking    ARIPiprazole (ABILIFY) 15 MG tablet Take 1 tablet by mouth Daily.   Taking    aspirin 81 MG EC tablet Take 1 tablet by mouth Daily.   Taking    atorvastatin (Lipitor) 10 MG tablet Take 1 tablet by mouth Daily. 90 tablet 1 Taking    budesonide-formoterol (SYMBICORT) 160-4.5 MCG/ACT inhaler Inhale 2 puffs 2 (Two) Times a Day.  0 Taking    carvedilol (COREG) 25 MG tablet Take 1 tablet by mouth 2 (Two) Times a Day With Meals. (Patient taking differently: Take 0.5 tablets by mouth 2 (Two) Times a Day With Meals.) 60 tablet 0 Taking Differently    cetirizine (zyrTEC) 10 MG tablet Take 1 tablet by mouth Daily.   Taking    cloNIDine (CATAPRES) 0.1 MG tablet Take 1 tablet by mouth 2 (Two) Times a Day As Needed for High Blood Pressure.   Taking As Needed    diphenoxylate-atropine (LOMOTIL) 2.5-0.025 MG per tablet Take 1 tablet by mouth 4 (Four) Times a Day As Needed for Diarrhea.   Taking As Needed    DULoxetine (CYMBALTA) 60 MG capsule Take 1 capsule by mouth Daily.   Taking    hydroCHLOROthiazide (HYDRODIURIL) 25 MG tablet Take 1 tablet by mouth Daily.   Taking    indomethacin (INDOCIN) 50 MG capsule Take 1 capsule by mouth 2 (Two) Times a Day With Meals.   Taking    LORazepam (ATIVAN) 1 MG tablet Take 0.5-1 tablets by mouth 2 (Two) Times a Day As Needed for Anxiety (severe anxiety).   Taking As Needed    losartan (COZAAR) 100 MG tablet  Take 1 tablet by mouth Daily.   Taking    methocarbamol (ROBAXIN) 500 MG tablet Take 1 tablet by mouth 4 (Four) Times a Day As Needed for Muscle Spasms.   Taking As Needed    methylPREDNISolone (MEDROL, BEN, PO) Take  by mouth.   Taking    metoprolol succinate XL (TOPROL-XL) 100 MG 24 hr tablet Take 1 tablet by mouth Daily.   Taking    olmesartan (BENICAR) 40 MG tablet Take 1 tablet by mouth Daily.   Taking    ondansetron ODT (ZOFRAN-ODT) 4 MG disintegrating tablet Place 1 tablet on the tongue Every 8 (Eight) Hours As Needed for Nausea or Vomiting.   Taking As Needed    pantoprazole (PROTONIX) 40 MG EC tablet Take 1 tablet by mouth 2 (Two) Times a Day.   Taking    POTASSIUM CHLORIDE ER PO Take 10 mEq by mouth Daily.   Taking    promethazine (PHENERGAN) 25 MG tablet Take 1 tablet by mouth Every 6 (Six) Hours As Needed for Nausea or Vomiting.   Taking As Needed    rizatriptan MLT (MAXALT-MLT) 10 MG disintegrating tablet Place 1 tablet on the tongue 1 (One) Time As Needed for Migraine. May repeat in 2 hours if needed   Taking As Needed    vitamin D (ERGOCALCIFEROL) 1.25 MG (99352 UT) capsule capsule Take 1 capsule by mouth 1 (One) Time Per Week.   Taking    amLODIPine (NORVASC) 5 MG tablet Take 1 tablet by mouth Daily.       butalbital-acetaminophen-caffeine (FIORICET, ESGIC) -40 MG per tablet Take 1 tablet by mouth Every 4 (Four) Hours As Needed for Headache.       fluconazole (DIFLUCAN) 100 MG tablet Take 1 tablet by mouth Daily.       guaiFENesin (MUCINEX) 600 MG 12 hr tablet Take 2 tablets by mouth Every 12 (Twelve) Hours. 20 tablet 0     valACYclovir (VALTREX) 1000 MG tablet Take 1 tablet by mouth Every 8 (Eight) Hours. For 7 days        Allergies:  Lyrica [pregabalin], Morphine, Codeine, and Penicillins    Objective     Vital Signs  Temp:  [98.2 °F (36.8 °C)-98.6 °F (37 °C)] 98.2 °F (36.8 °C)  Heart Rate:  [72-79] 74  Resp:  [16-20] 18  BP: ()/(48-68) 105/54    Physical Exam:  Vitals and nursing note  reviewed.   Constitutional:       Appearance: Normal and healthy appearance. Well-developed and not in distress.   Eyes:      Extraocular Movements: Extraocular movements intact.      Pupils: Pupils are equal, round, and reactive to light.   HENT:      Head: Normocephalic and atraumatic.    Mouth/Throat:      Pharynx: Oropharynx is clear.   Neck:      Vascular: JVD normal.      Trachea: Trachea normal.   Pulmonary:      Effort: Pulmonary effort is normal.      Breath sounds: Normal breath sounds. No wheezing. No rhonchi. No rales.   Cardiovascular:      PMI at left midclavicular line. Normal rate. Regular rhythm. Normal S1. Normal S2.       Murmurs: There is no murmur.      No gallop.  No click. No rub.   Pulses:     Dorsalis pedis: 2+ bilaterally.     Posterior tibial: 2+ bilaterally.  Abdominal:      General: Bowel sounds are normal.      Palpations: Abdomen is soft.      Tenderness: There is no abdominal tenderness.   Musculoskeletal: Normal range of motion.      Cervical back: Normal range of motion and neck supple. Skin:     General: Skin is warm and dry.      Capillary Refill: Capillary refill takes less than 2 seconds.   Feet:      Right foot:      Skin integrity: Skin integrity normal.      Left foot:      Skin integrity: Skin integrity normal.   Neurological:      Mental Status: Alert and oriented to person, place and time.      Sensory: Sensation is intact.      Motor: Motor function is intact.      Coordination: Coordination is intact.   Psychiatric:         Speech: Speech normal.         Behavior: Behavior is cooperative.       Results Review:   Lab Results (last 72 hours)       Procedure Component Value Units Date/Time    High Sensitivity Troponin T [[id number]]  (Abnormal) Collected: 02/02/25 0348    Specimen: Blood Updated: 02/02/25 0439     HS Troponin T 388 ng/L     Narrative:      High Sensitive Troponin T Reference Range:  <14.0 ng/L- Negative Female for AMI  <22.0 ng/L- Negative Male for AMI  >=14  - Abnormal Female indicating possible myocardial injury.  >=22 - Abnormal Male indicating possible myocardial injury.   Clinicians would have to utilize clinical acumen, EKG, Troponin, and serial changes to determine if it is an Acute Myocardial Infarction or myocardial injury due to an underlying chronic condition.         Basic Metabolic Panel [[id number]]  (Abnormal) Collected: 02/02/25 0348    Specimen: Blood Updated: 02/02/25 0430     Glucose 138 mg/dL      BUN 20 mg/dL      Creatinine 0.75 mg/dL      Sodium 141 mmol/L      Potassium 4.4 mmol/L      Chloride 112 mmol/L      CO2 19.0 mmol/L      Calcium 9.1 mg/dL      BUN/Creatinine Ratio 26.7     Anion Gap 10.0 mmol/L      eGFR 86.8 mL/min/1.73     Narrative:      GFR Categories in Chronic Kidney Disease (CKD)      GFR Category          GFR (mL/min/1.73)    Interpretation  G1                     90 or greater         Normal or high (1)  G2                      60-89                Mild decrease (1)  G3a                   45-59                Mild to moderate decrease  G3b                   30-44                Moderate to severe decrease  G4                    15-29                Severe decrease  G5                    14 or less           Kidney failure          (1)In the absence of evidence of kidney disease, neither GFR category G1 or G2 fulfill the criteria for CKD.    eGFR calculation 2021 CKD-EPI creatinine equation, which does not include race as a factor    Lipid Panel [[id number]]  (Abnormal) Collected: 02/02/25 0348    Specimen: Blood Updated: 02/02/25 0430     Total Cholesterol 154 mg/dL      Triglycerides 228 mg/dL      HDL Cholesterol 39 mg/dL      LDL Cholesterol  77 mg/dL      VLDL Cholesterol 38 mg/dL      LDL/HDL Ratio 1.78    Narrative:      Cholesterol Reference Ranges  (U.S. Department of Health and Human Services ATP III Classifications)    Desirable          <200 mg/dL  Borderline High    200-239 mg/dL  High Risk          >240  mg/dL      Triglyceride Reference Ranges  (U.S. Department of Health and Human Services ATP III Classifications)    Normal           <150 mg/dL  Borderline High  150-199 mg/dL  High             200-499 mg/dL  Very High        >500 mg/dL    HDL Reference Ranges  (U.S. Department of Health and Human Services ATP III Classifications)    Low     <40 mg/dl (major risk factor for CHD)  High    >60 mg/dl ('negative' risk factor for CHD)        LDL Reference Ranges  (U.S. Department of Health and Human Services ATP III Classifications)    Optimal          <100 mg/dL  Near Optimal     100-129 mg/dL  Borderline High  130-159 mg/dL  High             160-189 mg/dL  Very High        >189 mg/dL    LDL is calculated using the NIH LDL-C calculation.      Hemoglobin A1c [[id number]]  (Normal) Collected: 02/02/25 0348    Specimen: Blood Updated: 02/02/25 0426     Hemoglobin A1C 5.40 %     Narrative:      Hemoglobin A1C Ranges:    Increased Risk for Diabetes  5.7% to 6.4%  Diabetes                     >= 6.5%  Diabetic Goal                < 7.0%    CBC (No Diff) [[id number]]  (Abnormal) Collected: 02/02/25 0348    Specimen: Blood Updated: 02/02/25 0406     WBC 4.77 10*3/mm3      RBC 3.37 10*6/mm3      Hemoglobin 10.5 g/dL      Hematocrit 33.8 %      .3 fL      MCH 31.2 pg      MCHC 31.1 g/dL      RDW 15.9 %      RDW-SD 58.8 fl      MPV 8.3 fL      Platelets 214 10*3/mm3     High Sensitivity Troponin T 1Hr [[id number]]  (Abnormal) Collected: 02/01/25 2044    Specimen: Blood Updated: 02/01/25 2116     HS Troponin T 237 ng/L      Troponin T Numeric Delta 28 ng/L      Troponin T % Delta 13    Narrative:      High Sensitive Troponin T Reference Range:  <14.0 ng/L- Negative Female for AMI  <22.0 ng/L- Negative Male for AMI  >=14 - Abnormal Female indicating possible myocardial injury.  >=22 - Abnormal Male indicating possible myocardial injury.   Clinicians would have to utilize clinical acumen, EKG, Troponin, and serial changes to  determine if it is an Acute Myocardial Infarction or myocardial injury due to an underlying chronic condition.         Urinalysis, Microscopic Only - Urine, Clean Catch [[id number]]  (Abnormal) Collected: 02/01/25 2101    Specimen: Urine, Clean Catch Updated: 02/01/25 2116     RBC, UA 0-2 /HPF      WBC, UA 6-10 /HPF      Bacteria, UA None Seen /HPF      Squamous Epithelial Cells, UA None Seen /HPF      Hyaline Casts, UA None Seen /LPF      Methodology Automated Microscopy    Urinalysis With Microscopic If Indicated (No Culture) - Urine, Clean Catch [[id number]]  (Abnormal) Collected: 02/01/25 2101    Specimen: Urine, Clean Catch Updated: 02/01/25 2116     Color, UA Yellow     Appearance, UA Clear     pH, UA 5.5     Specific Gravity, UA 1.025     Glucose, UA Negative     Ketones, UA Negative     Bilirubin, UA Negative     Blood, UA Negative     Protein, UA Negative     Leuk Esterase, UA Trace     Nitrite, UA Negative     Urobilinogen, UA 0.2 E.U./dL    COVID PRE-OP / PRE-PROCEDURE SCREENING ORDER (NO ISOLATION) - Swab, Nasal Cavity [[id number]]  (Normal) Collected: 02/01/25 1934    Specimen: Swab from Nasal Cavity Updated: 02/01/25 2050    Narrative:      The following orders were created for panel order COVID PRE-OP / PRE-PROCEDURE SCREENING ORDER (NO ISOLATION) - Swab, Nasal Cavity.  Procedure                               Abnormality         Status                     ---------                               -----------         ------                     COVID-19 and FLU A/B PCR...[[id number]]  Normal              Final result                 Please view results for these tests on the individual orders.    COVID-19 and FLU A/B PCR, 1 HR TAT - Swab, Nasopharynx [[id number]]  (Normal) Collected: 02/01/25 1934    Specimen: Swab from Nasopharynx Updated: 02/01/25 2050     COVID19 Not Detected     Influenza A PCR Not Detected     Influenza B PCR Not Detected    Narrative:      Fact sheet for providers:  https://www.fda.gov/media/236288/download    Fact sheet for patients: https://www.fda.gov/media/845057/download    Test performed by PCR.    High Sensitivity Troponin T [[id number]]  (Abnormal) Collected: 02/01/25 1929    Specimen: Blood Updated: 02/01/25 2008     HS Troponin T 209 ng/L     Narrative:      High Sensitive Troponin T Reference Range:  <14.0 ng/L- Negative Female for AMI  <22.0 ng/L- Negative Male for AMI  >=14 - Abnormal Female indicating possible myocardial injury.  >=22 - Abnormal Male indicating possible myocardial injury.   Clinicians would have to utilize clinical acumen, EKG, Troponin, and serial changes to determine if it is an Acute Myocardial Infarction or myocardial injury due to an underlying chronic condition.         Magnesium [[id number]]  (Normal) Collected: 02/01/25 1929    Specimen: Blood Updated: 02/01/25 2006     Magnesium 1.8 mg/dL     Comprehensive Metabolic Panel [[id number]]  (Abnormal) Collected: 02/01/25 1929    Specimen: Blood Updated: 02/01/25 2006     Glucose 129 mg/dL      BUN 24 mg/dL      Creatinine 0.87 mg/dL      Sodium 137 mmol/L      Potassium 4.8 mmol/L      Chloride 108 mmol/L      CO2 15.0 mmol/L      Calcium 9.6 mg/dL      Total Protein 7.2 g/dL      Albumin 4.4 g/dL      ALT (SGPT) 5 U/L      AST (SGOT) 10 U/L      Alkaline Phosphatase 63 U/L      Total Bilirubin <0.2 mg/dL      Globulin 2.8 gm/dL      A/G Ratio 1.6 g/dL      BUN/Creatinine Ratio 27.6     Anion Gap 14.0 mmol/L      eGFR 72.7 mL/min/1.73     Narrative:      GFR Categories in Chronic Kidney Disease (CKD)      GFR Category          GFR (mL/min/1.73)    Interpretation  G1                     90 or greater         Normal or high (1)  G2                      60-89                Mild decrease (1)  G3a                   45-59                Mild to moderate decrease  G3b                   30-44                Moderate to severe decrease  G4                    15-29                Severe decrease  G5        "             14 or less           Kidney failure          (1)In the absence of evidence of kidney disease, neither GFR category G1 or G2 fulfill the criteria for CKD.    eGFR calculation 2021 CKD-EPI creatinine equation, which does not include race as a factor    D-dimer, Quantitative [[id number]]  (Abnormal) Collected: 02/01/25 1929    Specimen: Blood Updated: 02/01/25 2003     D-Dimer, Quantitative 0.98 MCGFEU/mL     Narrative:      According to the assay 's published package insert, a normal (<0.50 MCGFEU/mL) D-dimer result in conjunction with a non-high clinical probability assessment, excludes deep vein thrombosis (DVT) and pulmonary embolism (PE) with high sensitivity.    D-dimer values increase with age and this can make VTE exclusion of an older population difficult. To address this, the [employer], based on best available evidence and recent guidelines, recommends that clinicians use age-adjusted D-dimer thresholds in patients greater than 50 years of age with: a) a low probability of PE who do not meet all Pulmonary Embolism Rule Out Criteria, or b) in those with intermediate probability of PE.   The formula for an age-adjusted D-dimer cut-off is \"age/100\".  For example, a 60 year old patient would have an age-adjusted cut-off of 0.60 MCGFEU/mL and an 80 year old 0.80 MCGFEU/mL.    Lipase [[id number]]  (Normal) Collected: 02/01/25 1929    Specimen: Blood Updated: 02/01/25 2001     Lipase 25 U/L     [place] Draw [[id number]] Collected: 02/01/25 1929    Specimen: Blood Updated: 02/01/25 1947    Narrative:      The following orders were created for panel order [place] Draw.  Procedure                               Abnormality         Status                     ---------                               -----------         ------                     Green Top (Gel)[[id number]]                                  Final result               Lavender Top[[id number]]                            "          Final result               Red Top[[id number]]                                          Final result               Shahid Top[[id number]]                                         Final result               Light Blue Top[[id number]]                                   Final result                 Please view results for these tests on the individual orders.    Lavender Top [[id number]] Collected: 02/01/25 1929    Specimen: Blood Updated: 02/01/25 1947     Extra Tube hold for add-on     Comment: Auto resulted       Green Top (Gel) [[id number]] Collected: 02/01/25 1929    Specimen: Blood Updated: 02/01/25 1947     Extra Tube Hold for add-ons.     Comment: Auto resulted.       Red Top [[id number]] Collected: 02/01/25 1929    Specimen: Blood Updated: 02/01/25 1947     Extra Tube Hold for add-ons.     Comment: Auto resulted.       Shahid Top [[id number]] Collected: 02/01/25 1929    Specimen: Blood Updated: 02/01/25 1947     Extra Tube Hold for add-ons.     Comment: Auto resulted.       Light Blue Top [[id number]] Collected: 02/01/25 1929    Specimen: Blood Updated: 02/01/25 1947     Extra Tube Hold for add-ons.     Comment: Auto resulted       CBC & Differential [[id number]]  (Abnormal) Collected: 02/01/25 1929    Specimen: Blood Updated: 02/01/25 1946    Narrative:      The following orders were created for panel order CBC & Differential.  Procedure                               Abnormality         Status                     ---------                               -----------         ------                     CBC Auto Differential[[id number]]        Abnormal            Final result                 Please view results for these tests on the individual orders.    CBC Auto Differential [[id number]]  (Abnormal) Collected: 02/01/25 1929    Specimen: Blood Updated: 02/01/25 1946     WBC 6.52 10*3/mm3      RBC 3.66 10*6/mm3      Hemoglobin 11.6 g/dL      Hematocrit 36.4 %      MCV 99.5 fL      MCH 31.7 pg      MCHC 31.9 g/dL      RDW  15.9 %      RDW-SD 58.0 fl      MPV 8.8 fL      Platelets 223 10*3/mm3      Neutrophil % 93.0 %      Lymphocyte % 4.4 %      Monocyte % 1.7 %      Eosinophil % 0.0 %      Basophil % 0.3 %      Immature Grans % 0.6 %      Neutrophils, Absolute 6.06 10*3/mm3      Lymphocytes, Absolute 0.29 10*3/mm3      Monocytes, Absolute 0.11 10*3/mm3      Eosinophils, Absolute 0.00 10*3/mm3      Basophils, Absolute 0.02 10*3/mm3      Immature Grans, Absolute 0.04 10*3/mm3      nRBC 0.0 /100 WBC                   Assessment & Plan       NSTEMI (non-ST elevated myocardial infarction)    Gastroesophageal reflux disease    Primary hypertension    Hyperlipidemia    COPD (chronic obstructive pulmonary disease)      Plan:    Patient's symptoms are concerning for an acute coronary syndrome including a dull pressure-like sensation in the center of her chest that radiated to the left side with associated nausea and vomiting.  Troponin levels are elevated and continue to trend up.    We will plan for diagnostic angiography this morning to assess her coronary anatomy with possible intervention.  Risk, benefits and alternatives were explained to the patient and she wished to proceed.    Further recommendations to follow post procedure.    Thank you for the consult.  Please call with any questions.  We will continue to follow along.    I discussed the patient's findings and my recommendations with patient.     Mo Brooks, DO  Interventional cardiology  [facility]  02/02/25  09:04 CST

## 2025-02-02 NOTE — OP NOTE
"  [facility]    Date of procedure: 2/2/2025     Procedures performed:     1.  Access to the right femoral artery via modified Seldinger technique and ultrasound guidance.    2.  Left heart catheterization with retrograde cross aortic valve to the left ventricle  3.  LV ventriculography  4.  Selective bilateral coronary angiography  5.  Conscious sedation with continuous hemodynamic monitoring for 45 minutes  6.  Patent hemostasis achieved in the right femoral artery access site using a 6 [place] Angio-Seal closure device  7.  Successful PTCA to the proximal and mid RCA with a trek mini Rx 2 x 12 mm balloon times multiple inflations  8.  Successful PCI to the mid RCA with a Xience karen point 2.75 x 23 mm drug-eluting stent  9.  Successful PCI to the proximal/mid RCA with a Xience karen point 3 x 28 mm drug-eluting stent  10.  Successful PCI to the mid/distal RCA with a Xience karen point 2.75 x 15 mm drug-eluting stent  11.  Successful PCI to the proximal RCA with a Xience karen point 3.5 x 15 mm drug-eluting stent  12.  Successful NC PTCA to the proximal and mid RCA with an NC trek Maximiliano 3.5 x 20 mm balloon times multiple inflations      Risk, Benefits, and Alternatives discussed with the patient and/or family.  Plan is for moderate sedation and the patient agrees to proceed with the procedure.  An immediate assessment was done prior to the administration of moderate sedation     Indication: NSTEMI  Premedication: Versed, fentanyl  Contrast: Isovue 208 cc  Radiation: Flouro time= 7.7 minutes. Air Kerma= 419 mGy  Catheters: 6Fr JL4, 6Fr JR4, 6Fr angled pigtail  Guiding catheter: JR4 guide  PCI Hardware: .014\" BMW wire, balloons and stents as outlined above      Procedural details:    The patient was brought to the cath lab and prepped and draped in the usual fashion.  Access was obtained in the right femoral artery via modified Seldinger technique and ultrasound guidance.  A 6 [place] sheath was placed " into the artery and flushed.  Next, a JL 4 catheter was inserted and used to engage the left main and selective left coronary angiography performed multiple views.  Next a JR4 catheter was inserted and used to engage the right and selective right coronary angiography was performed in multiple views.  Next a pigtail catheter was inserted and used to cross aortic valve to the left ventricle pressures were recorded and LV ventriculography was performed.  This catheter was then pulled back across the aortic valve and again pressures were recorded.  Next, JR4 guide was inserted and used to engage the right coronary artery.  A BMW wire was inserted and advanced into the distal vessel.  We then inserted a trek mini Rx 2 x 12 mm balloon into the proximal and mid segments where it was inflated multiple times across multiple lesions.  Next, a Xience karen point 2.75 x 23 mm drug-eluting stent was inserted into the mid RCA where it was deployed at 16 shayna for 40 seconds.  We then inserted a Xience karen point 3 x 28 mm drug-eluting stent into the proximal/mid RCA where was deployed at 16 shayna for 40 seconds.  Nitroglycerin was administered intracoronary.  Postintervention angiography was performed and showed we still had significant disease proximal and distal to the stents that were just deployed.  Therefore, a Xience karen point 2.75 x 15 mm drug-eluting stent was inserted into the mid/distal vessel where it was deployed at 14 shayna for 40 seconds.  Next a Xience Skypoint 3.5 x 15 mm was inserted into the proximal vessel where it was deployed at 16 shayna for 40 seconds.  Next, a NC trek Maximiliano 3.5 x 20 mm balloon was inserted into the right coronary artery where was inflated multiple times within the mid and proximal vessels where recently deployed stents were.  Further postintervention angiography was performed in multiple views.  Everything was then withdrawn through the sheath and the sheath was flushed.  Patent hemostasis was achieved  in the right femoral artery access site using a 6 [place] Angio-Seal closure device.  Patient tolerated procedure well without any complications.  She left the Cath Lab in stable condition.      I supervised the administration of conscious sedation by nursing staff throughout the case.  First dose was given at 1333 and the end of my face-to-face encounter was at 1414, accounting for a total of 45 minutes of supervision.  During the case, continuous pulse oximetry, heart rate, blood pressure, and patient status were monitored.     Findings:    Hemodynamics:      Aortic: 93/46 mmHg  LV: 119/1 mmHg  LVEDP: 12 mmHg    Left ventriculography:  1. The contractility of the left ventricle is normal.  Estimated ejection fraction 55%.  2.  No significant gradient across aortic valve on pullback    Selective coronary angiography:     Left main: Large-caliber vessel with no angiographic evidence of stenosis, IDALMIS-3 flow    LAD: The LAD is a large-caliber vessel with heavy calcification noted in the proximal segment, there is 20 to 30% stenosis in the proximal/mid segment near the diagonal branch, remainder of the LAD has minor disease only, IDALMIS-3 flow    Diagonals: There is 1 moderate caliber diagonal branch with 40 to 50% stenosis at the ostium, the second diagonal is small caliber    Left circumflex: Circumflex is large caliber with minor calcification in the proximal segment, there is no angiographic evidence of stenosis, IDALMIS-3 flow    Obtuse marginals: First OM small caliber, the second OM is moderate caliber with no significant disease    RCA: The RCA is a large-caliber vessel that is calcified in its proximal and mid segments, there is approximately 70% stenosis in the proximal segment, the mid segment has 80 to 90% stenosis followed by segment of aneurysm, further distally we have 80 to 90% stenosis, the distal vessel has mild disease only, IDALMIS-3 flow.  Status post successful PCI x 4 overlapping stents and noncompliant  PTCA we had continuation IDALMIS-3 flow and 0% residual stenosis remaining.    PDA/NITZA: The PDA is small caliber, the NITZA is moderate caliber with no significant disease      Estimated Blood Loss: 50 cc    Specimens: None    Complications: None       Impression:  1.  Coronary artery disease as described above including hemodynamically significant lesions in the proximal and mid RCA that underwent successful PCI x 4 overlapping stents and noncompliant PTCA with continuation IDALMIS-3 flow and 0% residual stenosis remaining  2.  NSTEMI presentation     Plan:   1.  Return to the floor and monitor overnight with plans for discharge home in the morning if there are no complications or further chest pain reported  2.  Dual antiplatelet therapy with aspirin and Brilinta for a minimum of 1 year ideally, 6 months minimum  3.  High intensity statin with Lipitor 40  4.  Continue on home dose Norvasc, Coreg and losartan  5.  Referral for cardiac rehab  6.  Close follow-up with [facility] cardiology as an outpatient on discharge continue optimize her cardiovascular regimen    Mo Brooks, DO  Interventional cardiology  [facility] group  February 2, 2025

## 2025-02-02 NOTE — PLAN OF CARE
Problem: Adult Inpatient Plan of Care  Goal: Plan of Care Review  Outcome: Progressing  Flowsheets (Taken 2/2/2025 1616)  Plan of Care Reviewed With:   patient   spouse  Goal: Patient-Specific Goal (Individualized)  Outcome: Progressing  Goal: Absence of Hospital-Acquired Illness or Injury  Outcome: Progressing  Intervention: Identify and Manage Fall Risk  Recent Flowsheet Documentation  Taken 2/2/2025 1326 by Maricruz Rivas RN  Safety Promotion/Fall Prevention: patient off unit  Taken 2/2/2025 1045 by Maricruz Rivas RN  Safety Promotion/Fall Prevention: safety round/check completed  Taken 2/2/2025 0915 by Maricruz Rivas RN  Safety Promotion/Fall Prevention: safety round/check completed  Intervention: Prevent Skin Injury  Recent Flowsheet Documentation  Taken 2/2/2025 0915 by Maricruz Rivas RN  Body Position: position changed independently  Goal: Optimal Comfort and Wellbeing  Outcome: Progressing  Intervention: Provide Person-Centered Care  Recent Flowsheet Documentation  Taken 2/2/2025 0915 by Maricruz Rivas RN  Trust Relationship/Rapport:   care explained   questions answered   reassurance provided   thoughts/feelings acknowledged   emotional support provided  Goal: Readiness for Transition of Care  Outcome: Progressing   Goal Outcome Evaluation:  Plan of Care Reviewed With: patient, spouse

## 2025-02-02 NOTE — H&P
[facility] Medicine Services  HISTORY AND PHYSICAL    Date of Admission: 2/1/2025  Primary Care Physician: Grzegorz Michel Jr., MD    Subjective   Primary Historian: Patient    Chief Complaint: Chest pain    History of Present Illness  61-year-old female past medical history of COPD, hypertension, dyslipidemia, the presents to the emergency room with complaints of chest pain.  She states she has had chest pain on and off since about lunch lasting a few minutes, centrally which is radiating to both sides feeding like a dull pain associated with emesis once otherwise no diaphoresis or palpitations.  Presents with normal vital signs.Blood work showed a troponin of 209 and a follow-up of 237.  D-dimer 11.98.  CTA chest did not show pulmonary.  Cardia allergy consulted recommending admission and further recommendations in a.m.    Review of Systems   Otherwise complete ROS reviewed and negative except as mentioned in the HPI.    Past Medical History:   Past Medical History:   Diagnosis Date    Anemia     Anxiety     Arthritis     Cancer of skin     BCC OF NOSE    Colitis     Colon polyps     COPD (chronic obstructive pulmonary disease)     Depression     Fibromyalgia     Gastric ulcer     GERD (gastroesophageal reflux disease)     History of transfusion     Hx of colonic polyps     Hypercholesteremia     Hypertension     Nausea and vomiting 01/29/2024    Seizure     Small cell B-cell lymphoma     Voice disorder      Past Surgical History:  Past Surgical History:   Procedure Laterality Date    BREAST SURGERY      CHOLECYSTECTOMY      COLONOSCOPY  04/12/2017    Hemorrhoids and a few diverticula repeat exam in 3 years Dr. Cordero    COLONOSCOPY N/A 05/02/2019    Collagenous colitis, Diverticulosis repeat exam in 5 years    COLONOSCOPY N/A 05/21/2021    Fair prep repeat exam in 1 year    COLONOSCOPY W/ POLYPECTOMY  07/28/2016    Tubular adenoma proximal to the anus, 30 MM polyp in the cecum  not resected, Diverticulosis ref to Dr. Cordero    ENDOSCOPY  06/15/2016    Small hh, Negative for celiac disease    ENDOSCOPY N/A 06/13/2019    HH otherwise normal exam    ENDOSCOPY N/A 05/21/2021    Large HH    ENDOSCOPY N/A 02/01/2024    Dr. hylton- Normal examined duodenum. - Hiatal hernia. - Z-line regular, 32 cm from the incisors. - No specimens collected.    RADICAL HYSTERECTOMY      TENNIS ELBOW RELEASE Left 10/20/2020    Procedure: LEFT CUBITAL TUNNEL RELEASE;  Surgeon: Raymundo Kiran MD;  Location: [facility] OR;  Service: Orthopedics;  Laterality: Left;    URETEROSCOPY Right 09/13/2018    Procedure: CYSTOSCOPY RIGHT RETROGRADE PYELOGRAM RIGHT URETEROSCOPY WITH BIOPSY OF RIGHT RENAL PELVIS LASER LITHOTRIPSY AND RIGHT URETERAL STENT INSERTION;  Surgeon: Yifan Greco MD;  Location: [facility] OR;  Service: Urology    URETEROSCOPY Right 08/09/2019    Procedure: RIGHT FLEXIBLE URETEROSCOPY WITH RENAL PELVIC BIOPSY AND RIGHT URETERAL STENT;  Surgeon: Yifan Greco MD;  Location: [facility] OR;  Service: Urology     Social History:  reports that she has been smoking cigarettes. She has a 60 pack-year smoking history. She has never used smokeless tobacco. She reports that she does not drink alcohol and does not use drugs.    Family History: family history includes Cancer in her brother; Diabetes in her sister.       Allergies:  Allergies   Allergen Reactions    Lyrica [Pregabalin] Swelling     LIPS AND FACE    Morphine Itching    Codeine Itching    Penicillins Unknown - Low Severity     CHILDHOOD ALLERGY         Medications:  Prior to Admission medications    Medication Sig Start Date End Date Taking? Authorizing Provider   albuterol sulfate  (90 Base) MCG/ACT inhaler Inhale 2 puffs Every 4 (Four) Hours As Needed for Wheezing.   Yes ProviderPerla MD   amitriptyline (ELAVIL) 25 MG tablet Take 1 tablet by mouth Every Night.   Yes Perla Bliss MD   ARIPiprazole (ABILIFY) 15 MG  tablet Take 1 tablet by mouth Daily. 9/19/16  Yes Perla Bliss MD   aspirin 81 MG EC tablet Take 1 tablet by mouth Daily. 4/16/24  Yes Tim Carrasquillo DO   atorvastatin (Lipitor) 10 MG tablet Take 1 tablet by mouth Daily. 4/16/24  Yes Tim Carrasquillo,    budesonide-formoterol (SYMBICORT) 160-4.5 MCG/ACT inhaler Inhale 2 puffs 2 (Two) Times a Day. 9/27/23  Yes Jailyn Gant APRN   carvedilol (COREG) 25 MG tablet Take 1 tablet by mouth 2 (Two) Times a Day With Meals.  Patient taking differently: Take 0.5 tablets by mouth 2 (Two) Times a Day With Meals. 9/27/23  Yes Jailyn Gant APRN   cetirizine (zyrTEC) 10 MG tablet Take 1 tablet by mouth Daily.   Yes Perla Bliss MD   cloNIDine (CATAPRES) 0.1 MG tablet Take 1 tablet by mouth 2 (Two) Times a Day As Needed for High Blood Pressure.   Yes Provider, MD Perla   diphenoxylate-atropine (LOMOTIL) 2.5-0.025 MG per tablet Take 1 tablet by mouth 4 (Four) Times a Day As Needed for Diarrhea.   Yes ProviderPerla MD   DULoxetine (CYMBALTA) 60 MG capsule Take 1 capsule by mouth Daily. 9/19/16  Yes Perla Bliss MD   hydroCHLOROthiazide (HYDRODIURIL) 25 MG tablet Take 1 tablet by mouth Daily.   Yes ProviderPerla MD   indomethacin (INDOCIN) 50 MG capsule Take 1 capsule by mouth 2 (Two) Times a Day With Meals.   Yes Provider, MD Perla   LORazepam (ATIVAN) 1 MG tablet Take 0.5-1 tablets by mouth 2 (Two) Times a Day As Needed for Anxiety (severe anxiety).   Yes Perla Bliss MD   losartan (COZAAR) 100 MG tablet Take 1 tablet by mouth Daily.   Yes Perla Bliss MD   methocarbamol (ROBAXIN) 500 MG tablet Take 1 tablet by mouth 4 (Four) Times a Day As Needed for Muscle Spasms.   Yes Perla Bliss MD   methylPREDNISolone (MEDROL, BEN, PO) Take  by mouth.   Yes ProviderPerla MD   metoprolol succinate XL (TOPROL-XL) 100 MG 24 hr tablet Take 1 tablet by mouth Daily.   Yes Provider,  MD Perla   olmesartan (BENICAR) 40 MG tablet Take 1 tablet by mouth Daily.   Yes Perla Bliss MD   ondansetron ODT (ZOFRAN-ODT) 4 MG disintegrating tablet Place 1 tablet on the tongue Every 8 (Eight) Hours As Needed for Nausea or Vomiting.   Yes Perla Bliss MD   pantoprazole (PROTONIX) 40 MG EC tablet Take 1 tablet by mouth 2 (Two) Times a Day.   Yes Perla Bliss MD   POTASSIUM CHLORIDE ER PO Take 10 mEq by mouth Daily.   Yes Perla Bliss MD   promethazine (PHENERGAN) 25 MG tablet Take 1 tablet by mouth Every 6 (Six) Hours As Needed for Nausea or Vomiting.   Yes Perla Bliss MD   rizatriptan MLT (MAXALT-MLT) 10 MG disintegrating tablet Place 1 tablet on the tongue 1 (One) Time As Needed for Migraine. May repeat in 2 hours if needed   Yes Perla Bliss MD   vitamin D (ERGOCALCIFEROL) 1.25 MG (64136 UT) capsule capsule Take 1 capsule by mouth 1 (One) Time Per Week.   Yes Perla Bliss MD   amLODIPine (NORVASC) 5 MG tablet Take 1 tablet by mouth Daily.    Perla Bliss MD   butalbital-acetaminophen-caffeine (FIORICET, ESGIC) -40 MG per tablet Take 1 tablet by mouth Every 4 (Four) Hours As Needed for Headache.    Perla Bliss MD   fluconazole (DIFLUCAN) 100 MG tablet Take 1 tablet by mouth Daily.    Perla Bliss MD   guaiFENesin (MUCINEX) 600 MG 12 hr tablet Take 2 tablets by mouth Every 12 (Twelve) Hours. 9/27/23   Jailyn Gant APRN   valACYclovir (VALTREX) 1000 MG tablet Take 1 tablet by mouth Every 8 (Eight) Hours. For 7 days    Perla Bliss MD I have utilized all available immediate resources to obtain, update, or review the patient's current medications (including all prescriptions, over-the-counter products, herbals, cannabis/cannabidiol products, and vitamin/mineral/dietary (nutritional) supplements).    Objective     Vital Signs: /68 (BP Location: Left arm, Patient Position: Lying)    "Pulse 75   Temp 98.5 °F (36.9 °C) (Oral)   Resp 18   Ht 160 cm (63\")   Wt 57.3 kg (126 lb 6.4 oz)   SpO2 95%   BMI 22.39 kg/m²   Physical Exam  Vitals reviewed.   Constitutional:       General: She is not in acute distress.     Appearance: She is well-developed. She is not toxic-appearing.   HENT:      Head: Normocephalic and atraumatic.      Right Ear: External ear normal.      Left Ear: External ear normal.      Mouth/Throat:      Mouth: Mucous membranes are dry.      Pharynx: Oropharynx is clear.   Eyes:      General:         Right eye: No discharge.         Left eye: No discharge.      Extraocular Movements: Extraocular movements intact.      Conjunctiva/sclera: Conjunctivae normal.      Pupils: Pupils are equal, round, and reactive to light.   Neck:      Vascular: No JVD.   Cardiovascular:      Rate and Rhythm: Normal rate and regular rhythm.      Pulses: Normal pulses.      Heart sounds: Normal heart sounds. No murmur heard.     No friction rub. No gallop.   Pulmonary:      Effort: Pulmonary effort is normal. No respiratory distress.      Breath sounds: No stridor. No wheezing, rhonchi or rales.   Chest:      Chest wall: No tenderness.   Abdominal:      General: Bowel sounds are normal. There is no distension.      Palpations: Abdomen is soft.      Tenderness: There is no abdominal tenderness. There is no guarding or rebound.      Hernia: No hernia is present.   Musculoskeletal:         General: No swelling, tenderness or deformity. Normal range of motion.      Cervical back: Normal range of motion and neck supple. No rigidity or tenderness. No muscular tenderness.      Comments: Sock markings in both legs, minimal edema   Skin:     General: Skin is warm and dry.      Findings: No erythema or rash.   Neurological:      General: No focal deficit present.      Mental Status: She is alert and oriented to person, place, and time.      Cranial Nerves: No cranial nerve deficit.      Sensory: No sensory " deficit.      Motor: No weakness or abnormal muscle tone.      Deep Tendon Reflexes: Reflexes normal.   Psychiatric:         Mood and Affect: Mood normal.         Behavior: Behavior normal.     Results Reviewed:  Lab Results (last 24 hours)       Procedure Component Value Units Date/Time    High Sensitivity Troponin T 1Hr [[id number]]  (Abnormal) Collected: 02/01/25 2044    Specimen: Blood Updated: 02/01/25 2116     HS Troponin T 237 ng/L      Troponin T Numeric Delta 28 ng/L      Troponin T % Delta 13    Narrative:      High Sensitive Troponin T Reference Range:  <14.0 ng/L- Negative Female for AMI  <22.0 ng/L- Negative Male for AMI  >=14 - Abnormal Female indicating possible myocardial injury.  >=22 - Abnormal Male indicating possible myocardial injury.   Clinicians would have to utilize clinical acumen, EKG, Troponin, and serial changes to determine if it is an Acute Myocardial Infarction or myocardial injury due to an underlying chronic condition.         Urinalysis, Microscopic Only - Urine, Clean Catch [[id number]]  (Abnormal) Collected: 02/01/25 2101    Specimen: Urine, Clean Catch Updated: 02/01/25 2116     RBC, UA 0-2 /HPF      WBC, UA 6-10 /HPF      Bacteria, UA None Seen /HPF      Squamous Epithelial Cells, UA None Seen /HPF      Hyaline Casts, UA None Seen /LPF      Methodology Automated Microscopy    Urinalysis With Microscopic If Indicated (No Culture) - Urine, Clean Catch [[id number]]  (Abnormal) Collected: 02/01/25 2101    Specimen: Urine, Clean Catch Updated: 02/01/25 2116     Color, UA Yellow     Appearance, UA Clear     pH, UA 5.5     Specific Gravity, UA 1.025     Glucose, UA Negative     Ketones, UA Negative     Bilirubin, UA Negative     Blood, UA Negative     Protein, UA Negative     Leuk Esterase, UA Trace     Nitrite, UA Negative     Urobilinogen, UA 0.2 E.U./dL    COVID PRE-OP / PRE-PROCEDURE SCREENING ORDER (NO ISOLATION) - Swab, Nasal Cavity [[id number]]  (Normal) Collected: 02/01/25 1934     Specimen: Swab from Nasal Cavity Updated: 02/01/25 2050    Narrative:      The following orders were created for panel order COVID PRE-OP / PRE-PROCEDURE SCREENING ORDER (NO ISOLATION) - Swab, Nasal Cavity.  Procedure                               Abnormality         Status                     ---------                               -----------         ------                     COVID-19 and FLU A/B PCR...[[id number]]  Normal              Final result                 Please view results for these tests on the individual orders.    COVID-19 and FLU A/B PCR, 1 HR TAT - Swab, Nasopharynx [[id number]]  (Normal) Collected: 02/01/25 1934    Specimen: Swab from Nasopharynx Updated: 02/01/25 2050     COVID19 Not Detected     Influenza A PCR Not Detected     Influenza B PCR Not Detected    Narrative:      Fact sheet for providers: https://www.fda.gov/media/908150/download    Fact sheet for patients: https://www.fda.gov/media/752340/download    Test performed by PCR.    High Sensitivity Troponin T [[id number]]  (Abnormal) Collected: 02/01/25 1929    Specimen: Blood Updated: 02/01/25 2008     HS Troponin T 209 ng/L     Narrative:      High Sensitive Troponin T Reference Range:  <14.0 ng/L- Negative Female for AMI  <22.0 ng/L- Negative Male for AMI  >=14 - Abnormal Female indicating possible myocardial injury.  >=22 - Abnormal Male indicating possible myocardial injury.   Clinicians would have to utilize clinical acumen, EKG, Troponin, and serial changes to determine if it is an Acute Myocardial Infarction or myocardial injury due to an underlying chronic condition.         Magnesium [[id number]]  (Normal) Collected: 02/01/25 1929    Specimen: Blood Updated: 02/01/25 2006     Magnesium 1.8 mg/dL     Comprehensive Metabolic Panel [[id number]]  (Abnormal) Collected: 02/01/25 1929    Specimen: Blood Updated: 02/01/25 2006     Glucose 129 mg/dL      BUN 24 mg/dL      Creatinine 0.87 mg/dL      Sodium 137 mmol/L      Potassium 4.8 mmol/L       Chloride 108 mmol/L      CO2 15.0 mmol/L      Calcium 9.6 mg/dL      Total Protein 7.2 g/dL      Albumin 4.4 g/dL      ALT (SGPT) 5 U/L      AST (SGOT) 10 U/L      Alkaline Phosphatase 63 U/L      Total Bilirubin <0.2 mg/dL      Globulin 2.8 gm/dL      A/G Ratio 1.6 g/dL      BUN/Creatinine Ratio 27.6     Anion Gap 14.0 mmol/L      eGFR 72.7 mL/min/1.73     Narrative:      GFR Categories in Chronic Kidney Disease (CKD)      GFR Category          GFR (mL/min/1.73)    Interpretation  G1                     90 or greater         Normal or high (1)  G2                      60-89                Mild decrease (1)  G3a                   45-59                Mild to moderate decrease  G3b                   30-44                Moderate to severe decrease  G4                    15-29                Severe decrease  G5                    14 or less           Kidney failure          (1)In the absence of evidence of kidney disease, neither GFR category G1 or G2 fulfill the criteria for CKD.    eGFR calculation 2021 CKD-EPI creatinine equation, which does not include race as a factor    D-dimer, Quantitative [[id number]]  (Abnormal) Collected: 02/01/25 1929    Specimen: Blood Updated: 02/01/25 2003     D-Dimer, Quantitative 0.98 MCGFEU/mL     Narrative:      According to the assay 's published package insert, a normal (<0.50 MCGFEU/mL) D-dimer result in conjunction with a non-high clinical probability assessment, excludes deep vein thrombosis (DVT) and pulmonary embolism (PE) with high sensitivity.    D-dimer values increase with age and this can make VTE exclusion of an older population difficult. To address this, the [employer] College [employer], based on best available evidence and recent guidelines, recommends that clinicians use age-adjusted D-dimer thresholds in patients greater than 50 years of age with: a) a low probability of PE who do not meet all Pulmonary Embolism Rule Out Criteria, or b) in those  "with intermediate probability of PE.   The formula for an age-adjusted D-dimer cut-off is \"age/100\".  For example, a 60 year old patient would have an age-adjusted cut-off of 0.60 MCGFEU/mL and an 80 year old 0.80 MCGFEU/mL.    Lipase [[id number]]  (Normal) Collected: 02/01/25 1929    Specimen: Blood Updated: 02/01/25 2001     Lipase 25 U/L     [place] Draw [[id number]] Collected: 02/01/25 1929    Specimen: Blood Updated: 02/01/25 1947    Narrative:      The following orders were created for panel order [place] Draw.  Procedure                               Abnormality         Status                     ---------                               -----------         ------                     Green Top (Gel)[[id number]]                                  Final result               Lavender Top[[id number]]                                     Final result               Red Top[[id number]]                                          Final result               Shahid Top[[id number]]                                         Final result               Light Blue Top[[id number]]                                   Final result                 Please view results for these tests on the individual orders.    Lavender Top [[id number]] Collected: 02/01/25 1929    Specimen: Blood Updated: 02/01/25 1947     Extra Tube hold for add-on     Comment: Auto resulted       Green Top (Gel) [[id number]] Collected: 02/01/25 1929    Specimen: Blood Updated: 02/01/25 1947     Extra Tube Hold for add-ons.     Comment: Auto resulted.       Red Top [[id number]] Collected: 02/01/25 1929    Specimen: Blood Updated: 02/01/25 1947     Extra Tube Hold for add-ons.     Comment: Auto resulted.       Shahid Top [[id number]] Collected: 02/01/25 1929    Specimen: Blood Updated: 02/01/25 1947     Extra Tube Hold for add-ons.     Comment: Auto resulted.       Light Blue Top [[id number]] Collected: 02/01/25 1929    Specimen: Blood Updated: 02/01/25 1947     Extra Tube Hold for " add-ons.     Comment: Auto resulted       CBC & Differential [[id number]]  (Abnormal) Collected: 02/01/25 1929    Specimen: Blood Updated: 02/01/25 1946    Narrative:      The following orders were created for panel order CBC & Differential.  Procedure                               Abnormality         Status                     ---------                               -----------         ------                     CBC Auto Differential[[id number]]        Abnormal            Final result                 Please view results for these tests on the individual orders.    CBC Auto Differential [[id number]]  (Abnormal) Collected: 02/01/25 1929    Specimen: Blood Updated: 02/01/25 1946     WBC 6.52 10*3/mm3      RBC 3.66 10*6/mm3      Hemoglobin 11.6 g/dL      Hematocrit 36.4 %      MCV 99.5 fL      MCH 31.7 pg      MCHC 31.9 g/dL      RDW 15.9 %      RDW-SD 58.0 fl      MPV 8.8 fL      Platelets 223 10*3/mm3      Neutrophil % 93.0 %      Lymphocyte % 4.4 %      Monocyte % 1.7 %      Eosinophil % 0.0 %      Basophil % 0.3 %      Immature Grans % 0.6 %      Neutrophils, Absolute 6.06 10*3/mm3      Lymphocytes, Absolute 0.29 10*3/mm3      Monocytes, Absolute 0.11 10*3/mm3      Eosinophils, Absolute 0.00 10*3/mm3      Basophils, Absolute 0.02 10*3/mm3      Immature Grans, Absolute 0.04 10*3/mm3      nRBC 0.0 /100 WBC           Imaging Results (Last 24 Hours)       Procedure Component Value Units Date/Time    CT Angiogram Chest [[id number]] Collected: 02/01/25 2033     Updated: 02/01/25 2044    Narrative:      EXAM/TECHNIQUE: CT chest angiography with 3D MIP images with IV contrast     INDICATION: chest pain, elevated d-dimer     COMPARISON: None available.     DLP: 90.81 mGy.cm. Automated exposure control was utilized to decrease  patient radiation dose.     FINDINGS:     No evidence of pulmonary embolus. Main pulmonary artery is nondilated.  Thoracic aorta is nonaneurysmal and contains atherosclerotic plaque. No  evidence of  aortic dissection. Moderate coronary artery calcification.  No pericardial effusion.     The central airways are clear. Mild atelectasis at both lung bases. No  consolidation or pleural effusion. Moderate centrilobular and paraseptal  emphysema. 3 mm lingular pulmonary nodule image 101, stable.     No enlarged thoracic lymph nodes. No large thyroid nodule. No acute  chest soft tissue abnormality. 10 mm left lower pole renal calculus.  Multiple 4 mm right lower pole renal calculi. Moderate size hiatal  hernia. Old right posterior eighth rib fracture. Mild scoliotic  curvature of the thoracolumbar spine. Degenerative change in the upper  lumbar spine. No acute osseous finding.       Impression:         1.  No evidence of pulmonary embolus. No evidence of aortic dissection.     2.  Mild atelectasis at both lung bases. No consolidation or groundglass  opacities. Moderate emphysema.     3.  Bilateral renal calculi measuring 4 mm on the right and 10 mm on the  left.     4.  Moderate size hiatal hernia.           This report was signed and finalized on 2/1/2025 8:41 PM by Dr. Conner Helton MD.       XR Chest 1 View [[id number]] Collected: 02/01/25 1940     Updated: 02/01/25 1944    Narrative:      EXAM/TECHNIQUE: XR CHEST 1 VW-     INDICATION: chest pain     COMPARISON: 9/27/2023     FINDINGS:     Cardiac silhouette is within normal limits.     No pleural effusion or pneumothorax. No consolidation.     No acute osseous finding.       Impression:         No acute findings.     This report was signed and finalized on 2/1/2025 7:41 PM by Dr. Conner Helton MD.             I have personally reviewed and interpreted the radiology studies and ECG obtained at time of admission.     Assessment / Plan   Assessment:   Active Hospital Problems    Diagnosis     **NSTEMI (non-ST elevated myocardial infarction)     COPD (chronic obstructive pulmonary disease)     Hyperlipidemia     Primary hypertension     Gastroesophageal reflux  disease      Treatment Plan  The patient will be admitted to my service here at [facility].   Admit to medical telemetry  Vitals in 4 hours  Cardiac diet and n.p.o. after midnight  IV fluids KVO.  Activity bedrest    NSTEMI  Aspirin given in ER will continue with 81 mg daily  Lovenox 1 mg/kg every 12 hours  Will hold Plavix in case of needing surgical procedure  Smoking cessation advised  Lipitor increased dose from 10 to 40  Continue Cozaar and Coreg  Pain control    COPD does not appear exacerbated  Will continue Symbicort 160 inhalations    GERD  Continue Protonix    Anxiety  Continue Ativan oral    Other Home medications reviewed, see medical record. Will hold Maxalt from home medications,  and avoid triptans due to risk of muscle spasms.    DVT prophylaxis > SCD    Medical Decision Making  Number and Complexity of problems: 4 complex medical problems  Differential Diagnosis: see above    Conditions and Status        Condition is unchanged.     [facility] Data  External documents reviewed: [employer] EHR  Cardiac tracing (EKG, telemetry) interpretation: Sinus rhythm with 1st degree AV block, Anteroseptal infarct (cited on or before 08-Aug-2019)  Radiology interpretation: See above  Labs reviewed: see above  Any tests that were considered but not ordered: none     Decision rules/scores evaluated (example [id number], [place], etc): N/A     Discussed with: Patient and  at bedside     Care Planning  Shared decision making: Patient  Code status and discussions: Full code    Disposition  Social Determinants of Health that impact treatment or disposition: none  Estimated length of stay is overnight.     I confirmed that the patient's advanced care plan is present, code status is documented, and a surrogate decision maker is listed in the patient's medical record.     The patient's surrogate decision maker is     Barber Russo (Spouse)   .     The patient was seen and examined by me on 2/01/2025 at  2234.    Electronically signed by Kp Velasquez MD, 02/01/25, 22:34 CST.      Sinus bradycardia with sinus pauses noted on telemetry overnight.  Will hold beta-blockers.    Electronically signed by Kp Velasquez MD, 02/01/25, 11:49 PM CST.      `

## 2025-02-03 ENCOUNTER — READMISSION MANAGEMENT (OUTPATIENT)
Dept: CALL CENTER | Facility: HOSPITAL | Age: 70
End: 2025-02-03
Payer: MEDICARE

## 2025-02-03 ENCOUNTER — APPOINTMENT (OUTPATIENT)
Dept: CARDIOLOGY | Facility: HOSPITAL | Age: 70
DRG: 321 | End: 2025-02-03
Payer: MEDICARE

## 2025-02-03 VITALS
SYSTOLIC BLOOD PRESSURE: 93 MMHG | OXYGEN SATURATION: 93 % | HEART RATE: 73 BPM | BODY MASS INDEX: 22.32 KG/M2 | DIASTOLIC BLOOD PRESSURE: 55 MMHG | TEMPERATURE: 97.8 F | WEIGHT: 126 LBS | HEIGHT: 63 IN | RESPIRATION RATE: 18 BRPM

## 2025-02-03 DIAGNOSIS — Z95.5 S/P DRUG ELUTING CORONARY STENT PLACEMENT: Primary | ICD-10-CM

## 2025-02-03 LAB
ANION GAP SERPL CALCULATED.3IONS-SCNC: 11 MMOL/L (ref 5–15)
AV MEAN PRESS GRAD SYS DOP V1V2: 3 MMHG
AV VMAX SYS DOP: 128 CM/SEC
BASOPHILS # BLD AUTO: 0.02 10*3/MM3 (ref 0–0.2)
BASOPHILS NFR BLD AUTO: 0.4 % (ref 0–1.5)
BH CV ECHO LEFT VENTRICLE GLOBAL LONGITUDINAL STRAIN: -14.6 %
BH CV ECHO MEAS - AO MAX PG: 6.6 MMHG
BH CV ECHO MEAS - AO ROOT DIAM: 2.8 CM
BH CV ECHO MEAS - AO V2 VTI: 25.1 CM
BH CV ECHO MEAS - AVA(I,D): 2.8 CM2
BH CV ECHO MEAS - EDV(CUBED): 50.2 ML
BH CV ECHO MEAS - EDV(MOD-SP4): 64.6 ML
BH CV ECHO MEAS - EF(MOD-SP4): 65.5 %
BH CV ECHO MEAS - ESV(CUBED): 11.5 ML
BH CV ECHO MEAS - ESV(MOD-SP4): 22.3 ML
BH CV ECHO MEAS - FS: 38.8 %
BH CV ECHO MEAS - IVS/LVPW: 0.95 CM
BH CV ECHO MEAS - IVSD: 0.98 CM
BH CV ECHO MEAS - LA DIMENSION: 3.5 CM
BH CV ECHO MEAS - LAT PEAK E' VEL: 9.4 CM/SEC
BH CV ECHO MEAS - LV DIASTOLIC VOL/BSA (35-75): 40.7 CM2
BH CV ECHO MEAS - LV MASS(C)D: 113.7 GRAMS
BH CV ECHO MEAS - LV MAX PG: 4.6 MMHG
BH CV ECHO MEAS - LV MEAN PG: 2 MMHG
BH CV ECHO MEAS - LV SYSTOLIC VOL/BSA (12-30): 14 CM2
BH CV ECHO MEAS - LV V1 MAX: 107 CM/SEC
BH CV ECHO MEAS - LV V1 VTI: 22.5 CM
BH CV ECHO MEAS - LVIDD: 3.7 CM
BH CV ECHO MEAS - LVIDS: 2.26 CM
BH CV ECHO MEAS - LVOT AREA: 3.1 CM2
BH CV ECHO MEAS - LVOT DIAM: 2 CM
BH CV ECHO MEAS - LVPWD: 1.04 CM
BH CV ECHO MEAS - MED PEAK E' VEL: 8.1 CM/SEC
BH CV ECHO MEAS - MV A MAX VEL: 78.4 CM/SEC
BH CV ECHO MEAS - MV DEC TIME: 0.24 SEC
BH CV ECHO MEAS - MV E MAX VEL: 83.1 CM/SEC
BH CV ECHO MEAS - MV E/A: 1.06
BH CV ECHO MEAS - RAP SYSTOLE: 3 MMHG
BH CV ECHO MEAS - SV(LVOT): 70.7 ML
BH CV ECHO MEAS - SV(MOD-SP4): 42.3 ML
BH CV ECHO MEAS - SVI(LVOT): 44.5 ML/M2
BH CV ECHO MEAS - SVI(MOD-SP4): 26.6 ML/M2
BH CV ECHO MEASUREMENTS AVERAGE E/E' RATIO: 9.5
BH CV XLRA - RV BASE: 3.8 CM
BUN SERPL-MCNC: 16 MG/DL (ref 8–23)
BUN/CREAT SERPL: 30.2 (ref 7–25)
CALCIUM SPEC-SCNC: 9.3 MG/DL (ref 8.6–10.5)
CHLORIDE SERPL-SCNC: 107 MMOL/L (ref 98–107)
CO2 SERPL-SCNC: 20 MMOL/L (ref 22–29)
CREAT SERPL-MCNC: 0.53 MG/DL (ref 0.57–1)
DEPRECATED RDW RBC AUTO: 56.8 FL (ref 37–54)
EGFRCR SERPLBLD CKD-EPI 2021: 100.9 ML/MIN/1.73
EOSINOPHIL # BLD AUTO: 0.08 10*3/MM3 (ref 0–0.4)
EOSINOPHIL NFR BLD AUTO: 1.5 % (ref 0.3–6.2)
ERYTHROCYTE [DISTWIDTH] IN BLOOD BY AUTOMATED COUNT: 15.8 % (ref 12.3–15.4)
GLUCOSE SERPL-MCNC: 90 MG/DL (ref 65–99)
HCT VFR BLD AUTO: 33.5 % (ref 34–46.6)
HGB BLD-MCNC: 10.7 G/DL (ref 12–15.9)
IMM GRANULOCYTES # BLD AUTO: 0.03 10*3/MM3 (ref 0–0.05)
IMM GRANULOCYTES NFR BLD AUTO: 0.6 % (ref 0–0.5)
LEFT ATRIUM VOLUME INDEX: 26.5 ML/M2
LEFT ATRIUM VOLUME: 42.2 ML
LYMPHOCYTES # BLD AUTO: 0.97 10*3/MM3 (ref 0.7–3.1)
LYMPHOCYTES NFR BLD AUTO: 17.9 % (ref 19.6–45.3)
MCH RBC QN AUTO: 31.6 PG (ref 26.6–33)
MCHC RBC AUTO-ENTMCNC: 31.9 G/DL (ref 31.5–35.7)
MCV RBC AUTO: 98.8 FL (ref 79–97)
MONOCYTES # BLD AUTO: 0.71 10*3/MM3 (ref 0.1–0.9)
MONOCYTES NFR BLD AUTO: 13.1 % (ref 5–12)
NEUTROPHILS NFR BLD AUTO: 3.6 10*3/MM3 (ref 1.7–7)
NEUTROPHILS NFR BLD AUTO: 66.5 % (ref 42.7–76)
NRBC BLD AUTO-RTO: 0 /100 WBC (ref 0–0.2)
PLATELET # BLD AUTO: 184 10*3/MM3 (ref 140–450)
PMV BLD AUTO: 8.5 FL (ref 6–12)
POTASSIUM SERPL-SCNC: 4.3 MMOL/L (ref 3.5–5.2)
QT INTERVAL: 336 MS
QT INTERVAL: 360 MS
QTC INTERVAL: 375 MS
QTC INTERVAL: 402 MS
RBC # BLD AUTO: 3.39 10*6/MM3 (ref 3.77–5.28)
SODIUM SERPL-SCNC: 138 MMOL/L (ref 136–145)
WBC NRBC COR # BLD AUTO: 5.41 10*3/MM3 (ref 3.4–10.8)

## 2025-02-03 PROCEDURE — 94799 UNLISTED PULMONARY SVC/PX: CPT

## 2025-02-03 PROCEDURE — 93306 TTE W/DOPPLER COMPLETE: CPT

## 2025-02-03 PROCEDURE — 94664 DEMO&/EVAL PT USE INHALER: CPT

## 2025-02-03 PROCEDURE — 85025 COMPLETE CBC W/AUTO DIFF WBC: CPT

## 2025-02-03 PROCEDURE — 94760 N-INVAS EAR/PLS OXIMETRY 1: CPT

## 2025-02-03 PROCEDURE — 80048 BASIC METABOLIC PNL TOTAL CA: CPT

## 2025-02-03 PROCEDURE — 93356 MYOCRD STRAIN IMG SPCKL TRCK: CPT

## 2025-02-03 RX ORDER — ATORVASTATIN CALCIUM 40 MG/1
40 TABLET, FILM COATED ORAL DAILY
Qty: 90 TABLET | Refills: 3 | Status: SHIPPED | OUTPATIENT
Start: 2025-02-03

## 2025-02-03 RX ORDER — AMLODIPINE BESYLATE 5 MG/1
5 TABLET ORAL DAILY
Qty: 90 TABLET | Refills: 3 | Status: SHIPPED | OUTPATIENT
Start: 2025-02-03

## 2025-02-03 RX ORDER — CARVEDILOL 12.5 MG/1
12.5 TABLET ORAL 2 TIMES DAILY WITH MEALS
Start: 2025-02-03

## 2025-02-03 RX ORDER — LOSARTAN POTASSIUM 25 MG/1
25 TABLET ORAL
Qty: 30 TABLET | Refills: 2 | Status: SHIPPED | OUTPATIENT
Start: 2025-02-03

## 2025-02-03 RX ADMIN — GUAIFENESIN 1200 MG: 600 TABLET ORAL at 08:09

## 2025-02-03 RX ADMIN — ATORVASTATIN CALCIUM 40 MG: 40 TABLET, FILM COATED ORAL at 08:09

## 2025-02-03 RX ADMIN — CETIRIZINE HYDROCHLORIDE 10 MG: 10 TABLET, FILM COATED ORAL at 08:10

## 2025-02-03 RX ADMIN — BUDESONIDE AND FORMOTEROL FUMARATE DIHYDRATE 2 PUFF: 160; 4.5 AEROSOL RESPIRATORY (INHALATION) at 06:48

## 2025-02-03 RX ADMIN — TICAGRELOR 90 MG: 90 TABLET ORAL at 08:09

## 2025-02-03 RX ADMIN — PANTOPRAZOLE SODIUM 40 MG: 40 TABLET, DELAYED RELEASE ORAL at 08:10

## 2025-02-03 RX ADMIN — DULOXETINE HYDROCHLORIDE 60 MG: 30 CAPSULE, DELAYED RELEASE ORAL at 08:10

## 2025-02-03 RX ADMIN — DOCUSATE SODIUM 50 MG AND SENNOSIDES 8.6 MG 2 TABLET: 8.6; 5 TABLET, FILM COATED ORAL at 08:09

## 2025-02-03 RX ADMIN — POTASSIUM CHLORIDE 10 MEQ: 750 CAPSULE, EXTENDED RELEASE ORAL at 08:10

## 2025-02-03 RX ADMIN — Medication 10 ML: at 08:10

## 2025-02-03 RX ADMIN — ARIPIPRAZOLE 15 MG: 10 TABLET ORAL at 08:09

## 2025-02-03 RX ADMIN — INDOMETHACIN 50 MG: 50 CAPSULE ORAL at 08:10

## 2025-02-03 RX ADMIN — ASPIRIN 81 MG: 81 TABLET, COATED ORAL at 08:10

## 2025-02-03 NOTE — OUTREACH NOTE
Prep Survey      Flowsheet Row Responses   [facility] facility patient discharged from? [place]   Is LACE score < 7 ? No   Eligibility Readm Mgmt   Discharge diagnosis NSTEMI, heart cath with PCI x 4   Does the patient have one of the following disease processes/diagnoses(primary or secondary)? Acute MI (STEMI,NSTEMI)   Does the patient have Home health ordered? No   Is there a DME ordered? No   Prep survey completed? Yes            Nini Rice Registered Nurse

## 2025-02-03 NOTE — CONSULTS
Adult Nutrition  Assessment/PES    Patient Name:  Heather Russo  YOB: 1955  MRN: [id number]  Admit Date:  2/1/2025    Assessment Date:  2/3/2025   Reason for Assessment       Row Name 02/03/25 1048          Reason for Assessment    Reason For Assessment physician consult     Diagnosis cardiac disease     Identified At Risk by Screening Criteria need for education;MST SCORE 2+;unintentional loss of 10 lbs or more in the past 2 mos;reduced oral intake over the last month               Nutrition/Diet History       Row Name 02/03/25 1049          Nutrition/Diet History    Typical Intake (Food/Fluid/EN/PN) Pt in room with . Pt denies any wt loss or poor appetite at home. UBW noted as 121 lb but fluctuates per pt. Pt received educational and informational material on heart-healthy cooking methods, ways to cut fat out, and the Mediterranean diet. Dietitian discussed ways to reduce salt intake at home, heart-healthy cooking methods, and lower-fat food options. The Mediterranean diet rationale was explained to pt. Pt was encouraged to follow up with dietitian if she had any questions or concerns. Will monitor.               Labs/Tests/Procedures/Meds       Row Name 02/03/25 1101          Labs/Procedures/Meds    Lab Results Reviewed reviewed     Lab Results Comments Creat 0.53, Triglycerides 228        Diagnostic Tests/Procedures    Diagnostic Test/Procedure Reviewed reviewed        Medications    Pertinent Medications Reviewed reviewed               Physical Findings       Row Name 02/03/25 1103          Physical Findings    Overall Physical Appearance Rafael Score 21, room air, weakness, fatigued, unkown last BM               Estimated/Assessed Needs - Anthropometrics       Row Name 02/03/25 1104          Anthropometrics    Calculation Weight 57.3 kg (126 lb 5.2 oz)        Estimated/Assessed Needs    Additional Documentation KCAL/KG (Group);Protein Requirements (Group);Fluid Requirements (Group)         KCAL/KG    KCAL/KG 25 Kcal/Kg (kcal);30 Kcal/Kg (kcal)     25 Kcal/Kg (kcal) 1432.5     30 Kcal/Kg (kcal) 1719        Protein Requirements    Weight Used For Protein Calculations 57.3 kg (126 lb 5.2 oz)     Est Protein Requirement Amount (gms/kg) 1.1 gm protein     Estimated Protein Requirements (gms/day) 63.03        Fluid Requirements    Fluid Requirements (mL/day) 1719               Nutrition Prescription Ordered       Row Name 02/03/25 1105          Nutrition Prescription PO    Fluid Consistency Thin     Common Modifiers Cardiac               Evaluation of Received Nutrient/Fluid Intake       Row Name 02/03/25 1106          Nutrient/Fluid Evaluation    Number of Days Evaluated 2 days        Fluid Intake Evaluation    Oral Fluid (mL) 0        PO Evaluation    Number of Days PO Intake Evaluated 2 days     Number of Meals 1     % PO Intake 0%            Problem/Interventions:   Problem 1       Row Name 02/03/25 1108          Nutrition Diagnoses Problem 1    Problem 1 Knowledge Deficit     Etiology (related to) MNT for Treatment/Condition;Medical Diagnosis  Cardiac diet     Cardiac Other (comment)  Hypertension     Signs/Symptoms (evidenced by) Potential Information Deficit               Intervention Goal       Row Name 02/03/25 1112          Intervention Goal    General Provide information regarding MNT for treatment/condition               Nutrition Intervention       Row Name 02/03/25 1113          Nutrition Intervention    RD/Tech Action Follow Tx progress               Education/Evaluation       Row Name 02/03/25 1113          Education    Education Advised regarding habits/behavior;Education topics;Provided education regarding     Provided education regarding Medical diagnosis;Nutrition related factor     Education Topics Cardiac heart health;Protein     Advised Regarding Habits/Behavior Snacks;Food shopping;Meal planning;Food prep;Seasoning food;Food choices        Monitor/Evaluation    Monitor Per  protocol            Electronically signed by:  Sherley Gomez RDN, SUHA  02/03/25 11:18 CST

## 2025-02-03 NOTE — DISCHARGE SUMMARY
[facility]  DISCHARGE SUMMARY       Date of Admission: 2/1/2025  Date of Discharge:  2/3/2025  Primary Care Physician: Grzegorz Michel Jr., MD    Presenting Problem/History of Present Illness:  Chest pain    Final Discharge Diagnoses:  Active Hospital Problems    Diagnosis     **NSTEMI, initial episode of care     COPD (chronic obstructive pulmonary disease)     Hyperlipidemia     Primary hypertension     Gastroesophageal reflux disease        Consults: Cardiology-Dr. Brooks    Procedures Performed: 2/2/2025 cardiac angiography-Dr. Brooks    Pertinent Test Results:       Imaging Results (All)       Procedure Component Value Units Date/Time    CT Angiogram Chest [[id number]] Collected: 02/01/25 2033     Updated: 02/01/25 2044    Narrative:      EXAM/TECHNIQUE: CT chest angiography with 3D MIP images with IV contrast     INDICATION: chest pain, elevated d-dimer     COMPARISON: None available.     DLP: 90.81 mGy.cm. Automated exposure control was utilized to decrease  patient radiation dose.     FINDINGS:     No evidence of pulmonary embolus. Main pulmonary artery is nondilated.  Thoracic aorta is nonaneurysmal and contains atherosclerotic plaque. No  evidence of aortic dissection. Moderate coronary artery calcification.  No pericardial effusion.     The central airways are clear. Mild atelectasis at both lung bases. No  consolidation or pleural effusion. Moderate centrilobular and paraseptal  emphysema. 3 mm lingular pulmonary nodule image 101, stable.     No enlarged thoracic lymph nodes. No large thyroid nodule. No acute  chest soft tissue abnormality. 10 mm left lower pole renal calculus.  Multiple 4 mm right lower pole renal calculi. Moderate size hiatal  hernia. Old right posterior eighth rib fracture. Mild scoliotic  curvature of the thoracolumbar spine. Degenerative change in the upper  lumbar spine. No acute osseous finding.       Impression:          1.  No evidence of pulmonary embolus. No evidence of aortic dissection.     2.  Mild atelectasis at both lung bases. No consolidation or groundglass  opacities. Moderate emphysema.     3.  Bilateral renal calculi measuring 4 mm on the right and 10 mm on the  left.     4.  Moderate size hiatal hernia.           This report was signed and finalized on 2/1/2025 8:41 PM by Dr. Conner Helton MD.       XR Chest 1 View [[id number]] Collected: 02/01/25 1940     Updated: 02/01/25 1944    Narrative:      EXAM/TECHNIQUE: XR CHEST 1 VW-     INDICATION: chest pain     COMPARISON: 9/27/2023     FINDINGS:     Cardiac silhouette is within normal limits.     No pleural effusion or pneumothorax. No consolidation.     No acute osseous finding.       Impression:         No acute findings.     This report was signed and finalized on 2/1/2025 7:41 PM by Dr. Conner Helton MD.             LAB RESULTS:      Lab 02/03/25  0633 02/02/25  0348 02/01/25 1929   WBC 5.41 4.77 6.52   HEMOGLOBIN 10.7* 10.5* 11.6*   HEMATOCRIT 33.5* 33.8* 36.4   PLATELETS 184 214 223   NEUTROS ABS 3.60  --  6.06   IMMATURE GRANS (ABS) 0.03  --  0.04   LYMPHS ABS 0.97  --  0.29*   MONOS ABS 0.71  --  0.11   EOS ABS 0.08  --  0.00   MCV 98.8* 100.3* 99.5*   D DIMER QUANT  --   --  0.98*         Lab 02/03/25  0633 02/02/25  0348 02/01/25 1929   SODIUM 138 141 137   POTASSIUM 4.3 4.4 4.8   CHLORIDE 107 112* 108*   CO2 20.0* 19.0* 15.0*   ANION GAP 11.0 10.0 14.0   BUN 16 20 24*   CREATININE 0.53* 0.75 0.87   EGFR 100.9 86.8 72.7   GLUCOSE 90 138* 129*   CALCIUM 9.3 9.1 9.6   MAGNESIUM  --   --  1.8   HEMOGLOBIN A1C  --  5.40  --          Lab 02/01/25 1929   TOTAL PROTEIN 7.2   ALBUMIN 4.4   GLOBULIN 2.8   ALT (SGPT) 5   AST (SGOT) 10   BILIRUBIN <0.2   ALK PHOS 63   LIPASE 25         Lab 02/02/25  0348 02/01/25  2044 02/01/25  1929   HSTROP T 388* 237* 209*         Lab 02/02/25  0348   CHOLESTEROL 154   LDL CHOL 77   HDL CHOL 39*   TRIGLYCERIDES 228*              Brief Urine Lab Results  (Last result in the past 365 days)        Color   Clarity   Blood   Leuk Est   Nitrite   Protein   CREAT   Urine HCG        02/01/25 2101 Yellow   Clear   Negative   Trace   Negative   Negative                 Microbiology Results (last 10 days)       Procedure Component Value - Date/Time    COVID PRE-OP / PRE-PROCEDURE SCREENING ORDER (NO ISOLATION) - Swab, Nasal Cavity [[id number]]  (Normal) Collected: 02/01/25 1934    Lab Status: Final result Specimen: Swab from Nasal Cavity Updated: 02/01/25 2050    Narrative:      The following orders were created for panel order COVID PRE-OP / PRE-PROCEDURE SCREENING ORDER (NO ISOLATION) - Swab, Nasal Cavity.  Procedure                               Abnormality         Status                     ---------                               -----------         ------                     COVID-19 and FLU A/B PCR...[[id number]]  Normal              Final result                 Please view results for these tests on the individual orders.    COVID-19 and FLU A/B PCR, 1 HR TAT - Swab, Nasopharynx [[id number]]  (Normal) Collected: 02/01/25 1934    Lab Status: Final result Specimen: Swab from Nasopharynx Updated: 02/01/25 2050     COVID19 Not Detected     Influenza A PCR Not Detected     Influenza B PCR Not Detected    Narrative:      Fact sheet for providers: https://www.fda.gov/media/305056/download    Fact sheet for patients: https://www.fda.gov/media/596685/download    Test performed by PCR.          Microbiology Results (last 10 days)       Procedure Component Value - Date/Time    COVID PRE-OP / PRE-PROCEDURE SCREENING ORDER (NO ISOLATION) - Swab, Nasal Cavity [[id number]]  (Normal) Collected: 02/01/25 1934    Lab Status: Final result Specimen: Swab from Nasal Cavity Updated: 02/01/25 2050    Narrative:      The following orders were created for panel order COVID PRE-OP / PRE-PROCEDURE SCREENING ORDER (NO ISOLATION) - Swab, Nasal Cavity.  Procedure                                Abnormality         Status                     ---------                               -----------         ------                     COVID-19 and FLU A/B PCR...[[id number]]  Normal              Final result                 Please view results for these tests on the individual orders.    COVID-19 and FLU A/B PCR, 1 HR TAT - Swab, Nasopharynx [[id number]]  (Normal) Collected: 02/01/25 1934    Lab Status: Final result Specimen: Swab from Nasopharynx Updated: 02/01/25 2050     COVID19 Not Detected     Influenza A PCR Not Detected     Influenza B PCR Not Detected    Narrative:      Fact sheet for providers: https://www.fda.gov/media/657488/download    Fact sheet for patients: https://www.fda.gov/media/886909/download    Test performed by PCR.             Documented weights    02/01/25 1852 02/01/25 2208   Weight: 56.2 kg (124 lb) 57.3 kg (126 lb 6.4 oz)        Hospital Course: Heather Russo is a 69-year-old female with a past medical history of anemia, anxiety, skin cancer, colitis, COPD on no home oxygen, depression, fibromyalgia, GERD, hypertension, hypercholesteremia, seizures, small cell B-cell lymphoma and a current 2 PPD smoker.  Presented to [facility] emergency department 2/2/2025 with complaints of chest pain on and off since having lunch, lasting a few minutes at a time located midsternal and radiating to both sides feeling like a dull pain associated with emesis once, no diaphoresis or palpitations.  Ischial workup revealed an EKG with no acute findings, however initial troponin of 209, subsequent to 37 and 388, with a 13 delta.  D-dimer 1.98, CTA negative for pulmonary embolism, cardiology was discussed per ED physician recommending admission and further evaluation in the AM.     NSTEMI-Presented 2/2/2025 with complaints of chest pain, initial EKG with no acute findings, initial troponin 209, subsequent to 37 and 388 with a 13 delta.       Coronary angiography results-Coronary artery disease  including hemodynamically significant lesions in the proximal and mid RCA that underwent successful PCI x 4 overlapping stents and noncompliant PTCA with continuation IDALMIS-3 flow and 0% residual stenosis remaining  With recommendations for dual antiplatelet therapy with ASA and Brilinta for minimum of 1 year, high intensity statin with Lipitor, continue home Norvasc, Coreg and losartan, referral for cardiac rehab close follow-up with cardiology as outpatient.     COPD without acute exacerbation-continue Symbicort, albuterol as needed smoking cessation extensively discussed.     Hyperlipidemia for and lipid panel-total cholesterol 154, HDL 39, LDL 77, triglyceride 228 increase Lipitor to 40 mg nightly     Hypertension- continue home Norvasc decrease dose of Coreg and decreased dose of losartan.      Today patient is resting comfortably in bed on room air with her  at bedside.  Patient looks like a different person overnight.  Patient is alert and able to participate in assessment.  She states she already feels significantly better, she states that she just felt like she was getting old and that is why she had no energy and was unable to participate much in life.  She is very excited about moving forward.  We had a very luis armando and long discussion regarding her smoking cessation and food choices.  She will see the dietitian prior to discharge.  Patient will follow-up with Dr. Brooks per his recommendations.  And patient received Brilinta intraoperatively yesterday however patient's insurance does not cover Brilinta so she will discharge home with 30 days and will discuss change at follow-up per YOLY Fuentes, cardiology.  All patient and spouse's questions were answered to the best my ability and they are in agreement for discharge home today.    Patient has reached the maximum benefit of hospitalization and is stable for discharge  Patient has been evaluated today 02/03/25 and is stable for discharge.  "    Physical Exam on Discharge:  BP 93/55 (BP Location: Left arm, Patient Position: Lying)   Pulse 73   Temp 97.8 °F (36.6 °C) (Oral)   Resp 18   Ht 160 cm (63\")   Wt 57.3 kg (126 lb 6.4 oz)   SpO2 93%   BMI 22.39 kg/m²   Physical Exam  Vitals and nursing note reviewed.   Constitutional:       General: She is not in acute distress.     Appearance: She is cachectic. S She is not toxic-appearing.      Interventions: Nasal cannula in place.      Comments: Room air  HENT:      Right Ear: Tympanic membrane normal.      Left Ear: Tympanic membrane normal.      Nose: Nose normal.   Cardiovascular:      Rate and Rhythm: Normal rate and regular rhythm.      Comments: Overnight telemetry  SB/S50 8-71   Pulmonary:      Effort: No tachypnea, accessory muscle usage or respiratory distress.      Breath sounds: Examination of the right-middle field reveals rhonchi. Examination of the right-lower field reveals rhonchi. Examination of the left-lower field reveals rhonchi. Rhonchi present. No wheezing or rales.   Abdominal:      General: Abdomen is flat. Bowel sounds are normal. There is no distension.      Palpations: Abdomen is soft.      Tenderness: There is no abdominal tenderness.   Musculoskeletal:      Cervical back: Normal range of motion. No rigidity or tenderness.      Right lower leg: No edema.      Left lower leg: No edema.   Skin:     General: Skin is warm.      Capillary Refill: Capillary refill takes less than 2 seconds.      Coloration: Skin is sallow.   Neurological:      General: No focal deficit present.      Mental Status: She is oriented to person, place, and time.   Psychiatric:         Attention and Perception: She is attentive     Mood and Affect: Affect is normal     Speech: Speech normal.         Behavior: Behavior normal.         Thought Content: Thought content normal.     Condition on Discharge: Stable for discharge home    Discharge Disposition:  Home or Self Care    Discharge Medications:   "   Discharge Medications        New Medications        Instructions Start Date   losartan 25 MG tablet  Commonly known as: COZAAR  Replaces: olmesartan 40 MG tablet   25 mg, Oral, Every 24 Hours Scheduled      ticagrelor 90 MG tablet tablet  Commonly known as: BRILINTA   90 mg, Oral, 2 Times Daily             Changes to Medications        Instructions Start Date   atorvastatin 40 MG tablet  Commonly known as: LIPITOR  What changed:   medication strength  how much to take   40 mg, Oral, Daily      carvedilol 12.5 MG tablet  Commonly known as: COREG  What changed:   medication strength  how much to take  Another medication with the same name was removed. Continue taking this medication, and follow the directions you see here.   12.5 mg, Oral, 2 Times Daily With Meals             Continue These Medications        Instructions Start Date   albuterol sulfate  (90 Base) MCG/ACT inhaler  Commonly known as: PROVENTIL HFA;VENTOLIN HFA;PROAIR HFA   2 puffs, Inhalation, Every 4 Hours PRN      amitriptyline 25 MG tablet  Commonly known as: ELAVIL   25 mg, Nightly      amLODIPine 5 MG tablet  Commonly known as: NORVASC   5 mg, Oral, Daily      ARIPiprazole 15 MG tablet  Commonly known as: ABILIFY   15 mg, Daily      aspirin 81 MG EC tablet   81 mg, Oral, Daily      budesonide-formoterol 160-4.5 MCG/ACT inhaler  Commonly known as: SYMBICORT   2 puffs, Inhalation, 2 Times Daily - RT      cetirizine 10 MG tablet  Commonly known as: zyrTEC   10 mg, Daily      DULoxetine 60 MG capsule  Commonly known as: CYMBALTA   60 mg, Oral, Daily      hydroCHLOROthiazide 25 MG tablet   25 mg, Daily      LORazepam 1 MG tablet  Commonly known as: ATIVAN   0.5-1 mg, 2 Times Daily PRN      methocarbamol 500 MG tablet  Commonly known as: ROBAXIN   500 mg, Oral, 3 Times Daily PRN      pantoprazole 40 MG EC tablet  Commonly known as: PROTONIX   40 mg, Oral, 2 Times Daily      promethazine 25 MG tablet  Commonly known as: PHENERGAN   25 mg, Oral,  Every 6 Hours PRN      rizatriptan MLT 10 MG disintegrating tablet  Commonly known as: MAXALT-MLT   10 mg, Translingual, Once As Needed, May repeat in 2 hours if needed             Stop These Medications      cloNIDine 0.1 MG tablet  Commonly known as: CATAPRES     MEDROL (BEN) PO     olmesartan 40 MG tablet  Commonly known as: BENICAR  Replaced by: losartan 25 MG tablet     potassium chloride 10 MEQ CR tablet  Commonly known as: KLOR-CON M10              Discharge Diet:   Diet Instructions    Heart healthy         Activity at Discharge:   Activity Instructions       Activity as Tolerated              Discharge Instructions:   1.  Patient was instructed return for medical attention for any new or worsening chest pain, shortness of breath or palpitations.   2.  Patient was instructed to follow-up with PCP in 1 week.   3.  Patient was instructed to follow-up with Dr. Brooks per his recommendation.   4.  Patient was instructed on restrictions and care of heart catheterization site per cardiology.   5.  Patient was instructed verbally and with discharge instructions regarding all new medications.   6.  Patient was instructed extensively regarding smoking cessation and avenues to assist if needed.   7.  Patient was instructed per dietitian on cardiac diet.     Follow-up Appointments:   No future appointments.    Test Results Pending at Discharge: None  Electronically signed by SYLVIA Guevara, 02/03/25, 08:29 CST.    Time: 40 minutes.

## 2025-02-03 NOTE — NURSING NOTE
Patient and  verbally given discharge instructions, discussed home medications, s/s to return to er for and site care. Discussed follow up appt's. Patient and  verbalized understanding. Patient taken in wheelchair with staff and belongings to exit.

## 2025-02-03 NOTE — PROGRESS NOTES
[facility] -  Progress Note     LOS: 1 day   Patient Care Team:  Grzegorz Michel Jr., MD as PCP - General  Rommel Graves MD as Consulting Physician (Otolaryngology)  Danis Garcia MD as Consulting Physician (Gastroenterology)  Adair Muir MD as Consulting Physician (Pulmonary Disease)    Chief Complaint: Follow-up chest pain    Subjective     Interval History:   Overnight patient has done well.  She is not having any chest pain or shortness of breath.  Her catheterization site is stable.  Denies any significant dizziness or lightheadedness.      Review of Systems:     Review of Systems   Constitutional:  Negative for activity change, fatigue and fever.   HENT:  Negative for facial swelling, trouble swallowing and voice change.    Eyes:  Negative for visual disturbance.   Respiratory:  Negative for apnea, cough, shortness of breath and wheezing.    Cardiovascular:  Negative for chest pain and palpitations.   Gastrointestinal:  Negative for abdominal distention, abdominal pain, constipation and diarrhea.   Endocrine: Negative for polydipsia, polyphagia and polyuria.   Genitourinary:  Negative for difficulty urinating and flank pain.   Musculoskeletal:  Negative for back pain and joint swelling.   Skin:  Negative for color change.   Neurological:  Negative for dizziness, speech difficulty and numbness.   Hematological:  Does not bruise/bleed easily.     Objective     Vital Sign Min/Max for last 24 hours  Temp  Min: 97.5 °F (36.4 °C)  Max: 98.4 °F (36.9 °C)   BP  Min: 93/55  Max: 142/70   Pulse  Min: 65  Max: 93   Resp  Min: 14  Max: 20   SpO2  Min: 90 %  Max: 98 %   No data recorded   No data recorded         02/01/25  2208   Weight: 57.3 kg (126 lb 6.4 oz)       Telemetry:       Physical Exam:    Constitutional:       Appearance: Healthy appearance. Not in distress.   Pulmonary:      Effort: Pulmonary effort is normal.      Breath sounds: Normal breath sounds.    Cardiovascular:      PMI at left midclavicular line. Normal rate. Regular rhythm. Normal S1. Normal S2.       Murmurs: There is no murmur.      No gallop.  No click. No rub.      Comments: Right femoral groin site with dried blood.  No signs of oozing or hematoma.  Distal pulses intact.  Pulses:     Intact distal pulses.   Edema:     Peripheral edema absent.   Neurological:      Mental Status: Alert and oriented to person, place and time.       Results Review:   Lab Results (last 72 hours)       Procedure Component Value Units Date/Time    Basic Metabolic Panel [[id number]]  (Abnormal) Collected: 02/03/25 0633    Specimen: Blood Updated: 02/03/25 0709     Glucose 90 mg/dL      BUN 16 mg/dL      Creatinine 0.53 mg/dL      Sodium 138 mmol/L      Potassium 4.3 mmol/L      Chloride 107 mmol/L      CO2 20.0 mmol/L      Calcium 9.3 mg/dL      BUN/Creatinine Ratio 30.2     Anion Gap 11.0 mmol/L      eGFR 100.9 mL/min/1.73     Narrative:      GFR Categories in Chronic Kidney Disease (CKD)      GFR Category          GFR (mL/min/1.73)    Interpretation  G1                     90 or greater         Normal or high (1)  G2                      60-89                Mild decrease (1)  G3a                   45-59                Mild to moderate decrease  G3b                   30-44                Moderate to severe decrease  G4                    15-29                Severe decrease  G5                    14 or less           Kidney failure          (1)In the absence of evidence of kidney disease, neither GFR category G1 or G2 fulfill the criteria for CKD.    eGFR calculation 2021 CKD-EPI creatinine equation, which does not include race as a factor    CBC & Differential [[id number]]  (Abnormal) Collected: 02/03/25 0633    Specimen: Blood Updated: 02/03/25 0647    Narrative:      The following orders were created for panel order CBC & Differential.  Procedure                               Abnormality         Status                      ---------                               -----------         ------                     CBC Auto Differential[[id number]]        Abnormal            Final result                 Please view results for these tests on the individual orders.    CBC Auto Differential [[id number]]  (Abnormal) Collected: 02/03/25 0633    Specimen: Blood Updated: 02/03/25 0647     WBC 5.41 10*3/mm3      RBC 3.39 10*6/mm3      Hemoglobin 10.7 g/dL      Hematocrit 33.5 %      MCV 98.8 fL      MCH 31.6 pg      MCHC 31.9 g/dL      RDW 15.8 %      RDW-SD 56.8 fl      MPV 8.5 fL      Platelets 184 10*3/mm3      Neutrophil % 66.5 %      Lymphocyte % 17.9 %      Monocyte % 13.1 %      Eosinophil % 1.5 %      Basophil % 0.4 %      Immature Grans % 0.6 %      Neutrophils, Absolute 3.60 10*3/mm3      Lymphocytes, Absolute 0.97 10*3/mm3      Monocytes, Absolute 0.71 10*3/mm3      Eosinophils, Absolute 0.08 10*3/mm3      Basophils, Absolute 0.02 10*3/mm3      Immature Grans, Absolute 0.03 10*3/mm3      nRBC 0.0 /100 WBC     High Sensitivity Troponin T [[id number]]  (Abnormal) Collected: 02/02/25 0348    Specimen: Blood Updated: 02/02/25 0439     HS Troponin T 388 ng/L     Narrative:      High Sensitive Troponin T Reference Range:  <14.0 ng/L- Negative Female for AMI  <22.0 ng/L- Negative Male for AMI  >=14 - Abnormal Female indicating possible myocardial injury.  >=22 - Abnormal Male indicating possible myocardial injury.   Clinicians would have to utilize clinical acumen, EKG, Troponin, and serial changes to determine if it is an Acute Myocardial Infarction or myocardial injury due to an underlying chronic condition.         Basic Metabolic Panel [[id number]]  (Abnormal) Collected: 02/02/25 0348    Specimen: Blood Updated: 02/02/25 0430     Glucose 138 mg/dL      BUN 20 mg/dL      Creatinine 0.75 mg/dL      Sodium 141 mmol/L      Potassium 4.4 mmol/L      Chloride 112 mmol/L      CO2 19.0 mmol/L      Calcium 9.1 mg/dL      BUN/Creatinine Ratio 26.7      Anion Gap 10.0 mmol/L      eGFR 86.8 mL/min/1.73     Narrative:      GFR Categories in Chronic Kidney Disease (CKD)      GFR Category          GFR (mL/min/1.73)    Interpretation  G1                     90 or greater         Normal or high (1)  G2                      60-89                Mild decrease (1)  G3a                   45-59                Mild to moderate decrease  G3b                   30-44                Moderate to severe decrease  G4                    15-29                Severe decrease  G5                    14 or less           Kidney failure          (1)In the absence of evidence of kidney disease, neither GFR category G1 or G2 fulfill the criteria for CKD.    eGFR calculation 2021 CKD-EPI creatinine equation, which does not include race as a factor    Lipid Panel [[id number]]  (Abnormal) Collected: 02/02/25 0348    Specimen: Blood Updated: 02/02/25 0430     Total Cholesterol 154 mg/dL      Triglycerides 228 mg/dL      HDL Cholesterol 39 mg/dL      LDL Cholesterol  77 mg/dL      VLDL Cholesterol 38 mg/dL      LDL/HDL Ratio 1.78    Narrative:      Cholesterol Reference Ranges  (U.S. Department of Health and Human Services ATP III Classifications)    Desirable          <200 mg/dL  Borderline High    200-239 mg/dL  High Risk          >240 mg/dL      Triglyceride Reference Ranges  (U.S. Department of Health and Human Services ATP III Classifications)    Normal           <150 mg/dL  Borderline High  150-199 mg/dL  High             200-499 mg/dL  Very High        >500 mg/dL    HDL Reference Ranges  (U.S. Department of Health and Human Services ATP III Classifications)    Low     <40 mg/dl (major risk factor for CHD)  High    >60 mg/dl ('negative' risk factor for CHD)        LDL Reference Ranges  (U.S. Department of Health and Human Services ATP III Classifications)    Optimal          <100 mg/dL  Near Optimal     100-129 mg/dL  Borderline High  130-159 mg/dL  High             160-189 mg/dL  Very High         >189 mg/dL    LDL is calculated using the NIH LDL-C calculation.      Hemoglobin A1c [[id number]]  (Normal) Collected: 02/02/25 0348    Specimen: Blood Updated: 02/02/25 0426     Hemoglobin A1C 5.40 %     Narrative:      Hemoglobin A1C Ranges:    Increased Risk for Diabetes  5.7% to 6.4%  Diabetes                     >= 6.5%  Diabetic Goal                < 7.0%    CBC (No Diff) [[id number]]  (Abnormal) Collected: 02/02/25 0348    Specimen: Blood Updated: 02/02/25 0406     WBC 4.77 10*3/mm3      RBC 3.37 10*6/mm3      Hemoglobin 10.5 g/dL      Hematocrit 33.8 %      .3 fL      MCH 31.2 pg      MCHC 31.1 g/dL      RDW 15.9 %      RDW-SD 58.8 fl      MPV 8.3 fL      Platelets 214 10*3/mm3     High Sensitivity Troponin T 1Hr [[id number]]  (Abnormal) Collected: 02/01/25 2044    Specimen: Blood Updated: 02/01/25 2116     HS Troponin T 237 ng/L      Troponin T Numeric Delta 28 ng/L      Troponin T % Delta 13    Narrative:      High Sensitive Troponin T Reference Range:  <14.0 ng/L- Negative Female for AMI  <22.0 ng/L- Negative Male for AMI  >=14 - Abnormal Female indicating possible myocardial injury.  >=22 - Abnormal Male indicating possible myocardial injury.   Clinicians would have to utilize clinical acumen, EKG, Troponin, and serial changes to determine if it is an Acute Myocardial Infarction or myocardial injury due to an underlying chronic condition.         Urinalysis, Microscopic Only - Urine, Clean Catch [[id number]]  (Abnormal) Collected: 02/01/25 2101    Specimen: Urine, Clean Catch Updated: 02/01/25 2116     RBC, UA 0-2 /HPF      WBC, UA 6-10 /HPF      Bacteria, UA None Seen /HPF      Squamous Epithelial Cells, UA None Seen /HPF      Hyaline Casts, UA None Seen /LPF      Methodology Automated Microscopy    Urinalysis With Microscopic If Indicated (No Culture) - Urine, Clean Catch [[id number]]  (Abnormal) Collected: 02/01/25 2101    Specimen: Urine, Clean Catch Updated: 02/01/25 2116     Color, UA Yellow      Appearance, UA Clear     pH, UA 5.5     Specific Gravity, UA 1.025     Glucose, UA Negative     Ketones, UA Negative     Bilirubin, UA Negative     Blood, UA Negative     Protein, UA Negative     Leuk Esterase, UA Trace     Nitrite, UA Negative     Urobilinogen, UA 0.2 E.U./dL    COVID PRE-OP / PRE-PROCEDURE SCREENING ORDER (NO ISOLATION) - Swab, Nasal Cavity [[id number]]  (Normal) Collected: 02/01/25 1934    Specimen: Swab from Nasal Cavity Updated: 02/01/25 2050    Narrative:      The following orders were created for panel order COVID PRE-OP / PRE-PROCEDURE SCREENING ORDER (NO ISOLATION) - Swab, Nasal Cavity.  Procedure                               Abnormality         Status                     ---------                               -----------         ------                     COVID-19 and FLU A/B PCR...[[id number]]  Normal              Final result                 Please view results for these tests on the individual orders.    COVID-19 and FLU A/B PCR, 1 HR TAT - Swab, Nasopharynx [[id number]]  (Normal) Collected: 02/01/25 1934    Specimen: Swab from Nasopharynx Updated: 02/01/25 2050     COVID19 Not Detected     Influenza A PCR Not Detected     Influenza B PCR Not Detected    Narrative:      Fact sheet for providers: https://www.fda.gov/media/731931/download    Fact sheet for patients: https://www.fda.gov/media/079805/download    Test performed by PCR.    High Sensitivity Troponin T [[id number]]  (Abnormal) Collected: 02/01/25 1929    Specimen: Blood Updated: 02/01/25 2008     HS Troponin T 209 ng/L     Narrative:      High Sensitive Troponin T Reference Range:  <14.0 ng/L- Negative Female for AMI  <22.0 ng/L- Negative Male for AMI  >=14 - Abnormal Female indicating possible myocardial injury.  >=22 - Abnormal Male indicating possible myocardial injury.   Clinicians would have to utilize clinical acumen, EKG, Troponin, and serial changes to determine if it is an Acute Myocardial Infarction or myocardial  injury due to an underlying chronic condition.         Magnesium [[id number]]  (Normal) Collected: 02/01/25 1929    Specimen: Blood Updated: 02/01/25 2006     Magnesium 1.8 mg/dL     Comprehensive Metabolic Panel [[id number]]  (Abnormal) Collected: 02/01/25 1929    Specimen: Blood Updated: 02/01/25 2006     Glucose 129 mg/dL      BUN 24 mg/dL      Creatinine 0.87 mg/dL      Sodium 137 mmol/L      Potassium 4.8 mmol/L      Chloride 108 mmol/L      CO2 15.0 mmol/L      Calcium 9.6 mg/dL      Total Protein 7.2 g/dL      Albumin 4.4 g/dL      ALT (SGPT) 5 U/L      AST (SGOT) 10 U/L      Alkaline Phosphatase 63 U/L      Total Bilirubin <0.2 mg/dL      Globulin 2.8 gm/dL      A/G Ratio 1.6 g/dL      BUN/Creatinine Ratio 27.6     Anion Gap 14.0 mmol/L      eGFR 72.7 mL/min/1.73     Narrative:      GFR Categories in Chronic Kidney Disease (CKD)      GFR Category          GFR (mL/min/1.73)    Interpretation  G1                     90 or greater         Normal or high (1)  G2                      60-89                Mild decrease (1)  G3a                   45-59                Mild to moderate decrease  G3b                   30-44                Moderate to severe decrease  G4                    15-29                Severe decrease  G5                    14 or less           Kidney failure          (1)In the absence of evidence of kidney disease, neither GFR category G1 or G2 fulfill the criteria for CKD.    eGFR calculation 2021 CKD-EPI creatinine equation, which does not include race as a factor    D-dimer, Quantitative [[id number]]  (Abnormal) Collected: 02/01/25 1929    Specimen: Blood Updated: 02/01/25 2003     D-Dimer, Quantitative 0.98 MCGFEU/mL     Narrative:      According to the assay 's published package insert, a normal (<0.50 MCGFEU/mL) D-dimer result in conjunction with a non-high clinical probability assessment, excludes deep vein thrombosis (DVT) and pulmonary embolism (PE) with high  "sensitivity.    D-dimer values increase with age and this can make VTE exclusion of an older population difficult. To address this, the [employer], based on best available evidence and recent guidelines, recommends that clinicians use age-adjusted D-dimer thresholds in patients greater than 50 years of age with: a) a low probability of PE who do not meet all Pulmonary Embolism Rule Out Criteria, or b) in those with intermediate probability of PE.   The formula for an age-adjusted D-dimer cut-off is \"age/100\".  For example, a 60 year old patient would have an age-adjusted cut-off of 0.60 MCGFEU/mL and an 80 year old 0.80 MCGFEU/mL.    Lipase [[id number]]  (Normal) Collected: 02/01/25 1929    Specimen: Blood Updated: 02/01/25 2001     Lipase 25 U/L     [place] Draw [[id number]] Collected: 02/01/25 1929    Specimen: Blood Updated: 02/01/25 1947    Narrative:      The following orders were created for panel order [place] Draw.  Procedure                               Abnormality         Status                     ---------                               -----------         ------                     Green Top (Gel)[[id number]]                                  Final result               Lavender Top[[id number]]                                     Final result               Red Top[[id number]]                                          Final result               Shahid Top[[id number]]                                         Final result               Light Blue Top[[id number]]                                   Final result                 Please view results for these tests on the individual orders.    Lavender Top [[id number]] Collected: 02/01/25 1929    Specimen: Blood Updated: 02/01/25 1947     Extra Tube hold for add-on     Comment: Auto resulted       Green Top (Gel) [[id number]] Collected: 02/01/25 1929    Specimen: Blood Updated: 02/01/25 1947     Extra Tube Hold for add-ons.     Comment: Auto resulted.       " "Red Top [[id number]] Collected: 02/01/25 1929    Specimen: Blood Updated: 02/01/25 1947     Extra Tube Hold for add-ons.     Comment: Auto resulted.       Shahid Top [[id number]] Collected: 02/01/25 1929    Specimen: Blood Updated: 02/01/25 1947     Extra Tube Hold for add-ons.     Comment: Auto resulted.       Light Blue Top [[id number]] Collected: 02/01/25 1929    Specimen: Blood Updated: 02/01/25 1947     Extra Tube Hold for add-ons.     Comment: Auto resulted       CBC & Differential [[id number]]  (Abnormal) Collected: 02/01/25 1929    Specimen: Blood Updated: 02/01/25 1946    Narrative:      The following orders were created for panel order CBC & Differential.  Procedure                               Abnormality         Status                     ---------                               -----------         ------                     CBC Auto Differential[[id number]]        Abnormal            Final result                 Please view results for these tests on the individual orders.    CBC Auto Differential [[id number]]  (Abnormal) Collected: 02/01/25 1929    Specimen: Blood Updated: 02/01/25 1946     WBC 6.52 10*3/mm3      RBC 3.66 10*6/mm3      Hemoglobin 11.6 g/dL      Hematocrit 36.4 %      MCV 99.5 fL      MCH 31.7 pg      MCHC 31.9 g/dL      RDW 15.9 %      RDW-SD 58.0 fl      MPV 8.8 fL      Platelets 223 10*3/mm3      Neutrophil % 93.0 %      Lymphocyte % 4.4 %      Monocyte % 1.7 %      Eosinophil % 0.0 %      Basophil % 0.3 %      Immature Grans % 0.6 %      Neutrophils, Absolute 6.06 10*3/mm3      Lymphocytes, Absolute 0.29 10*3/mm3      Monocytes, Absolute 0.11 10*3/mm3      Eosinophils, Absolute 0.00 10*3/mm3      Basophils, Absolute 0.02 10*3/mm3      Immature Grans, Absolute 0.04 10*3/mm3      nRBC 0.0 /100 WBC                 Echo EF Estimated  No results found for: \"ECHOEFEST\"      Cath Ejection Fraction Quantitative  No results found for: \"CATHEF\"        Medication Review: yes  Current " Facility-Administered Medications   Medication Dose Route Frequency Provider Last Rate Last Admin    acetaminophen (TYLENOL) tablet 650 mg  650 mg Oral Q4H PRN Kp Velasquez MD        Or    acetaminophen (TYLENOL) 160 MG/5ML oral solution 650 mg  650 mg Oral Q4H PRN Kp Velasquez MD        Or    acetaminophen (TYLENOL) suppository 650 mg  650 mg Rectal Q4H PRN Kp Velasquez MD        albuterol (PROVENTIL) nebulizer solution 0.083% 2.5 mg/3mL  2.5 mg Nebulization Q6H PRN Elizabeth Ruiz APRN        amitriptyline (ELAVIL) tablet 25 mg  25 mg Oral Nightly Kp Velasquez MD   25 mg at 02/02/25 2137    amLODIPine (NORVASC) tablet 5 mg  5 mg Oral Daily Kp Velaqsuez MD        ARIPiprazole (ABILIFY) tablet 15 mg  15 mg Oral Daily Kp Velasquez MD   15 mg at 02/03/25 0809    aspirin EC tablet 81 mg  81 mg Oral Daily Kp Velasquez MD   81 mg at 02/03/25 0810    atorvastatin (LIPITOR) tablet 40 mg  40 mg Oral Daily Kp Velasquez MD   40 mg at 02/03/25 0809    sennosides-docusate (PERICOLACE) 8.6-50 MG per tablet 2 tablet  2 tablet Oral BID Kp Velasquez MD   2 tablet at 02/03/25 0809    And    polyethylene glycol (MIRALAX) packet 17 g  17 g Oral Daily PRN Kp Velasquez MD        And    bisacodyl (DULCOLAX) EC tablet 5 mg  5 mg Oral Daily PRN Kp Velasquez MD        And    bisacodyl (DULCOLAX) suppository 10 mg  10 mg Rectal Daily PRN Kp Velasquez MD        budesonide-formoterol (SYMBICORT) 160-4.5 MCG/ACT inhaler 2 puff  2 puff Inhalation BID - RT Kp Velasquez MD   2 puff at 02/03/25 0648    butalbital-acetaminophen-caffeine (FIORICET, ESGIC) -40 MG per tablet 1 tablet  1 tablet Oral Q4H PRN Federico Nicole MD        carvedilol (COREG) tablet 12.5 mg  12.5 mg Oral BID With Meals Kp Velasquez MD   12.5 mg at 02/02/25 1803    cetirizine (zyrTEC) tablet 10 mg  10 mg  Oral Daily Kp Velasquez MD   10 mg at 02/03/25 0810    diphenoxylate-atropine (LOMOTIL) 2.5-0.025 MG per tablet 1 tablet  1 tablet Oral 4x Daily PRN Kp Velasquez MD        DULoxetine (CYMBALTA) DR capsule 60 mg  60 mg Oral Daily Kp Velasquez MD   60 mg at 02/03/25 0810    guaiFENesin (MUCINEX) 12 hr tablet 1,200 mg  1,200 mg Oral Q12H Elizabeth Ruiz APRN   1,200 mg at 02/03/25 0809    hydroCHLOROthiazide tablet 25 mg  25 mg Oral Daily Elizabeth Ruiz APRN   25 mg at 02/02/25 1646    indomethacin (INDOCIN) capsule 50 mg  50 mg Oral BID With Meals Elizabeth Riuz APRN   50 mg at 02/03/25 0810    LORazepam (ATIVAN) tablet 1 mg  1 mg Oral BID PRN Kp Velasquez MD        losartan (COZAAR) tablet 100 mg  100 mg Oral Q24H Elizabeth Ruiz APRN   100 mg at 02/02/25 1646    methocarbamol (ROBAXIN) tablet 500 mg  500 mg Oral 4x Daily PRN Kp Velasquez MD        Morphine sulfate (PF) injection 1 mg  1 mg Intravenous Q4H PRN Kp Velasquez MD        And    naloxone (NARCAN) injection 0.4 mg  0.4 mg Intravenous Q5 Min PRN Kp Velasquez MD        nitroglycerin (NITROSTAT) SL tablet 0.4 mg  0.4 mg Sublingual Q5 Min PRN Kp Velasquez MD        ondansetron (ZOFRAN) injection 4 mg  4 mg Intravenous Q6H PRN Kp Velasquez MD        pantoprazole (PROTONIX) EC tablet 40 mg  40 mg Oral BID Kp Velasquez MD   40 mg at 02/03/25 0810    potassium chloride (MICRO-K/KLOR-CON) CR capsule  10 mEq Oral Daily Elizabeth Ruiz APRN   10 mEq at 02/03/25 0810    promethazine (PHENERGAN) tablet 25 mg  25 mg Oral Q6H PRN Kp Velasquez MD        [Transfer Hold] sodium chloride 0.9 % flush 10 mL  10 mL Intravenous PRN Wayne Richard DO        sodium chloride 0.9 % flush 10 mL  10 mL Intravenous Q12H Kp Velasquez MD   10 mL at 02/03/25 0810    sodium chloride 0.9 % flush 10 mL  10 mL Intravenous PRN  Kp Velasquez MD        sodium chloride 0.9 % infusion 40 mL  40 mL Intravenous PRN Kp Velasquez MD        SUMAtriptan (IMITREX) tablet 50 mg  50 mg Oral Once PRN Elizabeth Ruiz APRN        ticagrelor (BRILINTA) tablet 90 mg  90 mg Oral BID Elizabeth APRN   90 mg at 02/03/25 0809         Assessment & Plan     Non-STEMI  Coronary artery disease  Hypertension  GERD  COPD    -Patient to be discharged home today  - Continue on dual antiplatelet therapy of aspirin 81 mg daily as well as Brilinta 90 mg twice daily.  Unsure whether patient will be eligible for this long-term.  I told her to contact her office prior to running out as we may have to switch her to Plavix if Brilinta is unaffordable  - Continue losartan 100 mg daily  - Cardiac rehab at discharge  - Will order surface echocardiogram given ACS presentation, patient may go home after this is completed.      Electronically signed by YOLY Rodriguez, 02/03/25, 9:38 AM CST.

## 2025-02-03 NOTE — PLAN OF CARE
Problem: Adult Inpatient Plan of Care  Goal: Plan of Care Review  Outcome: Progressing  Goal: Patient-Specific Goal (Individualized)  Outcome: Progressing  Goal: Absence of Hospital-Acquired Illness or Injury  Outcome: Progressing  Intervention: Identify and Manage Fall Risk  Recent Flowsheet Documentation  Taken 2/3/2025 0810 by Maricruz Rivas RN  Safety Promotion/Fall Prevention: safety round/check completed  Taken 2/3/2025 0751 by Maricruz Rivas RN  Safety Promotion/Fall Prevention: safety round/check completed  Intervention: Prevent Skin Injury  Recent Flowsheet Documentation  Taken 2/3/2025 0810 by Maricruz Rivas RN  Body Position: position changed independently  Goal: Optimal Comfort and Wellbeing  Outcome: Progressing  Intervention: Provide Person-Centered Care  Recent Flowsheet Documentation  Taken 2/3/2025 0810 by Maricruz Rivas RN  Trust Relationship/Rapport:   care explained   choices provided   questions answered   reassurance provided   thoughts/feelings acknowledged  Goal: Readiness for Transition of Care  Outcome: Progressing     Problem: Comorbidity Management  Goal: Maintenance of COPD Symptom Control  Outcome: Progressing  Goal: Blood Pressure in Desired Range  Outcome: Progressing     Problem: Fall Injury Risk  Goal: Absence of Fall and Fall-Related Injury  Outcome: Progressing  Intervention: Promote Injury-Free Environment  Recent Flowsheet Documentation  Taken 2/3/2025 0810 by Maricruz Rivas RN  Safety Promotion/Fall Prevention: safety round/check completed  Taken 2/3/2025 0751 by Maricruz Rivas RN  Safety Promotion/Fall Prevention: safety round/check completed   Goal Outcome Evaluation:  Plan of Care Reviewed With: patient, spouse

## 2025-02-06 ENCOUNTER — READMISSION MANAGEMENT (OUTPATIENT)
Dept: CALL CENTER | Facility: HOSPITAL | Age: 70
End: 2025-02-06
Payer: MEDICARE

## 2025-02-06 NOTE — OUTREACH NOTE
AMI Week 1 Survey      Flowsheet Row Responses   [facility] patient discharged from? [place]   Does the patient have one of the following disease processes/diagnoses(primary or secondary)? Acute MI (STEMI,NSTEMI)   Week 1 attempt successful? Yes   Call start time 1659   Call end time 1704   Discharge diagnosis NSTEMI, heart cath with PCI x 4   Person spoke with today (if not patient) and relationship pt   Meds reviewed with patient/caregiver? Yes   Is the patient having any side effects they believe may be caused by any medication additions or changes? No   Does the patient have all prescriptions related to this admission filled (includes statins,anticoagulants,HTN meds,anti-arrhythmia meds) Yes   Is the patient taking all medications as directed (includes completed medication regime)? Yes   Does the patient have a primary care provider?  Yes   Does the patient have an appointment with their PCP,cardiologist,or clinic within 7 days of discharge? Greater than 7 days   What is preventing the patient from scheduling follow up appointments within 7 days of discharge? Earlier appointment not available   Has the patient kept scheduled appointments due by today? N/A   Psychosocial issues? No   Did the patient receive a copy of their discharge instructions? Yes   Nursing interventions Reviewed instructions with patient   What is the patient's perception of their health status since discharge? Improving   Nursing interventions Nurse provided patient education   Is the patient/caregiver able to teach back signs and symptoms of when to call for help immediately: Irregular or rapid heart rate, Dizziness or lightheadedness, Nausea or vomiting, Sudden sweating or clammy skin, Shortness of breath at any time, Sudden discomfort in arms, back, neck or jaw, Sudden chest discomfort   Nursing interventions Nurse provided patient education   Is the pateint /caregiver able to teach back the importance of cardiac rehab? Yes   Is the  patient/caregiver able to teach back lifestyle changes to help prevent MIs Quit smoking   Is the patient/caregiver able to teach back ways to prevent a second heart attack: Take medications, Follow up with MD   If the patient is a current smoker, are they able to teach back resources for cessation? [id number]   Is the patient/caregiver able to teach back the hierarchy of who to call/visit for symptoms/problems? PCP, Specialist, Home health nurse, Urgent Care, ED, 911 Yes   Week 1 call completed? Yes   Is the patient interested in additional calls from an ambulatory ? No   Would this patient benefit from a Referral to [facility] Social Work? No   Call end time 1700            ELSY PERKINS - Registered Nurse

## 2025-04-01 ENCOUNTER — OFFICE VISIT (OUTPATIENT)
Dept: CARDIOLOGY | Facility: CLINIC | Age: 70
End: 2025-04-01
Payer: MEDICARE

## 2025-04-01 VITALS
HEART RATE: 70 BPM | SYSTOLIC BLOOD PRESSURE: 135 MMHG | BODY MASS INDEX: 22.5 KG/M2 | WEIGHT: 127 LBS | HEIGHT: 63 IN | DIASTOLIC BLOOD PRESSURE: 75 MMHG | OXYGEN SATURATION: 96 % | RESPIRATION RATE: 18 BRPM

## 2025-04-01 DIAGNOSIS — I10 PRIMARY HYPERTENSION: ICD-10-CM

## 2025-04-01 DIAGNOSIS — Z72.0 TOBACCO USE: ICD-10-CM

## 2025-04-01 DIAGNOSIS — I25.10 CORONARY ARTERY DISEASE INVOLVING NATIVE CORONARY ARTERY OF NATIVE HEART WITHOUT ANGINA PECTORIS: Primary | ICD-10-CM

## 2025-04-01 DIAGNOSIS — E78.2 MIXED HYPERLIPIDEMIA: ICD-10-CM

## 2025-04-01 NOTE — PROGRESS NOTES
Subjective:     Encounter Date:04/01/2025      Patient ID: Heather Russo is a 69 y.o. female     Chief Complaint:  History of Present Illness  Patient presents today for follow up after admission in February. Patient in February was admitted for NSTEMI and chest pain. She underwent a heart cath which showed 70% stenosis of proximal RCA and 80-90% stenosis of mid RCA- with an aneurysmal segment followed by 80-90% distally. This was treated with 4 overlapping drug eluting stents. She had an echo which showed LVEF 61-65%. Patient has coronary artery disease, hypertension, COPD and GERD. She follows with DR. Michel as her PCP. Overall she is stable. Denies chest pain or shortness of breath. She does have chronic dizziness and nausea that are unchanged over several years. Compliant with medicine. She never started cardiac rehab. She continues to  smoke 2 pack per day.     The following portions of the patient's history were reviewed and updated as appropriate: allergies, current medications, past medical history, past social history, past and problem list.    Allergies   Allergen Reactions    Lyrica [Pregabalin] Swelling     LIPS AND FACE    Morphine Itching    Codeine Itching    Penicillins Unknown - Low Severity     CHILDHOOD ALLERGY         Current Outpatient Medications:     albuterol sulfate  (90 Base) MCG/ACT inhaler, Inhale 2 puffs Every 4 (Four) Hours As Needed for Wheezing., Disp: , Rfl:     amitriptyline (ELAVIL) 25 MG tablet, Take 1 tablet by mouth Every Night., Disp: , Rfl:     amLODIPine (NORVASC) 5 MG tablet, Take 1 tablet by mouth Daily., Disp: 90 tablet, Rfl: 3    ARIPiprazole (ABILIFY) 15 MG tablet, Take 1 tablet by mouth Daily., Disp: , Rfl:     aspirin 81 MG EC tablet, Take 1 tablet by mouth Daily., Disp: , Rfl:     atorvastatin (LIPITOR) 40 MG tablet, Take 1 tablet by mouth Daily., Disp: 90 tablet, Rfl: 3    budesonide-formoterol (SYMBICORT) 160-4.5 MCG/ACT inhaler, Inhale 2 puffs 2 (Two)  Times a Day., Disp: , Rfl: 0    carvedilol (COREG) 12.5 MG tablet, Take 1 tablet by mouth 2 (Two) Times a Day With Meals., Disp: , Rfl:     cetirizine (zyrTEC) 10 MG tablet, Take 1 tablet by mouth Daily., Disp: , Rfl:     DULoxetine (CYMBALTA) 60 MG capsule, Take 1 capsule by mouth Daily., Disp: , Rfl:     hydroCHLOROthiazide (HYDRODIURIL) 25 MG tablet, Take 1 tablet by mouth Daily., Disp: , Rfl:     LORazepam (ATIVAN) 1 MG tablet, Take 0.5-1 tablets by mouth 2 (Two) Times a Day As Needed for Anxiety (severe anxiety)., Disp: , Rfl:     losartan (COZAAR) 25 MG tablet, Take 1 tablet by mouth Daily., Disp: 30 tablet, Rfl: 2    methocarbamol (ROBAXIN) 500 MG tablet, Take 1 tablet by mouth 3 (Three) Times a Day As Needed for Muscle Spasms., Disp: , Rfl:     pantoprazole (PROTONIX) 40 MG EC tablet, Take 1 tablet by mouth 2 (Two) Times a Day., Disp: , Rfl:     promethazine (PHENERGAN) 25 MG tablet, Take 1 tablet by mouth Every 6 (Six) Hours As Needed for Nausea or Vomiting., Disp: , Rfl:     rizatriptan MLT (MAXALT-MLT) 10 MG disintegrating tablet, Place 1 tablet on the tongue 1 (One) Time As Needed for Migraine. May repeat in 2 hours if needed, Disp: , Rfl:     ticagrelor (BRILINTA) 90 MG tablet tablet, Take 1 tablet by mouth 2 (Two) Times a Day., Disp: 60 tablet, Rfl: 1    Social History     Socioeconomic History    Marital status:    Tobacco Use    Smoking status: Every Day     Current packs/day: 2.00     Average packs/day: 2.0 packs/day for 30.0 years (60.0 ttl pk-yrs)     Types: Cigarettes    Smokeless tobacco: Never   Vaping Use    Vaping status: Never Used   Substance and Sexual Activity    Alcohol use: No    Drug use: No    Sexual activity: Defer       Review of Systems   Constitutional: Positive for malaise/fatigue. Negative for chills, decreased appetite, fever, weight gain and weight loss.   HENT:  Negative for nosebleeds.    Eyes:  Negative for visual disturbance.   Cardiovascular:  Negative for chest  pain, dyspnea on exertion, leg swelling, near-syncope, orthopnea, palpitations, paroxysmal nocturnal dyspnea and syncope.   Respiratory:  Negative for cough, hemoptysis, shortness of breath and snoring.    Endocrine: Negative for cold intolerance and heat intolerance.   Hematologic/Lymphatic: Negative for bleeding problem. Does not bruise/bleed easily.   Skin:  Negative for rash.   Musculoskeletal:  Negative for back pain and falls.   Gastrointestinal:  Positive for nausea. Negative for abdominal pain, constipation, diarrhea, heartburn, melena and vomiting.   Genitourinary:  Negative for hematuria.   Neurological:  Positive for dizziness. Negative for headaches and light-headedness.   Psychiatric/Behavioral:  Negative for altered mental status.    Allergic/Immunologic: Negative for persistent infections.              Objective:     Constitutional:       Appearance: Healthy appearance. Not in distress.   Eyes:      Pupils: Pupils are equal, round, and reactive to light.   HENT:      Head: Normocephalic and atraumatic.      Nose: Nose normal.    Mouth/Throat:      Dentition: Normal.   Pulmonary:      Effort: Pulmonary effort is normal.      Breath sounds: Normal breath sounds.   Chest:      Chest wall: Not tender to palpatation.   Cardiovascular:      PMI at left midclavicular line. Normal rate. Regular rhythm.      Murmurs: There is no murmur.   Pulses:     Intact distal pulses.   Edema:     Peripheral edema absent.   Abdominal:      General: Bowel sounds are normal.      Palpations: Abdomen is soft.   Musculoskeletal: Normal range of motion.      Cervical back: Normal range of motion. Skin:     General: Skin is warm and dry.   Neurological:      Mental Status: Alert, oriented to person, place, and time and oriented to person, place and time.   Psychiatric:         Attention and Perception: Attention normal.         Mood and Affect: Mood normal.           Procedures  /75 (BP Location: Left arm, Patient  "Position: Sitting, Cuff Size: Adult)   Pulse 70   Resp 18   Ht 160 cm (63\")   Wt 57.6 kg (127 lb)   SpO2 96%   BMI 22.50 kg/m²   Lab Review:   I have reviewed       Lab Results   Component Value Date    CHOL 154 02/02/2025    TRIG 228 (H) 02/02/2025    HDL 39 (L) 02/02/2025    LDL 77 02/02/2025      Results for orders placed during the hospital encounter of 02/01/25    Adult Transthoracic Echo Complete W/ Cont if Necessary Per Protocol    Interpretation Summary    Left ventricular systolic function is normal. Left ventricular ejection fraction appears to be 61 - 65%.    Left ventricular diastolic function was normal.    The right ventricular cavity is borderline dilated with normal systolic function.    There is no significant (more than mild) valvular stenosis or regurgitation.     Assessment:          Diagnosis Plan   1. Coronary artery disease involving native coronary artery of native heart without angina pectoris        2. Mixed hyperlipidemia        3. Primary hypertension        4. Tobacco use               Plan:       Coronary Artery Disease- NSTEMI last month with heavily diseased RCA- treated with 4 overlapping stents. Continue Brilinta 90 mg BID and Aspirin 81 mg daily. Continue Coreg and Losartan. Continue Lipitor. Recommended to start cardiac rehab. The order was initially placed. She will reach out to [facility] to see if need another order. Also will reach out to pharmacy about cost of Brilinta.   Mixed Hyperlipidemia - She has been on Lipitor for some time. She cannot recall if she was on 20 or 40 of Lipitor at the time of her NSTEMI. Recheck lipid in 1 month  Hypertension - blood pressure well controlled.     Heather Florencia Russo  reports that she has been smoking cigarettes. She has a 60 pack-year smoking history. She has never used smokeless tobacco. I have educated her on the risk of diseases from using tobacco products such as cancer, COPD, and heart disease.     I advised her to quit and " she is not willing to quit.    I spent 4 minutes counseling the patient.             Follow up in 6 months

## 2025-04-21 ENCOUNTER — TELEPHONE (OUTPATIENT)
Dept: CARDIOLOGY | Facility: CLINIC | Age: 70
End: 2025-04-21
Payer: MEDICARE

## 2025-04-21 NOTE — TELEPHONE ENCOUNTER
Caller: Barber Russo    Relationship to patient: Emergency Contact    Best call back number: [phone]    Chief complaint: 3 DIFFERENT EPISODES OF BP DROPPING SUPER LOW SINCE SEEING BRI LAST. AS LOW AS 60/40, PT HAS GONE TO THE ER WITH ONE OF THE EPISODES. WHEN HER BP DROPS THIS SUDDEN SHE GETS DIZZY AND SICK TO STOMACH     Type of visit: FU    Requested date: ASAP     Additional notes:PT'S  IS REQUESTING TO GET PT IN ASAP, PLEASE ADVISE AND CALL HIM BACK TO OFFER AN APPT WHEN POSSIBLE.

## 2025-04-29 ENCOUNTER — OFFICE VISIT (OUTPATIENT)
Dept: CARDIOLOGY | Facility: CLINIC | Age: 70
End: 2025-04-29
Payer: MEDICARE

## 2025-04-29 VITALS
HEIGHT: 63 IN | SYSTOLIC BLOOD PRESSURE: 115 MMHG | BODY MASS INDEX: 22.32 KG/M2 | DIASTOLIC BLOOD PRESSURE: 68 MMHG | OXYGEN SATURATION: 96 % | RESPIRATION RATE: 18 BRPM | WEIGHT: 126 LBS | HEART RATE: 75 BPM

## 2025-04-29 DIAGNOSIS — R42 DIZZINESS: Primary | ICD-10-CM

## 2025-04-29 DIAGNOSIS — E78.2 MIXED HYPERLIPIDEMIA: ICD-10-CM

## 2025-04-29 DIAGNOSIS — I10 PRIMARY HYPERTENSION: ICD-10-CM

## 2025-04-29 DIAGNOSIS — Z72.0 TOBACCO USE: ICD-10-CM

## 2025-04-29 DIAGNOSIS — I25.118 CORONARY ARTERY DISEASE OF NATIVE ARTERY OF NATIVE HEART WITH STABLE ANGINA PECTORIS: ICD-10-CM

## 2025-04-29 PROCEDURE — 1160F RVW MEDS BY RX/DR IN RCRD: CPT | Performed by: NURSE PRACTITIONER

## 2025-04-29 PROCEDURE — 3074F SYST BP LT 130 MM HG: CPT | Performed by: NURSE PRACTITIONER

## 2025-04-29 PROCEDURE — 99214 OFFICE O/P EST MOD 30 MIN: CPT | Performed by: NURSE PRACTITIONER

## 2025-04-29 PROCEDURE — 3078F DIAST BP <80 MM HG: CPT | Performed by: NURSE PRACTITIONER

## 2025-04-29 PROCEDURE — 1159F MED LIST DOCD IN RCRD: CPT | Performed by: NURSE PRACTITIONER

## 2025-04-29 NOTE — PROGRESS NOTES
Subjective:     Encounter Date:04/29/2025      Patient ID: Heather uRsso is a 69 y.o. female     Chief Complaint:  History of Present Illness  Patient presents today for hypotension. Patient was seen earlier this month. Patient had an admission in February of this year for NSTEMI. She underwent a heart cath which showed 70% stenosis of proximal RCA and 80-90% stenosis of mid RCA- with an aneurysmal segment followed by 80-90% distally. This was treated with 4 overlapping drug eluting stents. She had an echo which showed LVEF 61-65%. Patient has coronary artery disease, hypertension, COPD and GERD. She follows with Dr. Michel as her PCP. Continue to smoke 2 packs per day. Overall she is stable. She feels like her BP has been better since stopping Amlodipine. Still feels like she has episodes of low blood pressure. Denies chest pain. Chronic shortness of breath. She drinks a lot of soda and minimal water.       The following portions of the patient's history were reviewed and updated as appropriate: allergies, current medications, past medical history, past social history, past and problem list.    Allergies   Allergen Reactions    Lyrica [Pregabalin] Swelling     LIPS AND FACE    Morphine Itching    Codeine Itching    Penicillins Unknown - Low Severity     CHILDHOOD ALLERGY         Current Outpatient Medications:     albuterol sulfate  (90 Base) MCG/ACT inhaler, Inhale 2 puffs Every 4 (Four) Hours As Needed for Wheezing., Disp: , Rfl:     amitriptyline (ELAVIL) 25 MG tablet, Take 1 tablet by mouth Every Night., Disp: , Rfl:     ARIPiprazole (ABILIFY) 15 MG tablet, Take 1 tablet by mouth Daily., Disp: , Rfl:     aspirin 81 MG EC tablet, Take 1 tablet by mouth Daily., Disp: , Rfl:     atorvastatin (LIPITOR) 40 MG tablet, Take 1 tablet by mouth Daily., Disp: 90 tablet, Rfl: 3    budesonide-formoterol (SYMBICORT) 160-4.5 MCG/ACT inhaler, Inhale 2 puffs 2 (Two) Times a Day., Disp: , Rfl: 0    carvedilol (COREG)  12.5 MG tablet, Take 1 tablet by mouth 2 (Two) Times a Day With Meals., Disp: , Rfl:     cetirizine (zyrTEC) 10 MG tablet, Take 1 tablet by mouth Daily., Disp: , Rfl:     DULoxetine (CYMBALTA) 60 MG capsule, Take 1 capsule by mouth Daily., Disp: , Rfl:     hydroCHLOROthiazide (HYDRODIURIL) 25 MG tablet, Take 0.5 tablets by mouth Daily., Disp: , Rfl:     LORazepam (ATIVAN) 1 MG tablet, Take 0.5-1 tablets by mouth 2 (Two) Times a Day As Needed for Anxiety (severe anxiety)., Disp: , Rfl:     losartan (COZAAR) 25 MG tablet, Take 1 tablet by mouth Daily., Disp: 30 tablet, Rfl: 2    methocarbamol (ROBAXIN) 500 MG tablet, Take 1 tablet by mouth 3 (Three) Times a Day As Needed for Muscle Spasms., Disp: , Rfl:     pantoprazole (PROTONIX) 40 MG EC tablet, Take 1 tablet by mouth 2 (Two) Times a Day., Disp: , Rfl:     promethazine (PHENERGAN) 25 MG tablet, Take 1 tablet by mouth Every 6 (Six) Hours As Needed for Nausea or Vomiting., Disp: , Rfl:     rizatriptan MLT (MAXALT-MLT) 10 MG disintegrating tablet, Place 1 tablet on the tongue 1 (One) Time As Needed for Migraine. May repeat in 2 hours if needed, Disp: , Rfl:     ticagrelor (BRILINTA) 90 MG tablet tablet, Take 1 tablet by mouth 2 (Two) Times a Day., Disp: 60 tablet, Rfl: 11    Social History     Socioeconomic History    Marital status:    Tobacco Use    Smoking status: Every Day     Current packs/day: 2.00     Average packs/day: 2.0 packs/day for 30.0 years (60.0 ttl pk-yrs)     Types: Cigarettes    Smokeless tobacco: Never   Vaping Use    Vaping status: Never Used   Substance and Sexual Activity    Alcohol use: No    Drug use: No    Sexual activity: Defer       Review of Systems   Constitutional: Positive for malaise/fatigue. Negative for chills, decreased appetite, fever, weight gain and weight loss.   HENT:  Negative for nosebleeds.    Eyes:  Negative for visual disturbance.   Cardiovascular:  Negative for chest pain, dyspnea on exertion, leg swelling,  near-syncope, orthopnea, palpitations, paroxysmal nocturnal dyspnea and syncope.   Respiratory:  Negative for cough, hemoptysis, shortness of breath and snoring.    Endocrine: Negative for cold intolerance and heat intolerance.   Hematologic/Lymphatic: Negative for bleeding problem. Does not bruise/bleed easily.   Skin:  Negative for rash.   Musculoskeletal:  Negative for back pain and falls.   Gastrointestinal:  Positive for nausea. Negative for abdominal pain, constipation, diarrhea, heartburn, melena and vomiting.   Genitourinary:  Negative for hematuria.   Neurological:  Positive for dizziness and light-headedness. Negative for headaches.   Psychiatric/Behavioral:  Negative for altered mental status.    Allergic/Immunologic: Negative for persistent infections.              Objective:     Constitutional:       Appearance: Healthy appearance. Well-developed and not in distress. Frail.   Eyes:      Pupils: Pupils are equal, round, and reactive to light.   HENT:      Head: Normocephalic and atraumatic.      Nose: Nose normal.    Mouth/Throat:      Dentition: Normal.   Neck:      Vascular: No carotid bruit or JVD.   Pulmonary:      Effort: Pulmonary effort is normal.      Breath sounds: Normal breath sounds.   Chest:      Chest wall: Not tender to palpatation.   Cardiovascular:      PMI at left midclavicular line. Normal rate. Regular rhythm.      Murmurs: There is no murmur.   Pulses:     Intact distal pulses.   Edema:     Peripheral edema absent.   Abdominal:      General: Bowel sounds are normal.      Palpations: Abdomen is soft.   Musculoskeletal: Normal range of motion.      Cervical back: Normal range of motion and neck supple. Skin:     General: Skin is warm and dry.   Neurological:      Mental Status: Alert, oriented to person, place, and time and oriented to person, place and time.      Deep Tendon Reflexes: Reflexes are normal and symmetric.   Psychiatric:         Attention and Perception: Attention normal.  "        Mood and Affect: Mood normal.         Behavior: Behavior normal.         Thought Content: Thought content normal.         Judgment: Judgment normal.           Procedures  /68 (BP Location: Left arm, Patient Position: Sitting, Cuff Size: Adult)   Pulse 75   Resp 18   Ht 160 cm (63\")   Wt 57.2 kg (126 lb)   SpO2 96%   BMI 22.32 kg/m²   Lab Review:   I have reviewed         Lab Results   Component Value Date    CHOL 154 02/02/2025    TRIG 228 (H) 02/02/2025    HDL 39 (L) 02/02/2025    LDL 77 02/02/2025      Results for orders placed during the hospital encounter of 02/01/25    Adult Transthoracic Echo Complete W/ Cont if Necessary Per Protocol    Interpretation Summary    Left ventricular systolic function is normal. Left ventricular ejection fraction appears to be 61 - 65%.    Left ventricular diastolic function was normal.    The right ventricular cavity is borderline dilated with normal systolic function.    There is no significant (more than mild) valvular stenosis or regurgitation.     Assessment:          Diagnosis Plan   1. Dizziness        2. Coronary artery disease of native artery of native heart with stable angina pectoris        3. Mixed hyperlipidemia        4. Primary hypertension        5. Tobacco use                 Plan:       Dizziness - some improved off Amlodipine. Reduce HCTZ. Increase water intake. Follow up in 6 weeks. Keep BP  log.   Coronary Artery Disease - NSTEMI in February with heavily disease RCA- treated with 4 overlapping drug eluting stents. Continue Brilinta and Aspirin. Continue Lipitor. Denies chest pain. Chronic shortness of breath.   Mixed Hyperlipidemia- Continue Lipitor. Will monitor  Hypertension - blood pressure borderline low. Reduce HCTZ. Stay hydrated. Continue Coreg and Losartan. Amlodipine reduced.     Heather Florencia Russo  reports that she has been smoking cigarettes. She has a 60 pack-year smoking history. She has never used smokeless tobacco. I have " educated her on the risk of diseases from using tobacco products such as cancer, COPD, and heart disease.     I advised her to quit and she is not willing to quit.    I spent 5 minutes counseling the patient.        Follow up in 6 weeks.

## 2025-05-02 ENCOUNTER — APPOINTMENT (OUTPATIENT)
Dept: CT IMAGING | Facility: HOSPITAL | Age: 70
End: 2025-05-02
Payer: MEDICARE

## 2025-05-02 ENCOUNTER — HOSPITAL ENCOUNTER (OUTPATIENT)
Facility: HOSPITAL | Age: 70
Setting detail: OBSERVATION
Discharge: HOME | End: 2025-05-04
Attending: EMERGENCY MEDICINE | Admitting: STUDENT IN AN ORGANIZED HEALTH CARE EDUCATION/TRAINING PROGRAM
Payer: MEDICARE

## 2025-05-02 ENCOUNTER — APPOINTMENT (OUTPATIENT)
Dept: GENERAL RADIOLOGY | Facility: HOSPITAL | Age: 70
End: 2025-05-02
Payer: MEDICARE

## 2025-05-02 DIAGNOSIS — E86.0 DEHYDRATION: ICD-10-CM

## 2025-05-02 DIAGNOSIS — N17.9 AKI (ACUTE KIDNEY INJURY): ICD-10-CM

## 2025-05-02 DIAGNOSIS — R55 SYNCOPE AND COLLAPSE: Primary | ICD-10-CM

## 2025-05-02 PROCEDURE — 71046 X-RAY EXAM CHEST 2 VIEWS: CPT

## 2025-05-02 PROCEDURE — 84443 ASSAY THYROID STIM HORMONE: CPT | Performed by: EMERGENCY MEDICINE

## 2025-05-02 PROCEDURE — 25810000003 LACTATED RINGERS SOLUTION: Performed by: EMERGENCY MEDICINE

## 2025-05-02 PROCEDURE — 83735 ASSAY OF MAGNESIUM: CPT | Performed by: EMERGENCY MEDICINE

## 2025-05-02 PROCEDURE — 85025 COMPLETE CBC W/AUTO DIFF WBC: CPT | Performed by: EMERGENCY MEDICINE

## 2025-05-02 PROCEDURE — 99285 EMERGENCY DEPT VISIT HI MDM: CPT | Performed by: EMERGENCY MEDICINE

## 2025-05-02 PROCEDURE — 83605 ASSAY OF LACTIC ACID: CPT | Performed by: EMERGENCY MEDICINE

## 2025-05-02 PROCEDURE — 80053 COMPREHEN METABOLIC PANEL: CPT | Performed by: EMERGENCY MEDICINE

## 2025-05-02 PROCEDURE — 93005 ELECTROCARDIOGRAM TRACING: CPT | Performed by: EMERGENCY MEDICINE

## 2025-05-02 PROCEDURE — 84484 ASSAY OF TROPONIN QUANT: CPT | Performed by: EMERGENCY MEDICINE

## 2025-05-02 PROCEDURE — P9612 CATHETERIZE FOR URINE SPEC: HCPCS

## 2025-05-02 PROCEDURE — 83690 ASSAY OF LIPASE: CPT | Performed by: EMERGENCY MEDICINE

## 2025-05-02 RX ORDER — SODIUM CHLORIDE 0.9 % (FLUSH) 0.9 %
10 SYRINGE (ML) INJECTION AS NEEDED
Status: DISCONTINUED | OUTPATIENT
Start: 2025-05-02 | End: 2025-05-04 | Stop reason: HOSPADM

## 2025-05-02 RX ADMIN — SODIUM CHLORIDE, POTASSIUM CHLORIDE, SODIUM LACTATE AND CALCIUM CHLORIDE 1000 ML: 600; 310; 30; 20 INJECTION, SOLUTION INTRAVENOUS at 23:50

## 2025-05-03 ENCOUNTER — APPOINTMENT (OUTPATIENT)
Dept: CT IMAGING | Facility: HOSPITAL | Age: 70
End: 2025-05-03
Payer: MEDICARE

## 2025-05-03 PROBLEM — R55 SYNCOPE AND COLLAPSE: Status: ACTIVE | Noted: 2025-05-03

## 2025-05-03 LAB
ALBUMIN SERPL-MCNC: 3.8 G/DL (ref 3.5–5.2)
ALBUMIN SERPL-MCNC: 4.4 G/DL (ref 3.5–5.2)
ALBUMIN/GLOB SERPL: 1.6 G/DL
ALBUMIN/GLOB SERPL: 1.7 G/DL
ALP SERPL-CCNC: 69 U/L (ref 39–117)
ALP SERPL-CCNC: 79 U/L (ref 39–117)
ALT SERPL W P-5'-P-CCNC: 6 U/L (ref 1–33)
ALT SERPL W P-5'-P-CCNC: 7 U/L (ref 1–33)
ANION GAP SERPL CALCULATED.3IONS-SCNC: 13 MMOL/L (ref 5–15)
ANION GAP SERPL CALCULATED.3IONS-SCNC: 20 MMOL/L (ref 5–15)
AST SERPL-CCNC: 10 U/L (ref 1–32)
AST SERPL-CCNC: 8 U/L (ref 1–32)
BACTERIA UR QL AUTO: ABNORMAL /HPF
BASOPHILS # BLD AUTO: 0.05 10*3/MM3 (ref 0–0.2)
BASOPHILS NFR BLD AUTO: 0.5 % (ref 0–1.5)
BILIRUB SERPL-MCNC: 0.2 MG/DL (ref 0–1.2)
BILIRUB SERPL-MCNC: 0.3 MG/DL (ref 0–1.2)
BILIRUB UR QL STRIP: NEGATIVE
BUN SERPL-MCNC: 32 MG/DL (ref 8–23)
BUN SERPL-MCNC: 33 MG/DL (ref 8–23)
BUN/CREAT SERPL: 17.1 (ref 7–25)
BUN/CREAT SERPL: 22.7 (ref 7–25)
CALCIUM SPEC-SCNC: 8.6 MG/DL (ref 8.6–10.5)
CALCIUM SPEC-SCNC: 9.5 MG/DL (ref 8.6–10.5)
CHLORIDE SERPL-SCNC: 101 MMOL/L (ref 98–107)
CHLORIDE SERPL-SCNC: 104 MMOL/L (ref 98–107)
CLARITY UR: ABNORMAL
CO2 SERPL-SCNC: 15 MMOL/L (ref 22–29)
CO2 SERPL-SCNC: 20 MMOL/L (ref 22–29)
COLOR UR: YELLOW
CREAT SERPL-MCNC: 1.41 MG/DL (ref 0.57–1)
CREAT SERPL-MCNC: 1.93 MG/DL (ref 0.57–1)
D-LACTATE SERPL-SCNC: 1.1 MMOL/L (ref 0.5–2)
DEPRECATED RDW RBC AUTO: 47.4 FL (ref 37–54)
DEPRECATED RDW RBC AUTO: 49 FL (ref 37–54)
EGFRCR SERPLBLD CKD-EPI 2021: 27.9 ML/MIN/1.73
EGFRCR SERPLBLD CKD-EPI 2021: 40.7 ML/MIN/1.73
EOSINOPHIL # BLD AUTO: 0.09 10*3/MM3 (ref 0–0.4)
EOSINOPHIL NFR BLD AUTO: 0.9 % (ref 0.3–6.2)
ERYTHROCYTE [DISTWIDTH] IN BLOOD BY AUTOMATED COUNT: 14.1 % (ref 12.3–15.4)
ERYTHROCYTE [DISTWIDTH] IN BLOOD BY AUTOMATED COUNT: 14.2 % (ref 12.3–15.4)
GEN 5 1HR TROPONIN T REFLEX: 27 NG/L
GLOBULIN UR ELPH-MCNC: 2.3 GM/DL
GLOBULIN UR ELPH-MCNC: 2.8 GM/DL
GLUCOSE SERPL-MCNC: 170 MG/DL (ref 65–99)
GLUCOSE SERPL-MCNC: 83 MG/DL (ref 65–99)
GLUCOSE UR STRIP-MCNC: NEGATIVE MG/DL
HCT VFR BLD AUTO: 35.2 % (ref 34–46.6)
HCT VFR BLD AUTO: 40.3 % (ref 34–46.6)
HGB BLD-MCNC: 11.2 G/DL (ref 12–15.9)
HGB BLD-MCNC: 12.5 G/DL (ref 12–15.9)
HGB UR QL STRIP.AUTO: ABNORMAL
HYALINE CASTS UR QL AUTO: ABNORMAL /LPF
IMM GRANULOCYTES # BLD AUTO: 0.07 10*3/MM3 (ref 0–0.05)
IMM GRANULOCYTES NFR BLD AUTO: 0.7 % (ref 0–0.5)
KETONES UR QL STRIP: ABNORMAL
LEUKOCYTE ESTERASE UR QL STRIP.AUTO: ABNORMAL
LIPASE SERPL-CCNC: 16 U/L (ref 13–60)
LYMPHOCYTES # BLD AUTO: 1 10*3/MM3 (ref 0.7–3.1)
LYMPHOCYTES NFR BLD AUTO: 9.9 % (ref 19.6–45.3)
MAGNESIUM SERPL-MCNC: 1.9 MG/DL (ref 1.6–2.4)
MCH RBC QN AUTO: 29.7 PG (ref 26.6–33)
MCH RBC QN AUTO: 29.7 PG (ref 26.6–33)
MCHC RBC AUTO-ENTMCNC: 31 G/DL (ref 31.5–35.7)
MCHC RBC AUTO-ENTMCNC: 31.8 G/DL (ref 31.5–35.7)
MCV RBC AUTO: 93.4 FL (ref 79–97)
MCV RBC AUTO: 95.7 FL (ref 79–97)
MONOCYTES # BLD AUTO: 1.29 10*3/MM3 (ref 0.1–0.9)
MONOCYTES NFR BLD AUTO: 12.7 % (ref 5–12)
NEUTROPHILS NFR BLD AUTO: 7.63 10*3/MM3 (ref 1.7–7)
NEUTROPHILS NFR BLD AUTO: 75.3 % (ref 42.7–76)
NITRITE UR QL STRIP: NEGATIVE
NRBC BLD AUTO-RTO: 0 /100 WBC (ref 0–0.2)
PH UR STRIP.AUTO: <=5 [PH] (ref 5–8)
PLATELET # BLD AUTO: 174 10*3/MM3 (ref 140–450)
PLATELET # BLD AUTO: 215 10*3/MM3 (ref 140–450)
PMV BLD AUTO: 10 FL (ref 6–12)
PMV BLD AUTO: 10.1 FL (ref 6–12)
POTASSIUM SERPL-SCNC: 3.6 MMOL/L (ref 3.5–5.2)
POTASSIUM SERPL-SCNC: 4.4 MMOL/L (ref 3.5–5.2)
PROT SERPL-MCNC: 6.1 G/DL (ref 6–8.5)
PROT SERPL-MCNC: 7.2 G/DL (ref 6–8.5)
PROT UR QL STRIP: ABNORMAL
RBC # BLD AUTO: 3.77 10*6/MM3 (ref 3.77–5.28)
RBC # BLD AUTO: 4.21 10*6/MM3 (ref 3.77–5.28)
RBC # UR STRIP: ABNORMAL /HPF
REF LAB TEST METHOD: ABNORMAL
SODIUM SERPL-SCNC: 136 MMOL/L (ref 136–145)
SODIUM SERPL-SCNC: 137 MMOL/L (ref 136–145)
SP GR UR STRIP: 1.02 (ref 1–1.03)
SQUAMOUS #/AREA URNS HPF: ABNORMAL /HPF
TROPONIN T % DELTA: -7
TROPONIN T NUMERIC DELTA: -2 NG/L
TROPONIN T SERPL HS-MCNC: 29 NG/L
TSH SERPL DL<=0.05 MIU/L-ACNC: 0.46 UIU/ML (ref 0.27–4.2)
TSH SERPL DL<=0.05 MIU/L-ACNC: 0.76 UIU/ML (ref 0.27–4.2)
UROBILINOGEN UR QL STRIP: ABNORMAL
VIT B12 BLD-MCNC: 331 PG/ML (ref 211–946)
WBC # UR STRIP: ABNORMAL /HPF
WBC CLUMPS # UR AUTO: ABNORMAL /HPF
WBC NRBC COR # BLD AUTO: 10.13 10*3/MM3 (ref 3.4–10.8)
WBC NRBC COR # BLD AUTO: 6.53 10*3/MM3 (ref 3.4–10.8)
YEAST URNS QL MICRO: ABNORMAL /HPF

## 2025-05-03 PROCEDURE — 85027 COMPLETE CBC AUTOMATED: CPT

## 2025-05-03 PROCEDURE — 97165 OT EVAL LOW COMPLEX 30 MIN: CPT

## 2025-05-03 PROCEDURE — G0378 HOSPITAL OBSERVATION PER HR: HCPCS

## 2025-05-03 PROCEDURE — 70450 CT HEAD/BRAIN W/O DYE: CPT

## 2025-05-03 PROCEDURE — 81001 URINALYSIS AUTO W/SCOPE: CPT | Performed by: EMERGENCY MEDICINE

## 2025-05-03 PROCEDURE — P9612 CATHETERIZE FOR URINE SPEC: HCPCS

## 2025-05-03 PROCEDURE — 84484 ASSAY OF TROPONIN QUANT: CPT | Performed by: EMERGENCY MEDICINE

## 2025-05-03 PROCEDURE — 25010000002 CEFTRIAXONE PER 250 MG

## 2025-05-03 PROCEDURE — 87086 URINE CULTURE/COLONY COUNT: CPT | Performed by: STUDENT IN AN ORGANIZED HEALTH CARE EDUCATION/TRAINING PROGRAM

## 2025-05-03 PROCEDURE — 84443 ASSAY THYROID STIM HORMONE: CPT

## 2025-05-03 PROCEDURE — 96365 THER/PROPH/DIAG IV INF INIT: CPT

## 2025-05-03 PROCEDURE — 87040 BLOOD CULTURE FOR BACTERIA: CPT

## 2025-05-03 PROCEDURE — 25810000003 SODIUM CHLORIDE 0.9 % SOLUTION

## 2025-05-03 PROCEDURE — 82607 VITAMIN B-12: CPT

## 2025-05-03 PROCEDURE — 25810000003 LACTATED RINGERS SOLUTION: Performed by: EMERGENCY MEDICINE

## 2025-05-03 PROCEDURE — 80053 COMPREHEN METABOLIC PANEL: CPT

## 2025-05-03 PROCEDURE — 94799 UNLISTED PULMONARY SVC/PX: CPT

## 2025-05-03 RX ORDER — SODIUM CHLORIDE 9 MG/ML
100 INJECTION, SOLUTION INTRAVENOUS CONTINUOUS
Status: DISPENSED | OUTPATIENT
Start: 2025-05-03 | End: 2025-05-03

## 2025-05-03 RX ORDER — SODIUM CHLORIDE 0.9 % (FLUSH) 0.9 %
10 SYRINGE (ML) INJECTION AS NEEDED
Status: DISCONTINUED | OUTPATIENT
Start: 2025-05-03 | End: 2025-05-04 | Stop reason: HOSPADM

## 2025-05-03 RX ORDER — ACETAMINOPHEN 650 MG/1
650 SUPPOSITORY RECTAL EVERY 4 HOURS PRN
Status: DISCONTINUED | OUTPATIENT
Start: 2025-05-03 | End: 2025-05-04 | Stop reason: HOSPADM

## 2025-05-03 RX ORDER — IPRATROPIUM BROMIDE AND ALBUTEROL SULFATE 2.5; .5 MG/3ML; MG/3ML
3 SOLUTION RESPIRATORY (INHALATION)
Status: DISCONTINUED | OUTPATIENT
Start: 2025-05-03 | End: 2025-05-03

## 2025-05-03 RX ORDER — ACETAMINOPHEN 325 MG/1
650 TABLET ORAL EVERY 4 HOURS PRN
Status: DISCONTINUED | OUTPATIENT
Start: 2025-05-03 | End: 2025-05-04 | Stop reason: HOSPADM

## 2025-05-03 RX ORDER — SODIUM CHLORIDE 9 MG/ML
40 INJECTION, SOLUTION INTRAVENOUS AS NEEDED
Status: DISCONTINUED | OUTPATIENT
Start: 2025-05-03 | End: 2025-05-04 | Stop reason: HOSPADM

## 2025-05-03 RX ORDER — ASPIRIN 81 MG/1
81 TABLET ORAL DAILY
Status: DISCONTINUED | OUTPATIENT
Start: 2025-05-03 | End: 2025-05-04 | Stop reason: HOSPADM

## 2025-05-03 RX ORDER — NITROGLYCERIN 0.4 MG/1
0.4 TABLET SUBLINGUAL
Status: DISCONTINUED | OUTPATIENT
Start: 2025-05-03 | End: 2025-05-04 | Stop reason: HOSPADM

## 2025-05-03 RX ORDER — PANTOPRAZOLE SODIUM 40 MG/1
40 TABLET, DELAYED RELEASE ORAL 2 TIMES DAILY
Status: DISCONTINUED | OUTPATIENT
Start: 2025-05-03 | End: 2025-05-04 | Stop reason: HOSPADM

## 2025-05-03 RX ORDER — ATORVASTATIN CALCIUM 40 MG/1
40 TABLET, FILM COATED ORAL DAILY
Status: DISCONTINUED | OUTPATIENT
Start: 2025-05-03 | End: 2025-05-04 | Stop reason: HOSPADM

## 2025-05-03 RX ORDER — SODIUM CHLORIDE 0.9 % (FLUSH) 0.9 %
10 SYRINGE (ML) INJECTION EVERY 12 HOURS SCHEDULED
Status: DISCONTINUED | OUTPATIENT
Start: 2025-05-03 | End: 2025-05-04 | Stop reason: HOSPADM

## 2025-05-03 RX ORDER — ACETAMINOPHEN 160 MG/5ML
650 SOLUTION ORAL EVERY 4 HOURS PRN
Status: DISCONTINUED | OUTPATIENT
Start: 2025-05-03 | End: 2025-05-04 | Stop reason: HOSPADM

## 2025-05-03 RX ORDER — CARVEDILOL 6.25 MG/1
12.5 TABLET ORAL 2 TIMES DAILY WITH MEALS
Status: DISCONTINUED | OUTPATIENT
Start: 2025-05-03 | End: 2025-05-04 | Stop reason: HOSPADM

## 2025-05-03 RX ORDER — IPRATROPIUM BROMIDE AND ALBUTEROL SULFATE 2.5; .5 MG/3ML; MG/3ML
3 SOLUTION RESPIRATORY (INHALATION) EVERY 4 HOURS PRN
Status: DISCONTINUED | OUTPATIENT
Start: 2025-05-03 | End: 2025-05-04 | Stop reason: HOSPADM

## 2025-05-03 RX ADMIN — PANTOPRAZOLE SODIUM 40 MG: 40 TABLET, DELAYED RELEASE ORAL at 20:53

## 2025-05-03 RX ADMIN — SODIUM CHLORIDE 1000 MG: 900 INJECTION INTRAVENOUS at 03:57

## 2025-05-03 RX ADMIN — IPRATROPIUM BROMIDE AND ALBUTEROL SULFATE 3 ML: .5; 3 SOLUTION RESPIRATORY (INHALATION) at 07:21

## 2025-05-03 RX ADMIN — ASPIRIN 81 MG: 81 TABLET, COATED ORAL at 09:00

## 2025-05-03 RX ADMIN — SODIUM CHLORIDE 100 ML/HR: 9 INJECTION, SOLUTION INTRAVENOUS at 03:47

## 2025-05-03 RX ADMIN — Medication 10 ML: at 09:01

## 2025-05-03 RX ADMIN — TICAGRELOR 90 MG: 90 TABLET ORAL at 09:00

## 2025-05-03 RX ADMIN — ACETAMINOPHEN 650 MG: 325 TABLET, FILM COATED ORAL at 17:17

## 2025-05-03 RX ADMIN — Medication 10 ML: at 20:53

## 2025-05-03 RX ADMIN — SODIUM CHLORIDE, POTASSIUM CHLORIDE, SODIUM LACTATE AND CALCIUM CHLORIDE 1000 ML: 600; 310; 30; 20 INJECTION, SOLUTION INTRAVENOUS at 01:46

## 2025-05-03 RX ADMIN — ATORVASTATIN CALCIUM 40 MG: 40 TABLET, FILM COATED ORAL at 09:00

## 2025-05-03 RX ADMIN — PANTOPRAZOLE SODIUM 40 MG: 40 TABLET, DELAYED RELEASE ORAL at 09:00

## 2025-05-03 RX ADMIN — TICAGRELOR 90 MG: 90 TABLET ORAL at 20:57

## 2025-05-03 NOTE — PLAN OF CARE
Goal Outcome Evaluation:  Plan of Care Reviewed With: patient, spouse, daughter           Outcome Evaluation: OT evaluation completed. Pt is A&O x3, disoriented to time. Pt admitted following syncope episode at home secondary to low BP, pt family does report that pt fell w/out any significant injuries. At baseline, pt was completing all ADLs and fxl mobility with independence. Today, pt presents fowlers in bed with 0/10 pain reported. Pt completes bed mobility w/ spv. Pt demonstrates ability to sit EOB w/ Ind. Pt completes t/f to FWW w/ SBA. Today, pt is able to complete toileting w/ spv. Pt BUE ROM/strength is WNL. Pt is near baseline and will not require continued OT at this time, OT to sign off. Recommend pt d/c home w/ assist. Pt left fowlers in bed/up in recliner w/ call light, nsg notified.    Anticipated Discharge Disposition (OT): home with assist

## 2025-05-03 NOTE — PROGRESS NOTES
Assessment tool to be used for patients with existing breathing treatments ordered by hospitalist                               Respiratory Therapist Driven Protocol - RT to Assess and Treat Algorithm    Item 0 Points 1 Point 2 Points 3 Points 4 Points Subtotal   Mental Status Alert, orientated, cooperative Lethargic, follows commands Confused, not following commands Obtunded or Somnolent Comatose 0   Respiratory Pattern Regular RR  8-16 breaths/minute Increased RR  18-25 breaths/minute Dyspnea on exertion, irregular RR  26-30/minute Shortness of breath,  RR 31-35 breaths/minute Accessory muscle use, severe SOB  RR > 35 breath/minute 0   Breath Sounds Clear Decreased unilaterally Decreased bilaterally Basilar crackles Wheezing and/or rhonchi 0   Cough Strong, spontaneous non-productive Strong productive Weak, non-productive Weak productive or weak with rhonchi Absent or may require suctioning 0   Pulmonary Status Nonsmoker or no previous history > 1 year quit < 1 PPD  < 1 year quit >  or = 1 PPD Diagnosed pulmonary disease (severe or chronic) Severe or chronic pulmonary disease with exacerbation 3   Surgical Status None General surgery (non-abdominal or non-thoracic) Lower abdominal Thoracic or upper abdominal Thoracic with pulmonary disease 0   Chest X-ray Clear Chronic changes Infiltrates, atelectasis or pleural effusion Infiltrates > 1 lobe Diffuse infiltrates and atelectasis and/or effusions 0   Activity level Ambulatory Ambulatory with assistance Non-ambulatory Paraplegic Quadriplegic 0                     Total Score 3   Score    Drug Therapy Frequency  20 or >    Q4 Duoneb & Q3 Albuterol PRN 15 - 19     Q6 Duoneb & Q4 Albuterol PRN 10 - 14    QID Duoneb & Q4 Albuterol PRN 5 - 9    TID Duoneb & Q6 Albuterol PRN 0 - 4    Q4 PRN Duoneb or Q4 PRN Albuterol    Incentive Spirometry - Initial RT instruct    Lung Expansion Therapy (PEP) Bronchopulmonary Hygiene (CPT)   Q4 & PRN - Severe atelectasis,  poor oxygenation Q4 - copious secretions, dyspnea, unable to sleep, mucus plugging   QID - High risk for persistent atelectasis, existence of atelectasis QID & Q4 PRN - Moderate secretion production   TID - At risk for developing atelectasis TID - small amounts of secretions with poor cough   BID - prevention of atelectasis BID - unable to breathe deeply and cough spontaneously   *RT Protocol patients will be re-assessed/re-evaluated every 48 hours.    *Patients who are home nebulizer treatments will be protocoled to no less than their home regimen and will remain     on their home regimen with re-evaluations as needed with changes in patient condition.    RT Comments/Recommendations: Changed to PRN as she takes at home.

## 2025-05-03 NOTE — ED PROVIDER NOTES
"Subjective   History of Present Illness  Pt presents to the ED with report of low BP at home/syncope.  Pt states her \"BP bottomed out\" - family states that she had BP in the 60s when they checked.  Pt denies any CP/SOB.  No f/c.  Has had some vomiting/diarrhea at home.  No abdominal pain . No numb/tingling. No new foods/meds.  Pt reports that she has not taken her BP meds in 2d.  No HA.  Pt reports she has some bilateral rib pains.         Review of Systems   Constitutional:  Negative for fever.   Respiratory:  Negative for shortness of breath.    Cardiovascular:  Negative for chest pain.   Gastrointestinal:  Positive for diarrhea, nausea and vomiting.   Genitourinary:  Negative for dysuria.   Neurological:  Positive for syncope and light-headedness. Negative for headaches.   All other systems reviewed and are negative.      Past Medical History:   Diagnosis Date    Anemia     Anxiety     Arthritis     Cancer of skin     BCC OF NOSE    Colitis     Colon polyps     COPD (chronic obstructive pulmonary disease)     Depression     Fibromyalgia     Gastric ulcer     GERD (gastroesophageal reflux disease)     History of transfusion     Hx of colonic polyps     Hypercholesteremia     Hypertension     Nausea and vomiting 01/29/2024    Seizure     Small cell B-cell lymphoma     Voice disorder        Allergies   Allergen Reactions    Lyrica [Pregabalin] Swelling     LIPS AND FACE    Morphine Itching    Codeine Itching    Penicillins Unknown - Low Severity     CHILDHOOD ALLERGY         Past Surgical History:   Procedure Laterality Date    BREAST SURGERY      CARDIAC CATHETERIZATION N/A 2/2/2025    Procedure: Left Heart Cath;  Surgeon: Mo Brooks DO;  Location:  [facility] CATH INVASIVE LOC[facility];  Service: Cardiovascular;  Laterality: N/A;    CHOLECYSTECTOMY      COLONOSCOPY  04/12/2017    Hemorrhoids and a few diverticula repeat exam in 3 years Dr. Cordero    COLONOSCOPY N/A 05/02/2019    Collagenous colitis, " Diverticulosis repeat exam in 5 years    COLONOSCOPY N/A 05/21/2021    Fair prep repeat exam in 1 year    COLONOSCOPY W/ POLYPECTOMY  07/28/2016    Tubular adenoma proximal to the anus, 30 MM polyp in the cecum not resected, Diverticulosis ref to Dr. Cordero    ENDOSCOPY  06/15/2016    Small hh, Negative for celiac disease    ENDOSCOPY N/A 06/13/2019    HH otherwise normal exam    ENDOSCOPY N/A 05/21/2021    Large HH    ENDOSCOPY N/A 02/01/2024    Dr. hylton- Normal examined duodenum. - Hiatal hernia. - Z-line regular, 32 cm from the incisors. - No specimens collected.    RADICAL HYSTERECTOMY      TENNIS ELBOW RELEASE Left 10/20/2020    Procedure: LEFT CUBITAL TUNNEL RELEASE;  Surgeon: Raymundo Kiran MD;  Location:  [facility] OR;  Service: Orthopedics;  Laterality: Left;    URETEROSCOPY Right 09/13/2018    Procedure: CYSTOSCOPY RIGHT RETROGRADE PYELOGRAM RIGHT URETEROSCOPY WITH BIOPSY OF RIGHT RENAL PELVIS LASER LITHOTRIPSY AND RIGHT URETERAL STENT INSERTION;  Surgeon: Yifan Greco MD;  Location:  [facility] OR;  Service: Urology    URETEROSCOPY Right 08/09/2019    Procedure: RIGHT FLEXIBLE URETEROSCOPY WITH RENAL PELVIC BIOPSY AND RIGHT URETERAL STENT;  Surgeon: Yifan Greco MD;  Location:  [facility] OR;  Service: Urology       Family History   Problem Relation Age of Onset    Diabetes Sister     Cancer Brother     Colon cancer Neg Hx     Colon polyps Neg Hx        Social History     Socioeconomic History    Marital status:    Tobacco Use    Smoking status: Every Day     Current packs/day: 2.00     Average packs/day: 2.0 packs/day for 30.0 years (60.0 ttl pk-yrs)     Types: Cigarettes    Smokeless tobacco: Never   Vaping Use    Vaping status: Never Used   Substance and Sexual Activity    Alcohol use: No    Drug use: No    Sexual activity: Defer           Objective   Physical Exam  Vitals and nursing note reviewed.   Constitutional:       General: She is not in acute distress.  HENT:      Head:  Normocephalic and atraumatic.      Nose: Nose normal.      Mouth/Throat:      Mouth: Mucous membranes are moist.   Eyes:      Extraocular Movements: Extraocular movements intact.      Conjunctiva/sclera: Conjunctivae normal.      Pupils: Pupils are equal, round, and reactive to light.   Cardiovascular:      Rate and Rhythm: Normal rate and regular rhythm.      Pulses: Normal pulses.      Heart sounds: Normal heart sounds.   Pulmonary:      Effort: Pulmonary effort is normal.      Breath sounds: Normal breath sounds.   Chest:      Chest wall: No tenderness.   Abdominal:      General: Abdomen is flat. Bowel sounds are normal.      Palpations: Abdomen is soft.   Musculoskeletal:         General: No tenderness or signs of injury.      Cervical back: Normal range of motion and neck supple. No tenderness.   Skin:     General: Skin is warm and dry.      Capillary Refill: Capillary refill takes less than 2 seconds.   Neurological:      General: No focal deficit present.      Mental Status: She is alert and oriented to person, place, and time.         Procedures           ED Course                                                       Medical Decision Making  Pt stable in ED - resting comfortably att and in NAD.  BP responded to IVFs.  She has not findings of stroke/ICH.  No evid of SBI/sepsis.  No evid of ACS.  Pt with recent vomiting/diarrhea/lowered intake with AYALA.  Suspect this is prerenal.  Given her syncope and significant hypotension earlier - recommend admit for IVFs and further mgmt.  Pt/family agreeable to this plan.  D/w Dr. Persaud     Amount and/or Complexity of Data Reviewed  Labs: ordered.  Radiology: ordered.  ECG/medicine tests: ordered and independent interpretation performed.     Details: NSR, Nml axis/int, No acute STT changes    Risk  Prescription drug management.        Final diagnoses:   Syncope and collapse   Dehydration   AYALA (acute kidney injury)       ED Disposition  ED Disposition       ED  Disposition   Decision to Admit    Condition   --    Comment   --               No follow-up provider specified.       Medication List      No changes were made to your prescriptions during this visit.            Wayne Richard DO  05/03/25 0206

## 2025-05-03 NOTE — THERAPY DISCHARGE NOTE
Acute Care - Occupational Therapy Discharge  [facility]    Patient Name: Heather Russo  : 1955    MRN: [id number]                              Today's Date: 5/3/2025       Admit Date: 2025    Visit Dx:     ICD-10-CM ICD-9-CM   1. Syncope and collapse  R55 780.2   2. Dehydration  E86.0 276.51   3. AYALA (acute kidney injury)  N17.9 584.9     Patient Active Problem List   Diagnosis    Laryngopharyngeal reflux    Hoarseness    Tobacco use    Abnormal liver function tests    Generalized abdominal pain    Nausea    Obesity, unspecified obesity severity, unspecified obesity type    Tobacco abuse counseling    Renal mass    Diarrhea in adult patient    Colitis    Gastroesophageal reflux disease    Abdominal pain, epigastric    NSAID long-term use    Primary hypertension    Hematemesis    Elevated liver enzymes    Hyperlipidemia    Pain and numbness of left upper extremity    Cubital tunnel syndrome on left    Heme positive stool    Left lower lobe pneumonia, due to unspecified organism    COPD with acute exacerbation    Acute respiratory failure with hypoxia    COPD (chronic obstructive pulmonary disease)    Hypokalemia    Pneumonia, unspecified organism    Nausea and vomiting    NSTEMI, initial episode of care    Syncope and collapse     Past Medical History:   Diagnosis Date    Anemia     Anxiety     Arthritis     Cancer of skin     BCC OF NOSE    Colitis     Colon polyps     COPD (chronic obstructive pulmonary disease)     Depression     Fibromyalgia     Gastric ulcer     GERD (gastroesophageal reflux disease)     History of transfusion     Hx of colonic polyps     Hypercholesteremia     Hypertension     Nausea and vomiting 2024    Seizure     Small cell B-cell lymphoma     Voice disorder      Past Surgical History:   Procedure Laterality Date    BREAST SURGERY      CARDIAC CATHETERIZATION N/A 2025    Procedure: Left Heart Cath;  Surgeon: Mo Brooks DO;  Location: [facility]  INVASIVE LOCATION;  Service: Cardiovascular;  Laterality: N/A;    CHOLECYSTECTOMY      COLONOSCOPY  04/12/2017    Hemorrhoids and a few diverticula repeat exam in 3 years Dr. Cordero    COLONOSCOPY N/A 05/02/2019    Collagenous colitis, Diverticulosis repeat exam in 5 years    COLONOSCOPY N/A 05/21/2021    Fair prep repeat exam in 1 year    COLONOSCOPY W/ POLYPECTOMY  07/28/2016    Tubular adenoma proximal to the anus, 30 MM polyp in the cecum not resected, Diverticulosis ref to Dr. Cordero    ENDOSCOPY  06/15/2016    Small hh, Negative for celiac disease    ENDOSCOPY N/A 06/13/2019    HH otherwise normal exam    ENDOSCOPY N/A 05/21/2021    Large HH    ENDOSCOPY N/A 02/01/2024    Dr. hylton- Normal examined duodenum. - Hiatal hernia. - Z-line regular, 32 cm from the incisors. - No specimens collected.    RADICAL HYSTERECTOMY      TENNIS ELBOW RELEASE Left 10/20/2020    Procedure: LEFT CUBITAL TUNNEL RELEASE;  Surgeon: Raymundo Kiran MD;  Location:  [facility] OR;  Service: Orthopedics;  Laterality: Left;    URETEROSCOPY Right 09/13/2018    Procedure: CYSTOSCOPY RIGHT RETROGRADE PYELOGRAM RIGHT URETEROSCOPY WITH BIOPSY OF RIGHT RENAL PELVIS LASER LITHOTRIPSY AND RIGHT URETERAL STENT INSERTION;  Surgeon: Yifan Greco MD;  Location:  [facility] OR;  Service: Urology    URETEROSCOPY Right 08/09/2019    Procedure: RIGHT FLEXIBLE URETEROSCOPY WITH RENAL PELVIC BIOPSY AND RIGHT URETERAL STENT;  Surgeon: Yifan Greco MD;  Location:  [facility] OR;  Service: Urology      General Information       Row Name 05/03/25 1040          OT Time and Intention    Subjective Information no complaints  -     Document Type evaluation  pt presented to ED w/ hypotension, fall at home. hx: 4 stents placed Feb 2025  -     Mode of Treatment occupational therapy  -       Row Name 05/03/25 1040          General Information    Patient Profile Reviewed yes  -KP     Prior Level of Function independent:;all household  mobility;transfer;ADL's;community mobility;home management;driving  -     Existing Precautions/Restrictions fall  -     Barriers to Rehab medically complex  -       Row Name 05/03/25 1040          Occupational Profile    Environmental Supports and Barriers (Occupational Profile) walk-in shower, no grab bars or seat  -       Row Name 05/03/25 1040          Living Environment    Current Living Arrangements home  -     People in Home spouse  -       Row Name 05/03/25 1040          Home Main Entrance    Number of Stairs, Main Entrance four  -     Stair Railings, Main Entrance railing on left side (ascending)  -       Row Name 05/03/25 1040          Stairs Within Home, Primary    Stairs, Within Home, Primary flight of stairs to the basement  -     Number of Stairs, Within Home, Primary twelve  -       Row Name 05/03/25 1040          Cognition    Orientation Status (Cognition) oriented x 3;disoriented to;time  -               User Key  (r) = Recorded By, (t) = Taken By, (c) = Cosigned By      Initials Name Provider Type     Sweetie Mar OTR/L Occupational Therapist                   Mobility/ADL's       Row Name 05/03/25 1040          Bed Mobility    Bed Mobility supine-sit;sit-supine  -     Supine-Sit [place] (Bed Mobility) supervision  -     Sit-Supine [place] (Bed Mobility) supervision  -     Assistive Device (Bed Mobility) bed rails;head of bed elevated  -       Row Name 05/03/25 1040          Transfers    Transfers sit-stand transfer;stand-sit transfer;toilet transfer  -       Row Name 05/03/25 1040          Sit-Stand Transfer    Sit-Stand [place] (Transfers) standby assist  -       Row Name 05/03/25 1040          Stand-Sit Transfer    Stand-Sit [place] (Transfers) standby assist  -       Row Name 05/03/25 1040          Toilet Transfer    Type (Toilet Transfer) sit-stand;stand-sit  -     [place] Level (Toilet Transfer) supervision  -     Assistive  Device (Toilet Transfer) commode;grab bars/safety frame  -[facility] Name 05/03/25 1040          Functional Mobility    Functional Mobility- Ind. Level standby assist  -     Functional Mobility- Comment due to having iv pole  -[facility] Name 05/03/25 1040          Activities of Daily Living    BADL Assessment/Intervention toileting  -[facility] Name 05/03/25 1040          Toileting Assessment/Training    [place] Level (Toileting) toileting skills;supervision  -     Assistive Devices (Toileting) commode;grab bar/safety frame  -               User Key  (r) = Recorded By, (t) = Taken By, (c) = Cosigned By      Initials Name Provider Type    Sweetie Plasencia OTR/L Occupational Therapist                   Obj/Interventions       [facility] Name 05/03/25 1040          Sensory Assessment (Somatosensory)    Sensory Assessment (Somatosensory) UE sensation intact  -[facility] Name 05/03/25 1040          Vision Assessment/Intervention    Visual Impairment/Limitations WNL  -[facility] Name 05/03/25 1040          Range of Motion Comprehensive    General Range of Motion no range of motion deficits identified  -[facility] Name 05/03/25 1040          Strength Comprehensive (MMT)    General Manual Muscle Testing (MMT) Assessment no strength deficits identified  -[facility] Name 05/03/25 1040          Balance    Balance Assessment sitting static balance;sitting dynamic balance;standing static balance;standing dynamic balance  -     Static Sitting Balance independent  -     Dynamic Sitting Balance independent  -     Position, Sitting Balance unsupported;sitting edge of bed  -     Static Standing Balance supervision  -     Dynamic Standing Balance standby assist  -     Position/Device Used, Standing Balance unsupported  -               User Key  (r) = Recorded By, (t) = Taken By, (c) = Cosigned By      Initials Name Provider Type    Sweetie Plasencia OTR/L Occupational Therapist                    Goals/Plan    No documentation.                  Clinical Impression       Row Name 05/03/25 1040          Pain Assessment    Pretreatment Pain Rating 0/10 - no pain  -     Posttreatment Pain Rating 0/10 - no pain  -       Row Name 05/03/25 1040          Plan of Care Review    Plan of Care Reviewed With patient;spouse;daughter  -     Outcome Evaluation OT evaluation completed. Pt is A&O x3, disoriented to time. Pt admitted following syncope episode at home secondary to low BP, pt family does report that pt fell w/out any significant injuries. At baseline, pt was completing all ADLs and fxl mobility with independence. Today, pt presents fowlers in bed with 0/10 pain reported. Pt completes bed mobility w/ spv. Pt demonstrates ability to sit EOB w/ Ind. Pt completes t/f to FWW w/ SBA. Today, pt is able to complete toileting w/ spv. Pt BUE ROM/strength is WNL. Pt is near baseline and will not require continued OT at this time, OT to sign off. Recommend pt d/c home w/ assist. Pt left fowlers in bed/up in recliner w/ call light, nsg notified.  -       Row Name 05/03/25 1040          Therapy Assessment/Plan (OT)    Rehab Potential (OT) good  -     Criteria for Skilled Therapeutic Interventions Met (OT) no;does not meet criteria for skilled intervention  -[facility] Name 05/03/25 1040          Therapy Plan Review/Discharge Plan (OT)    Anticipated Discharge Disposition (OT) home with assist  -       Row Name 05/03/25 1040          Vital Signs    Pre Systolic BP Rehab 107  -KP     Pre Treatment Diastolic BP 65  -KP     Intra Systolic BP Rehab 114  -KP     Intra Treatment Diastolic BP 68  -KP       [facility] Name 05/03/25 1040          Positioning and Restraints    Pre-Treatment Position in bed  -     Post Treatment Position bed  -KP     In Bed notified nsg;fowlers;call light within reach;encouraged to call for assist;with family/caregiver  -               User Key  (r) = Recorded By, (t) = Taken By, (c) =  Cosigned By      Initials Name Provider Type    Sweetie Plasencia OTR/L Occupational Therapist                   Outcome Measures       Row Name 05/03/25 1040          How much help from another is currently needed...    Putting on and taking off regular lower body clothing? 4  -KP     Bathing (including washing, rinsing, and drying) 4  -KP     Toileting (which includes using toilet bed pan or urinal) 4  -KP     Putting on and taking off regular upper body clothing 4  -KP     Taking care of personal grooming (such as brushing teeth) 4  -KP     Eating meals 4  -     AM-PAC 6 Clicks Score (OT) 24  -       Row Name 05/03/25 0313          How much help from another person do you currently need...    Turning from your back to your side while in flat bed without using bedrails? 3  -AD     Moving from lying on back to sitting on the side of a flat bed without bedrails? 3  -AD     Moving to and from a bed to a chair (including a wheelchair)? 3  -AD     Standing up from a chair using your arms (e.g., wheelchair, bedside chair)? 3  -AD     Climbing 3-5 steps with a railing? 3  -AD     To walk in hospital room? 3  -AD     AM-PAC 6 Clicks Score (PT) 18  -AD       Row Name 05/03/25 1040          Functional Assessment    Outcome Measure Options AM-PAC 6 Clicks Daily Activity (OT)  -               User Key  (r) = Recorded By, (t) = Taken By, (c) = Cosigned By      Initials Name Provider Type    Petra Mary RN Registered Nurse    Sweetie Plasencia OTR/L Occupational Therapist                  Occupational Therapy Education       Title: PT OT SLP Therapies (Done)       Topic: Occupational Therapy (Done)       Point: ADL training (Done)       Learning Progress Summary            Patient Acceptance, E, VU by  at 5/3/2025 1134                      Point: Precautions (Done)       Learning Progress Summary            Patient Acceptance, E, VU by  at 5/3/2025 1134                      Point: Body mechanics (Done)        Learning Progress Summary            Patient Acceptance, E, VU by  at 5/3/2025 1134                                      User Key       Initials Effective Dates Name Provider Type Discipline     10/08/24 -  Sweetie Mar, JASMYNER/L Occupational Therapist OT                  OT Recommendation and Plan     Plan of Care Review  Plan of Care Reviewed With: patient, spouse, daughter  Outcome Evaluation: OT evaluation completed. Pt is A&O x3, disoriented to time. Pt admitted following syncope episode at home secondary to low BP, pt family does report that pt fell w/out any significant injuries. At baseline, pt was completing all ADLs and fxl mobility with independence. Today, pt presents fowlers in bed with 0/10 pain reported. Pt completes bed mobility w/ spv. Pt demonstrates ability to sit EOB w/ Ind. Pt completes t/f to FWW w/ SBA. Today, pt is able to complete toileting w/ spv. Pt BUE ROM/strength is WNL. Pt is near baseline and will not require continued OT at this time, OT to sign off. Recommend pt d/c home w/ assist. Pt left fowlers in bed/up in recliner w/ call light, nsg notified.  Plan of Care Reviewed With: patient, spouse, daughter  Outcome Evaluation: OT evaluation completed. Pt is A&O x3, disoriented to time. Pt admitted following syncope episode at home secondary to low BP, pt family does report that pt fell w/out any significant injuries. At baseline, pt was completing all ADLs and fxl mobility with independence. Today, pt presents fowlers in bed with 0/10 pain reported. Pt completes bed mobility w/ spv. Pt demonstrates ability to sit EOB w/ Ind. Pt completes t/f to FWW w/ SBA. Today, pt is able to complete toileting w/ spv. Pt BUE ROM/strength is WNL. Pt is near baseline and will not require continued OT at this time, OT to sign off. Recommend pt d/c home w/ assist. Pt left fowlers in bed/up in recliner w/ call light, nsg notified.     Time Calculation:         Time Calculation- OT       Row Name 05/03/25  1040             Time Calculation- OT    OT Start Time 1040  -KP      OT Stop Time 1125  -KP      OT Time Calculation (min) 45 min  -KP      OT Received On 05/03/25  -KP         Untimed Charges    OT Eval/Re-eval Minutes 45  -KP         Total Minutes    Untimed Charges Total Minutes 45  -KP       Total Minutes 45  -KP                User Key  (r) = Recorded By, (t) = Taken By, (c) = Cosigned By      Initials Name Provider Type     Sweetie Mar OTR/L Occupational Therapist                  Therapy Charges for Today       Code Description Service Date Service Provider Modifiers Qty    [id number]  OT EVAL LOW COMPLEXITY 3 5/3/2025 Sweetie Mar OTR/L GO 1               OT Discharge Summary  Anticipated Discharge Disposition (OT): home with assist  Reason for Discharge: At baseline function  Outcomes Achieved: Other (eval only)  Discharge Destination: Home with assist    BEAR Barnett/FARHAT  5/3/2025

## 2025-05-03 NOTE — PLAN OF CARE
Goal Outcome Evaluation:  Plan of Care Reviewed With: patient, spouse        Progress: improving  Outcome Evaluation: Patient A/O x 4. VSS. NSR on telemetry. On room air. Orthostatics done x 1, negative. No syncopal episodes, no nausea or vomitting, voiding well per bathroom with standby assist. Tolerating IV fluids and IV ABT. No PRNs requested. Spouse at bedside. Safety maintained.

## 2025-05-03 NOTE — H&P
[facility] Medicine Services  HISTORY AND PHYSICAL    Date of Admission: 5/2/2025  Primary Care Physician: Grzegorz Michel Jr., MD    Subjective   Primary Historian: Patient and family    Chief Complaint: Hypotension    History of Present Illness  This is a 69-year-old female patient with a medical history of COPD, hypertension, dyslipidemia, coronary artery disease, admission in February for non-STEMI, presenting to the ED for the evaluation of hypotension.  As reported patient was having presyncopal episode, and blood pressure was found to be low in the 60s as reported.  Patient was also having generalized weakness, and she had episodes of vomiting and diarrhea. Patient was denying having any chest pain, shortness of breath, abdominal pain, fever, chills.      Review of Systems   Otherwise complete ROS reviewed and negative except as mentioned in the HPI.    Past Medical History:   Past Medical History:   Diagnosis Date    Anemia     Anxiety     Arthritis     Cancer of skin     BCC OF NOSE    Colitis     Colon polyps     COPD (chronic obstructive pulmonary disease)     Depression     Fibromyalgia     Gastric ulcer     GERD (gastroesophageal reflux disease)     History of transfusion     Hx of colonic polyps     Hypercholesteremia     Hypertension     Nausea and vomiting 01/29/2024    Seizure     Small cell B-cell lymphoma     Voice disorder      Past Surgical History:  Past Surgical History:   Procedure Laterality Date    BREAST SURGERY      CARDIAC CATHETERIZATION N/A 2/2/2025    Procedure: Left Heart Cath;  Surgeon: Mo Brooks DO;  Location: [facility] CATH INVASIVE LOCATION;  Service: Cardiovascular;  Laterality: N/A;    CHOLECYSTECTOMY      COLONOSCOPY  04/12/2017    Hemorrhoids and a few diverticula repeat exam in 3 years Dr. Cordero    COLONOSCOPY N/A 05/02/2019    Collagenous colitis, Diverticulosis repeat exam in 5 years    COLONOSCOPY N/A 05/21/2021    Fair  prep repeat exam in 1 year    COLONOSCOPY W/ POLYPECTOMY  07/28/2016    Tubular adenoma proximal to the anus, 30 MM polyp in the cecum not resected, Diverticulosis ref to Dr. Cordero    ENDOSCOPY  06/15/2016    Small hh, Negative for celiac disease    ENDOSCOPY N/A 06/13/2019    HH otherwise normal exam    ENDOSCOPY N/A 05/21/2021    Large HH    ENDOSCOPY N/A 02/01/2024    Dr. hylton- Normal examined duodenum. - Hiatal hernia. - Z-line regular, 32 cm from the incisors. - No specimens collected.    RADICAL HYSTERECTOMY      TENNIS ELBOW RELEASE Left 10/20/2020    Procedure: LEFT CUBITAL TUNNEL RELEASE;  Surgeon: Raymundo Kiran MD;  Location:  [facility] OR;  Service: Orthopedics;  Laterality: Left;    URETEROSCOPY Right 09/13/2018    Procedure: CYSTOSCOPY RIGHT RETROGRADE PYELOGRAM RIGHT URETEROSCOPY WITH BIOPSY OF RIGHT RENAL PELVIS LASER LITHOTRIPSY AND RIGHT URETERAL STENT INSERTION;  Surgeon: Yifan Greco MD;  Location:  [facility] OR;  Service: Urology    URETEROSCOPY Right 08/09/2019    Procedure: RIGHT FLEXIBLE URETEROSCOPY WITH RENAL PELVIC BIOPSY AND RIGHT URETERAL STENT;  Surgeon: Yifan Greco MD;  Location:  [facility] OR;  Service: Urology     Social History:  reports that she has been smoking cigarettes. She has a 60 pack-year smoking history. She has never used smokeless tobacco. She reports that she does not drink alcohol and does not use drugs.    Family History: family history includes Cancer in her brother; Diabetes in her sister.       Allergies:  Allergies   Allergen Reactions    Lyrica [Pregabalin] Swelling     LIPS AND FACE    Morphine Itching    Codeine Itching    Penicillins Unknown - Low Severity     CHILDHOOD ALLERGY         Medications:  Prior to Admission medications    Medication Sig Start Date End Date Taking? Authorizing Provider   albuterol sulfate  (90 Base) MCG/ACT inhaler Inhale 2 puffs Every 4 (Four) Hours As Needed for Wheezing.    Provider, MD Perla    amitriptyline (ELAVIL) 25 MG tablet Take 1 tablet by mouth Every Night.    Perla Bliss MD   ARIPiprazole (ABILIFY) 15 MG tablet Take 1 tablet by mouth Daily. 9/19/16   Perla Bliss MD   aspirin 81 MG EC tablet Take 1 tablet by mouth Daily. 4/16/24   Tim Carrasquillo DO   atorvastatin (LIPITOR) 40 MG tablet Take 1 tablet by mouth Daily. 2/3/25   Elizabeth Ruiz APRN   budesonide-formoterol (SYMBICORT) 160-4.5 MCG/ACT inhaler Inhale 2 puffs 2 (Two) Times a Day. 9/27/23   Jailyn Gant APRN   carvedilol (COREG) 12.5 MG tablet Take 1 tablet by mouth 2 (Two) Times a Day With Meals. 2/3/25   Elizabeth Ruiz APRN   cetirizine (zyrTEC) 10 MG tablet Take 1 tablet by mouth Daily.    Perla Bliss MD   DULoxetine (CYMBALTA) 60 MG capsule Take 1 capsule by mouth Daily. 9/19/16   Perla Bliss MD   hydroCHLOROthiazide (HYDRODIURIL) 25 MG tablet Take 0.5 tablets by mouth Daily.    Perla Bliss MD   LORazepam (ATIVAN) 1 MG tablet Take 0.5-1 tablets by mouth 2 (Two) Times a Day As Needed for Anxiety (severe anxiety).    Perla Bliss MD   losartan (COZAAR) 25 MG tablet Take 1 tablet by mouth Daily. 2/3/25   Elizabeth Ruiz APRN   methocarbamol (ROBAXIN) 500 MG tablet Take 1 tablet by mouth 3 (Three) Times a Day As Needed for Muscle Spasms.    Perla Bliss MD   pantoprazole (PROTONIX) 40 MG EC tablet Take 1 tablet by mouth 2 (Two) Times a Day.    Perla Bliss MD   promethazine (PHENERGAN) 25 MG tablet Take 1 tablet by mouth Every 6 (Six) Hours As Needed for Nausea or Vomiting.    Perla Bliss MD   rizatriptan MLT (MAXALT-MLT) 10 MG disintegrating tablet Place 1 tablet on the tongue 1 (One) Time As Needed for Migraine. May repeat in 2 hours if needed    Perla Bliss MD   ticagrelor (BRILINTA) 90 MG tablet tablet Take 1 tablet by mouth 2 (Two) Times a Day. 4/1/25   Llye Muir APRN     I have utilized all available immediate  "resources to obtain, update, or review the patient's current medications (including all prescriptions, over-the-counter products, herbals, cannabis/cannabidiol products, and vitamin/mineral/dietary (nutritional) supplements).    Objective     Vital Signs: /76 (BP Location: Left arm, Patient Position: Standing)   Pulse 89   Temp 98.1 °F (36.7 °C) (Oral)   Resp 16   Ht 160 cm (63\")   Wt 56.4 kg (124 lb 4.8 oz)   SpO2 97%   BMI 22.02 kg/m²   Physical Exam  Constitutional:       Appearance: She is ill-appearing.   Cardiovascular:      Rate and Rhythm: Normal rate and regular rhythm.      Pulses: Normal pulses.      Heart sounds: Normal heart sounds. No murmur heard.  Pulmonary:      Effort: Pulmonary effort is normal. No respiratory distress.      Breath sounds: Normal breath sounds. No wheezing or rales.   Abdominal:      General: Bowel sounds are normal. There is no distension.      Palpations: Abdomen is soft.      Tenderness: There is no abdominal tenderness. There is no guarding.   Musculoskeletal:      Right lower leg: No edema.      Left lower leg: No edema.   Skin:     General: Skin is warm.   Neurological:      Mental Status: She is alert and oriented to person, place, and time. Mental status is at baseline.              Results Reviewed:  Lab Results (last 24 hours)       Procedure Component Value Units Date/Time    Urinalysis With Microscopic If Indicated (No Culture) - Straight Cath [[id number]]  (Abnormal) Collected: 05/03/25 0227    Specimen: Urine from Straight Cath Updated: 05/03/25 0257     Color, UA Yellow     Appearance, UA Cloudy     pH, UA <=5.0     Specific Gravity, UA 1.018     Glucose, UA Negative     Ketones, UA Trace     Bilirubin, UA Negative     Blood, UA Large (3+)     Protein, UA 30 mg/dL (1+)     Leuk Esterase, UA Large (3+)     Nitrite, UA Negative     Urobilinogen, UA 1.0 E.U./dL    Urinalysis, Microscopic Only - Straight Cath [[id number]]  (Abnormal) Collected: 05/03/25 0227 "    Specimen: Urine from Straight Cath Updated: 05/03/25 0257     RBC, UA 11-20 /HPF      WBC, UA Too Numerous to Count /HPF      Bacteria, UA 3+ /HPF      Squamous Epithelial Cells, UA 3-6 /HPF      Yeast, UA Moderate/2+ Budding Yeast /HPF      Hyaline Casts, UA None Seen /LPF      WBC Clumps, UA Small/1+ /HPF      Methodology Manual Light Microscopy    High Sensitivity Troponin T 1Hr [[id number]]  (Abnormal) Collected: 05/03/25 0046    Specimen: Blood Updated: 05/03/25 0111     HS Troponin T 27 ng/L      Troponin T Numeric Delta -2 ng/L      Troponin T % Delta -7    Narrative:      High Sensitive Troponin T Reference Range:  <14.0 ng/L- Negative Female for AMI  <22.0 ng/L- Negative Male for AMI  >=14 - Abnormal Female indicating possible myocardial injury.  >=22 - Abnormal Male indicating possible myocardial injury.   Clinicians would have to utilize clinical acumen, EKG, Troponin, and serial changes to determine if it is an Acute Myocardial Infarction or myocardial injury due to an underlying chronic condition.         TSH Rfx On Abnormal To Free T4 [[id number]]  (Normal) Collected: 05/02/25 2344    Specimen: Blood Updated: 05/03/25 0024     TSH 0.758 uIU/mL     Comprehensive Metabolic Panel [[id number]]  (Abnormal) Collected: 05/02/25 2344    Specimen: Blood Updated: 05/03/25 0018     Glucose 83 mg/dL      BUN 33 mg/dL      Creatinine 1.93 mg/dL      Sodium 136 mmol/L      Potassium 4.4 mmol/L      Chloride 101 mmol/L      CO2 15.0 mmol/L      Calcium 9.5 mg/dL      Total Protein 7.2 g/dL      Albumin 4.4 g/dL      ALT (SGPT) 7 U/L      AST (SGOT) 10 U/L      Alkaline Phosphatase 79 U/L      Total Bilirubin 0.3 mg/dL      Globulin 2.8 gm/dL      A/G Ratio 1.6 g/dL      BUN/Creatinine Ratio 17.1     Anion Gap 20.0 mmol/L      eGFR 27.9 mL/min/1.73     Narrative:      GFR Categories in Chronic Kidney Disease (CKD)              GFR Category          GFR (mL/min/1.73)    Interpretation  G1                    90 or  greater        Normal or high (1)  G2                    60-89                Mild decrease (1)  G3a                   45-59                Mild to moderate decrease  G3b                   30-44                Moderate to severe decrease  G4                    15-29                Severe decrease  G5                    14 or less           Kidney failure    (1)In the absence of evidence of kidney disease, neither GFR category G1 or G2 fulfill the criteria for CKD.    eGFR calculation 2021 CKD-EPI creatinine equation, which does not include race as a factor    Magnesium [[id number]]  (Normal) Collected: 05/02/25 2344    Specimen: Blood Updated: 05/03/25 0018     Magnesium 1.9 mg/dL     High Sensitivity Troponin T [[id number]]  (Abnormal) Collected: 05/02/25 2344    Specimen: Blood Updated: 05/03/25 0015     HS Troponin T 29 ng/L     Narrative:      High Sensitive Troponin T Reference Range:  <14.0 ng/L- Negative Female for AMI  <22.0 ng/L- Negative Male for AMI  >=14 - Abnormal Female indicating possible myocardial injury.  >=22 - Abnormal Male indicating possible myocardial injury.   Clinicians would have to utilize clinical acumen, EKG, Troponin, and serial changes to determine if it is an Acute Myocardial Infarction or myocardial injury due to an underlying chronic condition.         Lactic Acid, Plasma [[id number]]  (Normal) Collected: 05/02/25 2351    Specimen: Blood Updated: 05/03/25 0014     Lactate 1.1 mmol/L     Lipase [[id number]]  (Normal) Collected: 05/02/25 2344    Specimen: Blood Updated: 05/03/25 0013     Lipase 16 U/L     CBC & Differential [[id number]]  (Abnormal) Collected: 05/02/25 2344    Specimen: Blood Updated: 05/03/25 0013    Narrative:      The following orders were created for panel order CBC & Differential.  Procedure                               Abnormality         Status                     ---------                               -----------         ------                     CBC Auto  Differential[[id number]]        Abnormal            Final result                 Please view results for these tests on the individual orders.    CBC Auto Differential [[id number]]  (Abnormal) Collected: 05/02/25 2344    Specimen: Blood Updated: 05/03/25 0013     WBC 10.13 10*3/mm3      RBC 4.21 10*6/mm3      Hemoglobin 12.5 g/dL      Hematocrit 40.3 %      MCV 95.7 fL      MCH 29.7 pg      MCHC 31.0 g/dL      RDW 14.2 %      RDW-SD 49.0 fl      MPV 10.0 fL      Platelets 215 10*3/mm3      Neutrophil % 75.3 %      Lymphocyte % 9.9 %      Monocyte % 12.7 %      Eosinophil % 0.9 %      Basophil % 0.5 %      Immature Grans % 0.7 %      Neutrophils, Absolute 7.63 10*3/mm3      Lymphocytes, Absolute 1.00 10*3/mm3      Monocytes, Absolute 1.29 10*3/mm3      Eosinophils, Absolute 0.09 10*3/mm3      Basophils, Absolute 0.05 10*3/mm3      Immature Grans, Absolute 0.07 10*3/mm3      nRBC 0.0 /100 WBC           Imaging Results (Last 24 Hours)       Procedure Component Value Units Date/Time    CT Head Without Contrast [[id number]] Resulted: 05/02/25 2351     Updated: 05/03/25 0006    XR Chest 2 View [[id number]] Resulted: 05/02/25 2334     Updated: 05/02/25 2345          I have personally reviewed and interpreted the radiology studies and ECG obtained at time of admission.     Assessment / Plan   Assessment:   Active Hospital Problems    Diagnosis     **Syncope and collapse        Treatment Plan  The patient will be admitted to my service here at [facility].     Assessment and plan:  #UTI.  #AYALA.  #Metabolic acidosis  #Rule out presyncope/syncope  #Episode of hypotension  #hx of CAD s/p PCI on dual antiplts    - Admitting to floor.  - Empiric antibiotics, and cultures ordered.  I also added GI panel as patient was having some diarrhea.  - Fall precautions.  - PT and OT ordered  - Orthostatic vitals ordered.  B12 and TSH ordered.  - DVT prophylaxis with SCDs    Medical Decision Making  Number and Complexity of problems:  3 acute and moderate  Differential Diagnosis: As above    Conditions and Status        Condition is worsening.     MDM Data  External documents reviewed: Yes  Cardiac tracing (EKG, telemetry) interpretation: Yes  Radiology interpretation: Yes  Labs reviewed: Yes  Any tests that were considered but not ordered: No     Decision rules/scores evaluated (example [id number], Wells, etc): No     Discussed with: Patient and ED provider     Care Planning  Shared decision making: Discussed the plan with the patient and she was agreeable  Code status and discussions: Full code    Disposition  Social Determinants of Health that impact treatment or disposition: Not at the moment  Estimated length of stay is 1 to 2 days.     I confirmed that the patient's advanced care plan is present, code status is documented, and a surrogate decision maker is listed in the patient's medical record.       The patient was seen and examined by me on 5/3 at 2 AM.    Electronically signed by Ann Persaud MD, 05/03/25, 06:04 CDT.

## 2025-05-03 NOTE — CONSULTS
Adult Nutrition  Assessment/PES    Patient Name:  Heather Russo  YOB: 1955  MRN: [id number]  Admit Date:  5/2/2025    Assessment Date:  5/3/2025   Reason for Assessment       Row Name 05/03/25 1006          Reason for Assessment    Reason For Assessment physician consult     Diagnosis other (see comments)  Syncope and collapse     Identified At Risk by Screening Criteria MST SCORE 2+;reduced oral intake over the last month;other (see comments)  unsure wt loss               Nutrition/Diet History       Row Name 05/03/25 1008          Nutrition/Diet History    Typical Intake (Food/Fluid/EN/PN) Pt seen in room with  for nutrition consult due to decreased appetite. When asked about recent wt loss, pt reported an UBW of 130 lb last month; current admission wt is 124 lb, indicating a 5% wt loss over one month. Pt is familiar to dietitian; previous notes indicate a history of weight fluctuations, which is also supported by recorded wt's.  Pt c/o N/V, weakness, and diarrhea at home, which may have contributed to recent wt loss. Pt currently receiving IV fluids at 100 mL/hr. Awaiting results from GI panel to further evaluate cause of diarrhea.  Pt agreeable to taking Boost with breakfast for added nutrition. Calorie and protein needs reviewed, along with the importance of hydration and adequate nutrition. Pt encouraged to maintain good intake both during hospitalization and at home. Will continue to monitor.     Food Intolerance(s) NKFA     Factors Affecting Nutritional Intake altered gastrointestinal function;nausea;fatigue               Labs/Tests/Procedures/Meds       Row Name 05/03/25 1021          Labs/Procedures/Meds    Lab Results Reviewed reviewed     Lab Results Comments BUN 32, Creat 1.41, Glu 170        Diagnostic Tests/Procedures    Diagnostic Test/Procedure Reviewed reviewed        Medications    Pertinent Medications Reviewed reviewed     Pertinent Medications Comments IV fluids  "100ml/hr               Physical Findings       Row Name 05/03/25 1022          Physical Findings    Overall Physical Appearance Rafael Score 19, room air, BM 5/3               Estimated/Assessed Needs - Anthropometrics       Row Name 05/03/25 1022 05/03/25 0252       Anthropometrics    Height -- 160 cm (63\")    Weight -- 56.4 kg (124 lb 4.8 oz)    Calculation Weight 57 kg (125 lb 10.6 oz) --       Estimated/Assessed Needs    Additional Documentation KCAL/KG (Group);Protein Requirements (Group);Fluid Requirements (Group) --       KCAL/KG    KCAL/KG 25 Kcal/Kg (kcal);Kcal/KG (kcal) comment --    25 Kcal/Kg (kcal) 1425 --    KCAL/KG (kcal) 1539  27kcal/kg/day --       Protein Requirements    Weight Used For Protein Calculations 57 kg (125 lb 10.6 oz) --    Est Protein Requirement Amount (gms/kg) 1.1 gm protein --    Estimated Protein Requirements (gms/day) 62.7 --       Fluid Requirements    Fluid Requirements (mL/day) 1539 --              Nutrition Prescription Ordered       Row Name 05/03/25 1023          Nutrition Prescription PO    Fluid Consistency Thin     Common Modifiers Cardiac               Evaluation of Received Nutrient/Fluid Intake       Row Name 05/03/25 1023          Nutrient/Fluid Evaluation    Number of Days Evaluated --  Insufficient data; admitted <24hr        Fluid Intake Evaluation    Oral Fluid (mL) --  Insufficient data; admitted <24hr        PO Evaluation    Number of Days PO Intake Evaluated Insufficient Data        EN Evaluation    HOB --              Problem/Interventions:   Problem 1       Row Name 05/03/25 1024          Nutrition Diagnoses Problem 1    Problem 1 Predicted Suboptimal Intake     Etiology (related to) Medical Diagnosis;Factors Affecting Nutrition     Gastrointestinal Diarrhea;Nausea     Mental State/Condition Weakness     Signs/Symptoms (evidenced by) Unintended Weight Change;% UBW;Report/Observation     Percent (%) UBW 95 %     Unintended Weight Change Loss     Number of " Pounds Lost 6     Weight loss time period 1 month     Reported/Observed By Patient     Reported GI Symptoms Nausea and vomiting;Diarrhea               Intervention Goal       Row Name 05/03/25 1025          Intervention Goal    General Meet nutritional needs for age/condition;Reduce/improve symptoms;Disease management/therapy     PO Tolerate PO;Meet estimated needs     Weight Maintain weight               Nutrition Intervention       Row Name 05/03/25 1025          Nutrition Intervention    RD/Tech Action Recommend/ordered;Follow Tx progress;Care plan reviewd;Encourage intake;Interview for preference     Recommended/Ordered Supplement               Nutrition Prescription       Row Name 05/03/25 1025          Nutrition Prescription PO    PO Prescription Begin/change supplement     Supplement Boost     Supplement Frequency Daily               Education/Evaluation       Row Name 05/03/25 1025          Education    Education Provided education regarding     Provided education regarding Nutrition related factor;Avoidance of associated complications        Monitor/Evaluation    Monitor Per protocol            Electronically signed by:  Sherley Gomez RDN, LD  05/03/25 10:26 CDT

## 2025-05-04 VITALS
DIASTOLIC BLOOD PRESSURE: 89 MMHG | RESPIRATION RATE: 16 BRPM | SYSTOLIC BLOOD PRESSURE: 172 MMHG | BODY MASS INDEX: 22.02 KG/M2 | TEMPERATURE: 97.9 F | HEIGHT: 63 IN | HEART RATE: 68 BPM | WEIGHT: 124.3 LBS | OXYGEN SATURATION: 97 %

## 2025-05-04 PROBLEM — I95.9 HYPOTENSION: Status: ACTIVE | Noted: 2025-05-04

## 2025-05-04 PROBLEM — N17.9 ACUTE KIDNEY INJURY: Status: ACTIVE | Noted: 2025-05-04

## 2025-05-04 PROBLEM — R55 SYNCOPE: Status: ACTIVE | Noted: 2025-05-04

## 2025-05-04 LAB
ANION GAP SERPL CALCULATED.3IONS-SCNC: 10 MMOL/L (ref 5–15)
BACTERIA SPEC AEROBE CULT: NO GROWTH
BUN SERPL-MCNC: 22 MG/DL (ref 8–23)
BUN/CREAT SERPL: 31.9 (ref 7–25)
CALCIUM SPEC-SCNC: 9.5 MG/DL (ref 8.6–10.5)
CHLORIDE SERPL-SCNC: 110 MMOL/L (ref 98–107)
CO2 SERPL-SCNC: 19 MMOL/L (ref 22–29)
CREAT SERPL-MCNC: 0.69 MG/DL (ref 0.57–1)
DEPRECATED RDW RBC AUTO: 46.8 FL (ref 37–54)
EGFRCR SERPLBLD CKD-EPI 2021: 94.7 ML/MIN/1.73
ERYTHROCYTE [DISTWIDTH] IN BLOOD BY AUTOMATED COUNT: 14 % (ref 12.3–15.4)
GLUCOSE SERPL-MCNC: 123 MG/DL (ref 65–99)
HBA1C MFR BLD: 5.8 % (ref 4.8–5.6)
HCT VFR BLD AUTO: 32.8 % (ref 34–46.6)
HGB BLD-MCNC: 10.7 G/DL (ref 12–15.9)
MCH RBC QN AUTO: 29.8 PG (ref 26.6–33)
MCHC RBC AUTO-ENTMCNC: 32.6 G/DL (ref 31.5–35.7)
MCV RBC AUTO: 91.4 FL (ref 79–97)
PLATELET # BLD AUTO: 178 10*3/MM3 (ref 140–450)
PMV BLD AUTO: 9.4 FL (ref 6–12)
POTASSIUM SERPL-SCNC: 4.4 MMOL/L (ref 3.5–5.2)
RBC # BLD AUTO: 3.59 10*6/MM3 (ref 3.77–5.28)
SODIUM SERPL-SCNC: 139 MMOL/L (ref 136–145)
WBC NRBC COR # BLD AUTO: 5.89 10*3/MM3 (ref 3.4–10.8)

## 2025-05-04 PROCEDURE — G0378 HOSPITAL OBSERVATION PER HR: HCPCS

## 2025-05-04 PROCEDURE — 80048 BASIC METABOLIC PNL TOTAL CA: CPT | Performed by: STUDENT IN AN ORGANIZED HEALTH CARE EDUCATION/TRAINING PROGRAM

## 2025-05-04 PROCEDURE — 85027 COMPLETE CBC AUTOMATED: CPT | Performed by: STUDENT IN AN ORGANIZED HEALTH CARE EDUCATION/TRAINING PROGRAM

## 2025-05-04 PROCEDURE — 25010000002 CEFTRIAXONE PER 250 MG

## 2025-05-04 PROCEDURE — 83036 HEMOGLOBIN GLYCOSYLATED A1C: CPT | Performed by: STUDENT IN AN ORGANIZED HEALTH CARE EDUCATION/TRAINING PROGRAM

## 2025-05-04 RX ORDER — LOSARTAN POTASSIUM 25 MG/1
25 TABLET ORAL
Status: DISCONTINUED | OUTPATIENT
Start: 2025-05-04 | End: 2025-05-04 | Stop reason: HOSPADM

## 2025-05-04 RX ADMIN — LOSARTAN POTASSIUM 25 MG: 25 TABLET, FILM COATED ORAL at 11:50

## 2025-05-04 RX ADMIN — ATORVASTATIN CALCIUM 40 MG: 40 TABLET, FILM COATED ORAL at 08:46

## 2025-05-04 RX ADMIN — CARVEDILOL 12.5 MG: 6.25 TABLET, FILM COATED ORAL at 08:46

## 2025-05-04 RX ADMIN — SODIUM CHLORIDE 1000 MG: 900 INJECTION INTRAVENOUS at 06:12

## 2025-05-04 RX ADMIN — TICAGRELOR 90 MG: 90 TABLET ORAL at 08:46

## 2025-05-04 RX ADMIN — PANTOPRAZOLE SODIUM 40 MG: 40 TABLET, DELAYED RELEASE ORAL at 08:46

## 2025-05-04 RX ADMIN — ASPIRIN 81 MG: 81 TABLET, COATED ORAL at 08:46

## 2025-05-04 RX ADMIN — Medication 10 ML: at 08:46

## 2025-05-04 NOTE — DISCHARGE SUMMARY
[facility]  DISCHARGE SUMMARY       Date of Admission: 5/2/2025  Date of Discharge:  5/4/2025  Primary Care Physician: Grzegorz Michel Jr., MD    Presenting Problem/History of Present Illness:  Heather Russo is a 69 y.o. female is a 69 y.o. female with pmh of hypertension, hyperlipidemia, CAD, COPD who presented to the ER with complaints of weakness and hypotension.  She was feeling weak and measured her blood pressure and was found to be in the 60s.  There was a reported presyncopal episode as well.  She was admitted for UTI and AYALA.    Final Discharge Diagnoses:  Active Hospital Problems    Diagnosis     **Syncope     Hypotension     Acute kidney injury        Consults: None    Procedures Performed: None    Pertinent Test Results:   Results for orders placed during the hospital encounter of 02/01/25    Adult Transthoracic Echo Complete W/ Cont if Necessary Per Protocol    Interpretation Summary    Left ventricular systolic function is normal. Left ventricular ejection fraction appears to be 61 - 65%.    Left ventricular diastolic function was normal.    The right ventricular cavity is borderline dilated with normal systolic function.    There is no significant (more than mild) valvular stenosis or regurgitation.      Imaging Results (All)       Procedure Component Value Units Date/Time    XR Chest 2 View [[id number]] Collected: 05/03/25 0706     Updated: 05/03/25 0711    Narrative:      EXAMINATION: XR CHEST 2 VW-     5/2/2025 10:34 PM     HISTORY: fall     Images are stored in PACS per institutional protocol.     2 view chest x-ray.     COMPARISON:  2/1/2025.     Heart size is normal.  Coronary artery stent noted.  Small to moderate sized hiatal hernia.  The mediastinum is within normal limits.     The lungs are mildly hyperexpanded with no pneumonia or pneumothorax.  Mild chronic appearing interstitial disease with a few calcified  granulomas.  No  congestive failure changes.     Old RIGHT eighth rib fracture noted.       Impression:      1. No acute disease.  2. Stable chronic change.                 This report was signed and finalized on 5/3/2025 7:07 AM by Dr. Chase Rios MD.       CT Head Without Contrast [[id number]] Collected: 05/03/25 0702     Updated: 05/03/25 0706    Narrative:      EXAMINATION: CT HEAD WO CONTRAST-      5/2/2025 11:02 PM     HISTORY: syncope/fall; R55-Syncope and collapse; E86.0-Dehydration;  N17.9-Acute kidney failure, unspecified     In order to have a CT radiation dose as low as reasonably achievable  Automated Exposure Control was utilized for adjustment of the mA and/or  KV according to patient size.     CT Dose DLP = 696.29 mGy.cm.  (If there are multiple studies performed at the same time this  represents the total dose).     Images are stored in PACS per institutional protocol.        Noncontrast head CT.  Axial, sagittal, and coronal sequences.     COMPARISON:  8/10/2020.     A preliminary [employer] report was faxed to the Emergency Department  immediately after this study was interpreted at 12:52 a.m.     Mild atrophy and moderate small vessel disease.  Normal ventricle size.     Clear mastoid air cells and paranasal sinuses.  No skull fracture.     No intracranial hemorrhage.       Impression:      1. Age-related changes.  2. No acute abnormality or significant traumatic injury.     This report was signed and finalized on 5/3/2025 7:03 AM by Dr. Chase Rios MD.             LAB RESULTS:      Lab 05/04/25  1022 05/03/25  0619 05/02/25  2351 05/02/25  2344   WBC 5.89 6.53  --  10.13   HEMOGLOBIN 10.7* 11.2*  --  12.5   HEMATOCRIT 32.8* 35.2  --  40.3   PLATELETS 178 174  --  215   NEUTROS ABS  --   --   --  7.63*   IMMATURE GRANS (ABS)  --   --   --  0.07*   LYMPHS ABS  --   --   --  1.00   MONOS ABS  --   --   --  1.29*   EOS ABS  --   --   --  0.09   MCV 91.4 93.4  --  95.7   LACTATE  --   --  1.1  --          Lab  05/04/25  1021 05/03/25  0619 05/02/25  2344   SODIUM 139 137 136   POTASSIUM 4.4 3.6 4.4   CHLORIDE 110* 104 101   CO2 19.0* 20.0* 15.0*   ANION GAP 10.0 13.0 20.0*   BUN 22 32* 33*   CREATININE 0.69 1.41* 1.93*   EGFR 94.7 40.7* 27.9*   GLUCOSE 123* 170* 83   CALCIUM 9.5 8.6 9.5   MAGNESIUM  --   --  1.9   TSH  --  0.456 0.758         Lab 05/03/25  0619 05/02/25  2344   TOTAL PROTEIN 6.1 7.2   ALBUMIN 3.8 4.4   GLOBULIN 2.3 2.8   ALT (SGPT) 6 7   AST (SGOT) 8 10   BILIRUBIN 0.2 0.3   ALK PHOS 69 79   LIPASE  --  16         Lab 05/03/25  0046 05/02/25  2344   HSTROP T 27* 29*             Lab 05/03/25  0619   VITAMIN B 12 331         Brief Urine Lab Results  (Last result in the past 365 days)        Color   Clarity   Blood   Leuk Est   Nitrite   Protein   CREAT   Urine HCG        05/03/25 0227 Yellow   Cloudy   Large (3+)   Large (3+)   Negative   30 mg/dL (1+)                 Microbiology Results (last 10 days)       Procedure Component Value - Date/Time    Blood Culture - Blood, Hand, Left [[id number]]  (Normal) Collected: 05/03/25 0629    Lab Status: Preliminary result Specimen: Blood from Hand, Left Updated: 05/04/25 0700     Blood Culture No growth at 24 hours    Blood Culture - Blood, Arm, Left [[id number]]  (Normal) Collected: 05/03/25 0620    Lab Status: Preliminary result Specimen: Blood from Arm, Left Updated: 05/04/25 0700     Blood Culture No growth at 24 hours    Urine Culture - Urine, Straight Cath [[id number]]  (Normal) Collected: 05/03/25 0227    Lab Status: Final result Specimen: Urine from Straight Cath Updated: 05/04/25 0956     Urine Culture No growth            Hospital Course:   Heather Russo is a 69 y.o. female is a 69 y.o. female with pmh of hypertension, hyperlipidemia, CAD, COPD who presented to the ER with complaints of weakness and hypotension.  She was feeling weak and measured her blood pressure and was found to be in the 60s.  There was a reported presyncopal episode as well.  She was  "admitted for UTI and AYALA.    # Hypotension and presyncope 2/2 above -resolved  - Follow blood cultures, no growth  - Urine cultures no growth , had received 2 days of ceftriaxone   - reports low oral intake  - was seen on 4/29/2025 by [facility] cardiology for complaints of dizziness and hypotension.  At that time her amlodipine was discontinued.  Hydrochlorothiazide dose was cut in half.  And follow-up was set up for 6/10.  - I discontinued hydrochlorothiazide.  Continue losartan and Coreg.  Advised patient to measure her blood pressure prior to taking her meds every day.  She will keep a blood pressure log and follow-up with cardiology on 6/10 existing appointments.  - Will also follow-up with PCP     # Acute kidney injury -solved  # Metabolic acidosis - improving   Cr on admission 1.93 > 1.41 > 0.69  HCO3 was 15 on presentation     # CAD s/p PCI -follow-up with cardiology and continue DAPT  # Hypertension --losartan and Coreg only.  Hydrochlorothiazide discontinued  # Hyperlipidemia --continue home Lipitor  # COPD not in exacerbation -continue home inhalers      Physical Exam on Discharge:  /89 (BP Location: Left arm, Patient Position: Lying) Comment: rn notified  Pulse 68   Temp 97.9 °F (36.6 °C) (Oral)   Resp 16   Ht 160 cm (63\")   Wt 56.4 kg (124 lb 4.8 oz)   SpO2 97%   BMI 22.02 kg/m²   Physical Exam  GEN: Awake, alert, interactive, in NAD, on room air  HEENT: Atraumatic, EOMI, Anicteric, Trachea midline  Lungs: CTAB, no wheezing/rales/rhonchi  Heart: RRR, +S1/s2, no rub  ABD: soft, nt/nd, +BS, no guarding/rebound  Extremities: atraumatic, no cyanosis, no edema  Skin: no rashes or lesions  Neuro: AAOx3, no focal deficits  Psych: normal mood & affect      Condition on Discharge: Stable, at baseline    Discharge Disposition:  Home or Self Care    Discharge Medications:     Discharge Medications        Continue These Medications        Instructions Start Date   albuterol sulfate  " (90 Base) MCG/ACT inhaler  Commonly known as: PROVENTIL HFA;VENTOLIN HFA;PROAIR HFA   2 puffs, Every 4 Hours PRN      amitriptyline 25 MG tablet  Commonly known as: ELAVIL   25 mg, Nightly      ARIPiprazole 15 MG tablet  Commonly known as: ABILIFY   15 mg, Daily      aspirin 81 MG EC tablet   81 mg, Oral, Daily      atorvastatin 40 MG tablet  Commonly known as: LIPITOR   40 mg, Oral, Daily      budesonide-formoterol 160-4.5 MCG/ACT inhaler  Commonly known as: SYMBICORT   2 puffs, Inhalation, 2 Times Daily - RT      carvedilol 12.5 MG tablet  Commonly known as: COREG   12.5 mg, Oral, 2 Times Daily With Meals      cetirizine 10 MG tablet  Commonly known as: zyrTEC   10 mg, Daily      DULoxetine 60 MG capsule  Commonly known as: CYMBALTA   60 mg, Daily      LORazepam 1 MG tablet  Commonly known as: ATIVAN   0.5-1 mg, 2 Times Daily PRN      losartan 25 MG tablet  Commonly known as: COZAAR   25 mg, Oral, Every 24 Hours Scheduled      methocarbamol 500 MG tablet  Commonly known as: ROBAXIN   500 mg, 3 Times Daily PRN      pantoprazole 40 MG EC tablet  Commonly known as: PROTONIX   40 mg, 2 Times Daily      promethazine 25 MG tablet  Commonly known as: PHENERGAN   25 mg, Every 6 Hours PRN      rizatriptan MLT 10 MG disintegrating tablet  Commonly known as: MAXALT-MLT   10 mg, Once As Needed      ticagrelor 90 MG tablet tablet  Commonly known as: BRILINTA   90 mg, Oral, 2 Times Daily             Stop These Medications      hydroCHLOROthiazide 25 MG tablet            Discharge Diet:   Dietary Orders (From admission, onward)       Start     Ordered    05/03/25 1029  Dietary Nutrition Supplements Other; Boost Original- Chocolate  Daily With Breakfast      Question Answer Comment   Select Supplement: Other    Other Boost Original- Chocolate        05/03/25 1028    05/03/25 0312  Diet: Cardiac; Healthy Heart (2-3 Na+); Fluid Consistency: Thin (IDDSI 0)  Diet Effective Now        References:    Diet Order Definitions   Question  Answer Comment   Diets: Cardiac    Cardiac Diet: Healthy Heart (2-3 Na+)    Fluid Consistency: Thin (IDDSI 0)        05/03/25 0312                      Activity at Discharge: Regular, as tolerated    Follow-up Appointments:   Future Appointments   Date Time Provider Department Center   6/10/2025  1:15 PM Lyle Muir APRN [facility]   9/30/2025  2:00 PM Lyle Muir APRN [facility]       Test Results Pending at Discharge: None    Electronically signed by Daniele Arreaga MD, 05/04/25, 11:51 CDT.    Time: 32 minutes.

## 2025-05-04 NOTE — PLAN OF CARE
Goal Outcome Evaluation:  Plan of Care Reviewed With: patient           Outcome Evaluation: Pt A&Ox3, disoriented to year. Up voiding to toilet, no BM collected for GI panel this shift. IV to R thumb IID, for q24 hr ABX. Spouse at bedside. On RA, Tele NSR. Safety maintained, call light within reach.

## 2025-05-04 NOTE — PLAN OF CARE
Goal Outcome Evaluation:   Pt sitting upright in bed. Aox4. Bp elevated; MD aware. Administered Losartan per orders. Tele removed. IV removed. Pt being discharged home. Dc education completed with pt and spouse. Verbalized understanding. No further questions. Call light within reach. Safety maintained. Care continues. Pt taken to main entrance at 1203.

## 2025-05-04 NOTE — PLAN OF CARE
Goal Outcome Evaluation:         Patient A&Ox4, VSS, RA, IV fluids completed, b/p soft and b/p meds held today. Up to bath room with stand by assist. Patient has c/o  back and rib pain, medicated with PRN's and good relief. All safety maintained and call light in reach.

## 2025-05-08 LAB
BACTERIA SPEC AEROBE CULT: NORMAL
BACTERIA SPEC AEROBE CULT: NORMAL

## 2025-05-09 LAB
QT INTERVAL: 386 MS
QTC INTERVAL: 440 MS

## 2025-06-10 ENCOUNTER — OFFICE VISIT (OUTPATIENT)
Dept: CARDIOLOGY | Facility: CLINIC | Age: 70
End: 2025-06-10
Payer: MEDICARE

## 2025-06-10 VITALS
HEART RATE: 78 BPM | RESPIRATION RATE: 18 BRPM | SYSTOLIC BLOOD PRESSURE: 118 MMHG | HEIGHT: 63 IN | BODY MASS INDEX: 21.97 KG/M2 | OXYGEN SATURATION: 96 % | DIASTOLIC BLOOD PRESSURE: 65 MMHG | WEIGHT: 124 LBS

## 2025-06-10 DIAGNOSIS — I10 PRIMARY HYPERTENSION: ICD-10-CM

## 2025-06-10 DIAGNOSIS — E78.2 MIXED HYPERLIPIDEMIA: ICD-10-CM

## 2025-06-10 DIAGNOSIS — Z72.0 TOBACCO ABUSE: ICD-10-CM

## 2025-06-10 DIAGNOSIS — R42 DIZZINESS: ICD-10-CM

## 2025-06-10 DIAGNOSIS — I25.10 CORONARY ARTERY DISEASE INVOLVING NATIVE CORONARY ARTERY OF NATIVE HEART WITHOUT ANGINA PECTORIS: Primary | ICD-10-CM

## 2025-06-10 PROCEDURE — 99214 OFFICE O/P EST MOD 30 MIN: CPT | Performed by: NURSE PRACTITIONER

## 2025-06-10 PROCEDURE — 3074F SYST BP LT 130 MM HG: CPT | Performed by: NURSE PRACTITIONER

## 2025-06-10 PROCEDURE — 3078F DIAST BP <80 MM HG: CPT | Performed by: NURSE PRACTITIONER

## 2025-06-10 NOTE — PROGRESS NOTES
Subjective:     Encounter Date:06/10/2025      Patient ID: Heather Russo is a 69 y.o. female     Chief Complaint:  History of Present Illness  Patient presents today for follow up for hypotension. She was last seen 4/29 for dizziness and hypotension. Amlodopine was stopped and HCTZ cut in half. She was admitted 5/2-5/4 for AYALA, UTI and Hypotension. Her HCTZ was stopped. She was treated with fluids and antibitoic. Patient has coronary artery disease. She had NSTMI in February with heavy disease of RCA with overlapping stents. She also has COPD, GERD, hypertension, hyperlipidemia and tobacco abuse. Overall she notes she is feeling much better. Denies chest pain. Dizziness is much better. Continues to smoke roughly 1.5 pack per day.     The following portions of the patient's history were reviewed and updated as appropriate: allergies, current medications, past medical history, past social history, past and problem list.    Allergies   Allergen Reactions    Lyrica [Pregabalin] Swelling     LIPS AND FACE    Morphine Itching    Codeine Itching    Penicillins Unknown - Low Severity     CHILDHOOD ALLERGY         Current Outpatient Medications:     albuterol sulfate  (90 Base) MCG/ACT inhaler, Inhale 2 puffs Every 4 (Four) Hours As Needed for Wheezing., Disp: , Rfl:     amitriptyline (ELAVIL) 25 MG tablet, Take 1 tablet by mouth Every Night., Disp: , Rfl:     ARIPiprazole (ABILIFY) 15 MG tablet, Take 1 tablet by mouth Daily., Disp: , Rfl:     aspirin 81 MG EC tablet, Take 1 tablet by mouth Daily., Disp: , Rfl:     atorvastatin (LIPITOR) 40 MG tablet, Take 1 tablet by mouth Daily., Disp: 90 tablet, Rfl: 3    budesonide-formoterol (SYMBICORT) 160-4.5 MCG/ACT inhaler, Inhale 2 puffs 2 (Two) Times a Day., Disp: , Rfl: 0    carvedilol (COREG) 12.5 MG tablet, Take 1 tablet by mouth 2 (Two) Times a Day With Meals., Disp: , Rfl:     cetirizine (zyrTEC) 10 MG tablet, Take 1 tablet by mouth Daily., Disp: , Rfl:      DULoxetine (CYMBALTA) 60 MG capsule, Take 1 capsule by mouth Daily., Disp: , Rfl:     LORazepam (ATIVAN) 1 MG tablet, Take 0.5-1 tablets by mouth 2 (Two) Times a Day As Needed for Anxiety (severe anxiety)., Disp: , Rfl:     losartan (COZAAR) 25 MG tablet, Take 1 tablet by mouth Daily., Disp: 30 tablet, Rfl: 2    methocarbamol (ROBAXIN) 500 MG tablet, Take 1 tablet by mouth 3 (Three) Times a Day As Needed for Muscle Spasms., Disp: , Rfl:     pantoprazole (PROTONIX) 40 MG EC tablet, Take 1 tablet by mouth 2 (Two) Times a Day., Disp: , Rfl:     promethazine (PHENERGAN) 25 MG tablet, Take 1 tablet by mouth Every 6 (Six) Hours As Needed for Nausea or Vomiting., Disp: , Rfl:     rizatriptan MLT (MAXALT-MLT) 10 MG disintegrating tablet, Place 1 tablet on the tongue 1 (One) Time As Needed for Migraine. May repeat in 2 hours if needed, Disp: , Rfl:     ticagrelor (BRILINTA) 90 MG tablet tablet, Take 1 tablet by mouth 2 (Two) Times a Day., Disp: 60 tablet, Rfl: 11    Social History     Socioeconomic History    Marital status:    Tobacco Use    Smoking status: Every Day     Current packs/day: 2.00     Average packs/day: 2.0 packs/day for 30.0 years (60.0 ttl pk-yrs)     Types: Cigarettes    Smokeless tobacco: Never   Vaping Use    Vaping status: Never Used   Substance and Sexual Activity    Alcohol use: No    Drug use: No    Sexual activity: Defer       Review of Systems   Constitutional: Positive for malaise/fatigue. Negative for chills, decreased appetite, fever, weight gain and weight loss.   HENT:  Negative for nosebleeds.    Eyes:  Negative for visual disturbance.   Cardiovascular:  Negative for chest pain, dyspnea on exertion, leg swelling, near-syncope, orthopnea, palpitations, paroxysmal nocturnal dyspnea and syncope.   Respiratory:  Positive for shortness of breath. Negative for cough, hemoptysis and snoring.    Endocrine: Negative for cold intolerance and heat intolerance.   Hematologic/Lymphatic: Negative for  "bleeding problem. Does not bruise/bleed easily.   Skin:  Negative for rash.   Musculoskeletal:  Negative for back pain and falls.   Gastrointestinal:  Negative for abdominal pain, constipation, diarrhea, heartburn, melena, nausea and vomiting.   Genitourinary:  Negative for hematuria.   Neurological:  Negative for dizziness, headaches and light-headedness.   Psychiatric/Behavioral:  Negative for altered mental status.    Allergic/Immunologic: Negative for persistent infections.              Objective:     Constitutional:       Appearance: Healthy appearance. Well-developed and not in distress. Chronically ill-appearing.   Eyes:      Pupils: Pupils are equal, round, and reactive to light.   HENT:      Head: Normocephalic and atraumatic.   Neck:      Vascular: No carotid bruit or JVD.   Pulmonary:      Effort: Pulmonary effort is normal.      Breath sounds: Normal breath sounds.   Cardiovascular:      Normal rate. Regular rhythm.      Murmurs: There is no murmur.   Pulses:     Intact distal pulses.   Edema:     Peripheral edema absent.   Abdominal:      General: Bowel sounds are normal.      Palpations: Abdomen is soft.   Musculoskeletal: Normal range of motion.      Cervical back: Normal range of motion and neck supple. Skin:     General: Skin is warm and dry.   Neurological:      Mental Status: Alert, oriented to person, place, and time and oriented to person, place and time.      Deep Tendon Reflexes: Reflexes are normal and symmetric.   Psychiatric:         Behavior: Behavior normal.         Thought Content: Thought content normal.         Judgment: Judgment normal.           Procedures  /65 (BP Location: Left arm, Patient Position: Sitting, Cuff Size: Adult)   Pulse 78   Resp 18   Ht 160 cm (63\")   Wt 56.2 kg (124 lb)   SpO2 96%   BMI 21.97 kg/m²   Lab Review:   I have reviewed   Lab Results   Component Value Date    GLUCOSE 123 (H) 05/04/2025    CALCIUM 9.5 05/04/2025     05/04/2025    K 4.4 " 05/04/2025    CO2 19.0 (L) 05/04/2025     (H) 05/04/2025    BUN 22 05/04/2025    CREATININE 0.69 05/04/2025    EGFR 94.7 05/04/2025    BCR 31.9 (H) 05/04/2025    ANIONGAP 10.0 05/04/2025          Lab Results   Component Value Date    CHOL 154 02/02/2025    TRIG 228 (H) 02/02/2025    HDL 39 (L) 02/02/2025    LDL 77 02/02/2025      Results for orders placed during the hospital encounter of 02/01/25    Adult Transthoracic Echo Complete W/ Cont if Necessary Per Protocol    Interpretation Summary    Left ventricular systolic function is normal. Left ventricular ejection fraction appears to be 61 - 65%.    Left ventricular diastolic function was normal.    The right ventricular cavity is borderline dilated with normal systolic function.    There is no significant (more than mild) valvular stenosis or regurgitation.     Assessment:          Diagnosis Plan   1. Coronary artery disease involving native coronary artery of native heart without angina pectoris        2. Primary hypertension        3. Mixed hyperlipidemia        4. Dizziness        5. Tobacco abuse               Plan:       Coronary Artery Disease- NSTEMI in February with stents to RCA. No angina. Continue Brilinta and Aspirin. Continue Lipitor. Continue Coreg and Losartan  Hypertension- most recently with hypotension. HCTZ and Norvasc stopped. Blood pressure stable.   Hyperlipidemia - Continue Lipitor. LDL reasonably controlled.   Dizziness- improved. AYALA, UTI and Hypotension have improved  Tobacco Abuse     Heather Don Karan  reports that she has been smoking cigarettes. She has a 60 pack-year smoking history. She has never used smokeless tobacco. I have educated her on the risk of diseases from using tobacco products such as cancer, COPD, and heart disease.     I advised her to quit and she is willing to quit. We have discussed the following method/s for tobacco cessation:  Education Material.  Together we have set a quit date for 1 month from today.  She  will follow up with me in 3 months or sooner to check on her progress.    I spent 4 minutes counseling the patient.

## 2025-08-08 ENCOUNTER — TELEPHONE (OUTPATIENT)
Dept: CARDIOLOGY | Facility: CLINIC | Age: 70
End: 2025-08-08
Payer: MEDICARE

## (undated) DEVICE — PK CATH CARD 30 CA/4

## (undated) DEVICE — SENSR O2 OXIMAX FNGR A/ 18IN NONSTR

## (undated) DEVICE — HI-TORQUE BALANCE MIDDLEWEIGHT UNIVERSAL II GUIDE WIRE STRAIGHT TIP PAK  190 CM: Brand: HI-TORQUE BALANCE MIDDLEWEIGHT UNIVERSAL II

## (undated) DEVICE — KT NDL GUIDE STRL 18GA

## (undated) DEVICE — URETEROSCOPIC BIOPSY FORCEPS: Brand: PIRANHA

## (undated) DEVICE — PK TURNOVER CYSTO RM

## (undated) DEVICE — FRCP BIOP ENDO CAPTURA/PRO SERR SPK 2.8MM 230CM

## (undated) DEVICE — DUAL LUMEN URETERAL CATHETER

## (undated) DEVICE — ENDOSCOPIC SEAL URO 1 SIZE FITS ALL: Brand: ENDOSCOPIC SEAL

## (undated) DEVICE — FRCP BIOP COLD ENDOJAW ALLGTR W/NDL 2.8X2300MM BLU

## (undated) DEVICE — TBG SMPL FLTR LINE NASL 02/C02 A/ BX/100

## (undated) DEVICE — MODEL BT2000 P/N 700287-012KIT CONTENTS: MANIFOLD WITH SALINE AND CONTRAST PORTS, SALINE TUBING WITH SPIKE AND HAND SYRINGE, TRANSDUCER: Brand: BT2000 AUTOMATED MANIFOLD KIT

## (undated) DEVICE — PK CYSTO 30

## (undated) DEVICE — PK EXTRM 30

## (undated) DEVICE — MINI TREK CORONARY DILATATION CATHETER 2.0 MM X 12 MM / RAPID-EXCHANGE: Brand: MINI TREK

## (undated) DEVICE — THE CHANNEL CLEANING BRUSH IS A NYLON FLEXI BRUSH ATTACHED TO A FLEXIBLE PLASTIC SHEATH DESIGNED TO SAFELY REMOVE DEBRIS FROM FLEXIBLE ENDOSCOPES.

## (undated) DEVICE — BLD SCLPL BEAVR MINI STR 2BVL 180D LF

## (undated) DEVICE — TOOL INSRT GW MTL OR PLSTC

## (undated) DEVICE — CUFF,BP,DISP,1 TUBE,ADULT,HP: Brand: MEDLINE

## (undated) DEVICE — URETERAL ACCESS SHEATH SET: Brand: NAVIGATOR HD

## (undated) DEVICE — UNDERCAST PADDING: Brand: DEROYAL

## (undated) DEVICE — GLV SURG BIOGEL M LTX PF 7 1/2

## (undated) DEVICE — ANGIO-SEAL VIP VASCULAR CLOSURE DEVICE: Brand: ANGIO-SEAL

## (undated) DEVICE — DISPOSABLE TOURNIQUET CUFF SINGLE BLADDER, SINGLE PORT AND QUICK CONNECT CONNECTOR: Brand: COLOR CUFF

## (undated) DEVICE — ENDOGATOR AUXILIARY WATER JET CONNECTOR: Brand: ENDOGATOR

## (undated) DEVICE — YANKAUER,BULB TIP WITH VENT: Brand: ARGYLE

## (undated) DEVICE — CONMED SCOPE SAVER BITE BLOCK, 20X27 MM: Brand: SCOPE SAVER

## (undated) DEVICE — INF TL MULIPACK FR6: Brand: INFINITI

## (undated) DEVICE — Device: Brand: DEFENDO AIR/WATER/SUCTION AND BIOPSY VALVE

## (undated) DEVICE — SOLIDIFIER LIQ LIQUILOC/PLUS W/TREAT 2000CC

## (undated) DEVICE — GW SENSR DUALFLEX NITNL STR .038IN 3X150CM

## (undated) DEVICE — TBG DRN URINARY 9/32IN

## (undated) DEVICE — PK TURNOVER RM ADV

## (undated) DEVICE — FRCP BIOP ENDO CAPTURAPRO SPK SERR 2.8MM 230CM

## (undated) DEVICE — PINNACLE INTRODUCER SHEATH: Brand: PINNACLE

## (undated) DEVICE — INFLATION DEVICE: Brand: ENCORE™ 26

## (undated) DEVICE — CANN NASL ETCO2 LO/FLO A/

## (undated) DEVICE — CLTH CLENS READYCLEANSE PERI CARE PK/5

## (undated) DEVICE — 6F .070 JR4 ECO PK: Brand: VISTA BRITE TIP

## (undated) DEVICE — WATER 50W MAX / AIR 8W MAX: Brand: FLEXIVA TRACTIP

## (undated) DEVICE — SOL IRR NACL 0.9PCT BO 1000ML

## (undated) DEVICE — COPILOT BLEEDBACK CONTROL VALVE: Brand: COPILOT

## (undated) DEVICE — 4-PORT MANIFOLD: Brand: NEPTUNE 2

## (undated) DEVICE — GLV SURG BIOGEL LTX PF 8

## (undated) DEVICE — PAD, DEFIB, ADULT, RADIOTRANS, PHYSIO: Brand: MEDLINE

## (undated) DEVICE — NC TREK NEO™ CORONARY DILATATION CATHETER 3.50 MM X 20 MM / RAPID-EXCHANGE: Brand: NC TREK NEO™

## (undated) DEVICE — GLV SURG DERMASSURE GRN LF PF 8.0

## (undated) DEVICE — CVR BRD ARM 13X30

## (undated) DEVICE — ANTIBACTERIAL UNDYED BRAIDED (POLYGLACTIN 910), SYNTHETIC ABSORBABLE SUTURE: Brand: COATED VICRYL

## (undated) DEVICE — MASK,OXYGEN,MED CONC,ADLT,7' TUB, UC: Brand: PENDING

## (undated) DEVICE — GW STARTER FXD/CORE PTFE/COAT J/TP/3MM .035IN 10X150CM